# Patient Record
Sex: FEMALE | Race: WHITE | NOT HISPANIC OR LATINO | Employment: UNEMPLOYED | URBAN - METROPOLITAN AREA
[De-identification: names, ages, dates, MRNs, and addresses within clinical notes are randomized per-mention and may not be internally consistent; named-entity substitution may affect disease eponyms.]

---

## 2017-01-23 ENCOUNTER — THERAPY VISIT (OUTPATIENT)
Dept: CHIROPRACTIC MEDICINE | Facility: CLINIC | Age: 47
End: 2017-01-23
Payer: COMMERCIAL

## 2017-01-23 DIAGNOSIS — G89.29 CHRONIC LEFT-SIDED LOW BACK PAIN WITH LEFT-SIDED SCIATICA: ICD-10-CM

## 2017-01-23 DIAGNOSIS — M99.01 CERVICAL SEGMENT DYSFUNCTION: ICD-10-CM

## 2017-01-23 DIAGNOSIS — M54.2 CERVICALGIA: ICD-10-CM

## 2017-01-23 DIAGNOSIS — M25.552 HIP PAIN, LEFT: ICD-10-CM

## 2017-01-23 DIAGNOSIS — G89.29 CHRONIC BILATERAL THORACIC BACK PAIN: ICD-10-CM

## 2017-01-23 DIAGNOSIS — M99.03 SOMATIC DYSFUNCTION OF LUMBAR REGION: Primary | ICD-10-CM

## 2017-01-23 DIAGNOSIS — M54.6 CHRONIC BILATERAL THORACIC BACK PAIN: ICD-10-CM

## 2017-01-23 DIAGNOSIS — M54.42 CHRONIC LEFT-SIDED LOW BACK PAIN WITH LEFT-SIDED SCIATICA: ICD-10-CM

## 2017-01-23 PROCEDURE — 98941 CHIROPRACT MANJ 3-4 REGIONS: CPT | Mod: AT | Performed by: CHIROPRACTOR

## 2017-01-24 ENCOUNTER — OFFICE VISIT (OUTPATIENT)
Dept: FAMILY MEDICINE | Facility: CLINIC | Age: 47
End: 2017-01-24
Payer: COMMERCIAL

## 2017-01-24 VITALS
OXYGEN SATURATION: 99 % | BODY MASS INDEX: 32.07 KG/M2 | DIASTOLIC BLOOD PRESSURE: 80 MMHG | HEIGHT: 65 IN | TEMPERATURE: 98 F | HEART RATE: 101 BPM | SYSTOLIC BLOOD PRESSURE: 119 MMHG | WEIGHT: 192.5 LBS

## 2017-01-24 DIAGNOSIS — J20.9 ACUTE BRONCHITIS WITH SYMPTOMS > 10 DAYS: Primary | ICD-10-CM

## 2017-01-24 DIAGNOSIS — J45.21 MILD INTERMITTENT ASTHMA WITH ACUTE EXACERBATION: ICD-10-CM

## 2017-01-24 DIAGNOSIS — J30.9 ALLERGIC RHINITIS, UNSPECIFIED ALLERGIC RHINITIS TRIGGER, UNSPECIFIED RHINITIS SEASONALITY: ICD-10-CM

## 2017-01-24 PROCEDURE — 99214 OFFICE O/P EST MOD 30 MIN: CPT | Performed by: FAMILY MEDICINE

## 2017-01-24 RX ORDER — AZITHROMYCIN 250 MG/1
TABLET, FILM COATED ORAL
Qty: 6 TABLET | Refills: 0 | Status: SHIPPED | OUTPATIENT
Start: 2017-01-24 | End: 2017-01-30

## 2017-01-24 RX ORDER — PREDNISONE 20 MG/1
20 TABLET ORAL 2 TIMES DAILY
Qty: 10 TABLET | Refills: 0 | Status: SHIPPED | OUTPATIENT
Start: 2017-01-24 | End: 2017-09-28

## 2017-01-24 NOTE — NURSING NOTE
"Chief Complaint   Patient presents with     URI     Initial Pulse 101  Temp(Src) 98  F (36.7  C) (Oral)  Ht 5' 5.25\" (1.657 m)  Wt 192 lb 8 oz (87.317 kg)  BMI 31.80 kg/m2  SpO2 99% Estimated body mass index is 31.8 kg/(m^2) as calculated from the following:    Height as of this encounter: 5' 5.25\" (1.657 m).    Weight as of this encounter: 192 lb 8 oz (87.317 kg)..  BP completed using cuff size: leon Hernandez MA   "

## 2017-01-24 NOTE — PATIENT INSTRUCTIONS
Flonase 1 spray each nose daily 2 weeks  Zyrtec 10 mg daily 2 weeks  Mucinex 600 mg twice a day for 10 days   Supportive care as below  Schedule your albuterol every 6 hrs next three days will help with symptoms  zpak as directed   Chest xray if not better   Follow up with your Primary Dr Briggs   Your symptoms and exam today indicate that you have a viral upper respiratory illness.  This includes viral rhinosinusitis and viral bronchitis.  Antibiotics do not help viral illnesses; the best remedies treat the symptoms (see below).  The typical course of a viral illness is that you feel rather miserable for the first few days - with sore throat, runny nose/nasal congestion, cough, and sometimes fever and body aches.  You should start to feel better after about 5-7 days and much better by 10-14 days.  If you develop sudden worsening of symptoms or fever after the first 5-7 days, or if you have persistence of your symptoms beyond 14 days, let us know as you may have developed a secondary bacterial infection.      For symptom relief I suggest tryin. Steam.  Take a long, hot shower.  Or if you don't want to get in the shower just run it with the bathroom door shut for a few minutes and breathe the steam.  2. Drink hot liquids frequently such as tea or hot water with honey and lemon.  3. Acetaminophen (Tylenol) and ibuprofen (Motrin or Advil) as needed for headache, sore throat, body aches, or fever.  4. For loosening phlegm and sputum try guaifenesin (available in many combination products and alone as plain Robitussin or plain Mucinex) and for cough suppression you can try dextromethorphan (Delsym or combined in other products).  5. For nasal congestion try:    An oral decongestant.  The only decongestant I recommend is pseudoephedrine. Ask the pharmacist for the over the counter (but real) pseudoephedrine - not phenylephrine.  This can raise your blood pressure and heart rate so do not use this if you have  hypertension.       Afrin spray for 3 days.  (Never use afrin nasal spray for more than 3 days as there is a risk of developing tolerance and rebound/worsening nasal congestion if used longer than this.)    Nealmed sinus rinses.    Nasal steroid spray such as nasacort or flonase, which are over-the-counter.  6. And most importantly: plenty of rest and sleep  especially while you have a fever.     Stop smoking and avoid secondhand smoke    Drink lots of fluids such as water and clear soups. Fluids help loosen mucus. Fluids are also important because they help prevent dehydration.    Gargle with warm salt water a few times a day to relieve a sore throat. Throat sprays or lozenges may also help relieve the pain.    Avoid alcohol.    Use saline (salt water) nose drops to help loosen mucus and moisten the tender skin in your nose.  Encouraged mucinex, warm salt water gargles, cepacol spray, soothers/lozenges, sinus rinses (neilmed), flonase (2 sprays per nostril daily x 2 weeks), vitamin c, fluids and rest.  May alternate tylenol and NSAIDS (ibuprofen, advil, aleve type products) every 4-6 hours for the next few days as needed.
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
## 2017-01-24 NOTE — MR AVS SNAPSHOT
After Visit Summary   2017    Isabela Pnachal    MRN: [id number]           Patient Information     Date Of Birth          1970        Visit Information        Provider Department      2017 9:20 AM Blanca Espinosa MD [facility]        Today's Diagnoses     Acute bronchitis with symptoms > 10 days    -  1     Mild intermittent asthma with acute exacerbation         Allergic rhinitis, unspecified allergic rhinitis trigger, unspecified rhinitis seasonality           Care Instructions    Flonase 1 spray each nose daily 2 weeks  Zyrtec 10 mg daily 2 weeks  Mucinex 600 mg twice a day for 10 days   Supportive care as below  Schedule your albuterol every 6 hrs next three days will help with symptoms  zpak as directed   Chest xray if not better   Follow up with your Primary Dr Briggs   Your symptoms and exam today indicate that you have a viral upper respiratory illness.  This includes viral rhinosinusitis and viral bronchitis.  Antibiotics do not help viral illnesses; the best remedies treat the symptoms (see below).  The typical course of a viral illness is that you feel rather miserable for the first few days - with sore throat, runny nose/nasal congestion, cough, and sometimes fever and body aches.  You should start to feel better after about 5-7 days and much better by 10-14 days.  If you develop sudden worsening of symptoms or fever after the first 5-7 days, or if you have persistence of your symptoms beyond 14 days, let us know as you may have developed a secondary bacterial infection.      For symptom relief I suggest tryin. Steam.  Take a long, hot shower.  Or if you don't want to get in the shower just run it with the bathroom door shut for a few minutes and breathe the steam.  2. Drink hot liquids frequently such as tea or hot water with honey and lemon.  3. Acetaminophen (Tylenol) and ibuprofen (Motrin or Advil) as needed for headache, sore throat, body aches, or  fever.  4. For loosening phlegm and sputum try guaifenesin (available in many combination products and alone as plain Robitussin or plain Mucinex) and for cough suppression you can try dextromethorphan (Delsym or combined in other products).  5. For nasal congestion try:    An oral decongestant.  The only decongestant I recommend is pseudoephedrine. Ask the pharmacist for the over the counter (but real) pseudoephedrine - not phenylephrine.  This can raise your blood pressure and heart rate so do not use this if you have hypertension.       Afrin spray for 3 days.  (Never use afrin nasal spray for more than 3 days as there is a risk of developing tolerance and rebound/worsening nasal congestion if used longer than this.)    Nealmed sinus rinses.    Nasal steroid spray such as nasacort or flonase, which are over-the-counter.  6. And most importantly: plenty of rest and sleep  especially while you have a fever.     Stop smoking and avoid secondhand smoke    Drink lots of fluids such as water and clear soups. Fluids help loosen mucus. Fluids are also important because they help prevent dehydration.    Gargle with warm salt water a few times a day to relieve a sore throat. Throat sprays or lozenges may also help relieve the pain.    Avoid alcohol.    Use saline (salt water) nose drops to help loosen mucus and moisten the tender skin in your nose.  Encouraged mucinex, warm salt water gargles, cepacol spray, soothers/lozenges, sinus rinses (neilmed), flonase (2 sprays per nostril daily x 2 weeks), vitamin c, fluids and rest.  May alternate tylenol and NSAIDS (ibuprofen, advil, aleve type products) every 4-6 hours for the next few days as needed.             Follow-ups after your visit        Who to contact     If you have questions or need follow up information about today's clinic visit or your schedule please contact [facility] directly at [phone].  Normal or non-critical lab and imaging results will  "be communicated to you by MyChart, letter or phone within 4 business days after the clinic has received the results. If you do not hear from us within 7 days, please contact the clinic through Sequence Design or phone. If you have a critical or abnormal lab result, we will notify you by phone as soon as possible.  Submit refill requests through Sequence Design or call your pharmacy and they will forward the refill request to us. Please allow 3 business days for your refill to be completed.          Additional Information About Your Visit        Sequence Design Information     Sequence Design lets you send messages to your doctor, view your test results, renew your prescriptions, schedule appointments and more. To sign up, go to [email or web address] . Click on \"Log in\" on the left side of the screen, which will take you to the Welcome page. Then click on \"Sign up Now\" on the right side of the page.     You will be asked to enter the access code listed below, as well as some personal information. Please follow the directions to create your username and password.     Your access code is: [email or web address]  Expires: 2017  9:53 AM     Your access code will  in 90 days. If you need help or a new code, please call your [place] clinic or [phone].        Care EveryWhere ID     This is your Care EveryWhere ID. This could be used by other organizations to access your [place] medical records  [id number]        Your Vitals Were     Pulse Temperature Height BMI (Body Mass Index) Pulse Oximetry       101 98  F (36.7  C) (Oral) 5' 5.25\" (1.657 m) 31.80 kg/m2 99%        Blood Pressure from Last 3 Encounters:   17 119/80   16 123/84   16 122/73    Weight from Last 3 Encounters:   17 192 lb 8 oz (87.317 kg)   16 189 lb 12.8 oz (86.093 kg)   16 195 lb (88.451 kg)              Today, you had the following     No orders found for display         Today's Medication Changes          These changes are accurate as of: " 1/24/17  9:53 AM.  If you have any questions, ask your nurse or doctor.               Start taking these medicines.        Dose/Directions    azithromycin 250 MG tablet   Commonly known as:  ZITHROMAX   Used for:  Acute bronchitis with symptoms > 10 days   Started by:  Blanca Espinosa MD        Two tablets first day, then one tablet daily for four days.   Quantity:  6 tablet   Refills:  0       predniSONE 20 MG tablet   Commonly known as:  DELTASONE   Used for:  Acute bronchitis with symptoms > 10 days, Mild intermittent asthma with acute exacerbation   Started by:  Blanca Espinosa MD        Dose:  20 mg   Take 1 tablet (20 mg) by mouth 2 times daily   Quantity:  10 tablet   Refills:  0         Stop taking these medicines if you haven't already. Please contact your care team if you have questions.     nitrofurantoin (macrocrystal-monohydrate) 100 MG capsule   Commonly known as:  MACROBID   Stopped by:  Blanca Espinosa MD                Where to get your medicines      These medications were sent to [facility] [place] [address]  [address] [facility] [address]     Phone:  [phone]    - azithromycin 250 MG tablet  - predniSONE 20 MG tablet             Primary Care Provider Office Phone # Fax #    Felisha Briggs [phone]       [facility] [address]  [facility] [address]        Thank you!     Thank you for choosing [facility]  for your care. Our goal is always to provide you with excellent care. Hearing back from our patients is one way we can continue to improve our services. Please take a few minutes to complete the written survey that you may receive in the mail after your visit with us. Thank you!             Your Updated Medication List - Protect others around you: Learn how to safely use, store and throw away your medicines at www.disposemymeds.org.          This list is accurate as of: 1/24/17  9:53 AM.  Always use  your most recent med list.                   Brand Name Dispense Instructions for use    albuterol 108 (90 BASE) MCG/ACT Inhaler    PROAIR HFA/PROVENTIL HFA/VENTOLIN HFA    2 Inhaler    Inhale 2 puffs into the lungs every 6 hours as needed for shortness of breath / dyspnea or wheezing       azithromycin 250 MG tablet    ZITHROMAX    6 tablet    Two tablets first day, then one tablet daily for four days.       CALCIUM + D PO      Take  by mouth daily.       Capsaicin 0.1 % Crea     42.5 g    Externally apply 1 g topically 2 times daily as needed       fluticasone 50 MCG/ACT spray    FLONASE    16 g    Spray 2 sprays into both nostrils daily       gabapentin 300 MG capsule    NEURONTIN     Indications: Headache Take 300mg po BID x 7 days, then increase to 300mg po TID x 7 days, then increase to 600mg po TID.       lisinopril 10 MG tablet    PRINIVIL/ZESTRIL    90 tablet    Take 1 tablet (10 mg) by mouth daily       loratadine-pseudoePHEDrine  MG per 24 hr tablet    CLARITIN-D 24-hour    30 tablet    Take 1 tablet by mouth daily       MIRENA (52 MG) 20 MCG/24HR IUD   Generic drug:  levonorgestrel      USE FOR UP TO 5 YEARS THEN REMOVE       predniSONE 20 MG tablet    DELTASONE    10 tablet    Take 1 tablet (20 mg) by mouth 2 times daily       traZODone 100 MG tablet    DESYREL    90 tablet    Take 1 tablet (100 mg) by mouth At Bedtime Please profile.       venlafaxine 150 MG 24 hr capsule    EFFEXOR-XR    90 capsule    Take 1 capsule (150 mg) by mouth daily Please profile.

## 2017-01-24 NOTE — PROGRESS NOTES
Visit #:  1/2017  Authorized to 04-23/2017    Subjective:  Isabela Panchal is a 45 year old female who is seen in f/u up for:        Somatic dysfunction of lumbar region  Chronic bilateral thoracic back pain  Chronic left-sided low back pain with left-sided sciatica  Hip pain, left  Cervical segment dysfunction  Cervicalgia.     Since last visit on 5/23/2016,  Isabela Panchal reports: 5-6/10 on VAS     Area of chief complaint:  Cervical, Thoracic and Lumbar :  Symptoms are graded at 5/10 on VAS. The quality is described as stiff, stabbing, dull.  Motion has increased, but is still not normal. The pt returns with L sided neck pain and HA that started this past week. She graded the pain a 6-7/10 on VAS at the time. She relates the episode to possible stress.  Previously her HA sx had been controlled and she denied taking medication to resolve the issue. The pt also reports low back discomfort more on the L side. She states she was shoveling snow and may have irritated the low back area.     Since last visit the patient feels that they are 50 percent  improved from last visit.       Objective:  The following was observed:    P: pain elicited on palpation, C2, C3, T5, T6, L4, L5, SACRUM, L PSIS  A: static palpation demonstrates intersegmental asymmetry C2, C3, T6, L4, L5, SACRUM, L PSIS  R: motion palpation notes restricted motion  T: hypertonicity at: Gluteal, Levator scapulae, Lumbar erector spine and Quad lumb L>>R    Segmental spinal dysfunction/restrictions found at:  C2 ,C3, T5, T6, L4, L5, SACRUM, L PSIS.      Assessment:    Diagnoses:      1. Somatic dysfunction of lumbar region    2. Chronic bilateral thoracic back pain    3. Chronic left-sided low back pain with left-sided sciatica    4. Hip pain, left    5. Cervical segment dysfunction    6. Cervicalgia        Patient's condition:  Exacerbation/regression    Treatment effectiveness:  No increase in sx, pt stable       Procedures:  CMT:  46398  Chiropractic manipulative treatment 3-4 regions performed manual adjustment  Cervical: diversified  C2, C3 , C7 , Prone  Thoracic: diversified , T1, T5, T6, Prone  Lumbar: diversified  L4, L5, Prone  Pelvis: Drop Table, Sacrum , PSIS Left , Prone    Modalities:  none    Therapeutic procedures: none       Response to Treatment  Reduction of symptoms no increase in sx, pt stable     Prognosis: Guarded    Progress towards Goals: Patient is making progress towards the goal: pending    Recommendations:    Instructions:drink plenty of fluids    Follow-up:  Return to care: none PRN if sx persist

## 2017-02-13 ENCOUNTER — THERAPY VISIT (OUTPATIENT)
Dept: CHIROPRACTIC MEDICINE | Facility: CLINIC | Age: 47
End: 2017-02-13
Payer: COMMERCIAL

## 2017-02-13 DIAGNOSIS — M99.03 SOMATIC DYSFUNCTION OF LUMBAR REGION: Primary | ICD-10-CM

## 2017-02-13 DIAGNOSIS — M54.2 CERVICALGIA: ICD-10-CM

## 2017-02-13 DIAGNOSIS — M99.02 THORACIC SEGMENT DYSFUNCTION: ICD-10-CM

## 2017-02-13 DIAGNOSIS — M54.50 CHRONIC LEFT-SIDED LOW BACK PAIN WITHOUT SCIATICA: ICD-10-CM

## 2017-02-13 DIAGNOSIS — G89.29 CHRONIC LEFT-SIDED LOW BACK PAIN WITHOUT SCIATICA: ICD-10-CM

## 2017-02-13 DIAGNOSIS — R51.9 CEPHALGIA: ICD-10-CM

## 2017-02-13 DIAGNOSIS — M99.01 CERVICAL SEGMENT DYSFUNCTION: ICD-10-CM

## 2017-02-13 PROCEDURE — 98941 CHIROPRACT MANJ 3-4 REGIONS: CPT | Mod: AT | Performed by: CHIROPRACTOR

## 2017-02-13 NOTE — PROGRESS NOTES
Visit #:  2/2017  Authorized to 04-23/2017    Subjective:  Isabela Panchal is a 45 year old female who is seen in f/u up for:        Somatic dysfunction of lumbar region  Chronic left-sided low back pain without sciatica  Cervical segment dysfunction  Cervicalgia  Thoracic segment dysfunction  Cephalgia.     Since last visit on 5/23/2016,  Isabela Panchal reports: 5-6/10 on VAS     Area of chief complaint:  Cervical, Thoracic and Lumbar :  Symptoms are graded at 8/10 on VAS. The quality is described as stiff, stabbing, dull.  Motion has increased, but is still not normal. The pt reports a door slamming into her L hip area last week. She also reported moderate pain in the L shoulder area that she relates to the injury. The pt is not able to lift the arm or sleep on the L arm. She reports moderate mid back pain and pain radiating into the L medial 2 fingers coming from the L posterior shoulder. She continues to report HA however no migraine sx. The pt denies weakness or other unusual sx.     Since last visit the patient feels that they are 50 percent  improved from last visit-exacerbation/regression.       Objective:  The following was observed:    P: pain elicited on palpation, C2, C3, T5, T6, L4, L5, SACRUM, L PSIS  A: static palpation demonstrates intersegmental asymmetry C2, C3, T6, L4, L5, SACRUM, L PSIS  R: motion palpation notes restricted motion  T: hypertonicity at: Gluteal, Levator scapulae, Lumbar erector spine and Quad lumb L>>R    Segmental spinal dysfunction/restrictions found at:  C2 ,C3, T5, T6, L4, L5, SACRUM, L PSIS.      Assessment:    Diagnoses:      1. Somatic dysfunction of lumbar region    2. Chronic left-sided low back pain without sciatica    3. Cervical segment dysfunction    4. Cervicalgia    5. Thoracic segment dysfunction    6. Cephalgia        Patient's condition:  Exacerbation/regression    Treatment effectiveness:  No increase in sx, pt stable       Procedures:  CMT:  92446  Chiropractic manipulative treatment 3-4 regions performed manual adjustment  Cervical: diversified  C2, C3 , C7 , Prone  Thoracic: diversified , T1, T5, T6, Prone  Lumbar: diversified  L4, L5, Prone  Pelvis: Drop Table, Sacrum , PSIS Left , Prone    Modalities:  none    Therapeutic procedures: none       Response to Treatment  Reduction of symptoms no increase in sx, pt stable     Prognosis: Guarded    Progress towards Goals: Patient is making progress towards the goal: pending    Recommendations:    Instructions:drink plenty of fluids    Follow-up:  Return to care: 1 week to stabilize

## 2017-02-13 NOTE — MR AVS SNAPSHOT
"              After Visit Summary   2/13/2017    Isabela Panchal    MRN: [id number]           Patient Information     Date Of Birth          1970        Visit Information        Provider Department      2/13/2017 11:00 AM Eusebia Jenkins DC [facility]        Today's Diagnoses     Somatic dysfunction of lumbar region    -  1    Chronic left-sided low back pain without sciatica        Cervical segment dysfunction        Cervicalgia        Thoracic segment dysfunction        Cephalgia           Follow-ups after your visit        Your next 10 appointments already scheduled     Feb 27, 2017 11:45 AM CST   [facility]ractor with [facility] ([facility]  )    [address]  [place] [address] [phone]              Who to contact     If you have questions or need follow up information about today's clinic visit or your schedule please contact [facility] directly at [phone].  Normal or non-critical lab and imaging results will be communicated to you by GENWIhart, letter or phone within 4 business days after the clinic has received the results. If you do not hear from us within 7 days, please contact the clinic through GENWIhart or phone. If you have a critical or abnormal lab result, we will notify you by phone as soon as possible.  Submit refill requests through Mersive or call your pharmacy and they will forward the refill request to us. Please allow 3 business days for your refill to be completed.          Additional Information About Your Visit        Mersive Information     Mersive lets you send messages to your doctor, view your test results, renew your prescriptions, schedule appointments and more. To sign up, go to [email or web address] . Click on \"Log in\" on the left side of the screen, which will take you to the Welcome page. Then click on \"Sign up Now\" on the right side of the page.     You will be asked to enter the access code listed " below, as well as some personal information. Please follow the directions to create your username and password.     Your access code is: [email or web address]  Expires: 2017  9:53 AM     Your access code will  in 90 days. If you need help or a new code, please call your [place] clinic or [phone].        Care EveryWhere ID     This is your Care EveryWhere ID. This could be used by other organizations to access your [place] medical records  [id number]         Blood Pressure from Last 3 Encounters:   17 119/80   16 123/84   16 122/73    Weight from Last 3 Encounters:   17 87.3 kg (192 lb 8 oz)   16 86.1 kg (189 lb 12.8 oz)   16 88.5 kg (195 lb)              We Performed the Following     CHIROPRAC MANIP,SPINAL,3-4 REGIONS        Primary Care Provider Office Phone # Fax #    Felisha Briggs [phone]       [facility] [address]  [facility] [address]        Thank you!     Thank you for choosing [facility]  for your care. Our goal is always to provide you with excellent care. Hearing back from our patients is one way we can continue to improve our services. Please take a few minutes to complete the written survey that you may receive in the mail after your visit with us. Thank you!             Your Updated Medication List - Protect others around you: Learn how to safely use, store and throw away your medicines at www.disposemymeds.org.          This list is accurate as of: 17 12:05 PM.  Always use your most recent med list.                   Brand Name Dispense Instructions for use    albuterol 108 (90 BASE) MCG/ACT Inhaler    PROAIR HFA/PROVENTIL HFA/VENTOLIN HFA    2 Inhaler    Inhale 2 puffs into the lungs every 6 hours as needed for shortness of breath / dyspnea or wheezing       azithromycin 250 MG tablet    ZITHROMAX    6 tablet    Two tablets first day, then one tablet daily for four days.       CALCIUM + D PO       Take  by mouth daily.       Capsaicin 0.1 % Crea     42.5 g    Externally apply 1 g topically 2 times daily as needed       fluticasone 50 MCG/ACT spray    FLONASE    16 g    Spray 2 sprays into both nostrils daily       gabapentin 300 MG capsule    NEURONTIN     Indications: Headache Take 300mg po BID x 7 days, then increase to 300mg po TID x 7 days, then increase to 600mg po TID.       lisinopril 10 MG tablet    PRINIVIL/ZESTRIL    90 tablet    Take 1 tablet (10 mg) by mouth daily       loratadine-pseudoePHEDrine  MG per 24 hr tablet    CLARITIN-D 24-hour    30 tablet    Take 1 tablet by mouth daily       MIRENA (52 MG) 20 MCG/24HR IUD   Generic drug:  levonorgestrel      USE FOR UP TO 5 YEARS THEN REMOVE       predniSONE 20 MG tablet    DELTASONE    10 tablet    Take 1 tablet (20 mg) by mouth 2 times daily       traZODone 100 MG tablet    DESYREL    90 tablet    Take 1 tablet (100 mg) by mouth At Bedtime Please profile.       venlafaxine 150 MG 24 hr capsule    EFFEXOR-XR    90 capsule    Take 1 capsule (150 mg) by mouth daily Please profile.

## 2017-03-16 DIAGNOSIS — Z20.828 EXPOSURE TO THE FLU: Primary | ICD-10-CM

## 2017-03-16 RX ORDER — OSELTAMIVIR PHOSPHATE 75 MG/1
75 CAPSULE ORAL DAILY
Qty: 10 CAPSULE | Refills: 0 | Status: SHIPPED | OUTPATIENT
Start: 2017-03-16 | End: 2017-03-20

## 2017-03-16 NOTE — TELEPHONE ENCOUNTER
I agree with assessment and plan below, thanks!  Felisha Briggs  3/16/2017   ASSESSMENT / PLAN:  (Z20.828) Exposure to the flu  (primary encounter diagnosis)  Comment: refill signed.  Plan: oseltamivir (TAMIFLU) 75 MG capsule        Routed to MA, please let patient know she can  prescription at [facility], thanks. -Felisha

## 2017-03-16 NOTE — TELEPHONE ENCOUNTER
Routing to PCP.    Dr. Briggs-Please review and advise.  Med pended.    Thank you!  JOANIE Smith, BRETTN, RN

## 2017-03-28 ENCOUNTER — OFFICE VISIT (OUTPATIENT)
Dept: FAMILY MEDICINE | Facility: CLINIC | Age: 47
End: 2017-03-28
Payer: COMMERCIAL

## 2017-03-28 VITALS
HEART RATE: 92 BPM | OXYGEN SATURATION: 96 % | TEMPERATURE: 99.2 F | WEIGHT: 200 LBS | BODY MASS INDEX: 33.03 KG/M2 | SYSTOLIC BLOOD PRESSURE: 125 MMHG | DIASTOLIC BLOOD PRESSURE: 79 MMHG

## 2017-03-28 DIAGNOSIS — F33.1 MODERATE RECURRENT MAJOR DEPRESSION (H): Primary | ICD-10-CM

## 2017-03-28 DIAGNOSIS — F41.1 GENERALIZED ANXIETY DISORDER: ICD-10-CM

## 2017-03-28 DIAGNOSIS — L01.00 IMPETIGO: ICD-10-CM

## 2017-03-28 PROCEDURE — 99213 OFFICE O/P EST LOW 20 MIN: CPT | Performed by: FAMILY MEDICINE

## 2017-03-28 RX ORDER — MUPIROCIN 20 MG/G
OINTMENT TOPICAL 3 TIMES DAILY
Qty: 22 G | Refills: 1 | Status: SHIPPED | OUTPATIENT
Start: 2017-03-28 | End: 2017-04-02

## 2017-03-28 ASSESSMENT — ANXIETY QUESTIONNAIRES
7. FEELING AFRAID AS IF SOMETHING AWFUL MIGHT HAPPEN: NOT AT ALL
GAD7 TOTAL SCORE: 5
1. FEELING NERVOUS, ANXIOUS, OR ON EDGE: SEVERAL DAYS
5. BEING SO RESTLESS THAT IT IS HARD TO SIT STILL: NOT AT ALL
6. BECOMING EASILY ANNOYED OR IRRITABLE: SEVERAL DAYS
2. NOT BEING ABLE TO STOP OR CONTROL WORRYING: SEVERAL DAYS
3. WORRYING TOO MUCH ABOUT DIFFERENT THINGS: SEVERAL DAYS
IF YOU CHECKED OFF ANY PROBLEMS ON THIS QUESTIONNAIRE, HOW DIFFICULT HAVE THESE PROBLEMS MADE IT FOR YOU TO DO YOUR WORK, TAKE CARE OF THINGS AT HOME, OR GET ALONG WITH OTHER PEOPLE: SOMEWHAT DIFFICULT

## 2017-03-28 ASSESSMENT — PATIENT HEALTH QUESTIONNAIRE - PHQ9: 5. POOR APPETITE OR OVEREATING: SEVERAL DAYS

## 2017-03-28 NOTE — MR AVS SNAPSHOT
"              After Visit Summary   3/28/2017    Isabela Panchal    MRN: [id number]           Patient Information     Date Of Birth          1970        Visit Information        Provider Department      3/28/2017 1:40 PM Felisha Briggs MD [facility]        Today's Diagnoses     Moderate recurrent major depression (H)    -  1    Generalized anxiety disorder        Impetigo           Follow-ups after your visit        Who to contact     If you have questions or need follow up information about today's clinic visit or your schedule please contact [facility] directly at [phone].  Normal or non-critical lab and imaging results will be communicated to you by [employer], letter or phone within 4 business days after the clinic has received the results. If you do not hear from us within 7 days, please contact the clinic through [employer] or phone. If you have a critical or abnormal lab result, we will notify you by phone as soon as possible.  Submit refill requests through [employer] or call your pharmacy and they will forward the refill request to us. Please allow 3 business days for your refill to be completed.          Additional Information About Your Visit        MyChart Information     [employer] lets you send messages to your doctor, view your test results, renew your prescriptions, schedule appointments and more. To sign up, go to [email or web address] . Click on \"Log in\" on the left side of the screen, which will take you to the Welcome page. Then click on \"Sign up Now\" on the right side of the page.     You will be asked to enter the access code listed below, as well as some personal information. Please follow the directions to create your username and password.     Your access code is: [email or web address]  Expires: 2017 10:53 AM     Your access code will  in 90 days. If you need help or a new code, please call your [facility] or [phone].        Care " EveryWhere ID     This is your Care EveryWhere ID. This could be used by other organizations to access your [place] medical records  [id number]        Your Vitals Were     Pulse Temperature Pulse Oximetry BMI (Body Mass Index)          92 99.2  F (37.3  C) (Tympanic) 96% 33.03 kg/m2         Blood Pressure from Last 3 Encounters:   03/28/17 125/79   01/24/17 119/80   11/23/16 123/84    Weight from Last 3 Encounters:   03/28/17 200 lb (90.7 kg)   01/24/17 192 lb 8 oz (87.3 kg)   11/23/16 189 lb 12.8 oz (86.1 kg)              We Performed the Following     DEPRESSION ACTION PLAN (DAP)          Today's Medication Changes          These changes are accurate as of: 3/28/17  2:24 PM.  If you have any questions, ask your nurse or doctor.               Start taking these medicines.        Dose/Directions    mupirocin 2 % ointment   Commonly known as:  BACTROBAN   Used for:  Impetigo   Started by:  Felisha Briggs MD        Apply topically 3 times daily for 5 days   Quantity:  22 g   Refills:  1            Where to get your medicines      These medications were sent to [facility] [place] [address]  [address] [facility] [address]     Phone:  [phone]     mupirocin 2 % ointment                Primary Care Provider Office Phone # Fax #    Felisha Briggs [phone]       [facility] [address]  [facility] [address]        Thank you!     Thank you for choosing [facility]  for your care. Our goal is always to provide you with excellent care. Hearing back from our patients is one way we can continue to improve our services. Please take a few minutes to complete the written survey that you may receive in the mail after your visit with us. Thank you!             Your Updated Medication List - Protect others around you: Learn how to safely use, store and throw away your medicines at www.disposemymeds.org.          This  list is accurate as of: 3/28/17  2:24 PM.  Always use your most recent med list.                   Brand Name Dispense Instructions for use    albuterol 108 (90 BASE) MCG/ACT Inhaler    PROAIR HFA/PROVENTIL HFA/VENTOLIN HFA    2 Inhaler    Inhale 2 puffs into the lungs every 6 hours as needed for shortness of breath / dyspnea or wheezing       azithromycin 250 MG tablet    ZITHROMAX    6 tablet    Two tablets first day, then one tablet daily for four days.       CALCIUM + D PO      Take  by mouth daily.       Capsaicin 0.1 % Crea     42.5 g    Externally apply 1 g topically 2 times daily as needed       fluticasone 50 MCG/ACT spray    FLONASE    16 g    Spray 2 sprays into both nostrils daily       gabapentin 300 MG capsule    NEURONTIN     Indications: Headache Take 300mg po BID x 7 days, then increase to 300mg po TID x 7 days, then increase to 600mg po TID.       lisinopril 10 MG tablet    PRINIVIL/ZESTRIL    90 tablet    Take 1 tablet (10 mg) by mouth daily       loratadine-pseudoePHEDrine  MG per 24 hr tablet    CLARITIN-D 24-hour    30 tablet    Take 1 tablet by mouth daily       MIRENA (52 MG) 20 MCG/24HR IUD   Generic drug:  levonorgestrel      USE FOR UP TO 5 YEARS THEN REMOVE       mupirocin 2 % ointment    BACTROBAN    22 g    Apply topically 3 times daily for 5 days       oseltamivir 75 MG capsule    TAMIFLU    10 capsule    Take 1 capsule (75 mg) by mouth daily       predniSONE 20 MG tablet    DELTASONE    10 tablet    Take 1 tablet (20 mg) by mouth 2 times daily       traZODone 100 MG tablet    DESYREL    90 tablet    Take 1 tablet (100 mg) by mouth At Bedtime Please profile.       * venlafaxine 150 MG 24 hr capsule    EFFEXOR-XR    90 capsule    Take 1 capsule (150 mg) by mouth daily Please profile.       * venlafaxine 150 MG 24 hr capsule    EFFEXOR-XR    90 capsule    TAKE ONE CAPSULE BY MOUTH EVERY DAY (NEED TO BE SEEN IN CLINIC FOR FURTHER REFILLS)       * Notice:  This list has 2  medication(s) that are the same as other medications prescribed for you. Read the directions carefully, and ask your doctor or other care provider to review them with you.
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
## 2017-03-28 NOTE — LETTER
My Depression Action Plan  Name: Isabela Panchal   Date of Birth 1970  Date: 3/28/2017    My doctor: Felisha Briggs   My clinic: [address] [address] [phone]          GREEN    ZONE   Good Control    What it looks like:     Things are going generally well. You have normal up s and down s. You may even feel depressed from time to time, but bad moods usually last less than a day.   What you need to do:  1. Continue to care for yourself (see self care plan)  2. Check your depression survival kit and update it as needed  3. Follow your physician s recommendations including any medication.  4. Do not stop taking medication unless you consult with your physician first.           YELLOW         ZONE Getting Worse    What it looks like:     Depression is starting to interfere with your life.     It may be hard to get out of bed; you may be starting to isolate yourself from others.    Symptoms of depression are starting to last most all day and this has happened for several days.     You may have suicidal thoughts but they are not constant.   What you need to do:     1. Call your care team, your response to treatment will improve if you keep your care team informed of your progress. Yellow periods are signs an adjustment may need to be made.     2. Continue your self-care, even if you have to fake it!    3. Talk to someone in your support network    4. Open up your depression survival kit           RED    ZONE Medical Alert - Get Help    What it looks like:     Depression is seriously interfering with your life.     You may experience these or other symptoms: You can t get out of bed most days, can t work or engage in other necessary activities, you have trouble taking care of basic hygiene, or basic responsibilities, thoughts of suicide or death that will not go away, self-injurious behavior.     What you need to do:  1. Call your  care team and request a same-day appointment. If they are not available (weekends or after hours) call your local crisis line, emergency room or 911.      Electronically signed by: Ina Toussaint, March 28, 2017    Depression Self Care Plan / Survival Kit    Self-Care for Depression  Here s the deal. Your body and mind are really not as separate as most people think.  What you do and think affects how you feel and how you feel influences what you do and think. This means if you do things that people who feel good do, it will help you feel better.  Sometimes this is all it takes.  There is also a place for medication and therapy depending on how severe your depression is, so be sure to consult with your medical provider and/ or Behavioral Health Consultant if your symptoms are worsening or not improving.     In order to better manage my stress, I will:    Exercise  Get some form of exercise, every day. This will help reduce pain and release endorphins, the  feel good  chemicals in your brain. This is almost as good as taking antidepressants!  This is not the same as joining a gym and then never going! (they count on that by the way ) It can be as simple as just going for a walk or doing some gardening, anything that will get you moving.      Hygiene   Maintain good hygiene (Get out of bed in the morning, Make your bed, Brush your teeth, Take a shower, and Get dressed like you were going to work, even if you are unemployed).  If your clothes don't fit try to get ones that do.    Diet  I will strive to eat foods that are good for me, drink plenty of water, and avoid excessive sugar, caffeine, alcohol, and other mood-altering substances.  Some foods that are helpful in depression are: complex carbohydrates, B vitamins, flaxseed, fish or fish oil, fresh fruits and vegetables.    Psychotherapy  I agree to participate in Individual Therapy (if recommended).    Medication  If prescribed medications, I agree to take them.   Missing doses can result in serious side effects.  I understand that drinking alcohol, or other illicit drug use, may cause potential side effects.  I will not stop my medication abruptly without first discussing it with my provider.    Staying Connected With Others  I will stay in touch with my friends, family members, and my primary care provider/team.    Use your imagination  Be creative.  We all have a creative side; it doesn t matter if it s oil painting, sand castles, or mud pies! This will also kick up the endorphins.    Witness Beauty  (AKA stop and smell the roses) Take a look outside, even in mid-winter. Notice colors, textures. Watch the squirrels and birds.     Service to others  Be of service to others.  There is always someone else in need.  By helping others we can  get out of ourselves  and remember the really important things.  This also provides opportunities for practicing all the other parts of the program.    Humor  Laugh and be silly!  Adjust your TV habits for less news and crime-drama and more comedy.    Control your stress  Try breathing deep, massage therapy, biofeedback, and meditation. Find time to relax each day.     My support system    Clinic Contact:  Phone number:    Contact 1:  Phone number:    Contact 2:  Phone number:    Denominational/:  Phone number:    Therapist:  Phone number:    Local crisis center:    Phone number:    Other community support:  Phone number:

## 2017-03-29 ASSESSMENT — PATIENT HEALTH QUESTIONNAIRE - PHQ9: SUM OF ALL RESPONSES TO PHQ QUESTIONS 1-9: 9

## 2017-03-29 ASSESSMENT — ASTHMA QUESTIONNAIRES: ACT_TOTALSCORE: 22

## 2017-03-29 ASSESSMENT — ANXIETY QUESTIONNAIRES: GAD7 TOTAL SCORE: 5

## 2017-04-27 ENCOUNTER — OFFICE VISIT (OUTPATIENT)
Dept: FAMILY MEDICINE | Facility: CLINIC | Age: 47
End: 2017-04-27
Payer: COMMERCIAL

## 2017-04-27 VITALS
OXYGEN SATURATION: 96 % | BODY MASS INDEX: 32.86 KG/M2 | SYSTOLIC BLOOD PRESSURE: 126 MMHG | DIASTOLIC BLOOD PRESSURE: 85 MMHG | WEIGHT: 199 LBS | HEART RATE: 86 BPM | TEMPERATURE: 98.2 F

## 2017-04-27 DIAGNOSIS — M51.369 DDD (DEGENERATIVE DISC DISEASE), LUMBAR: ICD-10-CM

## 2017-04-27 DIAGNOSIS — M54.2 CERVICALGIA: ICD-10-CM

## 2017-04-27 DIAGNOSIS — M62.830 BACK MUSCLE SPASM: Primary | ICD-10-CM

## 2017-04-27 PROCEDURE — 99213 OFFICE O/P EST LOW 20 MIN: CPT | Performed by: FAMILY MEDICINE

## 2017-04-27 RX ORDER — CYCLOBENZAPRINE HCL 10 MG
5-10 TABLET ORAL 3 TIMES DAILY PRN
Qty: 30 TABLET | Refills: 1 | Status: SHIPPED | OUTPATIENT
Start: 2017-04-27 | End: 2017-09-28

## 2017-04-27 ASSESSMENT — ENCOUNTER SYMPTOMS
LOSS OF CONSCIOUSNESS: 0
PHOTOPHOBIA: 1
TREMORS: 0
FOCAL WEAKNESS: 0
GASTROINTESTINAL NEGATIVE: 1
SPEECH CHANGE: 0
CARDIOVASCULAR NEGATIVE: 1
SEIZURES: 0
CHILLS: 0
WEIGHT LOSS: 0
DIZZINESS: 1
BRUISES/BLEEDS EASILY: 0
NECK PAIN: 1
HEADACHES: 1
FALLS: 0
TINGLING: 0
FEVER: 0
RESPIRATORY NEGATIVE: 1
SENSORY CHANGE: 0
MYALGIAS: 1
BACK PAIN: 1

## 2017-04-27 NOTE — MR AVS SNAPSHOT
"              After Visit Summary   4/27/2017    Isabela Panchal    MRN: [id number]           Patient Information     Date Of Birth          1970        Visit Information        Provider Department      4/27/2017 9:20 AM Felisha Briggs MD [facility]        Today's Diagnoses     DDD (degenerative disc disease), lumbar    -  1    Cervicalgia        Back muscle spasm          Care Instructions    Aleve 375 twice per day for the next 5 days  Hot bath every night  Flexeril 3 times per day but will make you drowsy and dizzy  Gentle stretching: touching toes,  \"hug\" yourself, shoulder stretch  2-3 times per day        Follow-ups after your visit        Who to contact     If you have questions or need follow up information about today's clinic visit or your schedule please contact [facility] directly at [phone].  Normal or non-critical lab and imaging results will be communicated to you by MyChart, letter or phone within 4 business days after the clinic has received the results. If you do not hear from us within 7 days, please contact the clinic through [email or web address] or phone. If you have a critical or abnormal lab result, we will notify you by phone as soon as possible.  Submit refill requests through Dailyevent or call your pharmacy and they will forward the refill request to us. Please allow 3 business days for your refill to be completed.          Additional Information About Your Visit        MyChart Information     Dailyevent lets you send messages to your doctor, view your test results, renew your prescriptions, schedule appointments and more. To sign up, go to [email or web address] . Click on \"Log in\" on the left side of the screen, which will take you to the Welcome page. Then click on \"Sign up Now\" on the right side of the page.     You will be asked to enter the access code listed below, as well as some personal information. Please follow the directions to create your " username and password.     Your access code is: [email or web address]  Expires: 2017 10:11 AM     Your access code will  in 90 days. If you need help or a new code, please call your [facility] or [phone].        Care EveryWhere ID     This is your Care EveryWhere ID. This could be used by other organizations to access your [facility] medical records  [id number]        Your Vitals Were     Pulse Temperature Pulse Oximetry BMI (Body Mass Index)          86 98.2  F (36.8  C) (Tympanic) 96% 32.86 kg/m2         Blood Pressure from Last 3 Encounters:   17 126/85   17 125/79   17 119/80    Weight from Last 3 Encounters:   17 199 lb (90.3 kg)   17 200 lb (90.7 kg)   17 192 lb 8 oz (87.3 kg)              Today, you had the following     No orders found for display         Today's Medication Changes          These changes are accurate as of: 17 10:11 AM.  If you have any questions, ask your nurse or doctor.               Start taking these medicines.        Dose/Directions    cyclobenzaprine 10 MG tablet   Commonly known as:  FLEXERIL   Used for:  DDD (degenerative disc disease), lumbar, Cervicalgia   Started by:  Felisha Briggs MD        Dose:  5-10 mg   Take 0.5-1 tablets (5-10 mg) by mouth 3 times daily as needed for muscle spasms   Quantity:  30 tablet   Refills:  1            Where to get your medicines      These medications were sent to [facility] - [place], [place] - [address]  [address] [facility] [address]     Phone:  [phone]     cyclobenzaprine 10 MG tablet                Primary Care Provider Office Phone # Fax #    Felisha Briggs [phone]       [facility] [address]  [facility] [address]        Thank you!     Thank you for choosing [facility]  for your care. Our goal is always to provide you with excellent care. Hearing back from our patients is one way we can  continue to improve our services. Please take a few minutes to complete the written survey that you may receive in the mail after your visit with us. Thank you!             Your Updated Medication List - Protect others around you: Learn how to safely use, store and throw away your medicines at www.disposemymeds.org.          This list is accurate as of: 4/27/17 10:11 AM.  Always use your most recent med list.                   Brand Name Dispense Instructions for use    albuterol 108 (90 BASE) MCG/ACT Inhaler    PROAIR HFA/PROVENTIL HFA/VENTOLIN HFA    2 Inhaler    Inhale 2 puffs into the lungs every 6 hours as needed for shortness of breath / dyspnea or wheezing       CALCIUM + D PO      Take  by mouth daily.       Capsaicin 0.1 % cream     42.5 g    Externally apply 1 g topically 2 times daily as needed       cyclobenzaprine 10 MG tablet    FLEXERIL    30 tablet    Take 0.5-1 tablets (5-10 mg) by mouth 3 times daily as needed for muscle spasms       fluticasone 50 MCG/ACT spray    FLONASE    16 g    Spray 2 sprays into both nostrils daily       gabapentin 300 MG capsule    NEURONTIN     Indications: Headache Take 300mg po BID x 7 days, then increase to 300mg po TID x 7 days, then increase to 600mg po TID.       lisinopril 10 MG tablet    PRINIVIL/ZESTRIL    90 tablet    Take 1 tablet (10 mg) by mouth daily       loratadine-pseudoePHEDrine  MG per 24 hr tablet    CLARITIN-D 24-hour    30 tablet    Take 1 tablet by mouth daily       MIRENA (52 MG) 20 MCG/24HR IUD   Generic drug:  levonorgestrel      USE FOR UP TO 5 YEARS THEN REMOVE       predniSONE 20 MG tablet    DELTASONE    10 tablet    Take 1 tablet (20 mg) by mouth 2 times daily       traZODone 100 MG tablet    DESYREL    90 tablet    Take 1 tablet (100 mg) by mouth At Bedtime Please profile.       * venlafaxine 150 MG 24 hr capsule    EFFEXOR-XR    90 capsule    Take 1 capsule (150 mg) by mouth daily Please profile.       * venlafaxine 150 MG 24 hr  capsule    EFFEXOR-XR    90 capsule    TAKE ONE CAPSULE BY MOUTH EVERY DAY (NEED TO BE SEEN IN CLINIC FOR FURTHER REFILLS)       * Notice:  This list has 2 medication(s) that are the same as other medications prescribed for you. Read the directions carefully, and ask your doctor or other care provider to review them with you.
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
## 2017-04-27 NOTE — PATIENT INSTRUCTIONS
"Aleve 375 twice per day for the next 5 days  Hot bath every night  Flexeril 3 times per day but will make you drowsy and dizzy  Gentle stretching: touching toes,  \"hug\" yourself, shoulder stretch  2-3 times per day  "
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
## 2017-05-15 ENCOUNTER — THERAPY VISIT (OUTPATIENT)
Dept: PHYSICAL THERAPY | Facility: CLINIC | Age: 47
End: 2017-05-15
Payer: COMMERCIAL

## 2017-05-15 DIAGNOSIS — M54.2 CERVICALGIA: Primary | ICD-10-CM

## 2017-05-15 DIAGNOSIS — G89.29 CHRONIC BILATERAL LOW BACK PAIN WITHOUT SCIATICA: ICD-10-CM

## 2017-05-15 DIAGNOSIS — M54.50 CHRONIC BILATERAL LOW BACK PAIN WITHOUT SCIATICA: ICD-10-CM

## 2017-05-15 DIAGNOSIS — M54.6 PAIN IN THORACIC SPINE: ICD-10-CM

## 2017-05-15 PROCEDURE — 97161 PT EVAL LOW COMPLEX 20 MIN: CPT | Mod: GP | Performed by: PHYSICAL THERAPIST

## 2017-05-15 PROCEDURE — 97110 THERAPEUTIC EXERCISES: CPT | Mod: GP | Performed by: PHYSICAL THERAPIST
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
## 2017-05-15 NOTE — MR AVS SNAPSHOT
"              After Visit Summary   5/15/2017    Isabela Panchal    MRN: [id number]           Patient Information     Date Of Birth          1970        Visit Information        Provider Department      5/15/2017 11:00 AM Hermilo Vega, PT [facility]        Today's Diagnoses     Cervicalgia    -  1    Chronic bilateral low back pain without sciatica        Pain in thoracic spine           Follow-ups after your visit        Your next 10 appointments already scheduled     May 22, 2017 11:00 AM CDT   [facility] Spine with Hermilo Vega PT   [facility] ([facility]  )    [address] [place] [address] [phone]              Who to contact     If you have questions or need follow up information about today's clinic visit or your schedule please contact [facility]AP[facility] directly at [phone].  Normal or non-critical lab and imaging results will be communicated to you by [employer], letter or phone within 4 business days after the clinic has received the results. If you do not hear from us within 7 days, please contact the clinic through [employer] or phone. If you have a critical or abnormal lab result, we will notify you by phone as soon as possible.  Submit refill requests through [employer] or call your pharmacy and they will forward the refill request to us. Please allow 3 business days for your refill to be completed.          Additional Information About Your Visit        [employer] Information     [employer] lets you send messages to your doctor, view your test results, renew your prescriptions, schedule appointments and more. To sign up, go to [email or web address] . Click on \"Log in\" on the left side of the screen, which will take you to the Welcome page. Then click on \"Sign up Now\" on the right side of the page.     You will be asked to enter " the access code listed below, as well as some personal information. Please follow the directions to create your username and password.     Your access code is: [email or web address]  Expires: 2017 10:11 AM     Your access code will  in 90 days. If you need help or a new code, please call your [facility] clinic or [phone].        Care EveryWhere ID     This is your Care EveryWhere ID. This could be used by other organizations to access your [facility] medical records  [id number]         Blood Pressure from Last 3 Encounters:   17 126/85   17 125/79   17 119/80    Weight from Last 3 Encounters:   17 90.3 kg (199 lb)   17 90.7 kg (200 lb)   17 87.3 kg (192 lb 8 oz)              We Performed the Following     HC PT EVAL, LOW COMPLEXITY     LEISA INITIAL EVAL REPORT     THERAPEUTIC EXERCISES        Primary Care Provider Office Phone # Fax #    Felisha Briggs [phone]       [facility] [address]  [facility] [address]        Thank you!     Thank you for choosing [facility] PH[facility]ER[facility]  for your care. Our goal is always to provide you with excellent care. Hearing back from our patients is one way we can continue to improve our services. Please take a few minutes to complete the written survey that you may receive in the mail after your visit with us. Thank you!             Your Updated Medication List - Protect others around you: Learn how to safely use, store and throw away your medicines at www.disposemymeds.org.          This list is accurate as of: 5/15/17  1:37 PM.  Always use your most recent med list.                   Brand Name Dispense Instructions for use    albuterol 108 (90 BASE) MCG/ACT Inhaler    PROAIR HFA/PROVENTIL HFA/VENTOLIN HFA    2 Inhaler    Inhale 2 puffs into the lungs every 6 hours as needed for shortness of breath / dyspnea or wheezing       CALCIUM + D PO      Take  by  mouth daily.       Capsaicin 0.1 % cream     42.5 g    Externally apply 1 g topically 2 times daily as needed       cyclobenzaprine 10 MG tablet    FLEXERIL    30 tablet    Take 0.5-1 tablets (5-10 mg) by mouth 3 times daily as needed for muscle spasms       fluticasone 50 MCG/ACT spray    FLONASE    16 g    Spray 2 sprays into both nostrils daily       gabapentin 300 MG capsule    NEURONTIN     Indications: Headache Take 300mg po BID x 7 days, then increase to 300mg po TID x 7 days, then increase to 600mg po TID.       lisinopril 10 MG tablet    PRINIVIL/ZESTRIL    90 tablet    Take 1 tablet (10 mg) by mouth daily       loratadine-pseudoePHEDrine  MG per 24 hr tablet    CLARITIN-D 24-hour    30 tablet    Take 1 tablet by mouth daily       MIRENA (52 MG) 20 MCG/24HR IUD   Generic drug:  levonorgestrel      USE FOR UP TO 5 YEARS THEN REMOVE       predniSONE 20 MG tablet    DELTASONE    10 tablet    Take 1 tablet (20 mg) by mouth 2 times daily       traZODone 100 MG tablet    DESYREL    90 tablet    Take 1 tablet (100 mg) by mouth At Bedtime Please profile.       * venlafaxine 150 MG 24 hr capsule    EFFEXOR-XR    90 capsule    Take 1 capsule (150 mg) by mouth daily Please profile.       * venlafaxine 150 MG 24 hr capsule    EFFEXOR-XR    90 capsule    TAKE ONE CAPSULE BY MOUTH EVERY DAY (NEED TO BE SEEN IN CLINIC FOR FURTHER REFILLS)       * Notice:  This list has 2 medication(s) that are the same as other medications prescribed for you. Read the directions carefully, and ask your doctor or other care provider to review them with you.

## 2017-05-22 ENCOUNTER — THERAPY VISIT (OUTPATIENT)
Dept: PHYSICAL THERAPY | Facility: CLINIC | Age: 47
End: 2017-05-22
Payer: COMMERCIAL

## 2017-05-22 DIAGNOSIS — M54.6 PAIN IN THORACIC SPINE: ICD-10-CM

## 2017-05-22 DIAGNOSIS — M54.2 CERVICALGIA: ICD-10-CM

## 2017-05-22 DIAGNOSIS — G89.29 CHRONIC BILATERAL LOW BACK PAIN WITHOUT SCIATICA: Primary | ICD-10-CM

## 2017-05-22 DIAGNOSIS — M54.50 CHRONIC BILATERAL LOW BACK PAIN WITHOUT SCIATICA: Primary | ICD-10-CM

## 2017-05-22 PROCEDURE — 97140 MANUAL THERAPY 1/> REGIONS: CPT | Mod: GP | Performed by: PHYSICAL THERAPIST

## 2017-05-22 PROCEDURE — 97110 THERAPEUTIC EXERCISES: CPT | Mod: GP | Performed by: PHYSICAL THERAPIST

## 2017-06-01 ENCOUNTER — OFFICE VISIT (OUTPATIENT)
Dept: PODIATRY | Facility: CLINIC | Age: 47
End: 2017-06-01
Payer: COMMERCIAL

## 2017-06-01 VITALS — HEART RATE: 68 BPM | HEIGHT: 65 IN | WEIGHT: 199 LBS | BODY MASS INDEX: 33.15 KG/M2

## 2017-06-01 DIAGNOSIS — M72.2 PLANTAR FASCIITIS OF LEFT FOOT: ICD-10-CM

## 2017-06-01 DIAGNOSIS — M79.672 PAIN OF LEFT HEEL: Primary | ICD-10-CM

## 2017-06-01 PROCEDURE — 99213 OFFICE O/P EST LOW 20 MIN: CPT | Mod: 25 | Performed by: PODIATRIST

## 2017-06-01 PROCEDURE — 20551 NJX 1 TENDON ORIGIN/INSJ: CPT | Mod: LT | Performed by: PODIATRIST

## 2017-06-01 NOTE — PATIENT INSTRUCTIONS
DR. ZAMBRANO'S SCHEDULE:        MONDAY / FRIDAY AM - [place] WEDNESDAY - [place]   [address] [place] [address] [place] [address]   P: [phone] P: [phone]   F: [phone] F:[phone]       TUESDAY - SURGERY THURSDAY - [place]   [phone] [address] [place] [address]   TO SCHEDULE AN APPT CALL: P: [phone] F: [phone]     FYI: Our schedule at [place] on Wednesday is from 7 AM - 2 PM.    [facility] LOCATIONS  [facility]  [address]  [place] [address]  Phone: [phone]  Fax: [phone] [facility]  [address]  [place] [address]  Phone: [phone]   Fax: [phone]   [facility]  [address]  [place] [address]  Phone: [phone]  Fax: [phone] [facility]  [address]  [place] [address]  Phone: [phone]   Fax: [phone]   [facility]   [address]  [place] [address]  Phone: [phone]   Fax: [phone] * Please call any location listed to make an appointment for a casting/fitting. Your referral was sent to their central office and they will all have the order on file.       PLANTAR FASCIITIS    Plantar fasciitis is often referred to as heel spurs or heel pain. Plantar fasciitis is a very common problem that affects people of all foot shapes, age, weight and activity level. Pain may be in the arch or on the weight-bearing surface of the heel. The pain may come on without injury or identifiable cause. Pain is generally present when first getting out of bed in the morning or up from a seated break.     CAUSES  The plantar fascia is a dense fibrous band of tissue that stretches across the bottom surface of the foot. The fascia helps support the foot muscles and arch. Plantar fasciitis is thought to be caused by  mechanical strain or overload. Frequent walking without shoes or wearing unsupportive shoes is thought to cause structural overload and ultimately inflammation of the plantar fascia. Some people have heel spurs that can be seen on x-ray. The heel spur is actually a minor component of plantar fascitis and is largely ignored.       SELF TREATMENT   The easiest solution is to stop walking around your home without shoes. Plantar fasciitis is largely a shoe problem. Shoes are either not being worn often enough or your current shoes are inadequate for your weight, foot structure or activity level. The majority of shoes on the market today are not sufficient to resist development of plantar fasciitis or to promote healing. Assume that your current shoes are inadequate and will need to be replaced. Even high quality shoes wear out with 6 months to one year of frequent use. Weight loss is another option. Losing ten pounds in the next two months may be enough to resolve the problem. Ice applied to the area of pain two to three times per day for ten minutes each session can be very helpful. This should continue until the problem resolves. Achilles tendon stretching is essential. Stretch multiple times daily to promote healing and to prevent recurrence in the future.     MEDICAL TREATMENT  Medical treatments often include custom arch supports, cortisone injections, physical therapy, splints to be worn in bed, prescription medications and surgery. The home treatments listed above will be necessary regardless of these advanced medical treatments. Surgery is rarely needed but is very helpful in selected cases.     PROGNOSIS  Plantar fasciitis can last from one day to a lifetime. Some people get intermittent fascitis that is very short-lived. Others suffer daily for years. Excessive body weight, frequent bare foot walking, long hours on the feet, inadequate shoes, predisposing foot structures and excessive activity such as running  are all potential issues that lead to chronic and/or recurring plantar fascitis. Having plantar fasciitis means that you are forever prone to this problem and will require modification of some of the above factors. Most people seek treatment within one to four months. Healing usually requires a similar one to four month time frame. Healing time is relative to the amount of effort spent treating the problem.   Plantar fasciitis is highly recurrent. Risk factors often continue, including return to bare foot walking, inadequate shoes, excessive body weight, excessive activities, etc. Your life style and foot structure may predispose you to recurrent plantar fasciitis. A daily prevention regimen can be very helpful. Ongoing use of shoe inserts, careful attention to appropriate shoes, daily Achilles stretching, etc. may prevent recurrence. Prompt attention at the earliest warning signs of heel pain can resolve the problem in as short as a few days.     EXERCISES    Stair Exercise: Step on the stairs with the ball of your foot and hold your position for at least 15 seconds, then slowly step down with the heels of your foot. You can do this daily and as often as you want.   Picking the Towel: Sit comfortably and then pick the towel up with your toes. You can use any object other than a towel as long as the material can be soft and you can pick it up with your toes.  Rolling the Bottle: Use a small ball or frozen water bottle and then roll it around with your foot.   Flex the Toes: Sit comfortably and then flex your toes by pointing it towards the floor or towards your body. This will relax and flex your foot and exercise your plantar fascia, the calf, and the Achilles tendon. The inability of the foot to stretch often causes the bunching up of the plantar fascia area leading to the pain.  Calf/Achilles Stretching: Lay on you back and raise one foot, then point your toes towards the floor. See photo below:                Hold each stretch for 10 seconds. Stretch 10 times per set, three sets per day. Morning, afternoon and evening. If your heel pain is very severe in the morning, consider doing the first set of stretches before you get out of bed.    THERAPIES COVERED:  1.  Supportive Shoes: minimizing barefoot ambulation helps to provide cushion, padding and support to the ligament that is inflamed. Socks, flip flops, flats and some slippers are not typically sufficient to provide support. Shoes should be worn even indoors  2.  Insert/Orthotics: ones with an arch support built in to them provide further stress relief for the ligament. See the information below on recommended inserts.  3. Icing: using a frozen water bottle or orange, and rolling it along the bottom of the arch/heel can help to alleviate discomfort, and can act as a tissue massage to the painful, inflamed ligament.  There is evidence that shows icing at least three times daily can be beneficial  4.  Antiinflammatory (NSAID): Ibuprofen, Aleve, as well as Tylenol can be used to help decrease symptoms and improve pain levels. If you have high blood pressure, heart disease, stomach or kidney problems, use antiinflammatories sparingly. Tylenol should not be used if you have liver problems.   5. Activity Modifications: if there are certain things that you do, whether it's going barefoot or certain shoes/activities, you should try to minimize those activities as much as possible until your symptoms are sufficiently resolved. Certainly, some activities, such as running on the treadmill, are easier to take a break from versus others, such as work or chores at home. If there are certain activities that hurt your heel, and you keep doing those activities that hurt your heel, your heel will keep hurting.  **If these initial therapies are insufficient, we have our tier 2 therapies that can more aggressively work to improve your symptoms and get you back to the activities that you  enjoy!    OVER THE COUNTER INSERTS    SuperFeet   Sofsole Fit Spenco   Power Step   Walk-Fit Arch Cradles     Most of these can be found at your local [employer], sporting [employer] stores, or online.  **A good high quality over the counter insert should cost around $40-$50      [employer] LOCATIONS    [place]  [address] [facility] [address] [facility]  [phone]   [address]  [phone] [place]  [address]  [phone]   [place]  [address]  [phone]   [place]  [address]  [phone] [place]  [address] [place] [address] [place].  [phone]   [place]  [address]  [phone]   [place]  [address]  [phone] [place]  [address] [phone]           BODY MASS INDEX (BMI)  Many things can cause foot and ankle problems. Foot structure, activity level, foot mechanics and injuries are common causes of pain.    One very important issue that often goes unmentioned, is body weight.  Extra weight can cause increased stress on muscles, ligaments, bones and tendons. Sometimes just a few extra pounds is all it takes to put one over her/his threshold. Without reducing that stress, it can be difficult to alleviate pain.      Some people are uncomfortable addressing this issue, but we feel it is important for you to think about it. As Foot & Ankle specialists, our job is addressing the lower extremity problem and possible causes.     Regarding extra body weight, we encourage patients to discuss diet and weight management plans with their primary care doctors. It is this team approach that gives you the best opportunity for pain relief and getting you back on your feet.

## 2017-06-01 NOTE — PROGRESS NOTES
"ASSESSMENT/PLAN:    Encounter Diagnoses   Name Primary?     Pain of left heel Yes     Plantar fasciitis of left foot      Pt educated about the cause and nature of heel pain.  Treatment plan discussed includes icing, calf and plantar fascial stretching, avoidance of barefoot walking, wearing sturdy, supportive athletic-type shoes, and activity modification.  Educational handouts provided.    Pt is referred to the [facility] for prescription orthoses.      Short CAM walker/ Aircast - due to limping    She requested an injection.     Heel Injection Procedure:    Procedure was discussed and questions answered.  Verbal and written consent obtained.  Risks include, but not limited to, infection and skin discoloration. The site was marked and the \"time out\" called.    The point of maximal tenderness was located.  The skin on the medial aspect of the heel (thick-thin skin intersection) was prepped with an alcohol swab.      A mixture of 0.5 cc of 2% Lidocaine plain and 0.5 cc of Kenalog -10 was injected into the point of maximal tenderness, directed from the medial aspect of the left heel.  The patient tolerated this well.  After the needle was withdrawn, the site was wiped with an alcohol swab, allowed to air dry, and a band aid was applied.      Patient is instructed to pay close attention to his symptoms in the immediate hours following the injection and over the next two weeks.  Also instructed to call if increasing pain, redness, or drainage.        Body mass index is 32.86 kg/(m^2).      Weight management plan: Patient was referred to their PCP to discuss a diet and exercise plan.      Teddy Coker DPM, FACFAS, MS    [facility]      ____________________________________________________________________    HPI:      Chief Complaint: left heel pain  Onset of problem:  Years.  Recently acted up, \"blew up.\"  Pain/ discomfort is described as:  Sharp " burning  Ratin/10   Frequency:  daily    The pain is made worse with walking  Previous treatment: stretching    She is interested in a referral for custom orthoses. She did not use the previous prescription/ referral.   *  Past Medical History:   Diagnosis Date     Allergic rhinitis due to allergen      Anemia      Breathing difficulty      Chest pain      Generalised anxiety disorder 2012     Headache      Heart trouble      High blood triglycerides 2016     History of blood transfusion     unsure     Hoarseness      Hypertension      Hypoglycemia 3/10/2013     Impaired fasting glucose 2014     Insomnia 2012     Irritable bowel syndrome      Itchy eyes      MEDICAL HISTORY OF -     hx of right wrist dislocation     Migraine      Mild intermittent asthma     allergy or URI triggered      Moderate recurrent major depression (H) 2012     Nasal congestion      Numbness      Obesity, unspecified (OBESE) 2014     Paroxysmal supraventricular tachycardia (H)          PONV (postoperative nausea and vomiting)      Ringing in ears      Sleep difficulties      Sneezing      Sore throat      Vertigo      Weakness      Weight gain    *  *  Past Surgical History:   Procedure Laterality Date     C NONSPECIFIC PROCEDURE      Plastic repair of facial lac     C NONSPECIFIC PROCEDURE      Lipoma removed from arm close to the tail of te breast     C NONSPECIFIC PROCEDURE      Plastic repair of facial lac     C NONSPECIFIC PROCEDURE      Knee surgery     C NONSPECIFIC PROCEDURE      Miscarriage x 2     COLONOSCOPY  2013    Procedure: COLONOSCOPY;;  Surgeon: Jim Humphries MD;  Location: [facility]     ENT SURGERY      deviated septum     HEAD & NECK SURGERY       LAPAROSCOPIC SALPINGO-OOPHORECTOMY  2014    Procedure: LAPAROSCOPIC SALPINGO-OOPHORECTOMY;  Laparoscopic Left Salpingo Oophorectomy ;  Surgeon: Chani Del Rosario MD;  Location: [facility]     LUMPECTOMY BREAST      right      "ORTHOPEDIC SURGERY      knee     ORTHOPEDIC SURGERY      foot     SOFT TISSUE SURGERY      lymphoma face and armpit     SOFT TISSUE SURGERY     *  *  Current Outpatient Prescriptions   Medication Sig Dispense Refill     cyclobenzaprine (FLEXERIL) 10 MG tablet Take 0.5-1 tablets (5-10 mg) by mouth 3 times daily as needed for muscle spasms 30 tablet 1     venlafaxine (EFFEXOR-XR) 150 MG 24 hr capsule TAKE ONE CAPSULE BY MOUTH EVERY DAY (NEED TO BE SEEN IN CLINIC FOR FURTHER REFILLS) 90 capsule 1     predniSONE (DELTASONE) 20 MG tablet Take 1 tablet (20 mg) by mouth 2 times daily 10 tablet 0     gabapentin (NEURONTIN) 300 MG capsule Indications: Headache Take 300mg po BID x 7 days, then increase to 300mg po TID x 7 days, then increase to 600mg po TID.       fluticasone (FLONASE) 50 MCG/ACT nasal spray Spray 2 sprays into both nostrils daily 16 g 9     traZODone (DESYREL) 100 MG tablet Take 1 tablet (100 mg) by mouth At Bedtime Please profile. 90 tablet 3     venlafaxine (EFFEXOR-XR) 150 MG 24 hr capsule Take 1 capsule (150 mg) by mouth daily Please profile. 90 capsule 1     loratadine-pseudoePHEDrine (CLARITIN-D 24-HOUR)  MG per tablet Take 1 tablet by mouth daily 30 tablet 3     albuterol (PROAIR HFA, PROVENTIL HFA, VENTOLIN HFA) 108 (90 BASE) MCG/ACT inhaler Inhale 2 puffs into the lungs every 6 hours as needed for shortness of breath / dyspnea or wheezing 2 Inhaler 11     lisinopril (PRINIVIL,ZESTRIL) 10 MG tablet Take 1 tablet (10 mg) by mouth daily 90 tablet 3     Capsaicin 0.1 % CREA Externally apply 1 g topically 2 times daily as needed 42.5 g 2     Calcium Carbonate-Vitamin D (CALCIUM + D PO) Take  by mouth daily.       MIRENA 20 MCG/24HR IU IUD USE FOR UP TO 5 YEARS THEN REMOVE         EXAM:    Vitals: Pulse 68  Ht 5' 5.25\" (1.657 m)  Wt 199 lb (90.3 kg)  BMI 32.86 kg/m2  BMI: Body mass index is 32.86 kg/(m^2).  Height: 5' 5.25\"    Constitutional/ general:  Pt is in no apparent distress, appears " well-nourished.  Cooperative with history and physical exam.     Vascular:  Pedal pulses are palpable bilaterally for both the DP and PT arteries.  CFT < 3 sec.  No edema.  Pedal hair growth noted.     Neuro:  Alert and oriented x 3. Coordinated gait.  Light touch sensation is intact to the L4, L5, S1 distributions. No obvious deficits.  No evidence of neurological-based weakness, spasticity, or contracture in the lower extremities.     Derm: Normal texture and turgor.  No erythema, ecchymosis, or cyanosis.  No open lesions.     Musculoskeletal:    Lower extremity muscle strength is normal.  Patient is ambulatory without an assistive device or brace .  No gross deformities.  Pain on palpation: plantar medial and plantar central left heel . No pain with side-to-side compression of the heel.       Teddy Coker DPM, FACFAS, MS    [facility] & [facility]le Surgery

## 2017-06-01 NOTE — MR AVS SNAPSHOT
After Visit Summary   2017    Isabela Panchal    MRN: [id number]           Patient Information     Date Of Birth          1970        Visit Information        Provider Department      2017 11:30 AM Teddy Zambrano DPM [facility]        Care Instructions    DR. ZAMBRANO'S SCHEDULE:        MONDAY / FRIDAY AM - [place] WEDNESDAY - [place]   [address] [place] [address] [place] [address]   P: [phone] P: [phone]   F: [phone] F:[phone]       TUESDAY - SURGERY THURSDAY - [place]   [phone] [address] [place] [address] [place] [address]   TO SCHEDULE AN APPT CALL: P: [phone] F: [phone]     FYI: Our schedule at [place] on Wednesday is from 7 AM - 2 PM.    [place] [facility] LOCATIONS  [facility]  [address] [facility] [address]  [place] [address]  Phone: [phone]  Fax: [phone] [facility]  [address] [place], [place] [address]  [place] [address]  Phone: [phone]   Fax: [phone]   [facility]  [address]  [place] [address]  Phone: [phone]  Fax: [phone] [facility]  [address]  [place] [address]  Phone: [phone]   Fax: [phone]   [facility]   [address]  [place] [address]  Phone: [phone]   Fax: [phone] * Please call any location listed to make an appointment for a casting/fitting. Your referral was sent to their central office and they will all have the order on file.       PLANTAR FASCIITIS    Plantar fasciitis is often referred to as heel spurs or heel pain. Plantar fasciitis is a very common problem that affects people of all foot shapes, age, weight and activity level. Pain may be in the arch or on the weight-bearing surface of the heel. The pain may come on without injury or identifiable cause. Pain is  generally present when first getting out of bed in the morning or up from a seated break.     CAUSES  The plantar fascia is a dense fibrous band of tissue that stretches across the bottom surface of the foot. The fascia helps support the foot muscles and arch. Plantar fasciitis is thought to be caused by mechanical strain or overload. Frequent walking without shoes or wearing unsupportive shoes is thought to cause structural overload and ultimately inflammation of the plantar fascia. Some people have heel spurs that can be seen on x-ray. The heel spur is actually a minor component of plantar fascitis and is largely ignored.       SELF TREATMENT   The easiest solution is to stop walking around your home without shoes. Plantar fasciitis is largely a shoe problem. Shoes are either not being worn often enough or your current shoes are inadequate for your weight, foot structure or activity level. The majority of shoes on the market today are not sufficient to resist development of plantar fasciitis or to promote healing. Assume that your current shoes are inadequate and will need to be replaced. Even high quality shoes wear out with 6 months to one year of frequent use. Weight loss is another option. Losing ten pounds in the next two months may be enough to resolve the problem. Ice applied to the area of pain two to three times per day for ten minutes each session can be very helpful. This should continue until the problem resolves. Achilles tendon stretching is essential. Stretch multiple times daily to promote healing and to prevent recurrence in the future.     MEDICAL TREATMENT  Medical treatments often include custom arch supports, cortisone injections, physical therapy, splints to be worn in bed, prescription medications and surgery. The home treatments listed above will be necessary regardless of these advanced medical treatments. Surgery is rarely needed but is very helpful in selected cases.      PROGNOSIS  Plantar fasciitis can last from one day to a lifetime. Some people get intermittent fascitis that is very short-lived. Others suffer daily for years. Excessive body weight, frequent bare foot walking, long hours on the feet, inadequate shoes, predisposing foot structures and excessive activity such as running are all potential issues that lead to chronic and/or recurring plantar fascitis. Having plantar fasciitis means that you are forever prone to this problem and will require modification of some of the above factors. Most people seek treatment within one to four months. Healing usually requires a similar one to four month time frame. Healing time is relative to the amount of effort spent treating the problem.   Plantar fasciitis is highly recurrent. Risk factors often continue, including return to bare foot walking, inadequate shoes, excessive body weight, excessive activities, etc. Your life style and foot structure may predispose you to recurrent plantar fasciitis. A daily prevention regimen can be very helpful. Ongoing use of shoe inserts, careful attention to appropriate shoes, daily Achilles stretching, etc. may prevent recurrence. Prompt attention at the earliest warning signs of heel pain can resolve the problem in as short as a few days.     EXERCISES    Stair Exercise: Step on the stairs with the ball of your foot and hold your position for at least 15 seconds, then slowly step down with the heels of your foot. You can do this daily and as often as you want.   Picking the Towel: Sit comfortably and then pick the towel up with your toes. You can use any object other than a towel as long as the material can be soft and you can pick it up with your toes.  Rolling the Bottle: Use a small ball or frozen water bottle and then roll it around with your foot.   Flex the Toes: Sit comfortably and then flex your toes by pointing it towards the floor or towards your body. This will relax and flex your  foot and exercise your plantar fascia, the calf, and the Achilles tendon. The inability of the foot to stretch often causes the bunching up of the plantar fascia area leading to the pain.  Calf/Achilles Stretching: Lay on you back and raise one foot, then point your toes towards the floor. See photo below:               Hold each stretch for 10 seconds. Stretch 10 times per set, three sets per day. Morning, afternoon and evening. If your heel pain is very severe in the morning, consider doing the first set of stretches before you get out of bed.    THERAPIES COVERED:  1.  Supportive Shoes: minimizing barefoot ambulation helps to provide cushion, padding and support to the ligament that is inflamed. Socks, flip flops, flats and some slippers are not typically sufficient to provide support. Shoes should be worn even indoors  2.  Insert/Orthotics: ones with an arch support built in to them provide further stress relief for the ligament. See the information below on recommended inserts.  3. Icing: using a frozen water bottle or orange, and rolling it along the bottom of the arch/heel can help to alleviate discomfort, and can act as a tissue massage to the painful, inflamed ligament.  There is evidence that shows icing at least three times daily can be beneficial  4.  Antiinflammatory (NSAID): Ibuprofen, Aleve, as well as Tylenol can be used to help decrease symptoms and improve pain levels. If you have high blood pressure, heart disease, stomach or kidney problems, use antiinflammatories sparingly. Tylenol should not be used if you have liver problems.   5. Activity Modifications: if there are certain things that you do, whether it's going barefoot or certain shoes/activities, you should try to minimize those activities as much as possible until your symptoms are sufficiently resolved. Certainly, some activities, such as running on the treadmill, are easier to take a break from versus others, such as work or chores at  home. If there are certain activities that hurt your heel, and you keep doing those activities that hurt your heel, your heel will keep hurting.  **If these initial therapies are insufficient, we have our tier 2 therapies that can more aggressively work to improve your symptoms and get you back to the activities that you enjoy!    OVER THE COUNTER INSERTS    SuperFeet   Sofsole Fit Spenco   Power Step   Walk-Fit Arch Cradles     Most of these can be found at your local [employer], sporting [employer] stores, or online.  **A good high quality over the counter insert should cost around $40-$50      [facility] LOCATIONS    [place]  [address] [place] [address]  [phone]   [address]  [phone] [place]  [address] [facility]  [phone]   [place]  [address]  [phone]   [place]  [address] [facility]  [phone] [place]  [address] [place] [address] [place].  [phone]   [place]  [address]  [phone]   [place]  [address] [place] [address]  [phone] [place]  [address] [facility] [phone]           BODY MASS INDEX (BMI)  Many things can cause foot and ankle problems. Foot structure, activity level, foot mechanics and injuries are common causes of pain.    One very important issue that often goes unmentioned, is body weight.  Extra weight can cause increased stress on muscles, ligaments, bones and tendons. Sometimes just a few extra pounds is all it takes to put one over her/his threshold. Without reducing that stress, it can be difficult to alleviate pain.      Some people are uncomfortable addressing this issue, but we feel it is important for you to think about it. As Foot & Ankle specialists, our job is addressing the lower extremity problem and possible causes.     Regarding extra body weight, we encourage patients to discuss diet and weight management plans with their primary care doctors. It is this team approach that gives you the best opportunity  "for pain relief and getting you back on your feet.                Follow-ups after your visit        Your next 10 appointments already scheduled     Jun 05, 2017 11:00 AM CDT   LE[facility] Spine with Hermilo Vega, PT   [facility] ([facility]  )    [address] [place] [address]   [phone]            Jun 12, 2017 11:00 AM CDT   LEISA Spine with Hermilo Vega PT   [facility] ([facility]  )    [address] [place] [address]   [phone]            Jun 19, 2017 11:00 AM CDT   LEISA Spine with Hermilo Vega PT   [facility] ([address] [address]   [phone]              Who to contact     If you have questions or need follow up information about today's clinic visit or your schedule please contact [facility] directly at [phone].  Normal or non-critical lab and imaging results will be communicated to you by MyChart, letter or phone within 4 business days after the clinic has received the results. If you do not hear from us within 7 days, please contact the clinic through [employer] or phone. If you have a critical or abnormal lab result, we will notify you by phone as soon as possible.  Submit refill requests through [employer] or call your pharmacy and they will forward the refill request to us. Please allow 3 business days for your refill to be completed.          Additional Information About Your Visit        [employer] Information     [employer] lets you send messages to your doctor, view your test results, renew your prescriptions, schedule appointments and more. To sign up, go to [email or web address] . Click on \"Log in\" on the left side of the screen, which will take you to the Welcome page. Then click on \"Sign up Now\" on the right side of the page.     You will be asked " "to enter the access code listed below, as well as some personal information. Please follow the directions to create your username and password.     Your access code is: [email or web address]  Expires: 2017 10:11 AM     Your access code will  in 90 days. If you need help or a new code, please call your [facility] clinic or [phone].        Care EveryWhere ID     This is your Care EveryWhere ID. This could be used by other organizations to access your [facility] medical records  [id number]        Your Vitals Were     Pulse Height BMI (Body Mass Index)             68 5' 5.25\" (1.657 m) 32.86 kg/m2          Blood Pressure from Last 3 Encounters:   17 126/85   17 125/79   17 119/80    Weight from Last 3 Encounters:   17 199 lb (90.3 kg)   17 199 lb (90.3 kg)   17 200 lb (90.7 kg)              Today, you had the following     No orders found for display       Primary Care Provider Office Phone # Fax #    Felisha Briggs [phone]       [facility] [address] [facility] [address]        Thank you!     Thank you for choosing [facility]  for your care. Our goal is always to provide you with excellent care. Hearing back from our patients is one way we can continue to improve our services. Please take a few minutes to complete the written survey that you may receive in the mail after your visit with us. Thank you!             Your Updated Medication List - Protect others around you: Learn how to safely use, store and throw away your medicines at www.disposemymeds.org.          This list is accurate as of: 17 11:54 AM.  Always use your most recent med list.                   Brand Name Dispense Instructions for use    albuterol 108 (90 BASE) MCG/ACT Inhaler    PROAIR HFA/PROVENTIL HFA/VENTOLIN HFA    2 Inhaler    Inhale 2 puffs into the lungs every 6 hours as needed for shortness of breath / dyspnea or wheezing       CALCIUM + " D PO      Take  by mouth daily.       Capsaicin 0.1 % cream     42.5 g    Externally apply 1 g topically 2 times daily as needed       cyclobenzaprine 10 MG tablet    FLEXERIL    30 tablet    Take 0.5-1 tablets (5-10 mg) by mouth 3 times daily as needed for muscle spasms       fluticasone 50 MCG/ACT spray    FLONASE    16 g    Spray 2 sprays into both nostrils daily       gabapentin 300 MG capsule    NEURONTIN     Indications: Headache Take 300mg po BID x 7 days, then increase to 300mg po TID x 7 days, then increase to 600mg po TID.       lisinopril 10 MG tablet    PRINIVIL/ZESTRIL    90 tablet    Take 1 tablet (10 mg) by mouth daily       loratadine-pseudoePHEDrine  MG per 24 hr tablet    CLARITIN-D 24-hour    30 tablet    Take 1 tablet by mouth daily       MIRENA (52 MG) 20 MCG/24HR IUD   Generic drug:  levonorgestrel      USE FOR UP TO 5 YEARS THEN REMOVE       predniSONE 20 MG tablet    DELTASONE    10 tablet    Take 1 tablet (20 mg) by mouth 2 times daily       traZODone 100 MG tablet    DESYREL    90 tablet    Take 1 tablet (100 mg) by mouth At Bedtime Please profile.       * venlafaxine 150 MG 24 hr capsule    EFFEXOR-XR    90 capsule    Take 1 capsule (150 mg) by mouth daily Please profile.       * venlafaxine 150 MG 24 hr capsule    EFFEXOR-XR    90 capsule    TAKE ONE CAPSULE BY MOUTH EVERY DAY (NEED TO BE SEEN IN CLINIC FOR FURTHER REFILLS)       * Notice:  This list has 2 medication(s) that are the same as other medications prescribed for you. Read the directions carefully, and ask your doctor or other care provider to review them with you.

## 2017-06-01 NOTE — LETTER
"  6/1/2017       RE: Isabela Panchal  [address]  [facility] [address]           Dear Colleague,    Thank you for referring your patient, Isabela Panchal, to the [facility]. Please see a copy of my visit note below.    ASSESSMENT/PLAN:    Encounter Diagnoses   Name Primary?     Pain of left heel Yes     Plantar fasciitis of left foot      Pt educated about the cause and nature of heel pain.  Treatment plan discussed includes icing, calf and plantar fascial stretching, avoidance of barefoot walking, wearing sturdy, supportive athletic-type shoes, and activity modification.  Educational handouts provided.    Pt is referred to the [facility] for prescription orthoses.      Short CAM walker/ Aircast - due to limping    She requested an injection.     Heel Injection Procedure:    Procedure was discussed and questions answered.  Verbal and written consent obtained.  Risks include, but not limited to, infection and skin discoloration. The site was marked and the \"time out\" called.    The point of maximal tenderness was located.  The skin on the medial aspect of the heel (thick-thin skin intersection) was prepped with an alcohol swab.      A mixture of 0.5 cc of 2% Lidocaine plain and 0.5 cc of Kenalog -10 was injected into the point of maximal tenderness, directed from the medial aspect of the left heel.  The patient tolerated this well.  After the needle was withdrawn, the site was wiped with an alcohol swab, allowed to air dry, and a band aid was applied.      Patient is instructed to pay close attention to his symptoms in the immediate hours following the injection and over the next two weeks.  Also instructed to call if increasing pain, redness, or drainage.        Body mass index is 32.86 kg/(m^2).      Weight management plan: Patient was referred to their PCP to discuss a diet and exercise plan.      Teddy Coker DPM, FACFAS, MS    [facility] Department " "of Podiatry/Foot & Ankle Surgery      ____________________________________________________________________    HPI:      Chief Complaint: left heel pain  Onset of problem:  Years.  Recently acted up, \"blew up.\"  Pain/ discomfort is described as:  Sharp burning  Ratin/10   Frequency:  daily    The pain is made worse with walking  Previous treatment: stretching    She is interested in a referral for custom orthoses. She did not use the previous prescription/ referral.   *  Past Medical History:   Diagnosis Date     Allergic rhinitis due to allergen      Anemia      Breathing difficulty      Chest pain      Generalised anxiety disorder 2012     Headache      Heart trouble      High blood triglycerides 2016     History of blood transfusion     unsure     Hoarseness      Hypertension      Hypoglycemia 3/10/2013     Impaired fasting glucose 2014     Insomnia 2012     Irritable bowel syndrome      Itchy eyes      MEDICAL HISTORY OF -     hx of right wrist dislocation     Migraine      Mild intermittent asthma     allergy or URI triggered      Moderate recurrent major depression (H) 2012     Nasal congestion      Numbness      Obesity, unspecified (OBESE) 2014     Paroxysmal supraventricular tachycardia (H)     4/00     PONV (postoperative nausea and vomiting)      Ringing in ears      Sleep difficulties      Sneezing      Sore throat      Vertigo      Weakness      Weight gain    *  *  Past Surgical History:   Procedure Laterality Date     C NONSPECIFIC PROCEDURE      Plastic repair of facial lac     C NONSPECIFIC PROCEDURE      Lipoma removed from arm close to the tail of te breast     C NONSPECIFIC PROCEDURE      Plastic repair of facial lac     C NONSPECIFIC PROCEDURE      Knee surgery     C NONSPECIFIC PROCEDURE      Miscarriage x 2     COLONOSCOPY  2013    Procedure: COLONOSCOPY;;  Surgeon: Jim Humphries MD;  Location:  GI     ENT SURGERY      deviated septum     " HEAD & NECK SURGERY       LAPAROSCOPIC SALPINGO-OOPHORECTOMY  1/20/2014    Procedure: LAPAROSCOPIC SALPINGO-OOPHORECTOMY;  Laparoscopic Left Salpingo Oophorectomy ;  Surgeon: Chani Del Rosario MD;  Location: [facility] OR     LUMPECTOMY BREAST      right     ORTHOPEDIC SURGERY      knee     ORTHOPEDIC SURGERY      foot     SOFT TISSUE SURGERY      lymphoma face and armpit     SOFT TISSUE SURGERY     *  *  Current Outpatient Prescriptions   Medication Sig Dispense Refill     cyclobenzaprine (FLEXERIL) 10 MG tablet Take 0.5-1 tablets (5-10 mg) by mouth 3 times daily as needed for muscle spasms 30 tablet 1     venlafaxine (EFFEXOR-XR) 150 MG 24 hr capsule TAKE ONE CAPSULE BY MOUTH EVERY DAY (NEED TO BE SEEN IN CLINIC FOR FURTHER REFILLS) 90 capsule 1     predniSONE (DELTASONE) 20 MG tablet Take 1 tablet (20 mg) by mouth 2 times daily 10 tablet 0     gabapentin (NEURONTIN) 300 MG capsule Indications: Headache Take 300mg po BID x 7 days, then increase to 300mg po TID x 7 days, then increase to 600mg po TID.       fluticasone (FLONASE) 50 MCG/ACT nasal spray Spray 2 sprays into both nostrils daily 16 g 9     traZODone (DESYREL) 100 MG tablet Take 1 tablet (100 mg) by mouth At Bedtime Please profile. 90 tablet 3     venlafaxine (EFFEXOR-XR) 150 MG 24 hr capsule Take 1 capsule (150 mg) by mouth daily Please profile. 90 capsule 1     loratadine-pseudoePHEDrine (CLARITIN-D 24-HOUR)  MG per tablet Take 1 tablet by mouth daily 30 tablet 3     albuterol (PROAIR HFA, PROVENTIL HFA, VENTOLIN HFA) 108 (90 BASE) MCG/ACT inhaler Inhale 2 puffs into the lungs every 6 hours as needed for shortness of breath / dyspnea or wheezing 2 Inhaler 11     lisinopril (PRINIVIL,ZESTRIL) 10 MG tablet Take 1 tablet (10 mg) by mouth daily 90 tablet 3     Capsaicin 0.1 % CREA Externally apply 1 g topically 2 times daily as needed 42.5 g 2     Calcium Carbonate-Vitamin D (CALCIUM + D PO) Take  by mouth daily.       MIRENA 20 MCG/24HR IU IUD USE FOR UP TO  "5 YEARS THEN REMOVE         EXAM:    Vitals: Pulse 68  Ht 5' 5.25\" (1.657 m)  Wt 199 lb (90.3 kg)  BMI 32.86 kg/m2  BMI: Body mass index is 32.86 kg/(m^2).  Height: 5' 5.25\"    Constitutional/ general:  Pt is in no apparent distress, appears well-nourished.  Cooperative with history and physical exam.     Vascular:  Pedal pulses are palpable bilaterally for both the DP and PT arteries.  CFT < 3 sec.  No edema.  Pedal hair growth noted.     Neuro:  Alert and oriented x 3. Coordinated gait.  Light touch sensation is intact to the L4, L5, S1 distributions. No obvious deficits.  No evidence of neurological-based weakness, spasticity, or contracture in the lower extremities.     Derm: Normal texture and turgor.  No erythema, ecchymosis, or cyanosis.  No open lesions.     Musculoskeletal:    Lower extremity muscle strength is normal.  Patient is ambulatory without an assistive device or brace .  No gross deformities.  Pain on palpation: plantar medial and plantar central left heel . No pain with side-to-side compression of the heel.       Teddy Cokre DPM, FACFAS, MS    [place] Department of Podiatry/Foot & Ankle Surgery              Again, thank you for allowing me to participate in the care of your patient.        Sincerely,              Teddy Coker DPM    "

## 2017-06-09 ENCOUNTER — TELEPHONE (OUTPATIENT)
Dept: PODIATRY | Facility: CLINIC | Age: 47
End: 2017-06-09

## 2017-06-09 NOTE — TELEPHONE ENCOUNTER
Patient calls back.  She got some relief from the injection, but not much.  States that she has tried Aleve and Tylenol and they don't seem to help much.  She states she lost the AVS and asking to have it mailed to her-wants to see exercises to do-this was mailed to patient.  She will continue icing, resting and will try other antiinflammatories and call with an update next week, if needed.  She thinks she is improving and want to give it more time ( the pain is still there and patient states that she was under the impression the pain should have been better now.)    She will call if not better-AVS mailed to patient.    Dionne Monsalve RN  Message handled by Nurse Triage.

## 2017-06-09 NOTE — TELEPHONE ENCOUNTER
Reason for Call:  Other call back    Detailed comments: Pt called in to leave a message for Dr. Coker. She stated she saw Dr. Coker recently for a heel injection. She stated she is still in a lot of pain. She has tried icing, resting, anti-inflammatories with no relief. Pt lost her sheet from the visit on how to ease the pain if those don't work. Pt is leaving out of town at 12:30 but can be reached on her cell. Please follow up with pt as she is in pain, thank you.    Phone Number Patient can be reached at: Cell number on file:    Telephone Information:   Mobile [phone]       Best Time: anytime    Can we leave a detailed message on this number? YES    Call taken on 6/9/2017 at 11:59 AM by Dora Arce

## 2017-06-10 ENCOUNTER — HEALTH MAINTENANCE LETTER (OUTPATIENT)
Age: 47
End: 2017-06-10

## 2017-07-11 ENCOUNTER — THERAPY VISIT (OUTPATIENT)
Dept: PHYSICAL THERAPY | Facility: CLINIC | Age: 47
End: 2017-07-11
Payer: COMMERCIAL

## 2017-07-11 DIAGNOSIS — M54.2 CERVICALGIA: Primary | ICD-10-CM

## 2017-07-11 DIAGNOSIS — M54.50 LUMBAGO: ICD-10-CM

## 2017-07-11 DIAGNOSIS — M54.6 PAIN IN THORACIC SPINE: ICD-10-CM

## 2017-07-11 PROCEDURE — 97112 NEUROMUSCULAR REEDUCATION: CPT | Mod: GP | Performed by: PHYSICAL THERAPIST

## 2017-07-11 PROCEDURE — 97110 THERAPEUTIC EXERCISES: CPT | Mod: GP | Performed by: PHYSICAL THERAPIST

## 2017-07-17 ENCOUNTER — TELEPHONE (OUTPATIENT)
Dept: FAMILY MEDICINE | Facility: CLINIC | Age: 47
End: 2017-07-17

## 2017-07-18 ENCOUNTER — THERAPY VISIT (OUTPATIENT)
Dept: PHYSICAL THERAPY | Facility: CLINIC | Age: 47
End: 2017-07-18
Payer: COMMERCIAL

## 2017-07-18 DIAGNOSIS — M54.50 LUMBAGO: ICD-10-CM

## 2017-07-18 DIAGNOSIS — M54.2 CERVICALGIA: Primary | ICD-10-CM

## 2017-07-18 DIAGNOSIS — M54.6 PAIN IN THORACIC SPINE: ICD-10-CM

## 2017-07-18 PROCEDURE — 97112 NEUROMUSCULAR REEDUCATION: CPT | Mod: GP | Performed by: PHYSICAL THERAPIST

## 2017-07-18 PROCEDURE — 97110 THERAPEUTIC EXERCISES: CPT | Mod: GP | Performed by: PHYSICAL THERAPIST

## 2017-07-24 ENCOUNTER — OFFICE VISIT (OUTPATIENT)
Dept: FAMILY MEDICINE | Facility: CLINIC | Age: 47
End: 2017-07-24
Payer: COMMERCIAL

## 2017-07-24 VITALS
SYSTOLIC BLOOD PRESSURE: 128 MMHG | OXYGEN SATURATION: 97 % | TEMPERATURE: 96.9 F | HEART RATE: 96 BPM | DIASTOLIC BLOOD PRESSURE: 94 MMHG | BODY MASS INDEX: 32.7 KG/M2 | WEIGHT: 198 LBS

## 2017-07-24 DIAGNOSIS — H00.015 HORDEOLUM EXTERNUM OF LEFT LOWER EYELID: Primary | ICD-10-CM

## 2017-07-24 PROCEDURE — 99213 OFFICE O/P EST LOW 20 MIN: CPT | Performed by: FAMILY MEDICINE

## 2017-07-24 RX ORDER — POLYMYXIN B SULFATE AND TRIMETHOPRIM 1; 10000 MG/ML; [USP'U]/ML
1 SOLUTION OPHTHALMIC
Qty: 1 BOTTLE | Refills: 0 | Status: SHIPPED | OUTPATIENT
Start: 2017-07-24 | End: 2017-07-31

## 2017-07-24 NOTE — NURSING NOTE
"Chief Complaint   Patient presents with     Stye / Hordeolum Evaluation       Initial BP (!) 128/94 (BP Location: Left arm, Patient Position: Chair, Cuff Size: Adult Regular)  Pulse 96  Temp 96.9  F (36.1  C) (Tympanic)  Wt 198 lb (89.8 kg)  SpO2 97%  BMI 32.7 kg/m2 Estimated body mass index is 32.7 kg/(m^2) as calculated from the following:    Height as of 6/1/17: 5' 5.25\" (1.657 m).    Weight as of this encounter: 198 lb (89.8 kg).  Medication Reconciliation: complete Des Tee MA      "

## 2017-07-24 NOTE — PROGRESS NOTES
SUBJECTIVE:                                                    Isabela Panchal is a 46 year old female who presents to clinic today for the following health issues:      Eye(s) Problem/stye      Duration: 6 days    Description:  Location: left  Pain: YES  Redness: YES- slight pink  Discharge: YES- yellow discharge     Accompanying signs and symptoms: tender to touch and painful when pt is looking down    History (Trauma, foreign body exposure,): yes- but as painful- go away    Precipitating or alleviating factors (contact use): looking at a certain directions     Therapies tried and outcome: eyedrop for style        Additional history/life update:    Hordeolum externum of left lower eyelid :for one week has had before.  Using some drops, not sure name and may be .      Medical, social, surgical, and family histories reviewed.      REVIEW OF SYSTEMS FOR GENERAL, RESPIRATORY, CV, GI AND PSYCHIATRIC NEGATIVE EXCEPT AS NOTED IN HPI.         OBJECTIVE:  BP (!) 128/94 (BP Location: Left arm, Patient Position: Chair, Cuff Size: Adult Regular)  Pulse 96  Temp 96.9  F (36.1  C) (Tympanic)  Wt 198 lb (89.8 kg)  SpO2 97%  BMI 32.7 kg/m2    EXAM:  GENERAL APPEARANCE: healthy, alert and no distress  Left lower eye lid with a 3mm erythematous nodule on inner eye lid.  No spreading erythema.  No conjunctival irritation.       ASSESSMENT AND PLAN  Patient Instructions   ASSESSMENT AND PLAN  1. Hordeolum externum of left lower eyelid  Treat with warm packs 3-4 times daily and start polytrim (new bottle.) I recommend follow up with your eye doctor (has one) in one week if possible in case it needs to be drained.       - trimethoprim-polymyxin b (POLYTRIM) ophthalmic solution; Apply 1 drop to eye every 3 hours for 7 days  Dispense: 1 Bottle; Refill: 0        MYCHART SIGNUP FOR E-VISITS AND EASIER COMMUNICATION:  [email or web address]     Zipnosis:  [email or web address].  Sign up for e-visits for common  illnesses!     RADIOLOGY:   [facility]:  [phone] to schedule any radiology tests at [facility]: [phone]    Mammograms/colonoscopies:  [phone]    CONSUMER PRICE LINE for estimates of test costs:  [phone]               Joel Wegener,MD

## 2017-07-24 NOTE — PATIENT INSTRUCTIONS
ASSESSMENT AND PLAN  1. Hordeolum externum of left lower eyelid  Treat with warm packs 3-4 times daily and start polytrim (new bottle.) I recommend follow up with your eye doctor (has one) in one week if possible in case it needs to be drained.       - trimethoprim-polymyxin b (POLYTRIM) ophthalmic solution; Apply 1 drop to eye every 3 hours for 7 days  Dispense: 1 Bottle; Refill: 0        MYCHART SIGNUP FOR E-VISITS AND EASIER COMMUNICATION:  [email or web address]     Zipnosis:  [email or web address].  Sign up for e-visits for common illnesses!     RADIOLOGY:   [facility]:  [phone] to schedule any radiology tests at [facility] [place]: [phone]    Mammograms/colonoscopies:  [phone]    CONSUMER PRICE LINE for estimates of test costs:  [phone]

## 2017-07-24 NOTE — MR AVS SNAPSHOT
After Visit Summary   7/24/2017    Isabela Panchal    MRN: [id number]           Patient Information     Date Of Birth          1970        Visit Information        Provider Department      7/24/2017 9:40 AM Wegener, Joel Daniel Irwin, MD [facility]        Today's Diagnoses     Hordeolum externum of left lower eyelid    -  1      Care Instructions    ASSESSMENT AND PLAN  1. Hordeolum externum of left lower eyelid  Treat with warm packs 3-4 times daily and start polytrim (new bottle.) I recommend follow up with your eye doctor (has one) in one week if possible in case it needs to be drained.       - trimethoprim-polymyxin b (POLYTRIM) ophthalmic solution; Apply 1 drop to eye every 3 hours for 7 days  Dispense: 1 Bottle; Refill: 0        MYCHART SIGNUP FOR E-VISITS AND EASIER COMMUNICATION:  [email or web address]     Zipnosis:  [email or web address].  Sign up for e-visits for common illnesses!     RADIOLOGY:   [facility]:  [phone] to schedule any radiology tests at [facility] [place]: [phone]    Mammograms/colonoscopies:  [phone]    CONSUMER PRICE LINE for estimates of test costs:  [phone]               Follow-ups after your visit        Your next 10 appointments already scheduled     Jul 26, 2017 10:20 AM CDT   LEISA Spine with Hermilo Vega PT   [facility] ([facility]  )    [address] [place] [id number] [phone]              Who to contact     If you have questions or need follow up information about today's clinic visit or your schedule please contact [facility] directly at [phone].  Normal or non-critical lab and imaging results will be communicated to you by MyChart, letter or phone within 4 business days after the clinic has received the results. If you do not hear from us within 7 days, please contact the clinic through MyChart or  "phone. If you have a critical or abnormal lab result, we will notify you by phone as soon as possible.  Submit refill requests through [employer] or call your pharmacy and they will forward the refill request to us. Please allow 3 business days for your refill to be completed.          Additional Information About Your Visit        GetFeedbackhart Information     [employer] lets you send messages to your doctor, view your test results, renew your prescriptions, schedule appointments and more. To sign up, go to [email or web address] . Click on \"Log in\" on the left side of the screen, which will take you to the Welcome page. Then click on \"Sign up Now\" on the right side of the page.     You will be asked to enter the access code listed below, as well as some personal information. Please follow the directions to create your username and password.     Your access code is: [email or web address]  Expires: 2017 10:11 AM     Your access code will  in 90 days. If you need help or a new code, please call your [facility] clinic or [phone].        Care EveryWhere ID     This is your Care EveryWhere ID. This could be used by other organizations to access your [facility] medical records  [id number]        Your Vitals Were     Pulse Temperature Pulse Oximetry BMI (Body Mass Index)          96 96.9  F (36.1  C) (Tympanic) 97% 32.7 kg/m2         Blood Pressure from Last 3 Encounters:   17 (!) 128/94   17 126/85   17 125/79    Weight from Last 3 Encounters:   17 198 lb (89.8 kg)   17 199 lb (90.3 kg)   17 199 lb (90.3 kg)              Today, you had the following     No orders found for display         Today's Medication Changes          These changes are accurate as of: 17 10:04 AM.  If you have any questions, ask your nurse or doctor.               Start taking these medicines.        Dose/Directions    trimethoprim-polymyxin b ophthalmic solution   Commonly known as:  POLYTRIM   Used for:  " Hordeolum externum of left lower eyelid   Started by:  Wegener, Joel Daniel Irwin, MD        Dose:  1 drop   Apply 1 drop to eye every 3 hours for 7 days   Quantity:  1 Bottle   Refills:  0            Where to get your medicines      These medications were sent to [facility] [address]  [address], [facility] [address]     Phone:  [phone]     trimethoprim-polymyxin b ophthalmic solution                Primary Care Provider Office Phone # Fax #    Felisha Imelda Briggs [phone]       [facility] [address]  [facility] [address]        Equal Access to Services     OSIEL GILLESPIE : Hadii mihir hudson hadasho Soomaali, waaxda luqadaha, qaybta kaalmada adeegyada, tere springer . So [facility] [phone].    ATENCIÓN: Si habla español, tiene a snyder disposición servicios gratuitos de asistencia lingüística. Llame al [phone].    We comply with applicable federal civil rights laws and [place] laws. We do not discriminate on the basis of race, color, national origin, age, disability sex, sexual orientation or gender identity.            Thank you!     Thank you for choosing [facility]  for your care. Our goal is always to provide you with excellent care. Hearing back from our patients is one way we can continue to improve our services. Please take a few minutes to complete the written survey that you may receive in the mail after your visit with us. Thank you!             Your Updated Medication List - Protect others around you: Learn how to safely use, store and throw away your medicines at www.disposemymeds.org.          This list is accurate as of: 7/24/17 10:04 AM.  Always use your most recent med list.                   Brand Name Dispense Instructions for use Diagnosis    albuterol 108 (90 BASE) MCG/ACT Inhaler    PROAIR HFA/PROVENTIL HFA/VENTOLIN HFA    2 Inhaler    Inhale 2 puffs into the lungs  every 6 hours as needed for shortness of breath / dyspnea or wheezing    Intermittent asthma, uncomplicated       CALCIUM + D PO      Take  by mouth daily.        Capsaicin 0.1 % cream     42.5 g    Externally apply 1 g topically 2 times daily as needed    Right anterior knee pain       cyclobenzaprine 10 MG tablet    FLEXERIL    30 tablet    Take 0.5-1 tablets (5-10 mg) by mouth 3 times daily as needed for muscle spasms    DDD (degenerative disc disease), lumbar, Cervicalgia, Back muscle spasm       fluticasone 50 MCG/ACT spray    FLONASE    16 g    Spray 2 sprays into both nostrils daily    Other seasonal allergic rhinitis       gabapentin 300 MG capsule    NEURONTIN     Indications: Headache Take 300mg po BID x 7 days, then increase to 300mg po TID x 7 days, then increase to 600mg po TID.        lisinopril 10 MG tablet    PRINIVIL/ZESTRIL    90 tablet    Take 1 tablet (10 mg) by mouth daily    Essential hypertension with goal blood pressure less than 140/90       loratadine-pseudoePHEDrine  MG per 24 hr tablet    CLARITIN-D 24-hour    30 tablet    Take 1 tablet by mouth daily    Environmental allergies       MIRENA (52 MG) 20 MCG/24HR IUD   Generic drug:  levonorgestrel      USE FOR UP TO 5 YEARS THEN REMOVE        order for DME     1 Device    Short CAM walker/ Aircast    Plantar fasciitis of left foot, Pain of left heel       predniSONE 20 MG tablet    DELTASONE    10 tablet    Take 1 tablet (20 mg) by mouth 2 times daily    Acute bronchitis with symptoms > 10 days, Mild intermittent asthma with acute exacerbation       traZODone 100 MG tablet    DESYREL    90 tablet    Take 1 tablet (100 mg) by mouth At Bedtime Please profile.    Primary insomnia       trimethoprim-polymyxin b ophthalmic solution    POLYTRIM    1 Bottle    Apply 1 drop to eye every 3 hours for 7 days    Hordeolum externum of left lower eyelid       * venlafaxine 150 MG 24 hr capsule    EFFEXOR-XR    90 capsule    Take 1 capsule (150 mg)  by mouth daily Please profile.    Generalized anxiety disorder, Moderate recurrent major depression (H)       * venlafaxine 150 MG 24 hr capsule    EFFEXOR-XR    90 capsule    TAKE ONE CAPSULE BY MOUTH EVERY DAY (NEED TO BE SEEN IN CLINIC FOR FURTHER REFILLS)    Generalized anxiety disorder, Moderate recurrent major depression (H)       * Notice:  This list has 2 medication(s) that are the same as other medications prescribed for you. Read the directions carefully, and ask your doctor or other care provider to review them with you.

## 2017-08-31 DIAGNOSIS — I10 ESSENTIAL HYPERTENSION WITH GOAL BLOOD PRESSURE LESS THAN 140/90: ICD-10-CM

## 2017-08-31 NOTE — TELEPHONE ENCOUNTER
lisinopril (PRINIVIL,ZESTRIL) 10 MG tablet      Last Written Prescription Date: 08/25/16  Last Fill Quantity: 90, # refills: 3  Last Office Visit with [facility], [facility] or [facility] prescribing provider: 07/24/17  Next 5 appointments (look out 90 days)     Sep 08, 2017 11:00 AM CDT   Nurse Only with  MEDICAL ASSISTANT   [facility] ([facility])    [address] [place] [address] [phone]                   Potassium   Date Value Ref Range Status   08/25/2016 3.8 3.4 - 5.3 mmol/L Final     Creatinine   Date Value Ref Range Status   08/25/2016 0.64 0.52 - 1.04 mg/dL Final     BP Readings from Last 3 Encounters:   07/24/17 (!) 128/94   04/27/17 126/85   03/28/17 125/79

## 2017-09-05 RX ORDER — LISINOPRIL 10 MG/1
TABLET ORAL
Qty: 90 TABLET | Refills: 0 | Status: SHIPPED | OUTPATIENT
Start: 2017-09-05 | End: 2017-09-28

## 2017-09-05 NOTE — TELEPHONE ENCOUNTER
Due in for labs  Please let pt know that a bmp order was placed for her to have drawn the same time as her ma appointment   and if ok, please make lab only appointment as well.   Thanks!     Regina Murcia RN

## 2017-09-08 ENCOUNTER — ALLIED HEALTH/NURSE VISIT (OUTPATIENT)
Dept: NURSING | Facility: CLINIC | Age: 47
End: 2017-09-08
Payer: COMMERCIAL

## 2017-09-08 VITALS — HEART RATE: 101 BPM | SYSTOLIC BLOOD PRESSURE: 122 MMHG | DIASTOLIC BLOOD PRESSURE: 80 MMHG

## 2017-09-08 DIAGNOSIS — R03.0 ELEVATED BLOOD PRESSURE READING WITHOUT DIAGNOSIS OF HYPERTENSION: Primary | ICD-10-CM

## 2017-09-08 DIAGNOSIS — I10 ESSENTIAL HYPERTENSION WITH GOAL BLOOD PRESSURE LESS THAN 140/90: ICD-10-CM

## 2017-09-08 PROCEDURE — 99207 ZZC NO CHARGE NURSE ONLY: CPT

## 2017-09-08 PROCEDURE — 36415 COLL VENOUS BLD VENIPUNCTURE: CPT | Performed by: FAMILY MEDICINE

## 2017-09-08 PROCEDURE — 80048 BASIC METABOLIC PNL TOTAL CA: CPT | Performed by: FAMILY MEDICINE

## 2017-09-08 NOTE — MR AVS SNAPSHOT
"              After Visit Summary   2017    Isabela Panchal    MRN: [id number]           Patient Information     Date Of Birth          1970        Visit Information        Provider Department      2017 3:00 PM  MEDICAL ASSISTANT [facility]        Today's Diagnoses     Elevated blood pressure reading without diagnosis of hypertension    -  1       Follow-ups after your visit        Who to contact     If you have questions or need follow up information about today's clinic visit or your schedule please contact [facility] directly at [phone].  Normal or non-critical lab and imaging results will be communicated to you by Fastmobilehart, letter or phone within 4 business days after the clinic has received the results. If you do not hear from us within 7 days, please contact the clinic through [employer] or phone. If you have a critical or abnormal lab result, we will notify you by phone as soon as possible.  Submit refill requests through [employer] or call your pharmacy and they will forward the refill request to us. Please allow 3 business days for your refill to be completed.          Additional Information About Your Visit        MyChart Information     [employer] lets you send messages to your doctor, view your test results, renew your prescriptions, schedule appointments and more. To sign up, go to [email or web address] . Click on \"Log in\" on the left side of the screen, which will take you to the Welcome page. Then click on \"Sign up Now\" on the right side of the page.     You will be asked to enter the access code listed below, as well as some personal information. Please follow the directions to create your username and password.     Your access code is: [email or web address]  Expires: 2017  3:26 PM     Your access code will  in 90 days. If you need help or a new code, please call your [facility] or [phone].        Care EveryWhere ID     This is your Care " EveryWhere ID. This could be used by other organizations to access your [facility] medical records  [id number]        Your Vitals Were     Pulse                   101            Blood Pressure from Last 3 Encounters:   09/08/17 122/80   07/24/17 (!) 128/94   04/27/17 126/85    Weight from Last 3 Encounters:   07/24/17 198 lb (89.8 kg)   06/01/17 199 lb (90.3 kg)   04/27/17 199 lb (90.3 kg)              Today, you had the following     No orders found for display       Primary Care Provider Office Phone # Fax #    Felisha Briggs [phone]       [address]  [facility] [address]        Equal Access to Services     OSIEL GILLESPIE : Hadii mihir hortao Sotarynali, waaxda luqadaha, qaybta kaalmada adeegyada, tere springer . So [facility] [phone].    ATENCIÓN: Si habla español, tiene a snyder disposición servicios gratuitos de asistencia lingüística. LlUC [facility] [phone].    We comply with applicable federal civil rights laws and [place] laws. We do not discriminate on the basis of race, color, national origin, age, disability sex, sexual orientation or gender identity.            Thank you!     Thank you for choosing [facility]  for your care. Our goal is always to provide you with excellent care. Hearing back from our patients is one way we can continue to improve our services. Please take a few minutes to complete the written survey that you may receive in the mail after your visit with us. Thank you!             Your Updated Medication List - Protect others around you: Learn how to safely use, store and throw away your medicines at www.disposemymeds.org.          This list is accurate as of: 9/8/17  3:26 PM.  Always use your most recent med list.                   Brand Name Dispense Instructions for use Diagnosis    albuterol 108 (90 BASE) MCG/ACT Inhaler    PROAIR HFA/PROVENTIL HFA/VENTOLIN HFA    2 Inhaler    Inhale 2 puffs into the lungs every 6  hours as needed for shortness of breath / dyspnea or wheezing    Intermittent asthma, uncomplicated       CALCIUM + D PO      Take  by mouth daily.        Capsaicin 0.1 % cream     42.5 g    Externally apply 1 g topically 2 times daily as needed    Right anterior knee pain       cyclobenzaprine 10 MG tablet    FLEXERIL    30 tablet    Take 0.5-1 tablets (5-10 mg) by mouth 3 times daily as needed for muscle spasms    DDD (degenerative disc disease), lumbar, Cervicalgia, Back muscle spasm       fluticasone 50 MCG/ACT spray    FLONASE    16 g    Spray 2 sprays into both nostrils daily    Other seasonal allergic rhinitis       gabapentin 300 MG capsule    NEURONTIN     Indications: Headache Take 300mg po BID x 7 days, then increase to 300mg po TID x 7 days, then increase to 600mg po TID.        lisinopril 10 MG tablet    PRINIVIL/ZESTRIL    90 tablet    TAKE ONE TABLET BY MOUTH EVERY DAY    Essential hypertension with goal blood pressure less than 140/90       loratadine-pseudoePHEDrine  MG per 24 hr tablet    CLARITIN-D 24-hour    30 tablet    Take 1 tablet by mouth daily    Environmental allergies       MIRENA (52 MG) 20 MCG/24HR IUD   Generic drug:  levonorgestrel      USE FOR UP TO 5 YEARS THEN REMOVE        order for DME     1 Device    Short CAM walker/ Aircast    Plantar fasciitis of left foot, Pain of left heel       predniSONE 20 MG tablet    DELTASONE    10 tablet    Take 1 tablet (20 mg) by mouth 2 times daily    Acute bronchitis with symptoms > 10 days, Mild intermittent asthma with acute exacerbation       traZODone 100 MG tablet    DESYREL    90 tablet    Take 1 tablet (100 mg) by mouth At Bedtime Please profile.    Primary insomnia       * venlafaxine 150 MG 24 hr capsule    EFFEXOR-XR    90 capsule    Take 1 capsule (150 mg) by mouth daily Please profile.    Generalized anxiety disorder, Moderate recurrent major depression (H)       * venlafaxine 150 MG 24 hr capsule    EFFEXOR-XR    90 capsule     TAKE ONE CAPSULE BY MOUTH EVERY DAY (NEED TO BE SEEN IN CLINIC FOR FURTHER REFILLS)    Generalized anxiety disorder, Moderate recurrent major depression (H)       * Notice:  This list has 2 medication(s) that are the same as other medications prescribed for you. Read the directions carefully, and ask your doctor or other care provider to review them with you.

## 2017-09-08 NOTE — NURSING NOTE
"Chief Complaint   Patient presents with     Hypertension       Initial /80  Pulse 101 Estimated body mass index is 32.7 kg/(m^2) as calculated from the following:    Height as of 6/1/17: 5' 5.25\" (1.657 m).    Weight as of 7/24/17: 198 lb (89.8 kg).  Medication Reconciliation: complete     Keturah Pettit CMA      "

## 2017-09-09 LAB
ANION GAP SERPL CALCULATED.3IONS-SCNC: 6 MMOL/L (ref 3–14)
BUN SERPL-MCNC: 10 MG/DL (ref 7–30)
CALCIUM SERPL-MCNC: 9 MG/DL (ref 8.5–10.1)
CHLORIDE SERPL-SCNC: 104 MMOL/L (ref 94–109)
CO2 SERPL-SCNC: 28 MMOL/L (ref 20–32)
CREAT SERPL-MCNC: 0.72 MG/DL (ref 0.52–1.04)
GFR SERPL CREATININE-BSD FRML MDRD: 87 ML/MIN/1.7M2
GLUCOSE SERPL-MCNC: 140 MG/DL (ref 70–99)
POTASSIUM SERPL-SCNC: 3.9 MMOL/L (ref 3.4–5.3)
SODIUM SERPL-SCNC: 138 MMOL/L (ref 133–144)

## 2017-09-24 ENCOUNTER — OFFICE VISIT (OUTPATIENT)
Dept: URGENT CARE | Facility: URGENT CARE | Age: 47
End: 2017-09-24
Payer: COMMERCIAL

## 2017-09-24 VITALS
TEMPERATURE: 98.9 F | BODY MASS INDEX: 31.87 KG/M2 | HEART RATE: 78 BPM | WEIGHT: 193 LBS | OXYGEN SATURATION: 97 % | DIASTOLIC BLOOD PRESSURE: 87 MMHG | SYSTOLIC BLOOD PRESSURE: 122 MMHG

## 2017-09-24 DIAGNOSIS — M77.12 LATERAL EPICONDYLITIS OF LEFT ELBOW: ICD-10-CM

## 2017-09-24 DIAGNOSIS — J20.9 ACUTE BRONCHITIS WITH SYMPTOMS > 10 DAYS: Primary | ICD-10-CM

## 2017-09-24 PROCEDURE — 99213 OFFICE O/P EST LOW 20 MIN: CPT | Performed by: PHYSICIAN ASSISTANT

## 2017-09-24 RX ORDER — AZITHROMYCIN 250 MG/1
TABLET, FILM COATED ORAL
Qty: 6 TABLET | Refills: 0 | Status: SHIPPED | OUTPATIENT
Start: 2017-09-24 | End: 2018-06-22

## 2017-09-24 NOTE — MR AVS SNAPSHOT
"              After Visit Summary   2017    Isabela Panchal    MRN: [id number]           Patient Information     Date Of Birth          1970        Visit Information        Provider Department      2017 5:25 PM Nohelia Reddy PA-C [facility]        Today's Diagnoses     Acute bronchitis with symptoms > 10 days    -  1    Lateral epicondylitis of left elbow           Follow-ups after your visit        Who to contact     If you have questions or need follow up information about today's clinic visit or your schedule please contact [facility] directly at [phone].  Normal or non-critical lab and imaging results will be communicated to you by [employer], letter or phone within 4 business days after the clinic has received the results. If you do not hear from us within 7 days, please contact the clinic through [employer] or phone. If you have a critical or abnormal lab result, we will notify you by phone as soon as possible.  Submit refill requests through [employer] or call your pharmacy and they will forward the refill request to us. Please allow 3 business days for your refill to be completed.          Additional Information About Your Visit        MyChart Information     [employer] lets you send messages to your doctor, view your test results, renew your prescriptions, schedule appointments and more. To sign up, go to [email or web address] . Click on \"Log in\" on the left side of the screen, which will take you to the Welcome page. Then click on \"Sign up Now\" on the right side of the page.     You will be asked to enter the access code listed below, as well as some personal information. Please follow the directions to create your username and password.     Your access code is: [email or web address]  Expires: 2017  3:26 PM     Your access code will  in 90 days. If you need help or a new code, please call your [facility] clinic or [phone].      "   Care EveryWhere ID     This is your Care EveryWhere ID. This could be used by other organizations to access your [facility] medical records  [id number]        Your Vitals Were     Pulse Temperature Pulse Oximetry BMI (Body Mass Index)          78 98.9  F (37.2  C) (Oral) 97% 31.87 kg/m2         Blood Pressure from Last 3 Encounters:   09/24/17 122/87   09/08/17 122/80   07/24/17 (!) 128/94    Weight from Last 3 Encounters:   09/24/17 193 lb (87.5 kg)   07/24/17 198 lb (89.8 kg)   06/01/17 199 lb (90.3 kg)              Today, you had the following     No orders found for display         Today's Medication Changes          These changes are accurate as of: 9/24/17  6:44 PM.  If you have any questions, ask your nurse or doctor.               Start taking these medicines.        Dose/Directions    azithromycin 250 MG tablet   Commonly known as:  ZITHROMAX   Used for:  Acute bronchitis with symptoms > 10 days   Started by:  Nohelia Reddy PA-C        Two tablets first day, then one tablet daily for four days.   Quantity:  6 tablet   Refills:  0         These medicines have changed or have updated prescriptions.        Dose/Directions    * order for DME   This may have changed:  Another medication with the same name was added. Make sure you understand how and when to take each.   Used for:  Plantar fasciitis of left foot, Pain of left heel   Changed by:  Teddy Coker DPM        Short CAM walker/ Aircast   Quantity:  1 Device   Refills:  0       * order for DME   This may have changed:  You were already taking a medication with the same name, and this prescription was added. Make sure you understand how and when to take each.   Used for:  Lateral epicondylitis of left elbow   Changed by:  Nohelia Reddy PA-C        Counter force forearm brace   Quantity:  1 Device   Refills:  0       * Notice:  This list has 2 medication(s) that are the same as other medications prescribed for you. Read the  directions carefully, and ask your doctor or other care provider to review them with you.         Where to get your medicines      These medications were sent to [facility] [address] [address] AT [address] [address]    Hours:  24-hours Phone:  [phone]     azithromycin 250 MG tablet         Some of these will need a paper prescription and others can be bought over the counter.  Ask your nurse if you have questions.     Bring a paper prescription for each of these medications     order for DME                Primary Care Provider Office Phone # Fax #    Felisha Briggs [phone] [address] [facility] [address]        Equal Access to Services     OSIEL GILLESPIE : Thu hortao Soaysha, waaxda luqadaha, qaybta kaalmada adeegyada, tere springer . So [facility] [phone].    ATENCIÓN: Si habla español, tiene a snyder disposición servicios gratuitos de asistencia lingüística. [facility] [phone].    We comply with applicable federal civil rights laws and [place] laws. We do not discriminate on the basis of race, color, national origin, age, disability sex, sexual orientation or gender identity.            Thank you!     Thank you for choosing [facility]  for your care. Our goal is always to provide you with excellent care. Hearing back from our patients is one way we can continue to improve our services. Please take a few minutes to complete the written survey that you may receive in the mail after your visit with us. Thank you!             Your Updated Medication List - Protect others around you: Learn how to safely use, store and throw away your medicines at www.disposemymeds.org.          This list is accurate as of: 9/24/17  6:44 PM.  Always use your most recent med list.                   Brand Name Dispense Instructions for use  Diagnosis    albuterol 108 (90 BASE) MCG/ACT Inhaler    PROAIR HFA/PROVENTIL HFA/VENTOLIN HFA    2 Inhaler    Inhale 2 puffs into the lungs every 6 hours as needed for shortness of breath / dyspnea or wheezing    Intermittent asthma, uncomplicated       azithromycin 250 MG tablet    ZITHROMAX    6 tablet    Two tablets first day, then one tablet daily for four days.    Acute bronchitis with symptoms > 10 days       CALCIUM + D PO      Take  by mouth daily.        Capsaicin 0.1 % cream     42.5 g    Externally apply 1 g topically 2 times daily as needed    Right anterior knee pain       cyclobenzaprine 10 MG tablet    FLEXERIL    30 tablet    Take 0.5-1 tablets (5-10 mg) by mouth 3 times daily as needed for muscle spasms    DDD (degenerative disc disease), lumbar, Cervicalgia, Back muscle spasm       fluticasone 50 MCG/ACT spray    FLONASE    16 g    Spray 2 sprays into both nostrils daily    Other seasonal allergic rhinitis       gabapentin 300 MG capsule    NEURONTIN     Indications: Headache Take 300mg po BID x 7 days, then increase to 300mg po TID x 7 days, then increase to 600mg po TID.        lisinopril 10 MG tablet    PRINIVIL/ZESTRIL    90 tablet    TAKE ONE TABLET BY MOUTH EVERY DAY    Essential hypertension with goal blood pressure less than 140/90       loratadine-pseudoePHEDrine  MG per 24 hr tablet    CLARITIN-D 24-hour    30 tablet    Take 1 tablet by mouth daily    Environmental allergies       MIRENA (52 MG) 20 MCG/24HR IUD   Generic drug:  levonorgestrel      USE FOR UP TO 5 YEARS THEN REMOVE        * order for DME     1 Device    Short CAM walker/ Aircast    Plantar fasciitis of left foot, Pain of left heel       * order for DME     1 Device    Counter force forearm brace    Lateral epicondylitis of left elbow       predniSONE 20 MG tablet    DELTASONE    10 tablet    Take 1 tablet (20 mg) by mouth 2 times daily    Acute bronchitis with symptoms > 10 days, Mild intermittent asthma with acute  exacerbation       traZODone 100 MG tablet    DESYREL    90 tablet    Take 1 tablet (100 mg) by mouth At Bedtime Please profile.    Primary insomnia       * venlafaxine 150 MG 24 hr capsule    EFFEXOR-XR    90 capsule    Take 1 capsule (150 mg) by mouth daily Please profile.    Generalized anxiety disorder, Moderate recurrent major depression (H)       * venlafaxine 150 MG 24 hr capsule    EFFEXOR-XR    90 capsule    TAKE ONE CAPSULE BY MOUTH EVERY DAY (NEED TO BE SEEN IN CLINIC FOR FURTHER REFILLS)    Generalized anxiety disorder, Moderate recurrent major depression (H)       * Notice:  This list has 4 medication(s) that are the same as other medications prescribed for you. Read the directions carefully, and ask your doctor or other care provider to review them with you.

## 2017-09-24 NOTE — PROGRESS NOTES
SUBJECTIVE:  Isabela Panchal is a 47 year old female who presents to the clinic today with a chief complaint of cough  for 6 week(s).  Her cough is described as persistent and productive of yellow sputum.    The patient's symptoms are moderate and stable.  Associated symptoms include nasal congestion and post nasal drainage with sore throat. The patient's symptoms are exacerbated by no particular triggers  Patient has been using albuterol nebs  to improve symptoms.    Elbow pain -- Lateral left elbow pain for the past 5-7 days. She has a history of tendonitis. She has not done extensive workouts or anything out of the ordinary. She has not had numbness or tingling. She is right handed.     Past Medical History:   Diagnosis Date     Allergic rhinitis due to allergen      Anemia      Breathing difficulty      Chest pain      Generalised anxiety disorder 9/6/2012     Headache      Heart trouble      High blood triglycerides 2/26/2016     History of blood transfusion     unsure     Hoarseness      Hypertension      Hypoglycemia 3/10/2013     Impaired fasting glucose 8/16/2014     Insomnia 9/6/2012     Irritable bowel syndrome      Itchy eyes      MEDICAL HISTORY OF -     hx of right wrist dislocation     Migraine      Mild intermittent asthma     allergy or URI triggered      Moderate recurrent major depression (H) 9/6/2012     Nasal congestion      Numbness      Obesity, unspecified (OBESE) 8/21/2014     Paroxysmal supraventricular tachycardia (H)     4/00     PONV (postoperative nausea and vomiting)      Ringing in ears      Sleep difficulties      Sneezing      Sore throat      Vertigo      Weakness      Weight gain        Current Outpatient Prescriptions   Medication Sig Dispense Refill     lisinopril (PRINIVIL/ZESTRIL) 10 MG tablet TAKE ONE TABLET BY MOUTH EVERY DAY 90 tablet 0     order for DME Short CAM walker/ Aircast 1 Device 0     cyclobenzaprine (FLEXERIL) 10 MG tablet Take 0.5-1 tablets (5-10 mg) by  mouth 3 times daily as needed for muscle spasms 30 tablet 1     venlafaxine (EFFEXOR-XR) 150 MG 24 hr capsule TAKE ONE CAPSULE BY MOUTH EVERY DAY (NEED TO BE SEEN IN CLINIC FOR FURTHER REFILLS) 90 capsule 1     predniSONE (DELTASONE) 20 MG tablet Take 1 tablet (20 mg) by mouth 2 times daily 10 tablet 0     gabapentin (NEURONTIN) 300 MG capsule Indications: Headache Take 300mg po BID x 7 days, then increase to 300mg po TID x 7 days, then increase to 600mg po TID.       fluticasone (FLONASE) 50 MCG/ACT nasal spray Spray 2 sprays into both nostrils daily 16 g 9     traZODone (DESYREL) 100 MG tablet Take 1 tablet (100 mg) by mouth At Bedtime Please profile. 90 tablet 3     venlafaxine (EFFEXOR-XR) 150 MG 24 hr capsule Take 1 capsule (150 mg) by mouth daily Please profile. 90 capsule 1     loratadine-pseudoePHEDrine (CLARITIN-D 24-HOUR)  MG per tablet Take 1 tablet by mouth daily 30 tablet 3     albuterol (PROAIR HFA, PROVENTIL HFA, VENTOLIN HFA) 108 (90 BASE) MCG/ACT inhaler Inhale 2 puffs into the lungs every 6 hours as needed for shortness of breath / dyspnea or wheezing 2 Inhaler 11     Capsaicin 0.1 % CREA Externally apply 1 g topically 2 times daily as needed 42.5 g 2     Calcium Carbonate-Vitamin D (CALCIUM + D PO) Take  by mouth daily.       MIRENA 20 MCG/24HR IU IUD USE FOR UP TO 5 YEARS THEN REMOVE         Social History   Substance Use Topics     Smoking status: Never Smoker     Smokeless tobacco: Never Used     Alcohol use Yes      Comment: occ-1-2x/month, 2 drinks a time       ROS  Review of systems negative except as stated above.    OBJECTIVE:  There were no vitals taken for this visit.  GENERAL APPEARANCE: healthy, alert and no distress  EYES: EOMI,  PERRL, conjunctiva clear  HENT: ear canals and TM's normal.  Nose and mouth without ulcers, erythema or lesions  NECK: supple, nontender, no lymphadenopathy  RESP: lungs clear to auscultation - no rales, rhonchi or wheezes  CV: regular rates and rhythm,  normal S1 S2, no murmur noted  ABDOMEN:  soft, nontender, no HSM or masses and bowel sounds normal  Extremities: TTP at lateral epicondyle. No swelling or erythema.   NEURO: Normal strength and tone, sensory exam grossly normal,  normal speech and mentation  SKIN: no suspicious lesions or rashes    ASSESSMENT/PLAN:  1. Acute bronchitis with symptoms > 10 days        Push fluids and rest. Honey for cough, humidified air at night.   Red flag symptoms discussed.   - azithromycin (ZITHROMAX) 250 MG tablet; Two tablets first day, then one tablet daily for four days.  Dispense: 6 tablet; Refill: 0    2. Lateral epicondylitis of left elbow  Elbow traction.   Tylenol for pain   ICE and rest. Avoid exacerbating activities.   - order for DME; Counter force forearm brace  Dispense: 1 Device; Refill: 0    Nohelia Reddy PA-C

## 2017-09-24 NOTE — NURSING NOTE
"Chief Complaint   Patient presents with     Urgent Care     Pt in clinic to have eval for left elbow pain and sinus drainage..     Sinus Problem     Elbow Pain       Initial /87  Pulse 78  Temp 98.9  F (37.2  C) (Oral)  Wt 193 lb (87.5 kg)  SpO2 97%  BMI 31.87 kg/m2 Estimated body mass index is 31.87 kg/(m^2) as calculated from the following:    Height as of 6/1/17: 5' 5.25\" (1.657 m).    Weight as of this encounter: 193 lb (87.5 kg).  Medication Reconciliation: complete   Nicole Rubio/ MA    "

## 2017-09-28 ENCOUNTER — OFFICE VISIT (OUTPATIENT)
Dept: FAMILY MEDICINE | Facility: CLINIC | Age: 47
End: 2017-09-28
Payer: COMMERCIAL

## 2017-09-28 VITALS
TEMPERATURE: 97.6 F | WEIGHT: 196 LBS | SYSTOLIC BLOOD PRESSURE: 125 MMHG | OXYGEN SATURATION: 98 % | RESPIRATION RATE: 16 BRPM | BODY MASS INDEX: 32.37 KG/M2 | HEART RATE: 101 BPM | DIASTOLIC BLOOD PRESSURE: 80 MMHG

## 2017-09-28 DIAGNOSIS — E55.9 VITAMIN D DEFICIENCY DISEASE: ICD-10-CM

## 2017-09-28 DIAGNOSIS — F41.1 GENERALIZED ANXIETY DISORDER: ICD-10-CM

## 2017-09-28 DIAGNOSIS — F33.1 MODERATE RECURRENT MAJOR DEPRESSION (H): ICD-10-CM

## 2017-09-28 DIAGNOSIS — M51.369 DDD (DEGENERATIVE DISC DISEASE), LUMBAR: ICD-10-CM

## 2017-09-28 DIAGNOSIS — Z23 NEED FOR PROPHYLACTIC VACCINATION AND INOCULATION AGAINST INFLUENZA: ICD-10-CM

## 2017-09-28 DIAGNOSIS — M62.830 BACK MUSCLE SPASM: ICD-10-CM

## 2017-09-28 DIAGNOSIS — M54.2 CERVICALGIA: ICD-10-CM

## 2017-09-28 DIAGNOSIS — F51.01 PRIMARY INSOMNIA: ICD-10-CM

## 2017-09-28 DIAGNOSIS — E78.1 HIGH BLOOD TRIGLYCERIDES: ICD-10-CM

## 2017-09-28 DIAGNOSIS — Z13.6 CARDIOVASCULAR SCREENING; LDL GOAL LESS THAN 160: ICD-10-CM

## 2017-09-28 DIAGNOSIS — F07.81 POSTCONCUSSION SYNDROME: ICD-10-CM

## 2017-09-28 DIAGNOSIS — I10 ESSENTIAL HYPERTENSION WITH GOAL BLOOD PRESSURE LESS THAN 140/90: ICD-10-CM

## 2017-09-28 DIAGNOSIS — J45.20 INTERMITTENT ASTHMA, UNCOMPLICATED: ICD-10-CM

## 2017-09-28 DIAGNOSIS — J30.2 OTHER SEASONAL ALLERGIC RHINITIS: ICD-10-CM

## 2017-09-28 DIAGNOSIS — M54.12 CERVICAL RADICULOPATHY: Primary | ICD-10-CM

## 2017-09-28 DIAGNOSIS — R73.01 IMPAIRED FASTING GLUCOSE: ICD-10-CM

## 2017-09-28 LAB — HBA1C MFR BLD: 5.3 % (ref 4.3–6)

## 2017-09-28 PROCEDURE — 83036 HEMOGLOBIN GLYCOSYLATED A1C: CPT | Performed by: FAMILY MEDICINE

## 2017-09-28 PROCEDURE — 90471 IMMUNIZATION ADMIN: CPT | Performed by: FAMILY MEDICINE

## 2017-09-28 PROCEDURE — 90686 IIV4 VACC NO PRSV 0.5 ML IM: CPT | Performed by: FAMILY MEDICINE

## 2017-09-28 PROCEDURE — 36415 COLL VENOUS BLD VENIPUNCTURE: CPT | Performed by: FAMILY MEDICINE

## 2017-09-28 PROCEDURE — 83721 ASSAY OF BLOOD LIPOPROTEIN: CPT | Mod: 59 | Performed by: FAMILY MEDICINE

## 2017-09-28 PROCEDURE — 99214 OFFICE O/P EST MOD 30 MIN: CPT | Mod: 25 | Performed by: FAMILY MEDICINE

## 2017-09-28 PROCEDURE — 82043 UR ALBUMIN QUANTITATIVE: CPT | Performed by: FAMILY MEDICINE

## 2017-09-28 PROCEDURE — 80061 LIPID PANEL: CPT | Performed by: FAMILY MEDICINE

## 2017-09-28 PROCEDURE — 82306 VITAMIN D 25 HYDROXY: CPT | Performed by: FAMILY MEDICINE

## 2017-09-28 RX ORDER — CYCLOBENZAPRINE HCL 10 MG
5-10 TABLET ORAL 3 TIMES DAILY PRN
Qty: 30 TABLET | Refills: 1 | Status: SHIPPED | OUTPATIENT
Start: 2017-09-28 | End: 2018-02-13

## 2017-09-28 RX ORDER — LISINOPRIL 10 MG/1
10 TABLET ORAL DAILY
Qty: 90 TABLET | Refills: 3 | Status: SHIPPED | OUTPATIENT
Start: 2017-09-28 | End: 2018-02-13

## 2017-09-28 RX ORDER — TRAZODONE HYDROCHLORIDE 100 MG/1
100 TABLET ORAL AT BEDTIME
Qty: 90 TABLET | Refills: 3 | Status: SHIPPED | OUTPATIENT
Start: 2017-09-28 | End: 2018-02-13

## 2017-09-28 RX ORDER — FLUTICASONE PROPIONATE 50 MCG
2 SPRAY, SUSPENSION (ML) NASAL DAILY
Qty: 16 G | Refills: 9 | Status: SHIPPED | OUTPATIENT
Start: 2017-09-28 | End: 2019-03-26

## 2017-09-28 RX ORDER — ALBUTEROL SULFATE 90 UG/1
2 AEROSOL, METERED RESPIRATORY (INHALATION) EVERY 6 HOURS PRN
Qty: 2 INHALER | Refills: 11 | Status: SHIPPED | OUTPATIENT
Start: 2017-09-28 | End: 2018-02-13

## 2017-09-28 RX ORDER — VENLAFAXINE HYDROCHLORIDE 150 MG/1
150 CAPSULE, EXTENDED RELEASE ORAL DAILY
Qty: 90 CAPSULE | Refills: 1 | Status: SHIPPED | OUTPATIENT
Start: 2017-09-28 | End: 2018-04-11

## 2017-09-28 ASSESSMENT — PATIENT HEALTH QUESTIONNAIRE - PHQ9
5. POOR APPETITE OR OVEREATING: MORE THAN HALF THE DAYS
SUM OF ALL RESPONSES TO PHQ QUESTIONS 1-9: 17

## 2017-09-28 ASSESSMENT — ANXIETY QUESTIONNAIRES
7. FEELING AFRAID AS IF SOMETHING AWFUL MIGHT HAPPEN: NEARLY EVERY DAY
6. BECOMING EASILY ANNOYED OR IRRITABLE: MORE THAN HALF THE DAYS
GAD7 TOTAL SCORE: 13
5. BEING SO RESTLESS THAT IT IS HARD TO SIT STILL: NOT AT ALL
2. NOT BEING ABLE TO STOP OR CONTROL WORRYING: MORE THAN HALF THE DAYS
1. FEELING NERVOUS, ANXIOUS, OR ON EDGE: SEVERAL DAYS
3. WORRYING TOO MUCH ABOUT DIFFERENT THINGS: NEARLY EVERY DAY
IF YOU CHECKED OFF ANY PROBLEMS ON THIS QUESTIONNAIRE, HOW DIFFICULT HAVE THESE PROBLEMS MADE IT FOR YOU TO DO YOUR WORK, TAKE CARE OF THINGS AT HOME, OR GET ALONG WITH OTHER PEOPLE: EXTREMELY DIFFICULT

## 2017-09-28 NOTE — LETTER
October 6, 2017      Isabela Panchal  [address]  [facility] [address]        Dear ,    We are writing to inform you of your test results.    Hello!  It was a pleasure to see you in clinic!  Thank you for getting labs done.  Your labs look pretty good!    Your microalbumen is normal, which is great news. This means you do not have protein in the urine, and that your kidneys are currently functioning well. Keeping blood pressure and blood fats low is the best way to preserve your kidney function over your lifetime.    Your vitamin D is normal but on the low end of normal. Please take 2000 units of Vitamin D daily.     Your cholesterol is pretty high, and your triglycerides are very, very high.    Thank you for getting your cholesterol checked!  Desired or goal levels are:  CHOLESTEROL: Desirable is less than 200.  HDL (Good Cholesterol): Desirable is greater than 40 in men and greater than 50 in women.  LDL (Bad Cholesterol): Desirable is less than 160  TRIGLYCERIDES: Desirable is less than 150.    Please follow the recommendations below and schedule a lab only appointment ([phone]) in 2-3 months for a repeat fasting test. Please don't have anything to eat or drink (except water) for 8 hours prior to the test.    I recommend that you take Omega-3 fatty acids (available in capsules) to improve your triglycerides. You need 2000 - 4000 mg daily of EPA + DHA. This usually means 4 - 8 capsules a day, depending on the strength you buy.  To keep triglycerides in check, reduce alcohol, sugar, juice, excess fruit, bread, pasta, rice and cereal in your diet. Increase exercise  and Omega 3 (fish oil supplements) with meals.  Your triglycerides are high enough that I recommend starting medication called fenofibrate to lower your triglycerides, along with the dietary changes (reducing sugar in your diet).    Also consider starting or increasing your aerobic activity; this is the best way to improve HDL  (good) cholesterol. Exercise for at least 30 min 5 times per week, and lift weights 2-3 times per week.    As you may know, abnormal cholesterol is one factor that increases your risk for heart disease and stroke. You can improve your cholesterol by controlling the amount and type of fat you eat and by increasing your daily activity level.    Here are some ways to improve your nutrition (adapted from the American Academy of [employer] handout):  Eat less fat (especially butter, Crisco and other saturated fats)  Buy lean cuts of meat; reduce your portions of red meat or substitute poultry or fish, or avoid meat altogether.  Use skim milk and low-fat dairy products  Eat no more than 4 egg yolks per week  Avoid fried or fast foods that are high in fat  Eat more fruits and vegetables, trying to make your plate of food at least half non-starchy vegetables.     If you have any questions, please contact the clinic or schedule an appointment with me, thank you!    Resulted Orders   Lipid panel reflex to direct LDL   Result Value Ref Range    Cholesterol 207 (H) <200 mg/dL      Comment:      Desirable:       <200 mg/dl    Triglycerides 618 (H) <150 mg/dL      Comment:      Borderline high:  150-199 mg/dl  High:             200-499 mg/dl  Very high:       >499 mg/dl  Non Fasting      HDL Cholesterol 30 (L) >49 mg/dL    LDL Cholesterol Calculated  <100 mg/dL     Cannot estimate LDL when triglyceride exceeds 400 mg/dL    Non HDL Cholesterol 177 (H) <130 mg/dL      Comment:      Above Desirable:  130-159 mg/dl  Borderline high:  160-189 mg/dl  High:             190-219 mg/dl  Very high:       >219 mg/dl     Albumin Random Urine Quantitative with Creat Ratio   Result Value Ref Range    Creatinine Urine 169 mg/dL    Albumin Urine mg/L 11 mg/L    Albumin Urine mg/g Cr 6.63 0 - 25 mg/g Cr   Hemoglobin A1c   Result Value Ref Range    Hemoglobin A1C 5.3 4.3 - 6.0 %   Vitamin D Deficiency   Result Value Ref Range    Vitamin D  Deficiency screening 24 20 - 75 ug/L      Comment:      Season, race, dietary intake, and treatment affect the concentration of   25-hydroxy-Vitamin D. Values may decrease during winter months and increase   during summer months. Values 20-29 ug/L may indicate Vitamin D insufficiency   and values <20 ug/L may indicate Vitamin D deficiency.  Vitamin D determination is routinely performed by an immunoassay specific for   25 hydroxyvitamin D3.  If an individual is on vitamin D2 (ergocalciferol)   supplementation, please specify 25 OH vitamin D2 and D3 level determination by   LCMSMS test VITD23.     LDL cholesterol direct   Result Value Ref Range    LDL Cholesterol Direct 115 (H) <100 mg/dL      Comment:      Above desirable:  100-129 mg/dl  Borderline High:  130-159 mg/dL  High:             160-189 mg/dL  Very high:       >189 mg/dl         If you have any questions or concerns, please call the clinic at the number listed above.       Sincerely,        Felisha Briggs MD/nr
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
## 2017-09-28 NOTE — NURSING NOTE
"Chief Complaint   Patient presents with     Referral       Initial /80  Pulse 101  Temp 97.6  F (36.4  C) (Tympanic)  Resp 16  Wt 196 lb (88.9 kg)  SpO2 98%  BMI 32.37 kg/m2 Estimated body mass index is 32.37 kg/(m^2) as calculated from the following:    Height as of 6/1/17: 5' 5.25\" (1.657 m).    Weight as of this encounter: 196 lb (88.9 kg).  Medication Reconciliation: complete       Ina Toussaint MA    "

## 2017-09-28 NOTE — MR AVS SNAPSHOT
After Visit Summary   9/28/2017    Isabela Panchal    MRN: [id number]           Patient Information     Date Of Birth          1970        Visit Information        Provider Department      9/28/2017 2:20 PM Felisha Briggs MD [facility]        Today's Diagnoses     Cervical radiculopathy    -  1    Postconcussion syndrome        Essential hypertension with goal blood pressure less than 140/90        Intermittent asthma, uncomplicated        Other seasonal allergic rhinitis        Generalized anxiety disorder        Moderate recurrent major depression        Primary insomnia        DDD (degenerative disc disease), lumbar        Cervicalgia        Back muscle spasm        Vitamin D deficiency disease        Impaired fasting glucose        CARDIOVASCULAR SCREENING; LDL GOAL LESS THAN 160        Need for prophylactic vaccination and inoculation against influenza          Care Instructions    Please schedule a physical with pap when you get a chance, thank you!          Follow-ups after your visit        Additional Services     [facility] PT, HAND, AND CHIROPRACTIC REFERRAL       **This order will print in the [facility] Scheduling Office**    Physical Therapy, Hand Therapy and Chiropractic Care are available through:    *[facility]  *[facility]  *[facility]    Call one number to schedule at any of the above locations: [phone].    Your provider has referred you to: Physical Therapy at [facility] or [facility]    Indication/Reason for Referral: neck and back pain  Onset of Illness: one year  Therapy Orders: Evaluate and Treat  Special Programs:   Special Request:     Venancio Craft      Additional Comments for the Therapist or Chiropractor:     Please be aware that coverage of these services is subject to the terms and limitations of your health insurance plan.  Call member services at your health plan with any benefit or coverage  "questions.      Please bring the following to your appointment:    *Your personal calendar for scheduling future appointments  *Comfortable clothing            PHYSICAL THERAPY REFERRAL       *This therapy referral will be filtered to a centralized scheduling office at [facility] and the patient will receive a call to schedule an appointment at a [facility] [place] location most convenient for them. *     [facility] provides Physical Therapy evaluation and treatment and many specialty services across the [place] system.  If requesting a specialty program, please choose from the list below.    If you have not heard from the scheduling office within 2 business days, please call [phone] for all locations, with the exception of [place], please call [phone].  Treatment: Evaluation & Treatment  Special Instructions/Modalities:   Special Programs: Balance/Vestibular    Please be aware that coverage of these services is subject to the terms and limitations of your health insurance plan.  Call member services at your health plan with any benefit or coverage questions.      **Note to Provider:  If you are referring outside of [facility] [place] for the therapy appointment, please list the name of the location in the \"special instructions\" above, print the referral and give to the patient to schedule the appointment.                  Who to contact     If you have questions or need follow up information about today's clinic visit or your schedule please contact [facility] directly at [phone].  Normal or non-critical lab and imaging results will be communicated to you by MyChart, letter or phone within 4 business days after the clinic has received the results. If you do not hear from us within 7 days, please contact the clinic through MyChart or phone. If you have a critical or abnormal lab result, we will notify you by phone as soon as possible.  Submit refill requests " "through [employer] or call your pharmacy and they will forward the refill request to us. Please allow 3 business days for your refill to be completed.          Additional Information About Your Visit        [employer] Information     [employer] lets you send messages to your doctor, view your test results, renew your prescriptions, schedule appointments and more. To sign up, go to [email or web address] . Click on \"Log in\" on the left side of the screen, which will take you to the Welcome page. Then click on \"Sign up Now\" on the right side of the page.     You will be asked to enter the access code listed below, as well as some personal information. Please follow the directions to create your username and password.     Your access code is: [email or web address]  Expires: 2017  3:26 PM     Your access code will  in 90 days. If you need help or a new code, please call your [place] clinic or [phone].        Care EveryWhere ID     This is your Care EveryWhere ID. This could be used by other organizations to access your [facility] medical records  [id number]        Your Vitals Were     Pulse Temperature Respirations Pulse Oximetry BMI (Body Mass Index)       101 97.6  F (36.4  C) (Tympanic) 16 98% 32.37 kg/m2        Blood Pressure from Last 3 Encounters:   17 125/80   17 122/87   17 122/80    Weight from Last 3 Encounters:   17 196 lb (88.9 kg)   17 193 lb (87.5 kg)   17 198 lb (89.8 kg)              We Performed the Following     Albumin Random Urine Quantitative with Creat Ratio     FLU VAC, SPLIT VIRUS IM > 3 YO (QUADRIVALENT) [59725]     Hemoglobin A1c     [facility] PT, HAND, AND CHIROPRACTIC REFERRAL     Lipid panel reflex to direct LDL     PHYSICAL THERAPY REFERRAL     Vaccine Administration, Initial [06708]     Vitamin D Deficiency          Today's Medication Changes          These changes are accurate as of: 17  3:22 PM.  If you have any questions, ask your nurse or doctor.    "            These medicines have changed or have updated prescriptions.        Dose/Directions    lisinopril 10 MG tablet   Commonly known as:  PRINIVIL/ZESTRIL   This may have changed:  See the new instructions.   Used for:  Essential hypertension with goal blood pressure less than 140/90   Changed by:  Felisha Briggs MD        Dose:  10 mg   Take 1 tablet (10 mg) by mouth daily   Quantity:  90 tablet   Refills:  3            Where to get your medicines      These medications were sent to [facility] [place], [place] - [address]  [address], [facility] [address]     Phone:  [phone]     albuterol 108 (90 BASE) MCG/ACT Inhaler    cyclobenzaprine 10 MG tablet    fluticasone 50 MCG/ACT spray    lisinopril 10 MG tablet    traZODone 100 MG tablet    venlafaxine 150 MG 24 hr capsule                Primary Care Provider Office Phone # Fax #    Felisha Briggs [phone]       [address]  [facility] [address]        Equal Access to Services     [facility]: Hadii aad ku hadasho Soomaali, waaxda luqadaha, qaybta kaalmada adeegyada, waxay idiin hayaan kristina springer . So [facility] [place] [phone].    ATENCIÓN: Si habla español, tiene a snyder disposición servicios gratuitos de asistencia lingüística. Ll[facility] [phone].    We comply with applicable federal civil rights laws and [place] laws. We do not discriminate on the basis of race, color, national origin, age, disability sex, sexual orientation or gender identity.            Thank you!     Thank you for choosing [facility]  for your care. Our goal is always to provide you with excellent care. Hearing back from our patients is one way we can continue to improve our services. Please take a few minutes to complete the written survey that you may receive in the mail after your visit with us. Thank you!             Your Updated Medication List - Protect others around you: Learn how to safely  use, store and throw away your medicines at www.disposemymeds.org.          This list is accurate as of: 9/28/17  3:22 PM.  Always use your most recent med list.                   Brand Name Dispense Instructions for use Diagnosis    albuterol 108 (90 BASE) MCG/ACT Inhaler    PROAIR HFA/PROVENTIL HFA/VENTOLIN HFA    2 Inhaler    Inhale 2 puffs into the lungs every 6 hours as needed for shortness of breath / dyspnea or wheezing    Intermittent asthma, uncomplicated       azithromycin 250 MG tablet    ZITHROMAX    6 tablet    Two tablets first day, then one tablet daily for four days.    Acute bronchitis with symptoms > 10 days       CALCIUM + D PO      Take  by mouth daily.        Capsaicin 0.1 % cream     42.5 g    Externally apply 1 g topically 2 times daily as needed    Right anterior knee pain       cyclobenzaprine 10 MG tablet    FLEXERIL    30 tablet    Take 0.5-1 tablets (5-10 mg) by mouth 3 times daily as needed for muscle spasms    DDD (degenerative disc disease), lumbar, Cervicalgia, Back muscle spasm       fluticasone 50 MCG/ACT spray    FLONASE    16 g    Spray 2 sprays into both nostrils daily    Other seasonal allergic rhinitis       gabapentin 300 MG capsule    NEURONTIN     Indications: Headache Take 300mg po BID x 7 days, then increase to 300mg po TID x 7 days, then increase to 600mg po TID.        lisinopril 10 MG tablet    PRINIVIL/ZESTRIL    90 tablet    Take 1 tablet (10 mg) by mouth daily    Essential hypertension with goal blood pressure less than 140/90       loratadine-pseudoePHEDrine  MG per 24 hr tablet    CLARITIN-D 24-hour    30 tablet    Take 1 tablet by mouth daily    Environmental allergies       MIRENA (52 MG) 20 MCG/24HR IUD   Generic drug:  levonorgestrel      USE FOR UP TO 5 YEARS THEN REMOVE        order for DME     1 Device    Counter force forearm brace    Lateral epicondylitis of left elbow       traZODone 100 MG tablet    DESYREL    90 tablet    Take 1 tablet (100 mg) by  mouth At Bedtime Please profile.    Primary insomnia       * venlafaxine 150 MG 24 hr capsule    EFFEXOR-XR    90 capsule    TAKE ONE CAPSULE BY MOUTH EVERY DAY (NEED TO BE SEEN IN CLINIC FOR FURTHER REFILLS)    Generalized anxiety disorder, Moderate recurrent major depression (H)       * venlafaxine 150 MG 24 hr capsule    EFFEXOR-XR    90 capsule    Take 1 capsule (150 mg) by mouth daily Please profile.    Generalized anxiety disorder, Moderate recurrent major depression (H)       * Notice:  This list has 2 medication(s) that are the same as other medications prescribed for you. Read the directions carefully, and ask your doctor or other care provider to review them with you.

## 2017-09-28 NOTE — PATIENT INSTRUCTIONS
Please schedule a physical with pap when you get a chance, thank you!    [facility]  [address]  [address], [place], [place] [address] [phone]    Lab Results   Component Value Date    PAP NIL 03/05/2014    PAP ASC-US 12/16/2010    PAP NIL 06/05/2009

## 2017-09-28 NOTE — PROGRESS NOTES
Patient was seen today in clinic.  I discussed results in clinic, please see clinic progress note.    Felisha Briggs 9/28/2017

## 2017-09-29 LAB
CHOLEST SERPL-MCNC: 207 MG/DL
CREAT UR-MCNC: 169 MG/DL
DEPRECATED CALCIDIOL+CALCIFEROL SERPL-MC: 24 UG/L (ref 20–75)
HDLC SERPL-MCNC: 30 MG/DL
LDLC SERPL CALC-MCNC: ABNORMAL MG/DL
LDLC SERPL DIRECT ASSAY-MCNC: 115 MG/DL
MICROALBUMIN UR-MCNC: 11 MG/L
MICROALBUMIN/CREAT UR: 6.63 MG/G CR (ref 0–25)
NONHDLC SERPL-MCNC: 177 MG/DL
TRIGL SERPL-MCNC: 618 MG/DL

## 2017-09-29 ASSESSMENT — ANXIETY QUESTIONNAIRES: GAD7 TOTAL SCORE: 13

## 2017-09-29 ASSESSMENT — ASTHMA QUESTIONNAIRES: ACT_TOTALSCORE: 25

## 2017-10-05 RX ORDER — FENOFIBRATE 48 MG/1
48 TABLET, COATED ORAL DAILY
Qty: 90 TABLET | Refills: 1 | Status: SHIPPED | OUTPATIENT
Start: 2017-10-05 | End: 2018-02-13

## 2017-10-05 NOTE — PROGRESS NOTES
Hello!  It was a pleasure to see you in clinic!  Thank you for getting labs done.  Your labs look pretty good!    Your microalbumen is normal, which is great news. This means you do not have protein in the urine, and that your kidneys are currently functioning well. Keeping blood pressure and blood fats low is the best way to preserve your kidney function over your lifetime.    Your vitamin D is normal but on the low end of normal. Please take 2000 units of Vitamin D daily.     Your cholesterol is pretty high, and your triglycerides are very, very high.    Thank you for getting your cholesterol checked!  Desired or goal levels are:  CHOLESTEROL: Desirable is less than 200.  HDL (Good Cholesterol): Desirable is greater than 40 in men and greater than 50 in women.  LDL (Bad Cholesterol): Desirable is less than 160  TRIGLYCERIDES: Desirable is less than 150.    Please follow the recommendations below and schedule a lab only appointment ([phone]) in 2-3 months for a repeat fasting test. Please don't have anything to eat or drink (except water) for 8 hours prior to the test.    I recommend that you take Omega-3 fatty acids (available in capsules) to improve your triglycerides. You need 2000 - 4000 mg daily of EPA + DHA. This usually means 4 - 8 capsules a day, depending on the strength you buy.  To keep triglycerides in check, reduce alcohol, sugar, juice, excess fruit, bread, pasta, rice and cereal in your diet. Increase exercise  and Omega 3 (fish oil supplements) with meals.  Your triglycerides are high enough that I recommend starting medication called fenofibrate to lower your triglycerides, along with the dietary changes (reducing sugar in your diet).    Also consider starting or increasing your aerobic activity; this is the best way to improve HDL (good) cholesterol. Exercise for at least 30 min 5 times per week, and lift weights 2-3 times per week.    As you may know, abnormal cholesterol is one factor that  increases your risk for heart disease and stroke. You can improve your cholesterol by controlling the amount and type of fat you eat and by increasing your daily activity level.    Here are some ways to improve your nutrition (adapted from the American Academy of Family Practice handout):  Eat less fat (especially butter, Crisco and other saturated fats)  Buy lean cuts of meat; reduce your portions of red meat or substitute poultry or fish, or avoid meat altogether.  Use skim milk and low-fat dairy products  Eat no more than 4 egg yolks per week  Avoid fried or fast foods that are high in fat  Eat more fruits and vegetables, trying to make your plate of food at least half non-starchy vegetables.    If you have any questions, please contact the clinic or schedule an appointment with me, thank you!    Sincerely,  Dr. Felisha Briggs MD  10/5/2017

## 2017-10-13 ENCOUNTER — HOSPITAL ENCOUNTER (OUTPATIENT)
Dept: PHYSICAL THERAPY | Facility: CLINIC | Age: 47
Setting detail: THERAPIES SERIES
End: 2017-10-13
Attending: FAMILY MEDICINE
Payer: COMMERCIAL

## 2017-10-13 PROCEDURE — 40000767 ZZHC STATISTIC PT CONCUSSION VISIT: Performed by: PHYSICAL THERAPIST

## 2017-10-13 PROCEDURE — 97161 PT EVAL LOW COMPLEX 20 MIN: CPT | Mod: GP | Performed by: PHYSICAL THERAPIST

## 2017-10-13 NOTE — IP AVS SNAPSHOT
"                  MRN:[id number]                      After Visit Summary   10/13/2017    Isabela Panchal    MRN: [id number]           Visit Information        Provider Department      10/13/2017  1:45 PM Tiesha Bell, PT [facility], [place], Physical Therapy - Outpatient        Your next 10 appointments already scheduled     Oct 16, 2017 12:00 PM CDT   (Arrive by 11:45 AM)   [facility] Chiropractor with [facility] ([facility] Demarco  )    [address] [place] [address]. [address]  [place] [address] [phone]                Further instructions from your care team       10/13/17     Ask at Monday jodi't at chiro - about neck PT.  There is a Dora PT out at [facility] [place] that is a great PT for your neck.    Scheduling number: [phone]     Try your walking pole on outdoor surfaces/ grass/ apple orchard.      Reminder to call [facility].    When get bifocals, take them off to do up or downstairs.    Ask around about a stationary bike/ your parents?  It would really help you get some home exercise.       Tiesha  PT   [phone]    Remember shoes/ better for closed toe regarding balance.             MyChart Information     [employer] lets you send messages to your doctor, view your test results, renew your prescriptions, schedule appointments and more. To sign up, go to [email or web address] . Click on \"Log in\" on the left side of the screen, which will take you to the Welcome page. Then click on \"Sign up Now\" on the right side of the page.     You will be asked to enter the access code listed below, as well as some personal information. Please follow the directions to create your username and password.     Your access code is: [email or web address]  Expires: 2017  3:26 PM     Your access code will  in 90 days. If you need help or a new code, please call your [place] clinic or [phone].        Care EveryWhere ID     This is your Care EveryWhere ID. This could be used by other organizations to access " your [facility] medical records  [id number]        Equal Access to Services     OSIEL GILLESPIE : Thu Le, roger york, kiya henriquez, tere guerrier. So [facility] [phone].    ATENCIÓN: Si habla español, tiene a snyder disposición servicios gratuitos de asistencia lingüística. Llame al [phone].    We comply with applicable federal civil rights laws and [place] laws. We do not discriminate on the basis of race, color, national origin, age, disability, sex, sexual orientation, or gender identity.

## 2017-10-13 NOTE — DISCHARGE INSTRUCTIONS
10/13/17     Ask at Monday jodi't at [facility] - about neck PT.  There is a Dora PT out at [facility] that is a great PT for your neck.    Scheduling number: [phone]     Try your walking pole on outdoor surfaces/ grass/ apple orchard.      Reminder to call [facility].    When get bifocals, take them off to do up or downstairs.    Ask around about a stationary bike/ your parents?  It would really help you get some home exercise.       Tiesha  PT   [phone]    Remember shoes/ better for closed toe regarding balance.

## 2017-10-16 ENCOUNTER — THERAPY VISIT (OUTPATIENT)
Dept: CHIROPRACTIC MEDICINE | Facility: CLINIC | Age: 47
End: 2017-10-16
Payer: COMMERCIAL

## 2017-10-16 DIAGNOSIS — M25.561 RIGHT KNEE PAIN, UNSPECIFIED CHRONICITY: ICD-10-CM

## 2017-10-16 DIAGNOSIS — M99.03 SOMATIC DYSFUNCTION OF LUMBAR REGION: ICD-10-CM

## 2017-10-16 DIAGNOSIS — F07.81 POST CONCUSSION SYNDROME: ICD-10-CM

## 2017-10-16 DIAGNOSIS — M99.01 CERVICAL SEGMENT DYSFUNCTION: Primary | ICD-10-CM

## 2017-10-16 DIAGNOSIS — M54.2 CERVICALGIA: ICD-10-CM

## 2017-10-16 DIAGNOSIS — G89.29 CHRONIC LOW BACK PAIN WITHOUT SCIATICA, UNSPECIFIED BACK PAIN LATERALITY: ICD-10-CM

## 2017-10-16 DIAGNOSIS — M99.04 SOMATIC DYSFUNCTION OF SACRAL REGION: ICD-10-CM

## 2017-10-16 DIAGNOSIS — M54.50 CHRONIC LOW BACK PAIN WITHOUT SCIATICA, UNSPECIFIED BACK PAIN LATERALITY: ICD-10-CM

## 2017-10-16 PROBLEM — M99.02 THORACIC SEGMENT DYSFUNCTION: Status: RESOLVED | Noted: 2017-02-13 | Resolved: 2017-10-16

## 2017-10-16 PROBLEM — R51.9 CEPHALGIA: Status: RESOLVED | Noted: 2017-02-13 | Resolved: 2017-10-16

## 2017-10-16 PROCEDURE — 98941 CHIROPRACT MANJ 3-4 REGIONS: CPT | Mod: AT | Performed by: CHIROPRACTOR

## 2017-10-16 PROCEDURE — 99213 OFFICE O/P EST LOW 20 MIN: CPT | Mod: GA | Performed by: CHIROPRACTOR

## 2017-10-16 NOTE — ADDENDUM NOTE
Encounter addended by: Tiesha Bell, PT on: 10/16/2017  8:00 AM<BR>     Actions taken: Episode edited, Flowsheet accepted, Charge Capture section accepted

## 2017-10-16 NOTE — PROGRESS NOTES
10/13/17 1300   General Information   Start of Care Date 10/13/17   Referring Physician Felisha Briggs   Orders Evaluate and Treat as Indicated   Additional Orders also has separate orders for chiro and PT for neck/back   Order Date 09/28/17   Medical Diagnosis post concussive syndrome   Onset of illness/injury or Date of Surgery 02/26/15  (feb of 2015)   Surgical/Medical history reviewed Yes   Pertinent history of current problem (include personal factors and/or comorbidities that impact the POC) Isabela here alone/ had past PT here  i have dizziness when i walk. not w/ bending/ hits me when i am walking and it throws me off balance. used 1-2 poles in past but not lately not in past yr.   i feel like i had a little relapse.  my head wants and body wants to go to the left.  am trying to cook again for the kids; forget to set timers.  not working/ applied for SSDI.     Pertinent Visual History  glasses   Prior level of functional mobility Ambulation   Ambulation not using pole(s) at this time.    Improvement after PT Mild   Improvement after OT Mild   Current Community Support Family/friend caregiver  (mom Janie)   Patient role/Employment history Disabled;Other/comments  (applied for ssdi.)   Living environment House/[facility]   Home/Community Accessibility Comments steps present.    Assistive Devices Comments has a pair of walking pole   Patient/Family Goals Statement feel more stable again, less wide gait, better on stairs.  less dizzy   General Information Comments Isabela is here alone; drove here.  kids 9 gr 4, a 18 yo bandar, and 19 yo senior   Fall Risk Screen   Fall screen completed by PT   Per patient - Fall 2 or more times in past year? Yes   Per patient - Fall with injury in past year? Yes   Is patient a fall risk? Yes;Department fall risk interventions implemented   Fall screen comments hard to remember; got up  and fell, bruises.   Pain   Patient currently in pain Yes   Pain location neck stiffness    Pain comments will be seeing chiro on monday 10/16/17   Cognitive Status Examination   Orientation orientation to person, place and time   Level of Consciousness alert   Follows Commands and Answers Questions 100% of the time   Personal Safety and Judgment intact   Posture   Posture Forward head position   Range of Motion (ROM)   ROM Comment grossly intact except.  neck rotation L 50 / R 60   Strength   Strength Comments gr 4-4+/5.    Bed Mobility   Bed Mobility Comments indep   Transfer Skills   Transfer Comments slow but indep   Locomotion   Wheel Chair Mobility Comments n/a   Gait Special Tests   Gait Special Tests FUNCTIONAL GAIT ASSESSMENT   Gait Special Tests 25 Foot Timed Walk   Seconds 13.4   Comments 18 steps w/ sl wide CLARISA, can talk and walk but slows w/ this test above.   Gait Special Tests Functional Gait Assessment Score out of 30   Score out of 30 18   Balance Special Tests Single Leg Stance Right,   Right, seconds 2 Seconds   Balance Special Tests Single Leg Stance Left   Left, seconds 2 Seconds   Balance Special Tests Modified CTSIB Conditions   Condition 1, seconds 30 Seconds   Condition 2, seconds 5 Seconds   Coordination   Coordination Comments not tested, did not noted issues   Muscle Tone   Muscle Tone no deficits were identified   Oculomotor Exam   Convergence Testing Comments 35cm then 40 cm   Convergence Testing Abnormal   Planned Therapy Interventions   Planned Therapy Interventions balance training;gait training;neuromuscular re-education;ROM;strengthening;stretching   Planned Therapy Interventions Comment HEP   Clinical Impression   Criteria for Skilled Therapeutic Interventions Met yes, treatment indicated   PT Diagnosis post concussive syndrome w/ lingering sxs   Influenced by the following impairments pain, imbalance, cognitive issues   Functional limitations due to impairments gait difficulty   Clinical Presentation Stable/Uncomplicated   Clinical Presentation Rationale long standing  sxs, non changing    Clinical Decision Making (Complexity) Low complexity   Therapy Frequency other (see comments)   Predicted Duration of Therapy Intervention (days/wks) up to 10 x in 3 months   Risk & Benefits of therapy have been explained Yes   Patient, Family & other staff in agreement with plan of care Yes   Clinical Impression Comments 46 yo female known to this clinic from PT/OT- pt feels has had backslide w/ sxs, wants PT again - MAY benefit from OT again but will await chiro jodi'ts and neck PT jodi'ts as well.  Skilled PT needed at this time for improving balance and gait and overall HEP/ wellness   Goal 1   Goal Identifier CSA   Goal Description Pt to demo decreased concussion sx to <30/90 to allow for increased participation in community activities as result of improved concussion sx management strategies.   Target Date 01/13/18   Goal 2   Goal Identifier balance   Goal Description pt to be able to perf SLS x 10 sec to perf safe curbs and stairs x 8 w/out railing    Target Date 01/13/18   Goal 3   Goal Identifier gait speed   Goal Description 25ft walk to be perf in 9 sec or less for safe crossing of streets/ parking lots.   Target Date 01/13/18   Goal 4   Goal Identifier gait   Goal Description pt to score FGA test at least 22/30 for indep community mobility   Target Date 01/13/18   Total Evaluation Time   Total Evaluation Time (Minutes) 45

## 2017-10-16 NOTE — MR AVS SNAPSHOT
After Visit Summary   10/16/2017    Isabela Panchal    MRN: [id number]           Patient Information     Date Of Birth          1970        Visit Information        Provider Department      10/16/2017 12:00 PM Eusebia Jenkins DC [facility]        Today's Diagnoses     Cervical segment dysfunction    -  1    Right knee pain, unspecified chronicity        Cervicalgia        Somatic dysfunction of lumbar region        Chronic low back pain without sciatica, unspecified back pain laterality        Somatic dysfunction of sacral region        Post concussion syndrome           Follow-ups after your visit        Additional Services     ORTHO  REFERRAL       [facility] referring you to the Orthopedic  Services at [facility].       The  Representative will assist you in the coordination of your Orthopedic and Musculoskeletal Care as prescribed by your physician.    The  Representative will call you within 1 business day to help schedule your appointment, or you may contact the  Representative at:    All areas ~ [phone]     Type of Referral : Non Surgical with Dr. Bartlett-may need a PCP referral      Timeframe requested: Routine    Coverage of these services is subject to the terms and limitations of your health insurance plan.  Please call member services at your health plan with any benefit or coverage questions.      If X-rays, CT or MRI's have been performed, please contact the facility where they were done to arrange for , prior to your scheduled appointment.  Please bring this referral request to your appointment and present it to your specialist.                  Your next 10 appointments already scheduled     Oct 23, 2017 11:15 AM CDT   [facility] with CONNOR Cantor ([facility]  )    [address]  [place] [id number] [phone]             Oct 31, 2017  3:15 PM CDT   Concussion Treatment with Tiesha Bell, PT   [facility] [place] - Outpatient ([facility])    [address], [address]  [place] [address]   [phone]            Nov 14, 2017 11:45 AM CST   Concussion Treatment with Gabrielle Stringer, PT   [facility] [place] - Outpatient ([facility])    [address], [address]  [place] [address]   [phone]            Nov 28, 2017 11:45 AM CST   Concussion Treatment with Tiesha Bell, PT   [facility] [place] - Outpatient ([facility])    [address]  [place] [address]   [phone]            Dec 12, 2017 11:45 AM CST   Concussion Treatment with Gabrielle Stringer, PT   [facility] [place] - Outpatient ([facility])    [address]  [place] [address]   [phone]              Who to contact     If you have questions or need follow up information about today's clinic visit or your schedule please contact [facility] directly at [phone].  Normal or non-critical lab and imaging results will be communicated to you by [employer], letter or phone within 4 business days after the clinic has received the results. If you do not hear from us within 7 days, please contact the clinic through [employer] or phone. If you have a critical or abnormal lab result, we will notify you by phone as soon as possible.  Submit refill requests through [employer] or call your pharmacy and they will forward the refill request to us. Please allow 3 business days for your refill to be completed.          Additional Information About Your Visit        MyFit Information     My[employer] lets you send messages to your doctor, view your test results, renew your prescriptions, schedule  "appointments and more. To sign up, go to [email or web address] . Click on \"Log in\" on the left side of the screen, which will take you to the Welcome page. Then click on \"Sign up Now\" on the right side of the page.     You will be asked to enter the access code listed below, as well as some personal information. Please follow the directions to create your username and password.     Your access code is: [email or web address]  Expires: 2017  3:26 PM     Your access code will  in 90 days. If you need help or a new code, please call your [facility] clinic or [phone].        Care EveryWhere ID     This is your Care EveryWhere ID. This could be used by other organizations to access your [facility] medical records  [id number]         Blood Pressure from Last 3 Encounters:   17 125/80   17 122/87   17 122/80    Weight from Last 3 Encounters:   17 88.9 kg (196 lb)   17 87.5 kg (193 lb)   17 89.8 kg (198 lb)              We Performed the Following     CHIROPRAC MANIP,SPINAL,3-4 REGIONS     OFFICE/OUTPT VISIT,EST,LEVL III     ORTHO  REFERRAL        Primary Care Provider Office Phone # Fax #    Felisha Imelda Briggs [phone]       [address]  [facility] [address]        Equal Access to Services     OSIEL GILLESPIE : Hadii mihir ku hadasho Soomaali, waaxda luqadaha, qaybta kaalmada tere henriquez. So [facility] [phone].    ATENCIÓN: Si milola andrea, tiene a snyder disposición servicios gratuitos de asistencia lingüística. Adelina al [phone].    We comply with applicable federal civil rights laws and [place] laws. We do not discriminate on the basis of race, color, national origin, age, disability, sex, sexual orientation, or gender identity.            Thank you!     Thank you for choosing [facility]  for your care. Our goal is always to provide you with excellent care. Hearing back from our patients is one way we " can continue to improve our services. Please take a few minutes to complete the written survey that you may receive in the mail after your visit with us. Thank you!             Your Updated Medication List - Protect others around you: Learn how to safely use, store and throw away your medicines at www.disposemymeds.org.          This list is accurate as of: 10/16/17  1:11 PM.  Always use your most recent med list.                   Brand Name Dispense Instructions for use Diagnosis    albuterol 108 (90 BASE) MCG/ACT Inhaler    PROAIR HFA/PROVENTIL HFA/VENTOLIN HFA    2 Inhaler    Inhale 2 puffs into the lungs every 6 hours as needed for shortness of breath / dyspnea or wheezing    Intermittent asthma, uncomplicated       azithromycin 250 MG tablet    ZITHROMAX    6 tablet    Two tablets first day, then one tablet daily for four days.    Acute bronchitis with symptoms > 10 days       CALCIUM + D PO      Take  by mouth daily.        Capsaicin 0.1 % cream     42.5 g    Externally apply 1 g topically 2 times daily as needed    Right anterior knee pain       cyclobenzaprine 10 MG tablet    FLEXERIL    30 tablet    Take 0.5-1 tablets (5-10 mg) by mouth 3 times daily as needed for muscle spasms    DDD (degenerative disc disease), lumbar, Cervicalgia, Back muscle spasm       fenofibrate 48 MG tablet     90 tablet    Take 1 tablet (48 mg) by mouth daily    High blood triglycerides       fluticasone 50 MCG/ACT spray    FLONASE    16 g    Spray 2 sprays into both nostrils daily    Other seasonal allergic rhinitis       gabapentin 300 MG capsule    NEURONTIN     Indications: Headache Take 300mg po BID x 7 days, then increase to 300mg po TID x 7 days, then increase to 600mg po TID.        lisinopril 10 MG tablet    PRINIVIL/ZESTRIL    90 tablet    Take 1 tablet (10 mg) by mouth daily    Essential hypertension with goal blood pressure less than 140/90       loratadine-pseudoePHEDrine  MG per 24 hr tablet    CLARITIN-D  24-hour    30 tablet    Take 1 tablet by mouth daily    Environmental allergies       MIRENA (52 MG) 20 MCG/24HR IUD   Generic drug:  levonorgestrel      USE FOR UP TO 5 YEARS THEN REMOVE        order for DME     1 Device    Counter force forearm brace    Lateral epicondylitis of left elbow       traZODone 100 MG tablet    DESYREL    90 tablet    Take 1 tablet (100 mg) by mouth At Bedtime Please profile.    Primary insomnia       * venlafaxine 150 MG 24 hr capsule    EFFEXOR-XR    90 capsule    TAKE ONE CAPSULE BY MOUTH EVERY DAY (NEED TO BE SEEN IN CLINIC FOR FURTHER REFILLS)    Generalized anxiety disorder, Moderate recurrent major depression (H)       * venlafaxine 150 MG 24 hr capsule    EFFEXOR-XR    90 capsule    Take 1 capsule (150 mg) by mouth daily Please profile.    Generalized anxiety disorder, Moderate recurrent major depression (H)       * Notice:  This list has 2 medication(s) that are the same as other medications prescribed for you. Read the directions carefully, and ask your doctor or other care provider to review them with you.
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
## 2017-10-16 NOTE — ADDENDUM NOTE
Encounter addended by: Tiesha Bell, PT on: 10/16/2017  9:17 AM<BR>     Actions taken: Flowsheet data copied forward, Sign clinical note, Flowsheet accepted

## 2017-10-17 NOTE — PROGRESS NOTES
Subjective:    Patient is a 47 year old female presenting with rehab left ankle/foot hpi.                                      Pertinent medical history includes:  Overweight, high blood pressure, depression, asthma, anemia and migraines.  Medical allergies: yes (adhesive ).  Other surgeries include:  Orthopedic surgery and other.  Current medications:  Anti-inflammatory, anti-depressants and high blood pressure medication.  Current occupation is none.        Barriers include:  None as reported by patient.    Red flags:  Significant weakness and severe dizziness.      Oswestry Score: 46 %                 Objective:    System    Physical Exam    General     ROS    Assessment/Plan:

## 2017-10-30 ENCOUNTER — THERAPY VISIT (OUTPATIENT)
Dept: CHIROPRACTIC MEDICINE | Facility: CLINIC | Age: 47
End: 2017-10-30
Payer: COMMERCIAL

## 2017-10-30 DIAGNOSIS — M99.02 THORACIC SEGMENT DYSFUNCTION: ICD-10-CM

## 2017-10-30 DIAGNOSIS — F07.81 POSTCONCUSSION SYNDROME: ICD-10-CM

## 2017-10-30 DIAGNOSIS — M99.03 SOMATIC DYSFUNCTION OF LUMBAR REGION: ICD-10-CM

## 2017-10-30 DIAGNOSIS — G89.29 CHRONIC LOW BACK PAIN WITHOUT SCIATICA, UNSPECIFIED BACK PAIN LATERALITY: ICD-10-CM

## 2017-10-30 DIAGNOSIS — M25.561 RIGHT KNEE PAIN, UNSPECIFIED CHRONICITY: ICD-10-CM

## 2017-10-30 DIAGNOSIS — M54.2 CERVICALGIA: ICD-10-CM

## 2017-10-30 DIAGNOSIS — M54.50 CHRONIC LOW BACK PAIN WITHOUT SCIATICA, UNSPECIFIED BACK PAIN LATERALITY: ICD-10-CM

## 2017-10-30 DIAGNOSIS — M99.01 CERVICAL SEGMENT DYSFUNCTION: Primary | ICD-10-CM

## 2017-10-30 PROCEDURE — 98941 CHIROPRACT MANJ 3-4 REGIONS: CPT | Mod: AT | Performed by: CHIROPRACTOR

## 2017-11-02 ENCOUNTER — OFFICE VISIT (OUTPATIENT)
Dept: BEHAVIORAL HEALTH | Facility: CLINIC | Age: 47
End: 2017-11-02
Payer: COMMERCIAL

## 2017-11-02 DIAGNOSIS — R69 DIAGNOSIS DEFERRED: Primary | ICD-10-CM

## 2017-11-02 PROBLEM — M25.561 RIGHT KNEE PAIN, UNSPECIFIED CHRONICITY: Status: ACTIVE | Noted: 2017-11-02

## 2017-11-02 PROBLEM — M54.50 CHRONIC LOW BACK PAIN WITHOUT SCIATICA, UNSPECIFIED BACK PAIN LATERALITY: Status: ACTIVE | Noted: 2017-11-02

## 2017-11-02 PROBLEM — M99.03 SOMATIC DYSFUNCTION OF LUMBAR REGION: Status: ACTIVE | Noted: 2017-11-02

## 2017-11-02 PROBLEM — M99.01 CERVICAL SEGMENT DYSFUNCTION: Status: ACTIVE | Noted: 2017-11-02

## 2017-11-02 PROBLEM — G89.29 CHRONIC LOW BACK PAIN WITHOUT SCIATICA, UNSPECIFIED BACK PAIN LATERALITY: Status: ACTIVE | Noted: 2017-11-02

## 2017-11-02 PROBLEM — M99.02 THORACIC SEGMENT DYSFUNCTION: Status: ACTIVE | Noted: 2017-11-02

## 2017-11-02 PROCEDURE — 99207 ZZC NO CHARGE LOS: CPT

## 2017-11-02 NOTE — PROGRESS NOTES
Behavioral Health Home Services  [facility]: [place]      Social Work Care Navigator Note      Patient: Isabela Panchal  Date: November 2, 2017  Preferred Name: Isabela    Previous PHQ-9:   PHQ-9 SCORE 9/20/2016 3/28/2017 9/28/2017   Total Score - - -   Total Score MyChart - 9 (Mild depression) -   Total Score 5 9 17     Previous JOSEFINA-7:   JOSEFINA-7 SCORE 1/5/2016 3/28/2017 9/28/2017   Total Score - - -   Total Score 15 5 13     MYKEL LEVEL:  MYKEL Score (Last Two) 11/5/2010   MYKEL Raw Score 51   Activation Score 91.6   MYKEL Level 4       Preferred Contact:  Need for : No    Type of Contact Today: Face to Face in Clinic      Data: (subjective / Objective):    [facility] Introduction:  Hi my name is Shayy Mora from your (name) primary care clinic.     I work closely with your primary care provider, Felisha Briggs.     If it's ok I'd like to talk about some new services available to you, at no out of pocket cost to you.      Before we get started can you verify your insurance for me? ucare/ma    What social work or case management services do you receive? (If so, are you receiving ACT or TCM?).  none    Getting to Know You - Whole Person Care:  This new service is called Behavioral Health Home services, which is designed to support you as a whole person beyond just your medical needs.      SW met with pt while she was in clinic with her daughter. SW explained [facility] and both pt and her daughter were enrolled.  Pt disclosed that she continues to suffer from a TBI. She had been going to the [facility] program but was discharged to do lack of attendance. Pt suffered from depression over the summer and stayed home more than usual she stated. When she was ready to get help again, she called but they informed her that she was no longer a pt in their program.    Pt believes she has a f/u neurology appt with [facility] but cannot remember. Pt was completing [facility] forms for herself and her daughter and  repeatedly had to be prompted of the date and her daughter's birthday. Pt stated her signature has been altered since the TBI as well.    Pt does not have a therapist and showed resistance to having ongoing therapy but was open to having an intake completed by [facility]. Pt stated that she feels overwhelmed caring for her two children and taking them to the ED consistentlly.     SW reassured pt that she would be able to assist her with her stressors and the girls stressors and health.   Pt returning tomorrow for her daughter's intake into [facility]. And will need to reschedule for her own.      Patient response to [facility] Service offering:   Interested in enrolling in [facility] services and scheduled appt / will drop-in to complete the consent form and Brief Needs Assessment    Shayy Mora, Social Work Care Coordinator   ________________________________________________________________

## 2017-11-02 NOTE — PROGRESS NOTES
Visit #:  2/2017      Subjective:  Isabela Panchal is a 45 year old female who is seen in f/u up for:        Cervical segment dysfunction  Cervicalgia  Thoracic segment dysfunction  Chronic low back pain without sciatica, unspecified back pain laterality  Right knee pain, unspecified chronicity  Postconcussion syndrome  Somatic dysfunction of lumbar region.     Since last visit,  Isabela Panchal reports    Area of chief complaint:  Cervical, Thoracic and Lumbar :  Symptoms are graded at 5/10 on VAS. The quality is described as stiff, stabbing, dull.  Motion has increased, but is still not normal. The pt returns with pain levels of 7/10 on VAS. She notes an increase in L sided cervical pain and L hip pain. The notes R knee pain that seemed to have increased. She reports  HA today. The pt denies weakness or other unusual sx.     Since last visit the patient feels that they are 0-10 percent  improved from last visit.       Objective:  The following was observed:    P: pain elicited on palpation, C2, C3, T5, T6, L4, L5, SACRUM, L PSIS  A: static palpation demonstrates intersegmental asymmetry C2, C3, T6, L4, L5, SACRUM, L PSIS  R: motion palpation notes restricted motion  T: hypertonicity at: Gluteal, Levator scapulae, Lumbar erector spine and Quad lumb L>>R    Segmental spinal dysfunction/restrictions found at:  C2 ,C3, T5, T6, L4, L5, SACRUM, L PSIS.      Assessment:    Diagnoses:      1. Cervical segment dysfunction    2. Cervicalgia    3. Thoracic segment dysfunction    4. Chronic low back pain without sciatica, unspecified back pain laterality    5. Right knee pain, unspecified chronicity    6. Postconcussion syndrome    7. Somatic dysfunction of lumbar region        Patient's condition:  Good response to initial therapy     Treatment effectiveness:  No increase in sx, pt stable       Procedures:  CMT:  12568 Chiropractic manipulative treatment 3-4 regions performed manual adjustment  Cervical:  diversified  C2, C3 , C7 , Prone  Thoracic: diversified , T1, T5, T6, Prone  Lumbar: diversified  L4, L5, Prone  Pelvis: Drop Table, Sacrum , PSIS Left , Prone    Modalities:  none    Therapeutic procedures: none       Response to Treatment  Reduction of symptoms no increase in sx, pt stable     Prognosis: Guarded    Progress towards Goals: Patient is making progress towards the goal: pending    Recommendations: Acupuncture     Instructions:drink plenty of fluids    Follow-up:  Return to care 1 week with ACP

## 2017-11-03 DIAGNOSIS — M25.561 CHRONIC PAIN OF RIGHT KNEE: Primary | ICD-10-CM

## 2017-11-03 DIAGNOSIS — G89.29 CHRONIC PAIN OF RIGHT KNEE: Primary | ICD-10-CM

## 2017-11-07 ENCOUNTER — OFFICE VISIT (OUTPATIENT)
Dept: ORTHOPEDICS | Facility: CLINIC | Age: 47
End: 2017-11-07

## 2017-11-07 VITALS — BODY MASS INDEX: 32.37 KG/M2 | SYSTOLIC BLOOD PRESSURE: 125 MMHG | DIASTOLIC BLOOD PRESSURE: 80 MMHG | WEIGHT: 196 LBS

## 2017-11-07 DIAGNOSIS — M22.2X2 PATELLOFEMORAL PAIN SYNDROME OF LEFT KNEE: Primary | ICD-10-CM

## 2017-11-07 RX ORDER — MELOXICAM 15 MG/1
15 TABLET ORAL DAILY
Qty: 30 TABLET | Refills: 0 | Status: SHIPPED | OUTPATIENT
Start: 2017-11-07 | End: 2017-12-15
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
## 2017-11-07 NOTE — MR AVS SNAPSHOT
After Visit Summary   11/7/2017    Isabela Panchal    MRN: [id number]           Patient Information     Date Of Birth          1970        Visit Information        Provider Department      11/7/2017 11:00 AM Man Zuniga DO [facility]        Today's Diagnoses     Patellofemoral pain syndrome of left knee    -  1      Care Instructions    Chondromalacia / Patellofemoral Pain    CHONDROMALACIA PATELLAE:  -Pain in the front of your knee due to increased pressure from the knee cap (patella)  -There are many causes including trauma, history of dislocation or subluxation of knee cap but most often it is due to an imbalance of the thigh muscles or weak core muscles which cause irregular tracking of your kneecap on your thigh bone.  -People whose feet pronate (roll in) increase the outward pulling on the kneecap as well    SIGNS AND SYMPTOMS:  -Diffuse knee pain that worsens with stairs, squatting, prolonged sitting, jumping, and similar movements.  -Pain may be achy or sharp  -Stiffness with prolonged sitting  -Occasionally, giving way of the knee, grinding or a catching sensation    TREATMENT:  -regular exercise (biking, swimming, or elliptical are good for cardio)  -weight lifting/plyometrics are helpful but remember to keep your knees behind your toes (don't bend knee more than 90degrees)  -regular core exercises (yoga and pilates)  -arch supports may help during exercise until hips stronger to prevent ankle pronation  *over the counter semi-rigid brands include power step arch supports may be purchased at specialty shoe stores, [employer] or internet  *custom made orthotics may be ordered by your physician if needed for prolonged treatment  -Stretching and strengthening therapy  -Ice 10-15 minutes after activity. (Ice cups for massage 5-7min)  -Ice and NSAIDs help decrease inflammation. A good anti-inflammatory NSAID medication is Alleve (220mg). Take 1-2 tabs twice  daily with food until pain improves or minimum of 14 days, then as needed for pain. (1-2 tabs twice daily dosing is for patients over 12 years old. Lito than 13 yo, check dose with doctor.)  -often component of hip weakness that leads to lower extremity (foot, ankle, shin, and knee) problems so a lot of focus will be on core strength and balance  - recommend yoga for core strengthening and stretching  -Perform exercises as instructed through handout or formal therapy if doing. Until then start with the following:  -Ankle strengthening  1) balance on one foot 1-2 min daily  2) once able to balance for 1 minute, start hip strengthening with 4 way motion with straight leg. Tie theraband around ankle and balance on other foot while doing15 reps each direction of straight leg  motion  3) arch raises- tighten bottom of foot and hold x 3-5 sec, repeat 5 times  4) ankle exercises (4 way with theraband)- 3 sets of 10-15 (fatigue) daily  -usually takes several weeks before pain free but longer before return to full activity without pain  --Once released to increase activity, BE PATIENT!    Return to activity guidelines include:    If it hurts, please avoid activity    Start gradually and build up, wait 24 hours before increase intensity and time    Runnin min on treadmill (or somewhere you can get home easily from if you have to stop), start walk 4 min/run 1 min and Repeat 3 times. If pain free for 48 hours, increase to walk 3 min/run 2 min. Continue to increase as such as pain allows. If you develop pain, back off to previous pain-free level and wait 1 week before increasing again.    Follow-up in 6 weeks if not improved or sooner if further concern.    If problem flares again after resolved, restart icing, and exercises.            Follow-ups after your visit        Additional Services     ORTHOTICS REFERRAL       Patellofemoral brace            PHYSICAL THERAPY REFERRAL (Internal)       Physical Therapy Referral                   Your next 10 appointments already scheduled     Nov 14, 2017 11:45 AM CST   Concussion Treatment with Gabrielle Stringer, PT   [facility] [place], Physical Therapy - Outpatient ([facility])    [address], [address]  [place] [address]   [phone]            Nov 20, 2017 11:15 AM CST   [facility] Chiropractor with CONNOR Cantor Chiro ([facility]  )    [address]  [place] [address] [phone]            Nov 28, 2017 11:45 AM CST   Concussion Treatment with Tiesha Bell, PT   [facility] [place], Physical Therapy - Outpatient ([facility])    [address], [address]  [place] [address]   [phone]            Dec 12, 2017 11:45 AM CST   Concussion Treatment with Gabrielle Stringer, PT   [facility] [place], Physical Therapy - Outpatient ([facility])    [address], [address]  [place] [address]   [phone]              Who to contact     Please call your clinic at [phone] to:    Ask questions about your health    Make or cancel appointments    Discuss your medicines    Learn about your test results    Speak to your doctor   If you have compliments or concerns about an experience at your clinic, or if you wish to file a complaint, please contact [facility] Patient Relations at [phone] or email us at [email or web address]         Additional Information About Your Visit        [employer] Information     [employer] is an electronic gateway that provides easy, online access to your medical records. With [employer], you can request a clinic appointment, read your test results, renew a prescription or communicate with your care team.     To sign up for [employer] visit the website at [email or web address]   You will be asked to enter the access code listed below, as well as some personal  information. Please follow the directions to create your username and password.     Your access code is: [email or web address]  Expires: 2017  2:26 PM     Your access code will  in 90 days. If you need help or a new code, please contact your [facility] or call [phone] for assistance.        Care EveryWhere ID     This is your Care EveryWhere ID. This could be used by other organizations to access your [facility] medical records  [id number]        Your Vitals Were     BMI (Body Mass Index)                   32.37 kg/m2            Blood Pressure from Last 3 Encounters:   17 125/80   17 125/80   17 122/87    Weight from Last 3 Encounters:   17 88.9 kg (196 lb)   17 88.9 kg (196 lb)   17 87.5 kg (193 lb)              We Performed the Following     ORTHOTICS REFERRAL     PHYSICAL THERAPY REFERRAL (Internal)          Today's Medication Changes          These changes are accurate as of: 17  2:27 PM.  If you have any questions, ask your nurse or doctor.               Start taking these medicines.        Dose/Directions    meloxicam 15 MG tablet   Commonly known as:  MOBIC   Used for:  Patellofemoral pain syndrome of left knee   Started by:  Man Zuniga DO        Dose:  15 mg   Take 1 tablet (15 mg) by mouth daily   Quantity:  30 tablet   Refills:  0            Where to get your medicines      These medications were sent to [facility] [address]  [address], [facility] [address]     Phone:  [phone]     meloxicam 15 MG tablet                Primary Care Provider Office Phone # Fax #    Felisha Briggs [phone]       [address]  [facility] [address]        Equal Access to Services     OSIEL GILLESPIE : Thu Le, walilo [place]ork, qaybta katere us. So [facility] [phone].    ATENCIÓN: Valeria thompson  español, tiene a snyder disposición servicios gratuitos de asistencia lingüística. Adelina villagomez [phone].    We comply with applicable federal civil rights laws and [place] laws. We do not discriminate on the basis of race, color, national origin, age, disability, sex, sexual orientation, or gender identity.            Thank you!     Thank you for choosing [facility]  for your care. Our goal is always to provide you with excellent care. Hearing back from our patients is one way we can continue to improve our services. Please take a few minutes to complete the written survey that you may receive in the mail after your visit with us. Thank you!             Your Updated Medication List - Protect others around you: Learn how to safely use, store and throw away your medicines at www.disposemymeds.org.          This list is accurate as of: 11/7/17  2:27 PM.  Always use your most recent med list.                   Brand Name Dispense Instructions for use Diagnosis    albuterol 108 (90 BASE) MCG/ACT Inhaler    PROAIR HFA/PROVENTIL HFA/VENTOLIN HFA    2 Inhaler    Inhale 2 puffs into the lungs every 6 hours as needed for shortness of breath / dyspnea or wheezing    Intermittent asthma, uncomplicated       azithromycin 250 MG tablet    ZITHROMAX    6 tablet    Two tablets first day, then one tablet daily for four days.    Acute bronchitis with symptoms > 10 days       CALCIUM + D PO      Take  by mouth daily.        Capsaicin 0.1 % cream     42.5 g    Externally apply 1 g topically 2 times daily as needed    Right anterior knee pain       cyclobenzaprine 10 MG tablet    FLEXERIL    30 tablet    Take 0.5-1 tablets (5-10 mg) by mouth 3 times daily as needed for muscle spasms    DDD (degenerative disc disease), lumbar, Cervicalgia, Back muscle spasm       fenofibrate 48 MG tablet     90 tablet    Take 1 tablet (48 mg) by mouth daily    High blood triglycerides       fluticasone 50 MCG/ACT spray    FLONASE    16 g     Spray 2 sprays into both nostrils daily    Other seasonal allergic rhinitis       gabapentin 300 MG capsule    NEURONTIN     Indications: Headache Take 300mg po BID x 7 days, then increase to 300mg po TID x 7 days, then increase to 600mg po TID.        lisinopril 10 MG tablet    PRINIVIL/ZESTRIL    90 tablet    Take 1 tablet (10 mg) by mouth daily    Essential hypertension with goal blood pressure less than 140/90       loratadine-pseudoePHEDrine  MG per 24 hr tablet    CLARITIN-D 24-hour    30 tablet    Take 1 tablet by mouth daily    Environmental allergies       meloxicam 15 MG tablet    MOBIC    30 tablet    Take 1 tablet (15 mg) by mouth daily    Patellofemoral pain syndrome of left knee       MIRENA (52 MG) 20 MCG/24HR IUD   Generic drug:  levonorgestrel      USE FOR UP TO 5 YEARS THEN REMOVE        order for DME     1 Device    Counter force forearm brace    Lateral epicondylitis of left elbow       traZODone 100 MG tablet    DESYREL    90 tablet    Take 1 tablet (100 mg) by mouth At Bedtime Please profile.    Primary insomnia       * venlafaxine 150 MG 24 hr capsule    EFFEXOR-XR    90 capsule    TAKE ONE CAPSULE BY MOUTH EVERY DAY (NEED TO BE SEEN IN CLINIC FOR FURTHER REFILLS)    Generalized anxiety disorder, Moderate recurrent major depression (H)       * venlafaxine 150 MG 24 hr capsule    EFFEXOR-XR    90 capsule    Take 1 capsule (150 mg) by mouth daily Please profile.    Generalized anxiety disorder, Moderate recurrent major depression (H)       * Notice:  This list has 2 medication(s) that are the same as other medications prescribed for you. Read the directions carefully, and ask your doctor or other care provider to review them with you.
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
## 2017-11-07 NOTE — PATIENT INSTRUCTIONS
Chondromalacia / Patellofemoral Pain    CHONDROMALACIA PATELLAE:  -Pain in the front of your knee due to increased pressure from the knee cap (patella)  -There are many causes including trauma, history of dislocation or subluxation of knee cap but most often it is due to an imbalance of the thigh muscles or weak core muscles which cause irregular tracking of your kneecap on your thigh bone.  -People whose feet pronate (roll in) increase the outward pulling on the kneecap as well    SIGNS AND SYMPTOMS:  -Diffuse knee pain that worsens with stairs, squatting, prolonged sitting, jumping, and similar movements.  -Pain may be achy or sharp  -Stiffness with prolonged sitting  -Occasionally, giving way of the knee, grinding or a catching sensation    TREATMENT:  -regular exercise (biking, swimming, or elliptical are good for cardio)  -weight lifting/plyometrics are helpful but remember to keep your knees behind your toes (don't bend knee more than 90degrees)  -regular core exercises (yoga and pilates)  -arch supports may help during exercise until hips stronger to prevent ankle pronation  *over the counter semi-rigid brands include power step arch supports may be purchased at specialty shoe stores, [employer] or internet  *custom made orthotics may be ordered by your physician if needed for prolonged treatment  -Stretching and strengthening therapy  -Ice 10-15 minutes after activity. (Ice cups for massage 5-7min)  -Ice and NSAIDs help decrease inflammation. A good anti-inflammatory NSAID medication is Alleve (220mg). Take 1-2 tabs twice daily with food until pain improves or minimum of 14 days, then as needed for pain. (1-2 tabs twice daily dosing is for patients over 12 years old. Lito than 13 yo, check dose with doctor.)  -often component of hip weakness that leads to lower extremity (foot, ankle, shin, and knee) problems so a lot of focus will be on core strength and balance  - recommend yoga for core  strengthening and stretching  -Perform exercises as instructed through handout or formal therapy if doing. Until then start with the following:  -Ankle strengthening  1) balance on one foot 1-2 min daily  2) once able to balance for 1 minute, start hip strengthening with 4 way motion with straight leg. Tie theraband around ankle and balance on other foot while doing15 reps each direction of straight leg  motion  3) arch raises- tighten bottom of foot and hold x 3-5 sec, repeat 5 times  4) ankle exercises (4 way with theraband)- 3 sets of 10-15 (fatigue) daily  -usually takes several weeks before pain free but longer before return to full activity without pain  --Once released to increase activity, BE PATIENT!    Return to activity guidelines include:    If it hurts, please avoid activity    Start gradually and build up, wait 24 hours before increase intensity and time    Runnin min on treadmill (or somewhere you can get home easily from if you have to stop), start walk 4 min/run 1 min and Repeat 3 times. If pain free for 48 hours, increase to walk 3 min/run 2 min. Continue to increase as such as pain allows. If you develop pain, back off to previous pain-free level and wait 1 week before increasing again.    Follow-up in 6 weeks if not improved or sooner if further concern.    If problem flares again after resolved, restart icing, and exercises.

## 2017-11-14 ENCOUNTER — THERAPY VISIT (OUTPATIENT)
Dept: PHYSICAL THERAPY | Facility: CLINIC | Age: 47
End: 2017-11-14
Payer: COMMERCIAL

## 2017-11-14 DIAGNOSIS — G89.29 CHRONIC PAIN OF RIGHT KNEE: Primary | ICD-10-CM

## 2017-11-14 DIAGNOSIS — M25.561 CHRONIC PAIN OF RIGHT KNEE: Primary | ICD-10-CM

## 2017-11-14 PROCEDURE — G8979 MOBILITY GOAL STATUS: HCPCS | Mod: GP | Performed by: PHYSICAL THERAPIST

## 2017-11-14 PROCEDURE — 97110 THERAPEUTIC EXERCISES: CPT | Mod: GP | Performed by: PHYSICAL THERAPIST

## 2017-11-14 PROCEDURE — G8978 MOBILITY CURRENT STATUS: HCPCS | Mod: GP | Performed by: PHYSICAL THERAPIST

## 2017-11-14 PROCEDURE — 97161 PT EVAL LOW COMPLEX 20 MIN: CPT | Mod: GP | Performed by: PHYSICAL THERAPIST

## 2017-11-14 ASSESSMENT — ACTIVITIES OF DAILY LIVING (ADL)
GO DOWN STAIRS: ACTIVITY IS SOMEWHAT DIFFICULT
RISE FROM A CHAIR: ACTIVITY IS MINIMALLY DIFFICULT
GIVING WAY, BUCKLING OR SHIFTING OF KNEE: THE SYMPTOM AFFECTS MY ACTIVITY SEVERELY
RAW_SCORE: 35
STAND: ACTIVITY IS FAIRLY DIFFICULT
WEAKNESS: THE SYMPTOM AFFECTS MY ACTIVITY MODERATELY
SQUAT: ACTIVITY IS SOMEWHAT DIFFICULT
STIFFNESS: I DO NOT HAVE THE SYMPTOM
KNEE_ACTIVITY_OF_DAILY_LIVING_SCORE: 50
LIMPING: THE SYMPTOM AFFECTS MY ACTIVITY SEVERELY
SWELLING: I DO NOT HAVE THE SYMPTOM
GO UP STAIRS: ACTIVITY IS FAIRLY DIFFICULT
HOW_WOULD_YOU_RATE_THE_CURRENT_FUNCTION_OF_YOUR_KNEE_DURING_YOUR_USUAL_DAILY_ACTIVITIES_ON_A_SCALE_FROM_0_TO_100_WITH_100_BEING_YOUR_LEVEL_OF_KNEE_FUNCTION_PRIOR_TO_YOUR_INJURY_AND_0_BEING_THE_INABILITY_TO_PERFORM_ANY_OF_YOUR_USUAL_DAILY_ACTIVITIES?: 40
PAIN: THE SYMPTOM AFFECTS MY ACTIVITY MODERATELY
SIT WITH YOUR KNEE BENT: ACTIVITY IS FAIRLY DIFFICULT
AS_A_RESULT_OF_YOUR_KNEE_INJURY,_HOW_WOULD_YOU_RATE_YOUR_CURRENT_LEVEL_OF_DAILY_ACTIVITY?: ABNORMAL
KNEEL ON THE FRONT OF YOUR KNEE: ACTIVITY IS VERY DIFFICULT
WALK: ACTIVITY IS FAIRLY DIFFICULT
KNEE_ACTIVITY_OF_DAILY_LIVING_SUM: 35
HOW_WOULD_YOU_RATE_THE_OVERALL_FUNCTION_OF_YOUR_KNEE_DURING_YOUR_USUAL_DAILY_ACTIVITIES?: ABNORMAL
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
## 2017-11-14 NOTE — MR AVS SNAPSHOT
After Visit Summary   11/14/2017    Isabela Panchal    MRN: [id number]           Patient Information     Date Of Birth          1970        Visit Information        Provider Department      11/14/2017 2:40 PM Serena May, PT [facility] Physical Therapy        Today's Diagnoses     Chronic pain of right knee    -  1       Follow-ups after your visit        Your next 10 appointments already scheduled     Nov 20, 2017 11:15 AM CST   LEISA Chiropractor with Eusebia Jenkins DC   [facility] [place] [facility] [place]  )    [address]  [place] [address]   [phone]            Nov 21, 2017  2:40 PM CST   LEISA Extremity with Serena May PT   [facility] Physical Therapy ([facility]  )    [address] [place] [address]   [phone]            Nov 28, 2017 10:10 AM CST   LEISA Extremity with Serena May PT   [facility] Physical Therapy ([facility]  )    [address] [place] [address]   [phone]            Nov 28, 2017 11:45 AM CST   Concussion Treatment with Tiesha Bell, PT   [facility] [place], Physical Therapy - Outpatient ([facility])    [address]  [place] [address]   [phone]            Dec 05, 2017  2:00 PM CST   LEISA Extremity with Serena May PT   [facility] Physical Therapy ([facility]  )    [address] [place] [address]   [phone]            Dec 12, 2017 11:45 AM CST   Concussion Treatment with Gabrielle Stringer, PT   [facility] [place], Physical Therapy - Outpatient ([facility])    [address] [facility] [facility] [address]  [place] [address]   [phone]            Dec 12, 2017  2:00 PM CST   LEISA Extremity with Serena May PT   [facility]  "[place] Physical Therapy ([facility]  )    [address] [facility] [address]   [phone]            Dec 19, 2017  2:00 PM CST   LIESA Extremity with Serena May PT   [facility] Physical Therapy ([facility]  )    [address] [place] [address]   [phone]            Dec 26, 2017  2:00 PM CST   LEISA Extremity with Serena May PT   [facility] Physical Therapy ([facility]  )    [address] [place] [address]   [phone]              Who to contact     If you have questions or need follow up information about today's clinic visit or your schedule please contact [facility] directly at [phone].  Normal or non-critical lab and imaging results will be communicated to you by MyChart, letter or phone within 4 business days after the clinic has received the results. If you do not hear from us within 7 days, please contact the clinic through [facility] [place] or phone. If you have a critical or abnormal lab result, we will notify you by phone as soon as possible.  Submit refill requests through [employer] or call your pharmacy and they will forward the refill request to us. Please allow 3 business days for your refill to be completed.          Additional Information About Your Visit        [facility] [place] Information     [employer] lets you send messages to your doctor, view your test results, renew your prescriptions, schedule appointments and more. To sign up, go to [email or web address] . Click on \"Log in\" on the left side of the screen, which will take you to the Welcome page. Then click on \"Sign up Now\" on the right side of the page.     You will be asked to enter the access code listed below, as well as some personal information. Please follow the directions to create your username and password.     Your access code is: [email or web address]  Expires: 12/7/2017  2:26 " PM     Your access code will  in 90 days. If you need help or a new code, please call your [facility] clinic or [phone].        Care EveryWhere ID     This is your Care EveryWhere ID. This could be used by other organizations to access your [facility] medical records  [id number]         Blood Pressure from Last 3 Encounters:   17 125/80   17 125/80   17 122/87    Weight from Last 3 Encounters:   17 88.9 kg (196 lb)   17 88.9 kg (196 lb)   17 87.5 kg (193 lb)              We Performed the Following     HC PT EVAL, LOW COMPLEXITY     LEISA INITIAL EVAL REPORT     THERAPEUTIC EXERCISES        Primary Care Provider Office Phone # Fax #    Felisha Briggs [phone] [address]  [facility] [address]        Equal Access to Services     OSIEL GILLESPIE : Hadii aad ku hadasho Soomaali, waaxda luqadaha, qaybta kaalmada adeegyada, waxay idiin hayhailen kristina springer . So [facility] [phone].    ATENCIÓN: Si habla español, tiene a snyder disposición servicios gratuitos de asistencia lingüística. Adelina al [phone].    We comply with applicable federal civil rights laws and [place] laws. We do not discriminate on the basis of race, color, national origin, age, disability, sex, sexual orientation, or gender identity.            Thank you!     Thank you for choosing [facility] PHYSICAL THERAPY  for your care. Our goal is always to provide you with excellent care. Hearing back from our patients is one way we can continue to improve our services. Please take a few minutes to complete the written survey that you may receive in the mail after your visit with us. Thank you!             Your Updated Medication List - Protect others around you: Learn how to safely use, store and throw away your medicines at www.disposemymeds.org.          This list is accurate as of: 17  3:47 PM.  Always use your most recent med  list.                   Brand Name Dispense Instructions for use Diagnosis    albuterol 108 (90 BASE) MCG/ACT Inhaler    PROAIR HFA/PROVENTIL HFA/VENTOLIN HFA    2 Inhaler    Inhale 2 puffs into the lungs every 6 hours as needed for shortness of breath / dyspnea or wheezing    Intermittent asthma, uncomplicated       azithromycin 250 MG tablet    ZITHROMAX    6 tablet    Two tablets first day, then one tablet daily for four days.    Acute bronchitis with symptoms > 10 days       CALCIUM + D PO      Take  by mouth daily.        Capsaicin 0.1 % cream     42.5 g    Externally apply 1 g topically 2 times daily as needed    Right anterior knee pain       cyclobenzaprine 10 MG tablet    FLEXERIL    30 tablet    Take 0.5-1 tablets (5-10 mg) by mouth 3 times daily as needed for muscle spasms    DDD (degenerative disc disease), lumbar, Cervicalgia, Back muscle spasm       fenofibrate 48 MG tablet     90 tablet    Take 1 tablet (48 mg) by mouth daily    High blood triglycerides       fluticasone 50 MCG/ACT spray    FLONASE    16 g    Spray 2 sprays into both nostrils daily    Other seasonal allergic rhinitis       gabapentin 300 MG capsule    NEURONTIN     Indications: Headache Take 300mg po BID x 7 days, then increase to 300mg po TID x 7 days, then increase to 600mg po TID.        lisinopril 10 MG tablet    PRINIVIL/ZESTRIL    90 tablet    Take 1 tablet (10 mg) by mouth daily    Essential hypertension with goal blood pressure less than 140/90       loratadine-pseudoePHEDrine  MG per 24 hr tablet    CLARITIN-D 24-hour    30 tablet    Take 1 tablet by mouth daily    Environmental allergies       meloxicam 15 MG tablet    MOBIC    30 tablet    Take 1 tablet (15 mg) by mouth daily    Patellofemoral pain syndrome of left knee       MIRENA (52 MG) 20 MCG/24HR IUD   Generic drug:  levonorgestrel      USE FOR UP TO 5 YEARS THEN REMOVE        order for DME     1 Device    Counter force forearm brace    Lateral epicondylitis of  left elbow       traZODone 100 MG tablet    DESYREL    90 tablet    Take 1 tablet (100 mg) by mouth At Bedtime Please profile.    Primary insomnia       * venlafaxine 150 MG 24 hr capsule    EFFEXOR-XR    90 capsule    TAKE ONE CAPSULE BY MOUTH EVERY DAY (NEED TO BE SEEN IN CLINIC FOR FURTHER REFILLS)    Generalized anxiety disorder, Moderate recurrent major depression (H)       * venlafaxine 150 MG 24 hr capsule    EFFEXOR-XR    90 capsule    Take 1 capsule (150 mg) by mouth daily Please profile.    Generalized anxiety disorder, Moderate recurrent major depression (H)       * Notice:  This list has 2 medication(s) that are the same as other medications prescribed for you. Read the directions carefully, and ask your doctor or other care provider to review them with you.

## 2017-12-05 ENCOUNTER — OFFICE VISIT (OUTPATIENT)
Dept: URGENT CARE | Facility: URGENT CARE | Age: 47
End: 2017-12-05
Payer: COMMERCIAL

## 2017-12-05 VITALS
HEIGHT: 65 IN | HEART RATE: 91 BPM | DIASTOLIC BLOOD PRESSURE: 86 MMHG | TEMPERATURE: 99.1 F | BODY MASS INDEX: 32.65 KG/M2 | OXYGEN SATURATION: 96 % | SYSTOLIC BLOOD PRESSURE: 143 MMHG | WEIGHT: 196 LBS

## 2017-12-05 DIAGNOSIS — R30.0 DYSURIA: ICD-10-CM

## 2017-12-05 DIAGNOSIS — N89.8 VAGINAL ITCHING: Primary | ICD-10-CM

## 2017-12-05 LAB
ALBUMIN UR-MCNC: NEGATIVE MG/DL
APPEARANCE UR: CLEAR
BILIRUB UR QL STRIP: NEGATIVE
COLOR UR AUTO: YELLOW
GLUCOSE UR STRIP-MCNC: NEGATIVE MG/DL
HGB UR QL STRIP: NEGATIVE
KETONES UR STRIP-MCNC: NEGATIVE MG/DL
LEUKOCYTE ESTERASE UR QL STRIP: NEGATIVE
NITRATE UR QL: NEGATIVE
PH UR STRIP: 5.5 PH (ref 5–7)
SOURCE: NORMAL
SP GR UR STRIP: >1.03 (ref 1–1.03)
SPECIMEN SOURCE: NORMAL
UROBILINOGEN UR STRIP-ACNC: 0.2 EU/DL (ref 0.2–1)
WET PREP SPEC: NORMAL

## 2017-12-05 PROCEDURE — 99213 OFFICE O/P EST LOW 20 MIN: CPT | Performed by: PHYSICIAN ASSISTANT

## 2017-12-05 PROCEDURE — 87210 SMEAR WET MOUNT SALINE/INK: CPT | Performed by: FAMILY MEDICINE

## 2017-12-05 PROCEDURE — 81003 URINALYSIS AUTO W/O SCOPE: CPT | Performed by: PHYSICIAN ASSISTANT

## 2017-12-05 RX ORDER — FLUCONAZOLE 150 MG/1
150 TABLET ORAL ONCE
Qty: 1 TABLET | Refills: 0 | Status: SHIPPED | OUTPATIENT
Start: 2017-12-05 | End: 2017-12-05

## 2017-12-05 NOTE — MR AVS SNAPSHOT
After Visit Summary   12/5/2017    Isabela Panchal    MRN: [id number]           Patient Information     Date Of Birth          1970        Visit Information        Provider Department      12/5/2017 7:15 PM Violet Argueta PA-C [facility]        Today's Diagnoses     Vaginal itching    -  1    Dysuria           Follow-ups after your visit        Your next 10 appointments already scheduled     Dec 12, 2017 11:45 AM CST   Concussion Treatment with Gabrielle Stringer, PT   [facility] [place], Physical Therapy - Outpatient ([facility])    [address]  [place] [address]   [phone]            Dec 12, 2017  2:00 PM CST   LEISA Extremity with Serena May, PT   [facility] Therapy ([facility]  )    [address] [place] [id number] [phone]            Dec 19, 2017  2:00 PM CST   LEISA Extremity with Serena May PT   [facility] Therapy ([facility]  )    [address] [place] [phone]            Dec 26, 2017  2:00 PM CST   LEISA Extremity with Serena May PT   [facility] Therapy ([facility]  )    [address] [place] [phone]              Who to contact     If you have questions or need follow up information about today's clinic visit or your schedule please contact [facility] URGENT CARE directly at [phone].  Normal or non-critical lab and imaging results will be communicated to you by MyChart, letter or phone within 4 business days after the clinic has received the results. If you do not hear from us within 7 days, please contact the clinic through MyChart or phone. If you have a critical or abnormal lab result, we will notify you by phone as soon as possible.  Submit refill  "requests through [employer] or call your pharmacy and they will forward the refill request to us. Please allow 3 business days for your refill to be completed.          Additional Information About Your Visit        [facility] Information     [employer] lets you send messages to your doctor, view your test results, renew your prescriptions, schedule appointments and more. To sign up, go to [email or web address] . Click on \"Log in\" on the left side of the screen, which will take you to the Welcome page. Then click on \"Sign up Now\" on the right side of the page.     You will be asked to enter the access code listed below, as well as some personal information. Please follow the directions to create your username and password.     Your access code is: [email or web address]  Expires: 2017  2:26 PM     Your access code will  in 90 days. If you need help or a new code, please call your [facility] clinic or [phone].        Care EveryWhere ID     This is your Care EveryWhere ID. This could be used by other organizations to access your [facility] medical records  [id number]        Your Vitals Were     Pulse Temperature Height Pulse Oximetry BMI (Body Mass Index)       91 99.1  F (37.3  C) (Tympanic) 5' 5\" (1.651 m) 96% 32.62 kg/m2        Blood Pressure from Last 3 Encounters:   17 143/86   17 125/80   17 125/80    Weight from Last 3 Encounters:   17 196 lb (88.9 kg)   17 196 lb (88.9 kg)   17 196 lb (88.9 kg)              We Performed the Following     *UA reflex to Microscopic and Culture ([facility] (except [place] and [place])     Wet prep          Today's Medication Changes          These changes are accurate as of: 17  9:36 PM.  If you have any questions, ask your nurse or doctor.               Start taking these medicines.        Dose/Directions    fluconazole 150 MG tablet   Commonly known as:  DIFLUCAN   Used for:  Dysuria   Started by:  Violet Argueta, " PA-C        Dose:  150 mg   Take 1 tablet (150 mg) by mouth once for 1 dose   Quantity:  1 tablet   Refills:  0            Where to get your medicines      These medications were sent to [facility] [id number] - [address] AT [facility] & [address] [place] [address]     Phone:  [phone]     fluconazole 150 MG tablet                Primary Care Provider Office Phone # Fax #    Felisha Imelda Briggs [phone] [address] [facility] [address]        Equal Access to Services     ZARI [facility] Center[facility]ANKLYN : Hadii aad ku hadasho Soomaali, waaxda luqadaha, qaybta kaalmada adeegyada, waxradha albrightn kristina springer . So [facility] [phone].    ATENCIÓN: Si habla español, tiene a snyder disposición servicios gratuitos de asistencia lingüística. Llame al [phone].    We comply with applicable federal civil rights laws and [place] laws. We do not discriminate on the basis of race, color, national origin, age, disability, sex, sexual orientation, or gender identity.            Thank you!     Thank you for choosing [facility] URGENT CARE  for your care. Our goal is always to provide you with excellent care. Hearing back from our patients is one way we can continue to improve our services. Please take a few minutes to complete the written survey that you may receive in the mail after your visit with us. Thank you!             Your Updated Medication List - Protect others around you: Learn how to safely use, store and throw away your medicines at www.disposemymeds.org.          This list is accurate as of: 12/5/17  9:36 PM.  Always use your most recent med list.                   Brand Name Dispense Instructions for use Diagnosis    albuterol 108 (90 BASE) MCG/ACT Inhaler    PROAIR HFA/PROVENTIL HFA/VENTOLIN HFA    2 Inhaler    Inhale 2 puffs into the lungs every 6 hours as needed for shortness of breath / dyspnea or wheezing     Intermittent asthma, uncomplicated       azithromycin 250 MG tablet    ZITHROMAX    6 tablet    Two tablets first day, then one tablet daily for four days.    Acute bronchitis with symptoms > 10 days       CALCIUM + D PO      Take  by mouth daily.        Capsaicin 0.1 % cream     42.5 g    Externally apply 1 g topically 2 times daily as needed    Right anterior knee pain       cyclobenzaprine 10 MG tablet    FLEXERIL    30 tablet    Take 0.5-1 tablets (5-10 mg) by mouth 3 times daily as needed for muscle spasms    DDD (degenerative disc disease), lumbar, Cervicalgia, Back muscle spasm       fenofibrate 48 MG tablet     90 tablet    Take 1 tablet (48 mg) by mouth daily    High blood triglycerides       fluconazole 150 MG tablet    DIFLUCAN    1 tablet    Take 1 tablet (150 mg) by mouth once for 1 dose    Dysuria       fluticasone 50 MCG/ACT spray    FLONASE    16 g    Spray 2 sprays into both nostrils daily    Other seasonal allergic rhinitis       gabapentin 300 MG capsule    NEURONTIN     Indications: Headache Take 300mg po BID x 7 days, then increase to 300mg po TID x 7 days, then increase to 600mg po TID.        lisinopril 10 MG tablet    PRINIVIL/ZESTRIL    90 tablet    Take 1 tablet (10 mg) by mouth daily    Essential hypertension with goal blood pressure less than 140/90       loratadine-pseudoePHEDrine  MG per 24 hr tablet    CLARITIN-D 24-hour    30 tablet    Take 1 tablet by mouth daily    Environmental allergies       meloxicam 15 MG tablet    MOBIC    30 tablet    Take 1 tablet (15 mg) by mouth daily    Patellofemoral pain syndrome of left knee       MIRENA (52 MG) 20 MCG/24HR IUD   Generic drug:  levonorgestrel      USE FOR UP TO 5 YEARS THEN REMOVE        order for DME     1 Device    Counter force forearm brace    Lateral epicondylitis of left elbow       traZODone 100 MG tablet    DESYREL    90 tablet    Take 1 tablet (100 mg) by mouth At Bedtime Please profile.    Primary insomnia       *  venlafaxine 150 MG 24 hr capsule    EFFEXOR-XR    90 capsule    TAKE ONE CAPSULE BY MOUTH EVERY DAY (NEED TO BE SEEN IN CLINIC FOR FURTHER REFILLS)    Generalized anxiety disorder, Moderate recurrent major depression (H)       * venlafaxine 150 MG 24 hr capsule    EFFEXOR-XR    90 capsule    Take 1 capsule (150 mg) by mouth daily Please profile.    Generalized anxiety disorder, Moderate recurrent major depression (H)       * Notice:  This list has 2 medication(s) that are the same as other medications prescribed for you. Read the directions carefully, and ask your doctor or other care provider to review them with you.

## 2017-12-06 NOTE — PROGRESS NOTES
"HPI:  Isabela is a 46 yo female who presents for dysuria and vaginal itching.  Dysuria onset last week but symptoms have mostly resolved.  However, vaginal itching and \"milky discharge\" over last few days.  States monistat irritates.  Denies abdominal pain, blood in urine, flank pain, vaginal lesions, or fever.  Uses IUD for birthcontrol.        ROS:  See HPI    PE:  Vitals & nursing notes reviewed. B/P: 143/86, T: 99.1, P: 91, R: Data Unavailable  Constitutional:  Alert, well nourished, well-developed, NAD  Lungs:  CTA, no wheezes, rhonchi, or rales  CV:  RRR,  no murmur appreciated  Abdomen:  Soft, mild mid suprapubic TTP, No HSM, No CVA tenderness, (+) bowel sounds,      ASSESSMENT:  1. Vaginal itching  (primary encounter diagnosis)  2. Dysuria  - mostly resolved  Comment: wet prep & UA negative.    Despite no yeast seen on wet prep, Will tx with 1 dose of diflucan a patient is symptomatic to see if any improvement.  Plan: Diflucan 150mg x 1   F/U with GYN if sx persist or worsen.        "

## 2017-12-06 NOTE — NURSING NOTE
"Chief Complaint   Patient presents with     Urgent Care     Pt in clinic to have eval for vaginal itching and dysuria     Vaginal Itching     Dysuria       Initial /86  Pulse 91  Temp 99.1  F (37.3  C) (Tympanic)  Ht 5' 5\" (1.651 m)  Wt 196 lb (88.9 kg)  SpO2 96%  BMI 32.62 kg/m2 Estimated body mass index is 32.62 kg/(m^2) as calculated from the following:    Height as of this encounter: 5' 5\" (1.651 m).    Weight as of this encounter: 196 lb (88.9 kg).  Medication Reconciliation: complete   Nicole Rubio/ MA    "

## 2017-12-12 NOTE — ADDENDUM NOTE
Encounter addended by: Tiesha Bell, PT on: 12/12/2017 12:57 PM<BR>     Actions taken: Flowsheet data copied forward, Flowsheet accepted, Episode resolved

## 2018-02-13 ENCOUNTER — OFFICE VISIT (OUTPATIENT)
Dept: FAMILY MEDICINE | Facility: CLINIC | Age: 48
End: 2018-02-13
Payer: COMMERCIAL

## 2018-02-13 ENCOUNTER — RESULT FOLLOW UP (OUTPATIENT)
Dept: FAMILY MEDICINE | Facility: CLINIC | Age: 48
End: 2018-02-13

## 2018-02-13 VITALS
HEIGHT: 65 IN | SYSTOLIC BLOOD PRESSURE: 138 MMHG | RESPIRATION RATE: 12 BRPM | HEART RATE: 90 BPM | TEMPERATURE: 98.4 F | WEIGHT: 185 LBS | BODY MASS INDEX: 30.82 KG/M2 | OXYGEN SATURATION: 98 % | DIASTOLIC BLOOD PRESSURE: 84 MMHG

## 2018-02-13 DIAGNOSIS — F41.1 GENERALIZED ANXIETY DISORDER: ICD-10-CM

## 2018-02-13 DIAGNOSIS — Z01.419 WELL WOMAN EXAM WITH ROUTINE GYNECOLOGICAL EXAM: Primary | ICD-10-CM

## 2018-02-13 DIAGNOSIS — F51.01 PRIMARY INSOMNIA: ICD-10-CM

## 2018-02-13 DIAGNOSIS — G89.29 CHRONIC PAIN OF RIGHT KNEE: ICD-10-CM

## 2018-02-13 DIAGNOSIS — M25.561 CHRONIC PAIN OF RIGHT KNEE: ICD-10-CM

## 2018-02-13 DIAGNOSIS — E78.1 HIGH BLOOD TRIGLYCERIDES: ICD-10-CM

## 2018-02-13 DIAGNOSIS — Z11.3 SCREENING FOR STD (SEXUALLY TRANSMITTED DISEASE): ICD-10-CM

## 2018-02-13 DIAGNOSIS — I10 ESSENTIAL HYPERTENSION WITH GOAL BLOOD PRESSURE LESS THAN 140/90: ICD-10-CM

## 2018-02-13 DIAGNOSIS — E55.9 VITAMIN D DEFICIENCY DISEASE: ICD-10-CM

## 2018-02-13 DIAGNOSIS — F07.81 POSTCONCUSSION SYNDROME: ICD-10-CM

## 2018-02-13 DIAGNOSIS — J45.20 MILD INTERMITTENT ASTHMA WITHOUT COMPLICATION: ICD-10-CM

## 2018-02-13 DIAGNOSIS — R87.810 CERVICAL HIGH RISK HPV (HUMAN PAPILLOMAVIRUS) TEST POSITIVE: ICD-10-CM

## 2018-02-13 DIAGNOSIS — F33.1 MODERATE RECURRENT MAJOR DEPRESSION (H): ICD-10-CM

## 2018-02-13 DIAGNOSIS — M51.369 DDD (DEGENERATIVE DISC DISEASE), LUMBAR: ICD-10-CM

## 2018-02-13 PROCEDURE — G0145 SCR C/V CYTO,THINLAYER,RESCR: HCPCS | Performed by: FAMILY MEDICINE

## 2018-02-13 PROCEDURE — 99214 OFFICE O/P EST MOD 30 MIN: CPT | Mod: 25 | Performed by: FAMILY MEDICINE

## 2018-02-13 PROCEDURE — 87491 CHLMYD TRACH DNA AMP PROBE: CPT | Performed by: FAMILY MEDICINE

## 2018-02-13 PROCEDURE — 87591 N.GONORRHOEAE DNA AMP PROB: CPT | Performed by: FAMILY MEDICINE

## 2018-02-13 PROCEDURE — 99396 PREV VISIT EST AGE 40-64: CPT | Performed by: FAMILY MEDICINE

## 2018-02-13 PROCEDURE — G0476 HPV COMBO ASSAY CA SCREEN: HCPCS | Performed by: FAMILY MEDICINE

## 2018-02-13 RX ORDER — CYCLOBENZAPRINE HCL 10 MG
5-10 TABLET ORAL 3 TIMES DAILY PRN
Qty: 30 TABLET | Refills: 1 | Status: SHIPPED | OUTPATIENT
Start: 2018-02-13 | End: 2018-10-30

## 2018-02-13 RX ORDER — ALBUTEROL SULFATE 90 UG/1
2 AEROSOL, METERED RESPIRATORY (INHALATION) EVERY 6 HOURS PRN
Qty: 2 INHALER | Refills: 11 | Status: SHIPPED | OUTPATIENT
Start: 2018-02-13 | End: 2019-06-04

## 2018-02-13 RX ORDER — FENOFIBRATE 48 MG/1
48 TABLET, COATED ORAL DAILY
Qty: 90 TABLET | Refills: 3 | Status: SHIPPED | OUTPATIENT
Start: 2018-02-13 | End: 2019-02-26

## 2018-02-13 RX ORDER — LISINOPRIL 10 MG/1
10 TABLET ORAL DAILY
Qty: 90 TABLET | Refills: 3 | Status: SHIPPED | OUTPATIENT
Start: 2018-02-13 | End: 2019-02-26

## 2018-02-13 RX ORDER — TRAZODONE HYDROCHLORIDE 100 MG/1
100 TABLET ORAL AT BEDTIME
Qty: 90 TABLET | Refills: 3 | Status: SHIPPED | OUTPATIENT
Start: 2018-02-13 | End: 2019-03-26

## 2018-02-13 ASSESSMENT — ANXIETY QUESTIONNAIRES
3. WORRYING TOO MUCH ABOUT DIFFERENT THINGS: MORE THAN HALF THE DAYS
2. NOT BEING ABLE TO STOP OR CONTROL WORRYING: SEVERAL DAYS
GAD7 TOTAL SCORE: 10
GAD7 TOTAL SCORE: 10
7. FEELING AFRAID AS IF SOMETHING AWFUL MIGHT HAPPEN: MORE THAN HALF THE DAYS
5. BEING SO RESTLESS THAT IT IS HARD TO SIT STILL: NOT AT ALL
1. FEELING NERVOUS, ANXIOUS, OR ON EDGE: MORE THAN HALF THE DAYS
7. FEELING AFRAID AS IF SOMETHING AWFUL MIGHT HAPPEN: MORE THAN HALF THE DAYS
4. TROUBLE RELAXING: SEVERAL DAYS
GAD7 TOTAL SCORE: 10
6. BECOMING EASILY ANNOYED OR IRRITABLE: MORE THAN HALF THE DAYS

## 2018-02-13 ASSESSMENT — PATIENT HEALTH QUESTIONNAIRE - PHQ9
10. IF YOU CHECKED OFF ANY PROBLEMS, HOW DIFFICULT HAVE THESE PROBLEMS MADE IT FOR YOU TO DO YOUR WORK, TAKE CARE OF THINGS AT HOME, OR GET ALONG WITH OTHER PEOPLE: SOMEWHAT DIFFICULT
SUM OF ALL RESPONSES TO PHQ QUESTIONS 1-9: 13
SUM OF ALL RESPONSES TO PHQ QUESTIONS 1-9: 13

## 2018-02-13 NOTE — LETTER
[facility]  [address] [place] [address] [phone]      April 4, 2018    Isabela Panchal    [address] [facility] [address]      Dear MsNicoleAnsley,    We are contacting you by certified letter because we are unable to verify that you have recieved this information in regards to the pap smear and HPV (Human Papillomavirus) test you had done recently.     Your PAP test result is normal, but your HPV (Human Papillomavirus) test was positive for a high risk type of the HPV. HPV is a common virus found in sexually active men and women. Some high risk strains of the HPV virus can be risk factors for later development of cervical cancer.    About 80 percent of women have been exposed to HPV virus throughout their lifetime. There is no medication for the treatment of HPV. Typically your own immune system gets rid of the virus before it does harm. HPV is spread by direct skin-to-skin contact, including sexual intercourse, oral sex, anal sex, or any other contact involving the genital area (example: hand to genital contact). It is not possible to become infected with HPV by touching an object, such as a toilet seat. Most people who are infected with HPV have no signs or symptoms.    Your doctor has recommended that you have specific test called colposcopy. This test allows the provider to closely examine your cervix. If biopsies are taken, the results will be complete within two weeks and will be used to determine the plan for future follow-up.      Please call [facility] at [phone] to schedule this procedure with Dr. Naranjo. Schedule this for a time when you are not due to have a period (if having regular menstrual bleeding). You can take an over the counter pain reliever 1 hour before your colposcopy. Nothing in the vagina for 24 hours before your colposcopy (no sex, douches, vaginal medications or lubricants).     If you have questions regarding this result  please contact [facility] at [phone].    Sincerely,    Felisha Briggs MD/  aKtty Raymond RN-Pap Tracking
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
## 2018-02-13 NOTE — PROGRESS NOTES
"  SUBJECTIVE:   Isabela Panchal is a 47 year old female who presents to clinic today for the following health issues:    Behavioral Health referral    Depression and Anxiety Follow-Up    Status since last visit: { :775571::\"No change\"}    Other associated symptoms:{ :232537::\"None\"}    Complicating factors:     Significant life event: { :848750::\"No\"}     Current substance abuse: { :575359::\"None\"}    PHQ-9 3/28/2017 3/28/2017 9/28/2017   Total Score - 9 17   Q9: Suicide Ideation Not at all Not at all Not at all     JOSEFINA-7 SCORE 1/5/2016 3/28/2017 9/28/2017   Total Score - - -   Total Score 15 5 13     {PROVIDER ONLY Complete follow-up questions for patients who report suicide ideation  (Optional):902352}  PHQ-9  English  PHQ-9   Any Language  JOSEFINA-7  Suicide Assessment Five-step Evaluation and Treatment (SAFE-T)    Amount of exercise or physical activity: {Exercise frequency days per week:768332}    Problems taking medications regularly: {Med Problems:102635::\"No\"}    Medication side effects: {CHRONIC MED SIDE EFFECTS:453027::\"none\"}    Diet: { :529880}      {ACUTE Problem  - brief histories:050525}    {additional problems for provider to add:778462}    Problem list and histories reviewed & adjusted, as indicated.  Additional history: {NONE - AS DOCUMENTED:587164::\"as documented\"}    {HIST REVIEW/ LINKS 2:528038}    Reviewed and updated as needed this visit by clinical staff       Reviewed and updated as needed this visit by Provider         {PROVIDER CHARTING PREFERENCE:623503}    "

## 2018-02-13 NOTE — LETTER
August 2, 2018      Isabela Panchal  [address]  [place] [facility] [address]    Dear ,      At [facility], your health and wellness is our primary concern. That is why we are following up on a positive high risk HPV test from 02/13/18, which was reported as HPV 16. Your provider had recommended that you have a Colposcopy completed by 05/13/18. Our records do not show that this has been done or scheduled.      It is important to complete the follow up that your provider has suggested for you to ensure that there are no worsening changes which may, over time, develop into cancer.      If you have chosen not to do the recommended colposcopy, please contact our office at [phone] to schedule an appointment for a repeat PAP smear and HPV test at your earliest convenience.    If you have completed the tests outside of [facility], please have the results forwarded to our office. We will update the chart for your primary Physician to review before your next annual physical.     Thank you for choosing [facility]!    Sincerely,      Felisha Briggs MD/[facility]

## 2018-02-13 NOTE — MR AVS SNAPSHOT
After Visit Summary   2/13/2018    Isabela Panchal    MRN: [id number]           Patient Information     Date Of Birth          1970        Visit Information        Provider Department      2/13/2018 1:40 PM Felisha Briggs MD [facility]        Today's Diagnoses     Well woman exam with routine gynecological exam    -  1    Mild intermittent asthma without complication        Moderate recurrent major depression (H)        Generalized anxiety disorder        DDD (degenerative disc disease), lumbar        High blood triglycerides        Intermittent asthma, uncomplicated        Essential hypertension with goal blood pressure less than 140/90        Primary insomnia        Vitamin D deficiency disease        Postconcussion syndrome        Screening for STD (sexually transmitted disease)          Care Instructions      Preventive Health Recommendations  Female Ages 40 to 49    Yearly exam:     See your health care provider every year in order to  1. Review health changes.   2. Discuss preventive care.    3. Review your medicines if your doctor prescribed any.      Get a Pap test every three years (unless you have an abnormal result and your provider advises testing more often).      If you get Pap tests with HPV test, you only need to test every 5 years, unless you have an abnormal result. You do not need a Pap test if your uterus was removed (hysterectomy) and you have not had cancer.      You should be tested each year for STDs (sexually transmitted diseases), if you're at risk.       Ask your doctor if you should have a mammogram.      Have a colonoscopy (test for colon cancer) if someone in your family has had colon cancer or polyps before age 50.       Have a cholesterol test every 5 years.       Have a diabetes test (fasting glucose) after age 45. If you are at risk for diabetes, you should have this test every 3 years.    Shots: Get a flu shot each year. Get a tetanus  shot every 10 years.     Nutrition:     Eat at least 5 servings of fruits and vegetables each day.    Eat whole-grain bread, whole-wheat pasta and brown rice instead of white grains and rice.    Talk to your provider about Calcium and Vitamin D.     Lifestyle    Exercise at least 150 minutes a week (an average of 30 minutes a day, 5 days a week). This will help you control your weight and prevent disease.    Limit alcohol to one drink per day.    No smoking.     Wear sunscreen to prevent skin cancer.    See your dentist every six months for an exam and cleaning.          Follow-ups after your visit        Additional Services     MENTAL HEALTH REFERRAL  - Adult; Outpatient Treatment; Individual/Couples/Family/Group Therapy/Health Psychology; [facility]: [facility] [phone]; We will contact you to schedule the appointment or please call with any questions       All scheduling is subject to the client's specific insurance plan & benefits, provider/location availability, and provider clinical specialities.  Please arrive 15 minutes early for your first appointment and bring your completed paperwork.    Please be aware that coverage of these services is subject to the terms and limitations of your health insurance plan.  Call member services at your health plan with any benefit or coverage questions.                            Future tests that were ordered for you today     Open Future Orders        Priority Expected Expires Ordered    Mammo Screening digital (bilat) Routine  2/13/2019 2/13/2018            Who to contact     If you have questions or need follow up information about today's clinic visit or your schedule please contact [facility] directly at [phone].  Normal or non-critical lab and imaging results will be communicated to you by MyChart, letter or phone within 4 business days after the clinic has received the results. If you do not hear from us within 7 days, please  "contact the clinic through [employer] or phone. If you have a critical or abnormal lab result, we will notify you by phone as soon as possible.  Submit refill requests through [employer] or call your pharmacy and they will forward the refill request to us. Please allow 3 business days for your refill to be completed.          Additional Information About Your Visit        [employer] Information     [employer] lets you send messages to your doctor, view your test results, renew your prescriptions, schedule appointments and more. To sign up, go to [email or web address] . Click on \"Log in\" on the left side of the screen, which will take you to the Welcome page. Then click on \"Sign up Now\" on the right side of the page.     You will be asked to enter the access code listed below, as well as some personal information. Please follow the directions to create your username and password.     Your access code is: [email or web address]  Expires: 2018  2:20 PM     Your access code will  in 90 days. If you need help or a new code, please call your [facility] clinic or [phone].        Care EveryWhere ID     This is your Care EveryWhere ID. This could be used by other organizations to access your [facility] medical records  [id number]        Your Vitals Were     Pulse Temperature Respirations Height Pulse Oximetry BMI (Body Mass Index)    90 98.4  F (36.9  C) (Oral) 12 5' 5\" (1.651 m) 98% 30.79 kg/m2       Blood Pressure from Last 3 Encounters:   18 138/84   17 143/86   17 125/80    Weight from Last 3 Encounters:   18 185 lb (83.9 kg)   17 196 lb (88.9 kg)   17 196 lb (88.9 kg)              We Performed the Following     CHLAMYDIA TRACHOMATIS PCR     HPV High Risk Types DNA Cervical     MENTAL HEALTH REFERRAL  - Adult; Outpatient Treatment; Individual/Couples/Family/Group Therapy/Health Psychology; [facility]: [facility] [phone]; We will contact you to schedule the appointment or " please call with any questions     NEISSERIA GONORRHOEA PCR     Pap imaged thin layer screen with HPV - recommended age 30 - 65          Where to get your medicines      These medications were sent to [facility] [place], [place] - [address]  [address], [facility] [address]     Phone:  [phone]     albuterol 108 (90 BASE) MCG/ACT Inhaler    cyclobenzaprine 10 MG tablet    fenofibrate 48 MG tablet    lisinopril 10 MG tablet    traZODone 100 MG tablet          Primary Care Provider Office Phone # Fax #    Felisha Briggs [phone]       [address]  [facility] [address]        Equal Access to Services     OSIEL GILLESPIE : Hadwillie Le, waaxda jaylen, qaybta kaalmada florence, tere guerrier. So [facility] [phone].    ATENCIÓN: Si habla español, tiene a snyder disposición servicios gratuitos de asistencia lingüística. Llame al [phone].    We comply with applicable federal civil rights laws and [place] laws. We do not discriminate on the basis of race, color, national origin, age, disability, sex, sexual orientation, or gender identity.            Thank you!     Thank you for choosing [facility]  for your care. Our goal is always to provide you with excellent care. Hearing back from our patients is one way we can continue to improve our services. Please take a few minutes to complete the written survey that you may receive in the mail after your visit with us. Thank you!             Your Updated Medication List - Protect others around you: Learn how to safely use, store and throw away your medicines at www.disposemymeds.org.          This list is accurate as of 2/13/18  2:20 PM.  Always use your most recent med list.                   Brand Name Dispense Instructions for use Diagnosis    albuterol 108 (90 BASE) MCG/ACT Inhaler    PROAIR HFA/PROVENTIL HFA/VENTOLIN HFA    2 Inhaler    Inhale 2 puffs  into the lungs every 6 hours as needed for shortness of breath / dyspnea or wheezing    Intermittent asthma, uncomplicated       azithromycin 250 MG tablet    ZITHROMAX    6 tablet    Two tablets first day, then one tablet daily for four days.    Acute bronchitis with symptoms > 10 days       CALCIUM + D PO      Take  by mouth daily.        Capsaicin 0.1 % cream     42.5 g    Externally apply 1 g topically 2 times daily as needed    Right anterior knee pain       cyclobenzaprine 10 MG tablet    FLEXERIL    30 tablet    Take 0.5-1 tablets (5-10 mg) by mouth 3 times daily as needed for muscle spasms    DDD (degenerative disc disease), lumbar       DEPAKOTE PO           fenofibrate 48 MG tablet     90 tablet    Take 1 tablet (48 mg) by mouth daily    High blood triglycerides       fluticasone 50 MCG/ACT spray    FLONASE    16 g    Spray 2 sprays into both nostrils daily    Other seasonal allergic rhinitis       gabapentin 300 MG capsule    NEURONTIN     Indications: Headache Take 300mg po BID x 7 days, then increase to 300mg po TID x 7 days, then increase to 600mg po TID.        lisinopril 10 MG tablet    PRINIVIL/ZESTRIL    90 tablet    Take 1 tablet (10 mg) by mouth daily    Essential hypertension with goal blood pressure less than 140/90       loratadine-pseudoePHEDrine  MG per 24 hr tablet    CLARITIN-D 24-hour    30 tablet    Take 1 tablet by mouth daily    Environmental allergies       meloxicam 15 MG tablet    MOBIC    30 tablet    Take 1 tablet (15 mg) by mouth daily    Patellofemoral pain syndrome of left knee       MIRENA (52 MG) 20 MCG/24HR IUD   Generic drug:  levonorgestrel      USE FOR UP TO 5 YEARS THEN REMOVE        order for DME     1 Device    Counter force forearm brace    Lateral epicondylitis of left elbow       traZODone 100 MG tablet    DESYREL    90 tablet    Take 1 tablet (100 mg) by mouth At Bedtime Please profile.    Primary insomnia       * venlafaxine 150 MG 24 hr capsule     EFFEXOR-XR    90 capsule    TAKE ONE CAPSULE BY MOUTH EVERY DAY (NEED TO BE SEEN IN CLINIC FOR FURTHER REFILLS)    Generalized anxiety disorder, Moderate recurrent major depression (H)       * venlafaxine 150 MG 24 hr capsule    EFFEXOR-XR    90 capsule    Take 1 capsule (150 mg) by mouth daily Please profile.    Generalized anxiety disorder, Moderate recurrent major depression (H)       * Notice:  This list has 2 medication(s) that are the same as other medications prescribed for you. Read the directions carefully, and ask your doctor or other care provider to review them with you.

## 2018-02-13 NOTE — LETTER
March 5, 2018      Isabela Panchal  [address]  [facility] [address]    Dear ,      We are contacting you in writing because we have been unable to reach you by phone.    This letter is in regards to the pap smear and HPV (Human Papillomavirus) test you had done recently. Your PAP test result is normal, but your HPV (Human Papillomavirus) test was positive for a high risk type of the HPV. HPV is a common virus found in sexually active men and women. Some high risk strains of the HPV virus can be risk factors for later development of cervical cancer.    About 80 percent of women have been exposed to HPV virus throughout their lifetime. There is no medication for the treatment of HPV. Typically your own immune system gets rid of the virus before it does harm. HPV is spread by direct skin-to-skin contact, including sexual intercourse, oral sex, anal sex, or any other contact involving the genital area (example: hand to genital contact). It is not possible to become infected with HPV by touching an object, such as a toilet seat. Most people who are infected with HPV have no signs or symptoms.    Your doctor has recommended that you have specific test called colposcopy. This test allows the provider to closely examine your cervix. If biopsies are taken, the results will be complete within two weeks and will be used to determine the plan for future follow-up.      Please call [facility] at [phone] to schedule this procedure with Dr. Naranjo. Schedule this for a time when you are not due to have a period (if having regular menstrual bleeding). You can take an over the counter pain reliever 1 hour before your colposcopy. Nothing in the vagina for 24 hours before your colposcopy (no sex, douches, vaginal medications or lubricants).     If you have questions regarding this result please contact Katty at [phone].    Sincerely,      Felisha Briggs MD./  Katty Raymond RN-Pap  Tracking

## 2018-02-13 NOTE — LETTER
My Asthma Action Plan  Name: Isabela Panchal   YOB: 1970  Date: 2/13/2018   My doctor: Felisha Briggs MD   My clinic: [facility]        My Control Medicine: none  My Rescue Medicine: Albuterol (Proair/Ventolin/Proventil) inhaler prn   My Asthma Severity: intermittent  Avoid your asthma triggers  bronchitis            GREEN ZONE   Good Control    I feel good    No cough or wheeze    Can work, sleep and play without asthma symptoms       Take your asthma control medicine every day.     1. If exercise triggers your asthma, take your rescue medication    15 minutes before exercise or sports, and    During exercise if you have asthma symptoms  2. Spacer to use with inhaler: If you have a spacer, make sure to use it with your inhaler             YELLOW ZONE Getting Worse  I have ANY of these:    I do not feel good    Cough or wheeze    Chest feels tight    Wake up at night   1. Keep taking your Green Zone medications  2. Start taking your rescue medicine:    every 20 minutes for up to 1 hour. Then every 4 hours for 24-48 hours.  3. If you stay in the Yellow Zone for more than 12-24 hours, contact your doctor.  4. If you do not return to the Green Zone in 12-24 hours or you get worse, start taking your oral steroid medicine if prescribed by your provider.           RED ZONE Medical Alert - Get Help  I have ANY of these:    I feel awful    Medicine is not helping    Breathing getting harder    Trouble walking or talking    Nose opens wide to breathe       1. Take your rescue medicine NOW  2. If your provider has prescribed an oral steroid medicine, start taking it NOW  3. Call your doctor NOW  4. If you are still in the Red Zone after 20 minutes and you have not reached your doctor:    Take your rescue medicine again and    Call 911 or go to the emergency room right away    See your regular doctor within 2 weeks of an Emergency Room or Urgent Care visit for follow-up treatment.         Electronically signed by: Felisha Briggs, February 13, 2018    Annual Reminders:  Meet with Asthma Educator,  Flu Shot in the Fall, consider Pneumonia Vaccination for patients with asthma (aged 19 and older).    Pharmacy:    [facility] [place], [place] - [address]  [facility] [address] [place], [place] - [address] AT [facility] [address]  [facility] [place], [place] - [address]  [facility] [address]  WRITTEN PRESCRIPTION REQUESTED  [facility] - [place], [place] - [address] AT [facility] [address]                    Asthma Triggers  How To Control Things That Make Your Asthma Worse    Triggers are things that make your asthma worse.  Look at the list below to help you find your triggers and what you can do about them.  You can help prevent asthma flare-ups by staying away from your triggers.      Trigger                                                          What you can do   Cigarette Smoke  Tobacco smoke can make asthma worse. Do not allow smoking in your home, car or around you.  Be sure no one smokes at a child s day care or school.  If you smoke, ask your health care provider for ways to help you quit.  Ask family members to quit too.  Ask your health care provider for a referral to Quit Plan to help you quit smoking, or call 5-413-505-PLAN.     Colds, Flu, Bronchitis  These are common triggers of asthma. Wash your hands often.  Don t touch your eyes, nose or mouth.  Get a flu shot every year.     Dust Mites  These are tiny bugs that live in cloth or carpet. They are too small to see. Wash sheets and blankets in hot water every week.   Encase pillows and mattress in dust mite proof covers.  Avoid having carpet if you can. If you have carpet, vacuum weekly.   Use a dust mask and HEPA vacuum.   Pollen and Outdoor Mold  Some people are allergic to trees, grass, or weed pollen, or molds. Try to keep  your windows closed.  Limit time out doors when pollen count is high.   Ask you health care provider about taking medicine during allergy season.     Animal Dander  Some people are allergic to skin flakes, urine or saliva from pets with fur or feathers. Keep pets with fur or feathers out of your home.    If you can t keep the pet outdoors, then keep the pet out of your bedroom.  Keep the bedroom door closed.  Keep pets off cloth furniture and away from stuffed toys.     Mice, Rats, and Cockroaches  Some people are allergic to the waste from these pests.   Cover food and garbage.  Clean up spills and food crumbs.  Store grease in the refrigerator.   Keep food out of the bedroom.   Indoor Mold  This can be a trigger if your home has high moisture. Fix leaking faucets, pipes, or other sources of water.   Clean moldy surfaces.  Dehumidify basement if it is damp and smelly.   Smoke, Strong Odors, and Sprays  These can reduce air quality. Stay away from strong odors and sprays, such as perfume, powder, hair spray, paints, smoke incense, paint, cleaning products, candles and new carpet.   Exercise or Sports  Some people with asthma have this trigger. Be active!  Ask your doctor about taking medicine before sports or exercise to prevent symptoms.    Warm up for 5-10 minutes before and after sports or exercise.     Other Triggers of Asthma  Cold air:  Cover your nose and mouth with a scarf.  Sometimes laughing or crying can be a trigger.  Some medicines and food can trigger asthma.

## 2018-02-13 NOTE — LETTER
February 16, 2018      Isabela Panchal  [address]  [facility] [address]        Dear Isabela,      Good news!  Your STI screen was completely negative for gonorrhea and chlamydia.  Thank you for getting these tests done, and please remember that only condoms can prevent pregnancy AND infections, if used correctly and 100% of the time.     If you have any questions, please contact the clinic or schedule an appointment with me, thank you!        Sincerely,        Felisha Briggs MD/martita

## 2018-02-13 NOTE — LETTER
November 21, 2019      Isabela Panchal  [address]  [facility] [address]    Dear ,      At [facility], your health and wellness is our primary concern. That is why we are following up on an abnormal pap from 07/05/19, which was reported as ASCUS and positive for high-risk HPV 16. Your provider had recommended that you have a Pap smear and HPV test completed by 12/05/19. Our records do not show that this has been scheduled.    It is important to complete the follow up that your provider has suggested for you to ensure that there are no worsening changes which may, over time, develop into cancer.      Please contact our office at  [phone] to schedule an appointment for a Pap smear and HPV test at your earliest convenience. If you have questions or concerns, please call the clinic and we will be happy to assist you.    If you have completed the tests outside of [facility], please have the results forwarded to our office. We will update the chart for your primary Physician to review before your next annual physical.     Thank you for choosing [facility]!    Sincerely,      Your [facility] Care Team//[facility]

## 2018-02-14 LAB
C TRACH DNA SPEC QL NAA+PROBE: NEGATIVE
N GONORRHOEA DNA SPEC QL NAA+PROBE: NEGATIVE
SPECIMEN SOURCE: NORMAL
SPECIMEN SOURCE: NORMAL

## 2018-02-14 ASSESSMENT — PATIENT HEALTH QUESTIONNAIRE - PHQ9: SUM OF ALL RESPONSES TO PHQ QUESTIONS 1-9: 13

## 2018-02-14 ASSESSMENT — ANXIETY QUESTIONNAIRES: GAD7 TOTAL SCORE: 10

## 2018-02-15 LAB
COPATH REPORT: NORMAL
PAP: NORMAL

## 2018-02-15 NOTE — PROGRESS NOTES
Good news!  Your STI screen was completely negative for gonorrhea and chlamydia.  Thank you for getting these tests done, and please remember that only condoms can prevent pregnancy AND infections, if used correctly and 100% of the time.     If you have any questions, please contact the clinic or schedule an appointment with me, thank you!    Felisha Briggs  2/15/2018

## 2018-02-19 LAB
FINAL DIAGNOSIS: ABNORMAL
HPV HR 12 DNA CVX QL NAA+PROBE: NEGATIVE
HPV16 DNA SPEC QL NAA+PROBE: POSITIVE
HPV18 DNA SPEC QL NAA+PROBE: NEGATIVE
SPECIMEN DESCRIPTION: ABNORMAL
SPECIMEN SOURCE CVX/VAG CYTO: ABNORMAL

## 2018-02-19 NOTE — PROGRESS NOTES
12/16/10 ASCUS pap, neg HR HPV.  3/5/14 NIL pap, neg HR HPV.  2/13/18 NIL pap, + HR HPV type 16. Plan colp due by 5/13/18.  02/21/18: Msg left to call back. (sk)  02/27/18:Msg left to call back. (sk)  03/05/18: Pt hasn't returned the call. Result letter mailed to the pt. (sk)  03/06/18 Result letter sent at request of RN. ([facility])   04/05/18 Result letter sent certified at request of RN: [phone] ([facility])   05/09/18 Certified letter returned as unclaimed. Sent to [facility] for scanning into pt's chart. ([facility])  05/09/18: [place] not done. Tracking updated for 6 mo colp/pap.   08/02/18 [place]/Pap reminder letter sent. ([facility])   12/05/18: [place] Bx A small amount of squamous epithelium with atypia of undetermined significance, favor reactive change,and chronic inflammation. Plan cotest in 1 year.   12/13/18: I called and left a detailed voice mail message on the pt's phone with the [place] results and recommendations. (ok per demographics) (sk)  7/5/19 ASCUS Pap, + HR HPV 16 (Neg 18 & other). Plan colp  7/11/19 Pt given result and recommendation by phone. She will call to schedule colp. ([facility])  08/14/19 [place] appt--after discussion, we will defer today's colposcopy, instead planning a repeat Pap and HPV test in December 2019.  If colposcopy is indicated at that time we will proceed. ([facility])  11/21/19 [place] reminder letter sent. ([facility])  12/18/19 [facility] clinic and schedule. ([facility])  01/15/20: [place] Bx benign, chronic inflammation present. ECC benign,chronic inflammation present, small amount of atypical squamous epithelium. NIL Pap, Neg HR HPV result. Plan cotest in 1 year. (sk)  01/23/20: Pt was advised. (sk)  01/24/20 Result letter sent at request of RN. ([facility])

## 2018-04-03 ENCOUNTER — HOSPITAL ENCOUNTER (EMERGENCY)
Facility: CLINIC | Age: 48
Discharge: HOME | End: 2018-04-03
Attending: EMERGENCY MEDICINE | Admitting: EMERGENCY MEDICINE
Payer: COMMERCIAL

## 2018-04-03 ENCOUNTER — TELEPHONE (OUTPATIENT)
Dept: FAMILY MEDICINE | Facility: CLINIC | Age: 48
End: 2018-04-03

## 2018-04-03 VITALS
HEART RATE: 93 BPM | SYSTOLIC BLOOD PRESSURE: 132 MMHG | WEIGHT: 180 LBS | DIASTOLIC BLOOD PRESSURE: 81 MMHG | OXYGEN SATURATION: 98 % | BODY MASS INDEX: 29.95 KG/M2 | TEMPERATURE: 96.5 F | RESPIRATION RATE: 16 BRPM

## 2018-04-03 DIAGNOSIS — R51.9 ACUTE NONINTRACTABLE HEADACHE, UNSPECIFIED HEADACHE TYPE: ICD-10-CM

## 2018-04-03 PROCEDURE — 96375 TX/PRO/DX INJ NEW DRUG ADDON: CPT | Performed by: EMERGENCY MEDICINE

## 2018-04-03 PROCEDURE — 99284 EMERGENCY DEPT VISIT MOD MDM: CPT | Mod: Z6 | Performed by: EMERGENCY MEDICINE

## 2018-04-03 PROCEDURE — 99284 EMERGENCY DEPT VISIT MOD MDM: CPT | Mod: 25 | Performed by: EMERGENCY MEDICINE

## 2018-04-03 PROCEDURE — 96361 HYDRATE IV INFUSION ADD-ON: CPT | Performed by: EMERGENCY MEDICINE

## 2018-04-03 PROCEDURE — 25000128 H RX IP 250 OP 636: Performed by: EMERGENCY MEDICINE

## 2018-04-03 PROCEDURE — 96374 THER/PROPH/DIAG INJ IV PUSH: CPT | Performed by: EMERGENCY MEDICINE

## 2018-04-03 RX ORDER — KETOROLAC TROMETHAMINE 30 MG/ML
30 INJECTION, SOLUTION INTRAMUSCULAR; INTRAVENOUS ONCE
Status: COMPLETED | OUTPATIENT
Start: 2018-04-03 | End: 2018-04-03

## 2018-04-03 RX ORDER — SODIUM CHLORIDE 9 MG/ML
1000 INJECTION, SOLUTION INTRAVENOUS CONTINUOUS
Status: DISCONTINUED | OUTPATIENT
Start: 2018-04-03 | End: 2018-04-03 | Stop reason: HOSPADM

## 2018-04-03 RX ADMIN — KETOROLAC TROMETHAMINE 30 MG: 30 INJECTION, SOLUTION INTRAMUSCULAR at 16:30

## 2018-04-03 RX ADMIN — PROCHLORPERAZINE EDISYLATE 10 MG: 5 INJECTION INTRAMUSCULAR; INTRAVENOUS at 16:32

## 2018-04-03 RX ADMIN — SODIUM CHLORIDE 1000 ML: 9 INJECTION, SOLUTION INTRAVENOUS at 16:30

## 2018-04-03 ASSESSMENT — ENCOUNTER SYMPTOMS
HEADACHES: 1
PHOTOPHOBIA: 1
FEVER: 0

## 2018-04-03 NOTE — ED AVS SNAPSHOT
[facility], Emergency Department    [address] [address]    Phone:  [phone]                                       Isabela Panchal   MRN: [id number]    Department:  [facility], Emergency Department   Date of Visit:  4/3/2018           Patient Information     Date Of Birth          1970        Your diagnoses for this visit were:     Acute nonintractable headache, unspecified headache type        You were seen by Mack Scanlon MD.        Discharge Instructions       Return if any problems or concerns    24 Hour Appointment Hotline       To make an appointment at any [facility] clinic, call [phone] ([phone]). If you don't have a family doctor or clinic, we will help you find one. [facility] clinics are conveniently located to serve the needs of you and your family.             Review of your medicines      Our records show that you are taking the medicines listed below. If these are incorrect, please call your family doctor or clinic.        Dose / Directions Last dose taken    albuterol 108 (90 BASE) MCG/ACT Inhaler   Commonly known as:  PROAIR HFA/PROVENTIL HFA/VENTOLIN HFA   Dose:  2 puff   Quantity:  2 Inhaler        Inhale 2 puffs into the lungs every 6 hours as needed for shortness of breath / dyspnea or wheezing   Refills:  11        azithromycin 250 MG tablet   Commonly known as:  ZITHROMAX   Quantity:  6 tablet        Two tablets first day, then one tablet daily for four days.   Refills:  0        CALCIUM + D PO        Take  by mouth daily.   Refills:  0        Capsaicin 0.1 % cream   Dose:  1 g   Quantity:  42.5 g        Externally apply 1 g topically 2 times daily as needed   Refills:  2        cyclobenzaprine 10 MG tablet   Commonly known as:  FLEXERIL   Dose:  5-10 mg   Quantity:  30 tablet        Take 0.5-1 tablets (5-10 mg) by mouth 3 times daily as needed for muscle spasms   Refills:  1        DEPAKOTE PO        Refills:  0        fenofibrate  48 MG tablet   Dose:  48 mg   Quantity:  90 tablet        Take 1 tablet (48 mg) by mouth daily   Refills:  3        fluticasone 50 MCG/ACT spray   Commonly known as:  FLONASE   Dose:  2 spray   Quantity:  16 g        Spray 2 sprays into both nostrils daily   Refills:  9        gabapentin 300 MG capsule   Commonly known as:  NEURONTIN        Indications: Headache Take 300mg po BID x 7 days, then increase to 300mg po TID x 7 days, then increase to 600mg po TID.   Refills:  0        lisinopril 10 MG tablet   Commonly known as:  PRINIVIL/ZESTRIL   Dose:  10 mg   Quantity:  90 tablet        Take 1 tablet (10 mg) by mouth daily   Refills:  3        loratadine-pseudoePHEDrine  MG per 24 hr tablet   Commonly known as:  CLARITIN-D 24-hour   Dose:  1 tablet   Quantity:  30 tablet        Take 1 tablet by mouth daily   Refills:  3        meloxicam 15 MG tablet   Commonly known as:  MOBIC   Dose:  15 mg   Quantity:  30 tablet        Take 1 tablet (15 mg) by mouth daily   Refills:  0        MIRENA (52 MG) 20 MCG/24HR IUD   Generic drug:  levonorgestrel        USE FOR UP TO 5 YEARS THEN REMOVE   Refills:  0        order for DME   Quantity:  1 Device        Counter force forearm brace   Refills:  0        traZODone 100 MG tablet   Commonly known as:  DESYREL   Dose:  100 mg   Quantity:  90 tablet        Take 1 tablet (100 mg) by mouth At Bedtime Please profile.   Refills:  3        * venlafaxine 150 MG 24 hr capsule   Commonly known as:  EFFEXOR-XR   Quantity:  90 capsule        TAKE ONE CAPSULE BY MOUTH EVERY DAY (NEED TO BE SEEN IN CLINIC FOR FURTHER REFILLS)   Refills:  1        * venlafaxine 150 MG 24 hr capsule   Commonly known as:  EFFEXOR-XR   Dose:  150 mg   Quantity:  90 capsule        Take 1 capsule (150 mg) by mouth daily Please profile.   Refills:  1        * Notice:  This list has 2 medication(s) that are the same as other medications prescribed for you. Read the directions carefully, and ask your doctor or other  "care provider to review them with you.            Orders Needing Specimen Collection     None      Pending Results     No orders found from 2018 to 2018.            Pending Culture Results     No orders found from 2018 to 2018.            Pending Results Instructions     If you had any lab results that were not finalized at the time of your Discharge, you can call the ED Lab Result RN at [phone]. You will be contacted by this team for any positive Lab results or changes in treatment. The nurses are available 7 days a week from 10A to 6:30P.  You can leave a message 24 hours per day and they will return your call.        Thank you for choosing [facility]       Thank you for choosing [facility] for your care. Our goal is always to provide you with excellent care. Hearing back from our patients is one way we can continue to improve our services. Please take a few minutes to complete the written survey that you may receive in the mail after you visit with us. Thank you!        [employer] Information     [employer] lets you send messages to your doctor, view your test results, renew your prescriptions, schedule appointments and more. To sign up, go to [email or web address] . Click on \"Log in\" on the left side of the screen, which will take you to the Welcome page. Then click on \"Sign up Now\" on the right side of the page.     You will be asked to enter the access code listed below, as well as some personal information. Please follow the directions to create your username and password.     Your access code is: [email or web address]  Expires: 2018  3:20 PM     Your access code will  in 90 days. If you need help or a new code, please call your [facility] clinic or [phone].        Care EveryWhere ID     This is your Care EveryWhere ID. This could be used by other organizations to access your [facility] medical records  [id number]        Equal Access to Services     OSIEL GILLESPIE AH: Thu cruz " roger Le, kiya powellmatere walker. So [facility] [phone].    ATENCIÓN: Si habla español, tiene a snyder disposición servicios gratuitos de asistencia lingüística. Llame al [phone].    We comply with applicable federal civil rights laws and [place] laws. We do not discriminate on the basis of race, color, national origin, age, disability, sex, sexual orientation, or gender identity.            After Visit Summary       This is your record. Keep this with you and show to your community pharmacist(s) and doctor(s) at your next visit.

## 2018-04-03 NOTE — ED NOTES
Bed: [facility]  Expected date:   Expected time:   Means of arrival:   Comments:  H412, yellow  Head pain after fall

## 2018-04-03 NOTE — ED NOTES
Pt presents via EMS from home. Pt states yesterday around 5pm Pt walked into wall and hit head. Pt slapped head against wall, Pt hade LOC for short periord of time per report to EMS from daughter. Pt is presently A and O x4. Has hx chronic headaches daily r/t TBI. Pt states has headache presently that is worse, nausea, dizziness and photosensitivity. Pt given 4 mg zofran Iv in route with some relief of nausea.

## 2018-04-03 NOTE — ED PROVIDER NOTES
[facility] EMERGENCY DEPARTMENT ([facility])  4/03/18 [facility]  History     Chief Complaint   Patient presents with     Head Injury     The history is provided by the patient and medical records.     Isabela Panchal is a 47 year old female who presents with worsening headache after striking her head on the floor yesterday.  She has a history of traumatic brain injury in 2016 as subsequent chronic headaches.  Follows with neurology for these headaches.  Patient states that she struck her head against the wall yesterday.  She states that she did black out and lose consciousness with this, found herself on the floor.  Had a headache initially which improved but worsened today.  She did have episode of receptive aphasia where she could not understand words being spoken to her, but this improved overnight and she has no aphasia at this time.  Although she has chronic headaches, she states that her headache currently is worse than her baseline headaches.  She has photophobia with this.     I have reviewed the Medications, Allergies, Past Medical and Surgical History, and Social History in the [employer] system.  Past Medical History:   Diagnosis Date     Allergic rhinitis due to allergen      Anemia      Breathing difficulty      Cervical high risk HPV (human papillomavirus) test positive 02/13/2018    type 16     Chest pain      Generalised anxiety disorder 9/6/2012     Headache      Heart trouble      High blood triglycerides 2/26/2016     History of blood transfusion     unsure     Hoarseness      Hypertension      Hypoglycemia 3/10/2013     Impaired fasting glucose 8/16/2014     Insomnia 9/6/2012     Irritable bowel syndrome      Itchy eyes      MEDICAL HISTORY OF -     hx of right wrist dislocation     Migraine      Mild intermittent asthma     allergy or URI triggered      Moderate recurrent major depression (H) 9/6/2012     Nasal congestion      Numbness      Obesity, unspecified (OBESE) 8/21/2014      Paroxysmal supraventricular tachycardia (H)     4/00     PONV (postoperative nausea and vomiting)      Ringing in ears      Sleep difficulties      Sneezing      Sore throat      Vertigo      Weakness      Weight gain        Past Surgical History:   Procedure Laterality Date     C NONSPECIFIC PROCEDURE      Plastic repair of facial lac     C NONSPECIFIC PROCEDURE      Lipoma removed from arm close to the tail of te breast     C NONSPECIFIC PROCEDURE      Plastic repair of facial lac     C NONSPECIFIC PROCEDURE      Knee surgery     C NONSPECIFIC PROCEDURE      Miscarriage x 2     COLONOSCOPY  4/26/2013    Procedure: COLONOSCOPY;;  Surgeon: Jim Humphries MD;  Location:  GI     ENT SURGERY      deviated septum     HEAD & NECK SURGERY       LAPAROSCOPIC SALPINGO-OOPHORECTOMY  1/20/2014    Procedure: LAPAROSCOPIC SALPINGO-OOPHORECTOMY;  Laparoscopic Left Salpingo Oophorectomy ;  Surgeon: Chani Del Rosario MD;  Location: [facility] OR     LUMPECTOMY BREAST      right     ORTHOPEDIC SURGERY      knee     ORTHOPEDIC SURGERY      foot     SOFT TISSUE SURGERY      lymphoma face and armpit     SOFT TISSUE SURGERY         Social History   Substance Use Topics     Smoking status: Never Smoker     Smokeless tobacco: Never Used     Alcohol use Yes      Comment: occ-1-2x/month, 2 drinks a time      Review of Systems   Constitutional: Negative for fever.   Eyes: Positive for photophobia.   Neurological: Positive for headaches.   All other systems reviewed and are negative.      Physical Exam   BP: 148/84  Pulse: 81  Temp: 96.4  F (35.8  C)  Resp: 16  Weight: 81.6 kg (180 lb)  SpO2: 98 %      Physical Exam   Constitutional: She is oriented to person, place, and time. She appears well-developed and well-nourished. She appears distressed.   Eyes: EOM are normal. Pupils are equal, round, and reactive to light. Right eye exhibits no nystagmus. Left eye exhibits no nystagmus.   Cardiovascular: Regular rhythm and normal heart  sounds.    Pulmonary/Chest: Breath sounds normal.   Neurological: She is alert and oriented to person, place, and time. No cranial nerve deficit.   Psychiatric: She has a normal mood and affect. Her behavior is normal.   Nursing note and vitals reviewed.      ED Course     ED Course     Procedures        Medications   0.9% sodium chloride BOLUS (1,000 mLs Intravenous New Bag 4/3/18 1630)     Followed by   sodium chloride 0.9% infusion (not administered)   prochlorperazine (COMPAZINE) injection 10 mg (10 mg Intravenous Given 4/3/18 1632)   ketorolac (TORADOL) injection 30 mg (30 mg Intravenous Given 4/3/18 1630)     6:04 PM Feels better       No results found for this or any previous visit (from the past 24 hour(s)).  Medications   0.9% sodium chloride BOLUS (1,000 mLs Intravenous New Bag 4/3/18 1630)     Followed by   sodium chloride 0.9% infusion (not administered)   prochlorperazine (COMPAZINE) injection 10 mg (10 mg Intravenous Given 4/3/18 1632)   ketorolac (TORADOL) injection 30 mg (30 mg Intravenous Given 4/3/18 1630)         Assessments & Plan (with Medical Decision Making)   47-year-old female with history of chronic headaches who had a minor head injury yesterday resulting in worsening headache.  She also has photophobia, no fevers, no rash.  Here, her neurologic exam was normal.  She has no nystagmus.  IV was started, she was given Compazine and Reglan and she feels much better and will be discharged home.  I do not think that this represents a pathologic headache.  I do not feel that she needs head imaging given the very minor trauma and the fact that she is not anticoagulated.    I have reviewed the nursing notes.    I have reviewed the findings, diagnosis, plan and need for follow up with the patient.  This part of the document was transcribed by Oswald Fletcher, Medical Scribe.     New Prescriptions    No medications on file       Final diagnoses:   Acute nonintractable headache, unspecified  headache type   I, Corina Morris, am serving as a trained medical scribe to document services personally performed by Mack Scanlon MD based on the provider's statements to me on April 3, 2018.  This document has been checked and approved by the attending provider.    I, Mack Scanlon MD, was physically present and have reviewed and verified the accuracy of this note documented by Corina Morris, medical scribe.       4/3/2018   [facility], [place], EMERGENCY DEPARTMENT     Mack Scanlon MD  04/03/18 1849

## 2018-04-03 NOTE — ED AVS SNAPSHOT
[facility], [place], Emergency Department    [address] [place] [address]    Phone:  [phone]                                       Isabela Panchal   MRN: [id number]    Department:  [facility], Emergency Department   Date of Visit:  4/3/2018           After Visit Summary Signature Page     I have received my discharge instructions, and my questions have been answered. I have discussed any challenges I see with this plan with the nurse or doctor.    ..........................................................................................................................................  Patient/Patient Representative Signature      ..........................................................................................................................................  Patient Representative Print Name and Relationship to Patient    ..................................................               ................................................  Date                                            Time    ..........................................................................................................................................  Reviewed by Signature/Title    ...................................................              ..............................................  Date                                                            Time

## 2018-04-03 NOTE — TELEPHONE ENCOUNTER
PT is a TBI pt.  Hit head on a wall last night.  Huge bump on head.  Bump is on Forehead and hairline.  Pt felt she lost consciousness. Went blank.  Had dry heave feeling. Doesn't feel well.    Has hx of light sensitivities.  Difficulty driving today.    Rec pt be taken to ER for assessment.  Don't drive until assessment.  Pt felt she had someone to drive her to ER.  KETTY Wallis

## 2018-04-11 DIAGNOSIS — F41.1 GENERALIZED ANXIETY DISORDER: ICD-10-CM

## 2018-04-11 DIAGNOSIS — F33.1 MODERATE RECURRENT MAJOR DEPRESSION (H): ICD-10-CM

## 2018-04-11 RX ORDER — VENLAFAXINE HYDROCHLORIDE 150 MG/1
CAPSULE, EXTENDED RELEASE ORAL
Qty: 90 CAPSULE | Refills: 0 | Status: SHIPPED | OUTPATIENT
Start: 2018-04-11 | End: 2018-07-17

## 2018-04-11 NOTE — TELEPHONE ENCOUNTER
Routing refill request to provider for review/approval because:  --Last PHQ-9 2/13/18 was > 5.      Last office visit : 2/13/18     PHQ-9 SCORE 3/28/2017 9/28/2017 2/13/2018   Total Score - - -   Total Score MyChart 9 (Mild depression) - 13 (Moderate depression)   Total Score 9 17 13       BP Readings from Last 1 Encounters:   04/03/18 132/81       Creatinine   Date Value Ref Range Status   09/08/2017 0.72 0.52 - 1.04 mg/dL Final   ]

## 2018-04-11 NOTE — TELEPHONE ENCOUNTER
Pt is calling on stating she only has one pill left and is hoping to get this filled ASAP, if appt is needed she will schedule.  Saira Aguilar

## 2018-06-22 ENCOUNTER — OFFICE VISIT (OUTPATIENT)
Dept: FAMILY MEDICINE | Facility: CLINIC | Age: 48
End: 2018-06-22
Payer: COMMERCIAL

## 2018-06-22 ENCOUNTER — RADIANT APPOINTMENT (OUTPATIENT)
Dept: GENERAL RADIOLOGY | Facility: CLINIC | Age: 48
End: 2018-06-22
Attending: FAMILY MEDICINE
Payer: COMMERCIAL

## 2018-06-22 VITALS
TEMPERATURE: 97.7 F | HEART RATE: 98 BPM | HEIGHT: 65 IN | OXYGEN SATURATION: 97 % | DIASTOLIC BLOOD PRESSURE: 76 MMHG | WEIGHT: 194 LBS | SYSTOLIC BLOOD PRESSURE: 116 MMHG | BODY MASS INDEX: 32.32 KG/M2

## 2018-06-22 DIAGNOSIS — M79.644 PAIN OF FINGER OF RIGHT HAND: ICD-10-CM

## 2018-06-22 DIAGNOSIS — M54.6 CHRONIC MIDLINE THORACIC BACK PAIN: Primary | ICD-10-CM

## 2018-06-22 DIAGNOSIS — M54.6 CHRONIC MIDLINE THORACIC BACK PAIN: ICD-10-CM

## 2018-06-22 DIAGNOSIS — G89.29 CHRONIC MIDLINE THORACIC BACK PAIN: ICD-10-CM

## 2018-06-22 DIAGNOSIS — G89.29 CHRONIC MIDLINE THORACIC BACK PAIN: Primary | ICD-10-CM
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
## 2018-06-22 PROCEDURE — 36415 COLL VENOUS BLD VENIPUNCTURE: CPT | Performed by: FAMILY MEDICINE

## 2018-06-22 PROCEDURE — 99214 OFFICE O/P EST MOD 30 MIN: CPT | Performed by: FAMILY MEDICINE

## 2018-06-22 PROCEDURE — 86431 RHEUMATOID FACTOR QUANT: CPT | Performed by: FAMILY MEDICINE

## 2018-06-22 PROCEDURE — 72070 X-RAY EXAM THORAC SPINE 2VWS: CPT

## 2018-06-22 PROCEDURE — 73130 X-RAY EXAM OF HAND: CPT | Mod: RT

## 2018-06-22 RX ORDER — CALCITONIN SALMON 200 [IU]/.09ML
1 SPRAY, METERED NASAL DAILY
Qty: 1 BOTTLE | Refills: 1 | Status: SHIPPED | OUTPATIENT
Start: 2018-06-22 | End: 2019-03-26

## 2018-06-22 NOTE — LETTER
June 27, 2018      Isabela Panchal  [address]  [facility] [address]        Dear ,    We are writing to inform you of your test results.    Good news, your test for autoimmune rheumatoid arthritis is normal (negative for RA).    Resulted Orders   Rheumatoid factor   Result Value Ref Range    Rheumatoid Factor <20 <20 IU/mL       If you have any questions or concerns, please call the clinic at the number listed above.       Sincerely,        Felisha Briggs MD/nr
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
## 2018-06-22 NOTE — PATIENT INSTRUCTIONS
Rheumatoid Arthritis  You have rheumatoid arthritis (RA). This is a chronic disease that mainly affects the joints. Sometimes, it also affects other parts of the body. RA is an autoimmune disease. This means that the body s immune system, which normally protects the body, causes harm instead. With RA, the immune system attacks the joints. The reason for this is unknown.  In most cases, RA affects pairs of joints on both sides of the body. These can include joints in both elbows, wrists, hands, knees, feet, or ankles. The disease often starts slowly. Early symptoms include stiffness, muscle aches, weakness, and fatigue. Over time, the joints may begin to hurt. They may also become warm, swollen, or tender. Symptoms may feel worse in the morning after a night s rest and may get better with activity.  With RA, you may have periods of active disease (when symptoms worsen) followed by periods of remission (when symptoms improve or go away). There is no known cure for RA. But medical treatment can slow or stop the progress of the disease. It can also help relieve symptoms. For advanced disease, surgery, such as joint replacement, may be the best option.  Home care    If you were prescribed a medicine, take it as directed.    To help control swelling and pain, acetaminophen, ibuprofen, or another NSAID (non-steroidal anti-inflammatory drug) may be recommended. Note: If you have chronic liver or kidney disease or ever had a stomach ulcer or gastrointestinal bleeding, tell your healthcare provider before taking any of these medicines.    Some persons find relief with heat (hot shower, hot bath, or heating pad). Others prefer cold (ice in a plastic bag, wrapped in a towel). Try both. Then use the method you like best. Use heat or cold for about 20 minutes, a few times a day.    Exercise is a key part of treatment for RA. It helps reduce pain. It may also improve flexibility. Do your best to be active daily. Move your joints  through their full range of motion each morning. Avoid staying in any one position for long periods of time. Take breaks throughout the day and move around. Also, ask your healthcare provider or physical therapist what exercises are best for you.    If you are overweight, lose weight. Extra weight puts stress on your joints.    If you smoke, quit. Smoking raises the risk of other problems linked to RA.    No herbal product or nutritional supplement has been proven to help RA. But treatments such as acupuncture and massage may help relieve pain.    Talk to your healthcare provider or occupational therapist about easier ways to perform daily tasks. This may include the use of assistive devices. These are special tools that can help with things like dressing, bathing, cooking, driving, and moving or getting around.  Follow-up care  Follow up with your healthcare provider, or as advised.   When to seek medical advice  Call your healthcare provider right away if any of these occur:    Increasing weakness, pale color of the skin, fainting    Chest pain or shortness of breath    Blood in vomit or stool (black or red color)    Changes in vision    Skin ulcers    Fever of 100.4 F (38 C) or higher, or as directed by your healthcare provider    New joint pain    New rash  Resources  To learn more about RA, contact:    Arthritis Foundation, 106.613.7492, www.arthritis.org    National [place] of Arthritis and Musculoskeletal and Skin Diseases (NIAMS), 188.507.6372, www.niams.nih.gov     Date Last Reviewed: 3/1/2017    8089-7084 [employer]. [address] [place] [address], [place] [id number]. All rights reserved. This information is not intended as a substitute for professional medical care. Always follow your healthcare professional's instructions.
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
## 2018-06-24 LAB — RHEUMATOID FACT SER NEPH-ACNC: <20 IU/ML (ref 0–20)

## 2018-06-26 NOTE — PROGRESS NOTES
Good news, your test for autoimmune rheumatoid arthritis is normal (negative for RA).    Sincerely,  Dr. Felisha Briggs MD  6/26/2018

## 2018-06-26 NOTE — PROGRESS NOTES
Your xray results are back and look good. You do not have arthritis of your hand and your spine is pretty normal, with only a little bit of arthritis in the neck.    Sincerely,  Dr. Felisha Briggs MD  6/26/2018

## 2018-06-28 ENCOUNTER — OFFICE VISIT (OUTPATIENT)
Dept: ORTHOPEDICS | Facility: CLINIC | Age: 48
End: 2018-06-28
Payer: COMMERCIAL

## 2018-06-28 VITALS — HEART RATE: 101 BPM | DIASTOLIC BLOOD PRESSURE: 79 MMHG | SYSTOLIC BLOOD PRESSURE: 112 MMHG | HEIGHT: 65 IN

## 2018-06-28 DIAGNOSIS — M51.379 DEGENERATION OF LUMBAR OR LUMBOSACRAL INTERVERTEBRAL DISC: ICD-10-CM

## 2018-06-28 DIAGNOSIS — M51.34 THORACIC DEGENERATIVE DISC DISEASE: Primary | ICD-10-CM

## 2018-06-28 DIAGNOSIS — F40.240 CLAUSTROPHOBIA: ICD-10-CM

## 2018-06-28 RX ORDER — METHOCARBAMOL 500 MG/1
1000 TABLET, FILM COATED ORAL 3 TIMES DAILY PRN
Qty: 30 TABLET | Refills: 1 | Status: SHIPPED | OUTPATIENT
Start: 2018-06-28 | End: 2018-06-29

## 2018-06-28 RX ORDER — DICLOFENAC SODIUM 75 MG/1
75 TABLET, DELAYED RELEASE ORAL 2 TIMES DAILY PRN
Qty: 30 TABLET | Refills: 1 | Status: SHIPPED | OUTPATIENT
Start: 2018-06-28 | End: 2018-10-30

## 2018-06-28 RX ORDER — LORAZEPAM 1 MG/1
2 TABLET ORAL
Qty: 2 TABLET | Refills: 0 | Status: SHIPPED | OUTPATIENT
Start: 2018-06-28 | End: 2019-07-05
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
## 2018-06-28 NOTE — MR AVS SNAPSHOT
After Visit Summary   6/28/2018    Isabela Panchal    MRN: [id number]           Patient Information     Date Of Birth          1970        Visit Information        Provider Department      6/28/2018 3:40 PM Man Gilbert MD [facility] Sports and Orthopaedic Walk In Clinic        Today's Diagnoses     Thoracic degenerative disc disease    -  1    Degeneration of lumbar or lumbosacral intervertebral disc        Claustrophobia           Follow-ups after your visit        Your next 10 appointments already scheduled     Jun 30, 2018  2:30 PM CDT   MR LUMBAR SPINE W/O CONTRAST with [id number]   [facility] [place] [facility] ([facility])    [address] [address] [phone]           Take your medicines as usual, unless your doctor tells you not to. Bring a list of your current medicines to your exam (including vitamins, minerals and over-the-counter drugs). Also bring the results of similar scans you may have had.  Please remove any body piercings and hair extensions before you arrive.  Follow your doctor s orders. If you do not, we may have to postpone your exam.  You may or may not receive IV contrast for this exam pending the discretion of the Radiologist.  You do not need to do anything special to prepare.  The MRI machine uses a strong magnet. Please wear clothes without metal (snaps, zippers). A sweatsuit works well, or we may give you a hospital gown.   **IMPORTANT** THE INSTRUCTIONS BELOW ARE ONLY FOR THOSE PATIENTS WHO HAVE BEEN PRESCRIBED SEDATION OR GENERAL ANESTHESIA DURING THEIR MRI PROCEDURE:  IF YOUR DOCTOR PRESCRIBED ORAL SEDATION (take medicine to help you relax during your exam):   You must get the medicine from your doctor (oral medication) before you arrive. Bring the medicine to the exam. Do not take it at home. You ll be told when to take it upon arriving for your exam.   Arrive one hour early. Bring someone  who can take you home after the test. Your medicine will make you sleepy. After the exam, you may not drive, take a bus or take a taxi by yourself.  IF YOUR DOCTOR PRESCRIBED IV SEDATION:   Arrive one hour early. Bring someone who can take you home after the test. Your medicine will make you sleepy. After the exam, you may not drive, take a bus or take a taxi by yourself.   No eating 6 hours before your exam. You may have clear liquids up until 4 hours before your exam. (Clear liquids include water, clear tea, black coffee and fruit juice without pulp.)  IF YOUR DOCTOR PRESCRIBED ANESTHESIA (be asleep for your exam):   Arrive 1 1/2 hours early. Bring someone who can take you home after the test. You may not drive, take a bus or take a taxi by yourself.   No eating 8 hours before your exam. You may have clear liquids up until 4 hours before your exam. (Clear liquids include water, clear tea, black coffee and fruit juice without pulp.)   You will spend four to five hours in the recovery room.  Please call the Imaging Department at your exam site with any questions.            Jun 30, 2018  3:15 PM CDT   MR THORACIC SPINE W/O CONTRAST with [id number]   [facility] ([facility])    [address] [address] [phone]           Take your medicines as usual, unless your doctor tells you not to. Bring a list of your current medicines to your exam (including vitamins, minerals and over-the-counter drugs). Also bring the results of similar scans you may have had.  Please remove any body piercings and hair extensions before you arrive.  Follow your doctor s orders. If you do not, we may have to postpone your exam.  You may or may not receive IV contrast for this exam pending the discretion of the Radiologist.  You do not need to do anything special to prepare.  The MRI machine uses a strong magnet. Please wear clothes without metal (snaps, zippers). A  [facility] works well, or we may give you a hospital gown.   **IMPORTANT** THE INSTRUCTIONS BELOW ARE ONLY FOR THOSE PATIENTS WHO HAVE BEEN PRESCRIBED SEDATION OR GENERAL ANESTHESIA DURING THEIR MRI PROCEDURE:  IF YOUR DOCTOR PRESCRIBED ORAL SEDATION (take medicine to help you relax during your exam):   You must get the medicine from your doctor (oral medication) before you arrive. Bring the medicine to the exam. Do not take it at home. You ll be told when to take it upon arriving for your exam.   Arrive one hour early. Bring someone who can take you home after the test. Your medicine will make you sleepy. After the exam, you may not drive, take a bus or take a taxi by yourself.  IF YOUR DOCTOR PRESCRIBED IV SEDATION:   Arrive one hour early. Bring someone who can take you home after the test. Your medicine will make you sleepy. After the exam, you may not drive, take a bus or take a taxi by yourself.   No eating 6 hours before your exam. You may have clear liquids up until 4 hours before your exam. (Clear liquids include water, clear tea, black coffee and fruit juice without pulp.)  IF YOUR DOCTOR PRESCRIBED ANESTHESIA (be asleep for your exam):   Arrive 1 1/2 hours early. Bring someone who can take you home after the test. You may not drive, take a bus or take a taxi by yourself.   No eating 8 hours before your exam. You may have clear liquids up until 4 hours before your exam. (Clear liquids include water, clear tea, black coffee and fruit juice without pulp.)   You will spend four to five hours in the recovery room.  Please call the Imaging Department at your exam site with any questions.            Aug 03, 2018  1:20 PM CDT   (Arrive by 1:05 PM)   Return Walk In Ortho with Man Gilbert MD   [facility] ([facility] [place])    [address] [address] [phone]              Future tests that were ordered for you today     Open Future  "Orders        Priority Expected Expires Ordered    MRI Thoracic spine w/o contrast Routine  6/28/2019 6/28/2018    MRI Lumbar spine w/o contrast Routine  6/28/2019 6/28/2018            Who to contact     Please call your clinic at [phone] to:    Ask questions about your health    Make or cancel appointments    Discuss your medicines    Learn about your test results    Speak to your doctor            Additional Information About Your Visit        Care EveryWhere ID     This is your Care EveryWhere ID. This could be used by other organizations to access your [facility] medical records  [id number]        Your Vitals Were     Pulse Height                101 1.651 m (5' 5\")           Blood Pressure from Last 3 Encounters:   06/28/18 112/79   06/22/18 116/76   04/03/18 132/81    Weight from Last 3 Encounters:   06/22/18 88 kg (194 lb)   04/03/18 81.6 kg (180 lb)   02/13/18 83.9 kg (185 lb)                 Today's Medication Changes          These changes are accurate as of 6/28/18  4:53 PM.  If you have any questions, ask your nurse or doctor.               Start taking these medicines.        Dose/Directions    diclofenac 75 MG EC tablet   Commonly known as:  VOLTAREN   Used for:  Thoracic degenerative disc disease   Started by:  Man Gilbert MD        Dose:  75 mg   Take 1 tablet (75 mg) by mouth 2 times daily as needed for moderate pain   Quantity:  30 tablet   Refills:  1       LORazepam 1 MG tablet   Commonly known as:  ATIVAN   Used for:  Claustrophobia   Started by:  Man Gilbert MD        Dose:  2 mg   Take 2 tablets (2 mg) by mouth once as needed for anxiety (take prior to MRI for claustrophobia)   Quantity:  2 tablet   Refills:  0       methocarbamol 500 MG tablet   Commonly known as:  ROBAXIN   Used for:  Thoracic degenerative disc disease   Started by:  Man Gilbert MD        Dose:  1000 mg   Take 2 tablets (1,000 mg) by mouth 3 times daily as needed for muscle spasms "   Quantity:  30 tablet   Refills:  1            Where to get your medicines      These medications were sent to [facility] [place], [place] - [address]  [address], [facility] [address]     Phone:  [phone]     diclofenac 75 MG EC tablet    methocarbamol 500 MG tablet         Some of these will need a paper prescription and others can be bought over the counter.  Ask your nurse if you have questions.     Bring a paper prescription for each of these medications     LORazepam 1 MG tablet                Primary Care Provider Office Phone # Fax #    Felisha Briggs [phone]       [address]  [facility] [address]        Equal Access to Services     OSIEL GILLESPIE : Thu Le, walilo [place], qacheriseta kaalmada [place], tere guerrier. So [facility] [phone].    ATENCIÓN: Si habla español, tiene a snyder disposición servicios gratuitos de asistencia lingüística. Llame al [phone].    We comply with applicable federal civil rights laws and [place] laws. We do not discriminate on the basis of race, color, national origin, age, disability, sex, sexual orientation, or gender identity.            Thank you!     Thank you for choosing [facility] SPORTS AND ORTHOPAEDIC WALK IN CLINIC  for your care. Our goal is always to provide you with excellent care. Hearing back from our patients is one way we can continue to improve our services. Please take a few minutes to complete the written survey that you may receive in the mail after your visit with us. Thank you!             Your Updated Medication List - Protect others around you: Learn how to safely use, store and throw away your medicines at www.disposemymeds.org.          This list is accurate as of 6/28/18  4:53 PM.  Always use your most recent med list.                   Brand Name Dispense Instructions for use Diagnosis    albuterol 108 (90 Base) MCG/ACT Inhaler     PROAIR HFA/PROVENTIL HFA/VENTOLIN HFA    2 Inhaler    Inhale 2 puffs into the lungs every 6 hours as needed for shortness of breath / dyspnea or wheezing    Mild intermittent asthma without complication       calcitonin (salmon) 200 UNIT/ACT nasal spray    MIACALCIN    1 Bottle    Spray 1 spray into one nostril alternating nostrils daily Alternate nostril each day.    Chronic midline thoracic back pain       CALCIUM + D PO      Take  by mouth daily.        Capsaicin 0.1 % cream     42.5 g    Externally apply 1 g topically 2 times daily as needed    Right anterior knee pain       cyclobenzaprine 10 MG tablet    FLEXERIL    30 tablet    Take 0.5-1 tablets (5-10 mg) by mouth 3 times daily as needed for muscle spasms    DDD (degenerative disc disease), lumbar       DEPAKOTE PO           diclofenac 75 MG EC tablet    VOLTAREN    30 tablet    Take 1 tablet (75 mg) by mouth 2 times daily as needed for moderate pain    Thoracic degenerative disc disease       fenofibrate 48 MG tablet     90 tablet    Take 1 tablet (48 mg) by mouth daily    High blood triglycerides       fluticasone 50 MCG/ACT spray    FLONASE    16 g    Spray 2 sprays into both nostrils daily    Other seasonal allergic rhinitis       gabapentin 300 MG capsule    NEURONTIN     Indications: Headache Take 300mg po BID x 7 days, then increase to 300mg po TID x 7 days, then increase to 600mg po TID.        lisinopril 10 MG tablet    PRINIVIL/ZESTRIL    90 tablet    Take 1 tablet (10 mg) by mouth daily    Essential hypertension with goal blood pressure less than 140/90       loratadine-pseudoePHEDrine  MG per 24 hr tablet    CLARITIN-D 24-hour    30 tablet    Take 1 tablet by mouth daily    Environmental allergies       LORazepam 1 MG tablet    ATIVAN    2 tablet    Take 2 tablets (2 mg) by mouth once as needed for anxiety (take prior to MRI for claustrophobia)    Claustrophobia       meloxicam 15 MG tablet    MOBIC    30 tablet    Take 1 tablet (15 mg) by  mouth daily    Patellofemoral pain syndrome of left knee       methocarbamol 500 MG tablet    ROBAXIN    30 tablet    Take 2 tablets (1,000 mg) by mouth 3 times daily as needed for muscle spasms    Thoracic degenerative disc disease       MIRENA (52 MG) 20 MCG/24HR IUD   Generic drug:  levonorgestrel      USE FOR UP TO 5 YEARS THEN REMOVE        order for DME     1 Device    Counter force forearm brace    Lateral epicondylitis of left elbow       traZODone 100 MG tablet    DESYREL    90 tablet    Take 1 tablet (100 mg) by mouth At Bedtime Please profile.    Primary insomnia       * venlafaxine 150 MG 24 hr capsule    EFFEXOR-XR    90 capsule    TAKE ONE CAPSULE BY MOUTH EVERY DAY (NEED TO BE SEEN IN CLINIC FOR FURTHER REFILLS)    Generalized anxiety disorder, Moderate recurrent major depression (H)       * venlafaxine 150 MG 24 hr capsule    EFFEXOR-XR    90 capsule    TAKE ONE CAPSULE BY MOUTH EVERY DAY    Generalized anxiety disorder, Moderate recurrent major depression (H)       * Notice:  This list has 2 medication(s) that are the same as other medications prescribed for you. Read the directions carefully, and ask your doctor or other care provider to review them with you.
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
## 2018-06-29 ENCOUNTER — TELEPHONE (OUTPATIENT)
Dept: ORTHOPEDICS | Facility: CLINIC | Age: 48
End: 2018-06-29

## 2018-06-29 RX ORDER — METHOCARBAMOL 500 MG/1
1000 TABLET, FILM COATED ORAL 3 TIMES DAILY PRN
Qty: 30 TABLET | Refills: 1 | Status: SHIPPED | OUTPATIENT
Start: 2018-06-29 | End: 2019-03-26

## 2018-06-29 NOTE — TELEPHONE ENCOUNTER
[facility] called this morning stating that the prescription Robaxin was not available at that pharmacy. After consulting Dr. Gilbert, I confirmed with the pharmacy in the [facility] that we have that medication here (generic). Dr. Gilbert ordered Tizanidine as well. Patient can either getting Robaxin at this or another pharmacy, or filling Tizanidine at [facility]. She would also be able to pick it up and get it filled elsewhere.      was left asking her to call us back about a her prescriptions. No further details were left.

## 2018-06-30 ENCOUNTER — RADIANT APPOINTMENT (OUTPATIENT)
Dept: MRI IMAGING | Facility: CLINIC | Age: 48
End: 2018-06-30
Attending: PREVENTIVE MEDICINE
Payer: COMMERCIAL

## 2018-06-30 DIAGNOSIS — M51.379 DEGENERATION OF LUMBAR OR LUMBOSACRAL INTERVERTEBRAL DISC: ICD-10-CM

## 2018-06-30 DIAGNOSIS — M51.34 THORACIC DEGENERATIVE DISC DISEASE: ICD-10-CM

## 2018-07-03 ENCOUNTER — TELEPHONE (OUTPATIENT)
Dept: ORTHOPEDICS | Facility: CLINIC | Age: 48
End: 2018-07-03

## 2018-07-03 NOTE — TELEPHONE ENCOUNTER
The pharmacy called requesting information on the patient's Robaxin prescription.  The patient was then called discuss this.  From the last note she was attempted to be contacted regarding this issue at the end of last week.  The plan is for the patient to come to the [facility] and  her prescription in the lock box and then fill the prescription at the pharmacy.  The patient was agreeable with this plan.    The [place] pharmacy was called back regarding the medications.  They will cancel the Robaxin and they are putting a note in that we will have further discussion with the patient and Dr. Gilbert about the Voltaren gel.  Roxy was the pharmacist that we were in contact with and the number that was called was [phone].

## 2018-07-13 ENCOUNTER — TELEPHONE (OUTPATIENT)
Dept: ORTHOPEDICS | Facility: CLINIC | Age: 48
End: 2018-07-13

## 2018-07-13 DIAGNOSIS — M62.830 LUMBAR PARASPINAL MUSCLE SPASM: Primary | ICD-10-CM

## 2018-07-17 DIAGNOSIS — F41.1 GENERALIZED ANXIETY DISORDER: ICD-10-CM

## 2018-07-17 DIAGNOSIS — F33.1 MODERATE RECURRENT MAJOR DEPRESSION (H): ICD-10-CM

## 2018-07-18 NOTE — TELEPHONE ENCOUNTER
"Requested Prescriptions   Pending Prescriptions Disp Refills     venlafaxine (EFFEXOR-XR) 150 MG 24 hr capsule [Pharmacy Med Name: VENLAFAXINE HCL ER 150MG CP24]  Last Written Prescription Date:  4/11/18  Last Fill Quantity: 90 capsule,  # refills: 0   Last Office Visit: 6/22/2018   Future Office Visit:      90 capsule 0     Sig: TAKE ONE CAPSULE BY MOUTH EVERY DAY    Serotonin-Norepinephrine Reuptake Inhibitors  Failed    7/17/2018  1:12 PM       Failed - PHQ-9 score of less than 5 in past 6 months    Please review last PHQ-9 score.   PHQ-9 SCORE 3/28/2017 9/28/2017 2/13/2018   Total Score - - -   Total Score MyChart 9 (Mild depression) - 13 (Moderate depression)   Total Score 9 17 13     JOSEFINA-7 SCORE 3/28/2017 9/28/2017 2/13/2018   Total Score - - -   Total Score - - 10 (moderate anxiety)   Total Score 5 13 10              Passed - Blood pressure under 140/90 in past 12 months    BP Readings from Last 3 Encounters:   06/28/18 112/79   06/22/18 116/76   04/03/18 132/81          Passed - Patient is age 18 or older       Passed - No active pregnancy on record       Passed - Normal serum creatinine on file in past 12 months    Recent Labs   Lab Test  09/08/17   1528   CR  0.72            Passed - No positive pregnancy test in past 12 months       Passed - Recent (6 mo) or future (30 days) visit within the authorizing provider's specialty    Patient had office visit in the last 6 months or has a visit in the next 30 days with authorizing provider or within the authorizing provider's specialty.  See \"Patient Info\" tab in inbasket, or \"Choose Columns\" in Meds & Orders section of the refill encounter.              "

## 2018-07-20 NOTE — TELEPHONE ENCOUNTER
Routing refill request to provider for review/approval because:  Labs out of range:  phq9 >4

## 2018-07-22 RX ORDER — VENLAFAXINE HYDROCHLORIDE 150 MG/1
CAPSULE, EXTENDED RELEASE ORAL
Qty: 90 CAPSULE | Refills: 0 | Status: SHIPPED | OUTPATIENT
Start: 2018-07-22 | End: 2018-10-24

## 2018-09-04 ENCOUNTER — OFFICE VISIT (OUTPATIENT)
Dept: ORTHOPEDICS | Facility: CLINIC | Age: 48
End: 2018-09-04
Payer: COMMERCIAL

## 2018-09-04 ENCOUNTER — RADIANT APPOINTMENT (OUTPATIENT)
Dept: GENERAL RADIOLOGY | Facility: CLINIC | Age: 48
End: 2018-09-04
Attending: FAMILY MEDICINE
Payer: COMMERCIAL

## 2018-09-04 VITALS — HEART RATE: 108 BPM | HEIGHT: 65 IN | SYSTOLIC BLOOD PRESSURE: 144 MMHG | DIASTOLIC BLOOD PRESSURE: 85 MMHG

## 2018-09-04 DIAGNOSIS — M79.671 RIGHT FOOT PAIN: ICD-10-CM

## 2018-09-04 DIAGNOSIS — M79.671 RIGHT FOOT PAIN: Primary | ICD-10-CM

## 2018-09-04 NOTE — PROGRESS NOTES
[facility]  Orthopedics  Man Zuniga, DO  2018     Name: Isabela Panchal  MRN: [id number]  Age: 48 year old  : 1970  Referring provider: Referred Self     Chief Complaint: Pain of the Right Foot    History of Present Illness:   Isabela Panchal is a 48 year old female, 11-12 years status-post unspecified right lateral foot surgery secondary to what sounds like an accessory ossicle, who presents today for evaluation of right foot pain beginning approximately two months ago without precipitating injury or trauma. Pain is localized laterally and is sharp in nature. She endorses associated swelling in the area of pain. Standing and walking seem to exacerbate her symptoms while rest offers improvement. She has been using ice and supportive footwear for pain management with little relief of her discomfort.      Review of Systems:   A 10-point review of systems was obtained and is negative except for as noted in the HPI.     Medications:      albuterol (PROAIR HFA/PROVENTIL HFA/VENTOLIN HFA) 108 (90 BASE) MCG/ACT Inhaler, Inhale 2 puffs into the lungs every 6 hours as needed for shortness of breath / dyspnea or wheezing, Disp: 2 Inhaler, Rfl: 11     calcitonin, salmon, (MIACALCIN) 200 UNIT/ACT nasal spray, Spray 1 spray into one nostril alternating nostrils daily Alternate nostril each day., Disp: 1 Bottle, Rfl: 1     Calcium Carbonate-Vitamin D (CALCIUM + D PO), Take  by mouth daily., Disp: , Rfl:      Capsaicin 0.1 % CREA, Externally apply 1 g topically 2 times daily as needed, Disp: 42.5 g, Rfl: 2     cyclobenzaprine (FLEXERIL) 10 MG tablet, Take 0.5-1 tablets (5-10 mg) by mouth 3 times daily as needed for muscle spasms, Disp: 30 tablet, Rfl: 1     diclofenac (VOLTAREN) 75 MG EC tablet, Take 1 tablet (75 mg) by mouth 2 times daily as needed for moderate pain, Disp: 30 tablet, Rfl: 1     Divalproex Sodium (DEPAKOTE PO), , Disp: , Rfl:      fenofibrate 48 MG tablet, Take 1 tablet (48 mg) by mouth  daily, Disp: 90 tablet, Rfl: 3     fluticasone (FLONASE) 50 MCG/ACT spray, Spray 2 sprays into both nostrils daily, Disp: 16 g, Rfl: 9     gabapentin (NEURONTIN) 300 MG capsule, Indications: Headache Take 300mg po BID x 7 days, then increase to 300mg po TID x 7 days, then increase to 600mg po TID., Disp: , Rfl:      lisinopril (PRINIVIL/ZESTRIL) 10 MG tablet, Take 1 tablet (10 mg) by mouth daily, Disp: 90 tablet, Rfl: 3     loratadine-pseudoePHEDrine (CLARITIN-D 24-HOUR)  MG per tablet, Take 1 tablet by mouth daily, Disp: 30 tablet, Rfl: 3     LORazepam (ATIVAN) 1 MG tablet, Take 2 tablets (2 mg) by mouth once as needed for anxiety (take prior to MRI for claustrophobia), Disp: 2 tablet, Rfl: 0     meloxicam (MOBIC) 15 MG tablet, Take 1 tablet (15 mg) by mouth daily, Disp: 30 tablet, Rfl: 0     methocarbamol (ROBAXIN) 500 MG tablet, Take 2 tablets (1,000 mg) by mouth 3 times daily as needed for muscle spasms, Disp: 30 tablet, Rfl: 1     MIRENA 20 MCG/24HR IU IUD, USE FOR UP TO 5 YEARS THEN REMOVE, Disp: , Rfl:      order for DME, Counter force forearm brace, Disp: 1 Device, Rfl: 0     tiZANidine (ZANAFLEX) 4 MG tablet, Take 1-2 tablets (4-8 mg) by mouth 3 times daily as needed, Disp: 60 tablet, Rfl: 1     tiZANidine (ZANAFLEX) 4 MG tablet, Take 1-2 tablets (4-8 mg) by mouth 3 times daily as needed, Disp: 60 tablet, Rfl: 1     traZODone (DESYREL) 100 MG tablet, Take 1 tablet (100 mg) by mouth At Bedtime Please profile., Disp: 90 tablet, Rfl: 3     venlafaxine (EFFEXOR-XR) 150 MG 24 hr capsule, TAKE ONE CAPSULE BY MOUTH EVERY DAY, Disp: 90 capsule, Rfl: 0     venlafaxine (EFFEXOR-XR) 150 MG 24 hr capsule, TAKE ONE CAPSULE BY MOUTH EVERY DAY (NEED TO BE SEEN IN CLINIC FOR FURTHER REFILLS), Disp: 90 capsule, Rfl: 1    Allergies:  Benzoin  Adhesive tape.   Allegra.   Cucumber extract.   Fexofenadine.   Ibuprofen.   Lexapro.     Additional medical/Social/Surgical histories reviewed in EPIC and updated as  "appropriate.      Physical Examination:  Blood pressure 144/85, pulse 108, height 1.651 m (5' 5\"), not currently breastfeeding.  General  - normal appearance, in no obvious distress  CV  - normal pulses at posterior tib and dorsalis pedis  Pulm  - normal respiratory pattern, non-labored  Musculoskeletal - Right foot  - stance: normal stance without excessive pronation, arches well maintained overall  - inspection: mild swelling at lateral foot near proximal metatarsals, swelling overlying the peroneal tendons at lateral ankle, normal bone and joint alignment  - palpation: tenderness at ATFL region, overlying peroneal tendons  - ROM: normal active and passive ROM of great and lesser toes, no pain with MT translation  - strength: 5/5 in all planes  Neuro  - no sensory or motor deficit, grossly normal coordination, normal muscle tone  Skin  - no ecchymosis, erythema, warmth, or induration, no obvious rash  Psych  - interactive, appropriate, normal mood and affect    Imaging:   Radiographs of the right foot - 3 views (09/04/2018)  Osteophyte at the base of the 5th metatarsal. Otherwise no significant osteoarthritis. Per my read.     I have independently reviewed the above imaging studies; the results were discussed with the patient.     Assessment:   48 year old, right handed female with right foot pain with insidious onset of lateral foot pain. Clinical exam consistent with peroneal tendonitis of right foot.  XR unremarkable.    Plan:   - One week of short CAM walking boot, then attempt wean if pain free  - Over-the-counter antiinflammatory for one week; naprosyn  - Activity modification.   - May begin light ankle exercises in one week.   - Follow up in two weeks for repeat evaluation.   - Consider MRI if pain persists despite immobilization    IMan DO, have reviewed the above note and agree with the scribe's notation as written.    Scribe Disclosure:   I, Anuj Szymanski, am serving as a scribe to document " services personally performed by Man Zuniga DO at this visit, based upon the provider's statements to me. All documentation has been reviewed by the aforementioned provider prior to being entered into the official medical record.     Portions of this medical record were completed by a scribe. UPON MY REVIEW AND AUTHENTICATION BY ELECTRONIC SIGNATURE, this confirms (a) I performed the applicable clinical services, and (b) the record is accurate.

## 2018-09-04 NOTE — PATIENT INSTRUCTIONS
PERONEAL TENDONITIS  What is a peroneal tendon injury?   A peroneal tendon injury is a problem with the tendons and muscles on the outer side of your lower leg and foot. Tendons are strong bands of tissue that attach muscle to bone. The peroneal tendons help keep your foot and ankle stable when you walk.   Tendons can be injured suddenly or they may be slowly damaged over time. You can have tiny or partial tears in your tendon. If you have a complete tear of your tendon, it is called a rupture. Other tendon injuries may be called a strain, tendinosis, or tendonitis.  What is the cause?   Peroneal injuries can be caused by:  Overuse of the tendon from a sport or work activity that causes your foot and ankle to roll inward, like when you run on sloped surfaces or run in shoes that are getting worn out on the outside of the heel.   A sudden activity that forces your foot upward toward your shin, like landing on your feet after a fall, or rolling your ankle on a rock while running.   What are the symptoms?   You may hear a pop or a snap when the injury happens. You may have pain and swelling on the outer side of your lower leg or ankle.   How is it diagnosed?   Your healthcare provider will examine you and ask about your symptoms, activities, and medical history. You may have X-rays or other scans.  How is it treated?   While you are recovering from your injury, you will need to change your sport or activity to one that will not make your condition worse. For example, you may need to swim instead of run.   Your healthcare provider may recommend stretching and strengthening exercises to help you heal.  Use an elastic bandage or an ankle brace as directed by your provider. You may need to use crutches until you can walk without pain.   The pain often gets better within a few weeks with self-care, but some injuries may take several months or longer to heal. It s important to follow all of your healthcare provider s  instructions.  How can I take care of myself?   To help relieve swelling and pain:  Put an ice pack, gel pack, or package of frozen vegetables wrapped in a cloth, on the area every 3 to 4 hours for up to 20 minutes at a time.   Do ice massage. To do this, first freeze water in a Styrofoam cup, then peel the top of the cup away to expose the ice. Hold the bottom of the cup and rub the ice over your tendon for 5 to 10 minutes. Do this several times a day while you have pain.   Keep your ankle up on a pillow when you sit or lie down.   Take pain medicine, such as acetaminophen, ibuprofen, or other medicine as directed by your provider. Nonsteroidal anti-inflammatory medicines (NSAIDs), such as ibuprofen, may cause stomach bleeding and other problems. These risks increase with age. Read the label and take as directed. Unless recommended by your healthcare provider, do not take for more than 10 days.  Moist heat may help relax your muscles and make it easier to move your leg. Put moist heat on the injured area for 10 to 15 minutes at a time before you do warm-up and stretching exercises. Moist heat includes heat patches or moist heating pads that you can purchase at most [employer], a wet washcloth or towel that has been heated in the dryer, or a hot shower. Don t use heat if you have swelling.   Follow your healthcare provider's instructions, including any exercises recommended by your provider. Ask your provider:  How and when you will hear your test results   How long it will take to recover   What activities you should avoid, including how much you can lift, and when you can return to your normal activities   How to take care of yourself at home   What symptoms or problems you should watch for and what to do if you have them  Make sure you know when you should come back for a checkup.  How can I help prevent a peroneal tendon injury?   Warm-up exercises and stretching before activities can help prevent injuries. For  example, do exercises that keep your ankles and leg muscles strong. If your leg or ankle hurts after exercise, putting ice on it may help keep it from getting injured.   Follow safety rules and use any protective equipment recommended for your work or sport. For example, wear high-top athletic shoes or a supportive ankle brace. When you run, choose level surfaces and avoid rocks or holes.  Developed by [email or web address].  Published by [email or web address].  Copyright  2014 [employer] and/or one of its subsidiaries. All rights reserved.                Peroneal Tendon Exercises  You may start these exercises when you can stand comfortably on your injured leg with your heel resting on the floor and your full weight evenly distributed on both legs.  Towel stretch: Sit on a hard surface with your injured leg stretched out in front of you. Loop a towel around your toes and the ball of your foot and pull the towel toward your body keeping your leg straight. Hold this position for 15 to 30 seconds and then relax. Repeat 3 times.  When you don't feel much of a stretch using the towel, you can start the following exercises.  Standing calf stretch: Stand facing a wall with your hands on the wall at about eye level. Keep your injured leg back with your heel on the floor. Keep the other leg forward with the knee bent. Turn your back foot slightly inward (as if you were pigeon-toed). Slowly lean into the wall until you feel a stretch in the back of your calf. Hold the stretch for 15 to 30 seconds. Return to the starting position. Repeat 3 times. Do this exercise several times each day.   Standing soleus stretch: Stand facing a wall with your hands on the wall at about chest height. Keep your injured leg back with your heel on the floor. Keep the other leg forward with the knee bent. Turn your back foot slightly inward (as if you were pigeon-toed). Bend your back knee slightly and gently lean into the wall until you feel a stretch in  the lower calf of your injured leg. Hold the stretch for 15 to 30 seconds. Return to the starting position. Repeat 3 times.   Achilles stretch: Stand with the ball of one foot on a stair. Reach for the step below with your heel until you feel a stretch in the arch of your foot. Hold this position for 15 to 30 seconds and then relax. Repeat 3 times.   Heel raise: Stand behind a chair or counter with both feet flat on the floor. Using the chair or counter as a support, rise up onto your toes and hold for 5 seconds. Then slowly lower yourself down without holding onto the support. (It's OK to keep holding onto the support if you need to.) When this exercise becomes less painful, try doing this exercise while you are standing on the injured leg only. Repeat 15 times. Do 2 sets of 15. Rest 30 seconds between sets.   Step-up: Stand with the foot of your injured leg on a support 3 to 5 inches (8 to 13 centimeters) high --like a small step or block of wood. Keep your other foot flat on the floor. Shift your weight onto the injured leg on the support. Straighten your injured leg as the other leg comes off the floor. Return to the starting position by bending your injured leg and slowly lowering your uninjured leg back to the floor. Do 2 sets of 15.   Resisted ankle eversion: Sit with both legs stretched out in front of you, with your feet about a shoulder's width apart. Tie a loop in one end of elastic tubing. Put the foot of your injured leg through the loop so that the tubing goes around the arch of that foot and wraps around the outside of the other foot. Hold onto the other end of the tubing with your hand to provide tension. Turn the foot of your injured leg up and out. Make sure you keep your other foot still so that it will allow the tubing to stretch as you move the foot of your injured leg. Return to the starting position. Do 2 sets of 15.   Balance and reach exercises: Stand next to a chair with your injured leg  farther from the chair. The chair will provide support if you need it. Stand on the foot of your injured leg and bend your knee slightly. Try to raise the arch of this foot while keeping your big toe on the floor. Keep your foot in this position.   With the hand that is farther away from the chair, reach forward in front of you by bending at the waist. Avoid bending your knee any more as you do this. Repeat this 15 times. To make the exercise more challenging, reach farther in front of you. Do 2 sets of 15.   While keeping your arch raised, reach the hand that is farther away from the chair across your body toward the chair. The farther you reach, the more challenging the exercise. Do 2 sets of 15.  If you have access to a wobble board, do the following exercises:  Wobble board exercises   Stand on a wobble board with your feet shoulder-width apart.   Rock the board forwards and backwards 30 times, then side to side 30 times. Hold on to a chair if you need support.   Rotate the wobble board around so that the edge of the board is in contact with the floor at all times. Do this 30 times in a clockwise and then a counterclockwise direction.   Balance on the wobble board for as long as you can without letting the edges touch the floor. Try to do this for 2 minutes without touching the floor.   Rotate the wobble board in clockwise and counterclockwise circles, but do not let the edge of the board touch the floor.   When you have mastered the wobble exercises standing on both legs, try repeating them while standing on just your injured leg. After you are able to do these exercises on one leg, try to do them with your eyes closed. Make sure you have something nearby to support you in case you lose your balance.  Developed by [employer].  Published by [employer].  Copyright  2014 [employer] and/or one of its subsidiaries. All rights reserved.

## 2018-09-04 NOTE — MR AVS SNAPSHOT
After Visit Summary   9/4/2018    Isabela Panchal    MRN: [id number]           Patient Information     Date Of Birth          1970        Visit Information        Provider Department      9/4/2018 2:20 PM Man Zuniga [facility]        Today's Diagnoses     Right foot pain    -  1      Care Instructions    PERONEAL TENDONITIS  What is a peroneal tendon injury?   A peroneal tendon injury is a problem with the tendons and muscles on the outer side of your lower leg and foot. Tendons are strong bands of tissue that attach muscle to bone. The peroneal tendons help keep your foot and ankle stable when you walk.   Tendons can be injured suddenly or they may be slowly damaged over time. You can have tiny or partial tears in your tendon. If you have a complete tear of your tendon, it is called a rupture. Other tendon injuries may be called a strain, tendinosis, or tendonitis.  What is the cause?   Peroneal injuries can be caused by:  Overuse of the tendon from a sport or work activity that causes your foot and ankle to roll inward, like when you run on sloped surfaces or run in shoes that are getting worn out on the outside of the heel.   A sudden activity that forces your foot upward toward your shin, like landing on your feet after a fall, or rolling your ankle on a rock while running.   What are the symptoms?   You may hear a pop or a snap when the injury happens. You may have pain and swelling on the outer side of your lower leg or ankle.   How is it diagnosed?   Your healthcare provider will examine you and ask about your symptoms, activities, and medical history. You may have X-rays or other scans.  How is it treated?   While you are recovering from your injury, you will need to change your sport or activity to one that will not make your condition worse. For example, you may need to swim instead of run.   Your healthcare provider may recommend  stretching and strengthening exercises to help you heal.  Use an elastic bandage or an ankle brace as directed by your provider. You may need to use crutches until you can walk without pain.   The pain often gets better within a few weeks with self-care, but some injuries may take several months or longer to heal. It s important to follow all of your healthcare provider s instructions.  How can I take care of myself?   To help relieve swelling and pain:  Put an ice pack, gel pack, or package of frozen vegetables wrapped in a cloth, on the area every 3 to 4 hours for up to 20 minutes at a time.   Do ice massage. To do this, first freeze water in a Styrofoam cup, then peel the top of the cup away to expose the ice. Hold the bottom of the cup and rub the ice over your tendon for 5 to 10 minutes. Do this several times a day while you have pain.   Keep your ankle up on a pillow when you sit or lie down.   Take pain medicine, such as acetaminophen, ibuprofen, or other medicine as directed by your provider. Nonsteroidal anti-inflammatory medicines (NSAIDs), such as ibuprofen, may cause stomach bleeding and other problems. These risks increase with age. Read the label and take as directed. Unless recommended by your healthcare provider, do not take for more than 10 days.  Moist heat may help relax your muscles and make it easier to move your leg. Put moist heat on the injured area for 10 to 15 minutes at a time before you do warm-up and stretching exercises. Moist heat includes heat patches or moist heating pads that you can purchase at most drugstores, a wet washcloth or towel that has been heated in the dryer, or a hot shower. Don t use heat if you have swelling.   Follow your healthcare provider's instructions, including any exercises recommended by your provider. Ask your provider:  How and when you will hear your test results   How long it will take to recover   What activities you should avoid, including how much you  can lift, and when you can return to your normal activities   How to take care of yourself at home   What symptoms or problems you should watch for and what to do if you have them  Make sure you know when you should come back for a checkup.  How can I help prevent a peroneal tendon injury?   Warm-up exercises and stretching before activities can help prevent injuries. For example, do exercises that keep your ankles and leg muscles strong. If your leg or ankle hurts after exercise, putting ice on it may help keep it from getting injured.   Follow safety rules and use any protective equipment recommended for your work or sport. For example, wear high-top athletic shoes or a supportive ankle brace. When you run, choose level surfaces and avoid rocks or holes.  Developed by [employer].  Published by [employer].  Copyright  2014 [employer] and/or one of its subsidiaries. All rights reserved.                Peroneal Tendon Exercises  You may start these exercises when you can stand comfortably on your injured leg with your heel resting on the floor and your full weight evenly distributed on both legs.  Towel stretch: Sit on a hard surface with your injured leg stretched out in front of you. Loop a towel around your toes and the ball of your foot and pull the towel toward your body keeping your leg straight. Hold this position for 15 to 30 seconds and then relax. Repeat 3 times.  When you don't feel much of a stretch using the towel, you can start the following exercises.  Standing calf stretch: Stand facing a wall with your hands on the wall at about eye level. Keep your injured leg back with your heel on the floor. Keep the other leg forward with the knee bent. Turn your back foot slightly inward (as if you were pigeon-toed). Slowly lean into the wall until you feel a stretch in the back of your calf. Hold the stretch for 15 to 30 seconds. Return to the starting position. Repeat 3 times. Do this exercise  several times each day.   Standing soleus stretch: Stand facing a wall with your hands on the wall at about chest height. Keep your injured leg back with your heel on the floor. Keep the other leg forward with the knee bent. Turn your back foot slightly inward (as if you were pigeon-toed). Bend your back knee slightly and gently lean into the wall until you feel a stretch in the lower calf of your injured leg. Hold the stretch for 15 to 30 seconds. Return to the starting position. Repeat 3 times.   Achilles stretch: Stand with the ball of one foot on a stair. Reach for the step below with your heel until you feel a stretch in the arch of your foot. Hold this position for 15 to 30 seconds and then relax. Repeat 3 times.   Heel raise: Stand behind a chair or counter with both feet flat on the floor. Using the chair or counter as a support, rise up onto your toes and hold for 5 seconds. Then slowly lower yourself down without holding onto the support. (It's OK to keep holding onto the support if you need to.) When this exercise becomes less painful, try doing this exercise while you are standing on the injured leg only. Repeat 15 times. Do 2 sets of 15. Rest 30 seconds between sets.   Step-up: Stand with the foot of your injured leg on a support 3 to 5 inches (8 to 13 centimeters) high --like a small step or block of wood. Keep your other foot flat on the floor. Shift your weight onto the injured leg on the support. Straighten your injured leg as the other leg comes off the floor. Return to the starting position by bending your injured leg and slowly lowering your uninjured leg back to the floor. Do 2 sets of 15.   Resisted ankle eversion: Sit with both legs stretched out in front of you, with your feet about a shoulder's width apart. Tie a loop in one end of elastic tubing. Put the foot of your injured leg through the loop so that the tubing goes around the arch of that foot and wraps around the outside of the other  foot. Hold onto the other end of the tubing with your hand to provide tension. Turn the foot of your injured leg up and out. Make sure you keep your other foot still so that it will allow the tubing to stretch as you move the foot of your injured leg. Return to the starting position. Do 2 sets of 15.   Balance and reach exercises: Stand next to a chair with your injured leg farther from the chair. The chair will provide support if you need it. Stand on the foot of your injured leg and bend your knee slightly. Try to raise the arch of this foot while keeping your big toe on the floor. Keep your foot in this position.   With the hand that is farther away from the chair, reach forward in front of you by bending at the waist. Avoid bending your knee any more as you do this. Repeat this 15 times. To make the exercise more challenging, reach farther in front of you. Do 2 sets of 15.   While keeping your arch raised, reach the hand that is farther away from the chair across your body toward the chair. The farther you reach, the more challenging the exercise. Do 2 sets of 15.  If you have access to a wobble board, do the following exercises:  Wobble board exercises   Stand on a wobble board with your feet shoulder-width apart.   Rock the board forwards and backwards 30 times, then side to side 30 times. Hold on to a chair if you need support.   Rotate the wobble board around so that the edge of the board is in contact with the floor at all times. Do this 30 times in a clockwise and then a counterclockwise direction.   Balance on the wobble board for as long as you can without letting the edges touch the floor. Try to do this for 2 minutes without touching the floor.   Rotate the wobble board in clockwise and counterclockwise circles, but do not let the edge of the board touch the floor.   When you have mastered the wobble exercises standing on both legs, try repeating them while standing on just your injured leg. After you  "are able to do these exercises on one leg, try to do them with your eyes closed. Make sure you have something nearby to support you in case you lose your balance.  Developed by [employer].  Published by [employer].  Copyright  2014 [employer] and/or one of its subsidiaries. All rights reserved.                    Follow-ups after your visit        Who to contact     Please call your clinic at [phone] to:    Ask questions about your health    Make or cancel appointments    Discuss your medicines    Learn about your test results    Speak to your doctor            Additional Information About Your Visit        Care EveryWhere ID     This is your Care EveryWhere ID. This could be used by other organizations to access your [place] medical records  [id number]        Your Vitals Were     Pulse Height                108 1.651 m (5' 5\")           Blood Pressure from Last 3 Encounters:   09/04/18 144/85   06/28/18 112/79   06/22/18 116/76    Weight from Last 3 Encounters:   06/22/18 88 kg (194 lb)   04/03/18 81.6 kg (180 lb)   02/13/18 83.9 kg (185 lb)               Primary Care Provider Office Phone # Fax #    Felisha Briggs [phone] [address]  [facility] [address]        Equal Access to Services     OSIEL GILLESPIE AH: Hadii mihir ku hadasho Soomaali, waaxda luqadaha, qaybta kaalmada adeegyada, waxay terrence albrightn kristina guerrier. So [facility] [phone].    ATENCIÓN: Si habla español, tiene a snyder disposición servicios gratuitos de asistencia lingüística. Llame al [phone].    We comply with applicable federal civil rights laws and [place] laws. We do not discriminate on the basis of race, color, national origin, age, disability, sex, sexual orientation, or gender identity.            Thank you!     Thank you for choosing [facility]  for your care. Our goal is always to provide you with excellent care. Hearing back from our " patients is one way we can continue to improve our services. Please take a few minutes to complete the written survey that you may receive in the mail after your visit with us. Thank you!             Your Updated Medication List - Protect others around you: Learn how to safely use, store and throw away your medicines at www.disposemymeds.org.          This list is accurate as of 9/4/18  3:24 PM.  Always use your most recent med list.                   Brand Name Dispense Instructions for use Diagnosis    albuterol 108 (90 Base) MCG/ACT inhaler    PROAIR HFA/PROVENTIL HFA/VENTOLIN HFA    2 Inhaler    Inhale 2 puffs into the lungs every 6 hours as needed for shortness of breath / dyspnea or wheezing    Mild intermittent asthma without complication       calcitonin (salmon) 200 UNIT/ACT nasal spray    MIACALCIN    1 Bottle    Spray 1 spray into one nostril alternating nostrils daily Alternate nostril each day.    Chronic midline thoracic back pain       CALCIUM + D PO      Take  by mouth daily.        Capsaicin 0.1 % cream     42.5 g    Externally apply 1 g topically 2 times daily as needed    Right anterior knee pain       cyclobenzaprine 10 MG tablet    FLEXERIL    30 tablet    Take 0.5-1 tablets (5-10 mg) by mouth 3 times daily as needed for muscle spasms    DDD (degenerative disc disease), lumbar       DEPAKOTE PO           diclofenac 75 MG EC tablet    VOLTAREN    30 tablet    Take 1 tablet (75 mg) by mouth 2 times daily as needed for moderate pain    Thoracic degenerative disc disease       fenofibrate 48 MG tablet     90 tablet    Take 1 tablet (48 mg) by mouth daily    High blood triglycerides       fluticasone 50 MCG/ACT spray    FLONASE    16 g    Spray 2 sprays into both nostrils daily    Other seasonal allergic rhinitis       gabapentin 300 MG capsule    NEURONTIN     Indications: Headache Take 300mg po BID x 7 days, then increase to 300mg po TID x 7 days, then increase to 600mg po TID.        lisinopril 10  MG tablet    PRINIVIL/ZESTRIL    90 tablet    Take 1 tablet (10 mg) by mouth daily    Essential hypertension with goal blood pressure less than 140/90       loratadine-pseudoePHEDrine  MG per 24 hr tablet    CLARITIN-D 24-hour    30 tablet    Take 1 tablet by mouth daily    Environmental allergies       LORazepam 1 MG tablet    ATIVAN    2 tablet    Take 2 tablets (2 mg) by mouth once as needed for anxiety (take prior to MRI for claustrophobia)    Claustrophobia       meloxicam 15 MG tablet    MOBIC    30 tablet    Take 1 tablet (15 mg) by mouth daily    Patellofemoral pain syndrome of left knee       methocarbamol 500 MG tablet    ROBAXIN    30 tablet    Take 2 tablets (1,000 mg) by mouth 3 times daily as needed for muscle spasms    Thoracic degenerative disc disease       MIRENA (52 MG) 20 MCG/24HR IUD   Generic drug:  levonorgestrel      USE FOR UP TO 5 YEARS THEN REMOVE        order for DME     1 Device    Counter force forearm brace    Lateral epicondylitis of left elbow       * tiZANidine 4 MG tablet    ZANAFLEX    60 tablet    Take 1-2 tablets (4-8 mg) by mouth 3 times daily as needed    Degeneration of lumbar or lumbosacral intervertebral disc, Thoracic degenerative disc disease       * tiZANidine 4 MG tablet    ZANAFLEX    60 tablet    Take 1-2 tablets (4-8 mg) by mouth 3 times daily as needed    Lumbar paraspinal muscle spasm       traZODone 100 MG tablet    DESYREL    90 tablet    Take 1 tablet (100 mg) by mouth At Bedtime Please profile.    Primary insomnia       * venlafaxine 150 MG 24 hr capsule    EFFEXOR-XR    90 capsule    TAKE ONE CAPSULE BY MOUTH EVERY DAY (NEED TO BE SEEN IN CLINIC FOR FURTHER REFILLS)    Generalized anxiety disorder, Moderate recurrent major depression (H)       * venlafaxine 150 MG 24 hr capsule    EFFEXOR-XR    90 capsule    TAKE ONE CAPSULE BY MOUTH EVERY DAY    Generalized anxiety disorder, Moderate recurrent major depression (H)       * Notice:  This list has 4  medication(s) that are the same as other medications prescribed for you. Read the directions carefully, and ask your doctor or other care provider to review them with you.

## 2018-09-04 NOTE — LETTER
2018     RE: Isabela Panchal  [address]  [facility] [address]     Dear Colleague,    Thank you for referring your patient, Isabela Panchal, to the [facility] at [facility]. Please see a copy of my visit note below.          SPORTS & ORTHOPEDIC WALK-IN VISIT 2018    Primary Care Physician: Dr. Briggs    11-12 years ago she had a repair to lateral part of foot. Spliced tendons together. Now hurts to walk. Used to be intermittent and now it's constant.     Reason for visit:     What part of your body is injured / painful?  right foot    What caused the injury /pain? Unsure    How long ago did your injury occur or pain begin? problem is longstanding, this episode began 2month(s)ago    What are your most bothersome symptoms? Pain, swelling    How would you characterize your symptom?  pulling    What makes your symptoms better? Ice and Other: footwear    What makes your symptoms worse? Standing and Walking    Have you been previously seen for this problem? No    Medical History:    Any recent changes to your medical history? No    Any new medication prescribed since last visit? No    Have you had surgery on this body part before? Yes 11-12 years ago.     Social History:    Occupation:     Handedness: Right    Exercise: 3-4 days/week    Review of Systems:    Do you have fever, chills, weight loss? No    Do you have any vision problems? No    Do you have any chest pain or edema? No    Do you have any shortness of breath or wheezing?  No    Do you have stomach problems? No    Do you have any numbness or focal weakness? Yes, hand, seen by PCP    Do you have diabetes? No    Do you have problems with bleeding or clotting? No    Do you have an rashes or other skin lesions? No           [facility]  Orthopedics  Man Zuniga,   2018     Name: Isabela Panchal  MRN: [id number]  Age: 48 year old  :  1970  Referring provider: Referred Self     Chief Complaint: Pain of the Right Foot    History of Present Illness:   Isabela Panchal is a 48 year old female, 11-12 years status-post unspecified right lateral foot surgery secondary to what sounds like an accessory ossicle, who presents today for evaluation of right foot pain beginning approximately two months ago without precipitating injury or trauma. Pain is localized laterally and is sharp in nature. She endorses associated swelling in the area of pain. Standing and walking seem to exacerbate her symptoms while rest offers improvement. She has been using ice and supportive footwear for pain management with little relief of her discomfort.      Review of Systems:   A 10-point review of systems was obtained and is negative except for as noted in the HPI.     Medications:      albuterol (PROAIR HFA/PROVENTIL HFA/VENTOLIN HFA) 108 (90 BASE) MCG/ACT Inhaler, Inhale 2 puffs into the lungs every 6 hours as needed for shortness of breath / dyspnea or wheezing, Disp: 2 Inhaler, Rfl: 11     calcitonin, salmon, (MIACALCIN) 200 UNIT/ACT nasal spray, Spray 1 spray into one nostril alternating nostrils daily Alternate nostril each day., Disp: 1 Bottle, Rfl: 1     Calcium Carbonate-Vitamin D (CALCIUM + D PO), Take  by mouth daily., Disp: , Rfl:      Capsaicin 0.1 % CREA, Externally apply 1 g topically 2 times daily as needed, Disp: 42.5 g, Rfl: 2     cyclobenzaprine (FLEXERIL) 10 MG tablet, Take 0.5-1 tablets (5-10 mg) by mouth 3 times daily as needed for muscle spasms, Disp: 30 tablet, Rfl: 1     diclofenac (VOLTAREN) 75 MG EC tablet, Take 1 tablet (75 mg) by mouth 2 times daily as needed for moderate pain, Disp: 30 tablet, Rfl: 1     Divalproex Sodium (DEPAKOTE PO), , Disp: , Rfl:      fenofibrate 48 MG tablet, Take 1 tablet (48 mg) by mouth daily, Disp: 90 tablet, Rfl: 3     fluticasone (FLONASE) 50 MCG/ACT spray, Spray 2 sprays into both nostrils daily, Disp: 16 g,  "Rfl: 9     gabapentin (NEURONTIN) 300 MG capsule, Indications: Headache Take 300mg po BID x 7 days, then increase to 300mg po TID x 7 days, then increase to 600mg po TID., Disp: , Rfl:      lisinopril (PRINIVIL/ZESTRIL) 10 MG tablet, Take 1 tablet (10 mg) by mouth daily, Disp: 90 tablet, Rfl: 3     loratadine-pseudoePHEDrine (CLARITIN-D 24-HOUR)  MG per tablet, Take 1 tablet by mouth daily, Disp: 30 tablet, Rfl: 3     LORazepam (ATIVAN) 1 MG tablet, Take 2 tablets (2 mg) by mouth once as needed for anxiety (take prior to MRI for claustrophobia), Disp: 2 tablet, Rfl: 0     meloxicam (MOBIC) 15 MG tablet, Take 1 tablet (15 mg) by mouth daily, Disp: 30 tablet, Rfl: 0     methocarbamol (ROBAXIN) 500 MG tablet, Take 2 tablets (1,000 mg) by mouth 3 times daily as needed for muscle spasms, Disp: 30 tablet, Rfl: 1     MIRENA 20 MCG/24HR IU IUD, USE FOR UP TO 5 YEARS THEN REMOVE, Disp: , Rfl:      order for DME, Counter force forearm brace, Disp: 1 Device, Rfl: 0     tiZANidine (ZANAFLEX) 4 MG tablet, Take 1-2 tablets (4-8 mg) by mouth 3 times daily as needed, Disp: 60 tablet, Rfl: 1     tiZANidine (ZANAFLEX) 4 MG tablet, Take 1-2 tablets (4-8 mg) by mouth 3 times daily as needed, Disp: 60 tablet, Rfl: 1     traZODone (DESYREL) 100 MG tablet, Take 1 tablet (100 mg) by mouth At Bedtime Please profile., Disp: 90 tablet, Rfl: 3     venlafaxine (EFFEXOR-XR) 150 MG 24 hr capsule, TAKE ONE CAPSULE BY MOUTH EVERY DAY, Disp: 90 capsule, Rfl: 0     venlafaxine (EFFEXOR-XR) 150 MG 24 hr capsule, TAKE ONE CAPSULE BY MOUTH EVERY DAY (NEED TO BE SEEN IN CLINIC FOR FURTHER REFILLS), Disp: 90 capsule, Rfl: 1    Allergies:  Benzoin  Adhesive tape.   Allegra.   Cucumber extract.   Fexofenadine.   Ibuprofen.   Lexapro.     Additional medical/Social/Surgical histories reviewed in EPIC and updated as appropriate.      Physical Examination:  Blood pressure 144/85, pulse 108, height 1.651 m (5' 5\"), not currently breastfeeding.  General  - " normal appearance, in no obvious distress  CV  - normal pulses at posterior tib and dorsalis pedis  Pulm  - normal respiratory pattern, non-labored  Musculoskeletal - Right foot  - stance: normal stance without excessive pronation, arches well maintained overall  - inspection: mild swelling at lateral foot near proximal metatarsals, swelling overlying the peroneal tendons at lateral ankle, normal bone and joint alignment  - palpation: tenderness at ATFL region, overlying peroneal tendons  - ROM: normal active and passive ROM of great and lesser toes, no pain with MT translation  - strength: 5/5 in all planes  Neuro  - no sensory or motor deficit, grossly normal coordination, normal muscle tone  Skin  - no ecchymosis, erythema, warmth, or induration, no obvious rash  Psych  - interactive, appropriate, normal mood and affect    Imaging:   Radiographs of the right foot - 3 views (09/04/2018)  Osteophyte at the base of the 5th metatarsal. Otherwise no significant osteoarthritis. Per my read.     I have independently reviewed the above imaging studies; the results were discussed with the patient.     Assessment:   48 year old, right handed female with right foot pain with insidious onset of lateral foot pain. Clinical exam consistent with peroneal tendonitis of right foot.  XR unremarkable.    Plan:   - One week of short CAM walking boot, then attempt wean if pain free  - Over-the-counter antiinflammatory for one week; naprosyn  - Activity modification.   - May begin light ankle exercises in one week.   - Follow up in two weeks for repeat evaluation.   - Consider MRI if pain persists despite immobilization    Man WARREN DO, have reviewed the above note and agree with the scribe's notation as written.    Scribe Disclosure:   IAnuj, am serving as a scribe to document services personally performed by Man Zuniga DO at this visit, based upon the provider's statements to me. All documentation has been  reviewed by the aforementioned provider prior to being entered into the official medical record.     Portions of this medical record were completed by a scribe. UPON MY REVIEW AND AUTHENTICATION BY ELECTRONIC SIGNATURE, this confirms (a) I performed the applicable clinical services, and (b) the record is accurate.    Again, thank you for allowing me to participate in the care of your patient.      Sincerely,    Man Zuniga, DO

## 2018-09-07 ENCOUNTER — OFFICE VISIT (OUTPATIENT)
Dept: ORTHOPEDICS | Facility: CLINIC | Age: 48
End: 2018-09-07
Payer: COMMERCIAL

## 2018-09-07 VITALS — WEIGHT: 194 LBS | HEIGHT: 65 IN | BODY MASS INDEX: 32.32 KG/M2

## 2018-09-07 DIAGNOSIS — M51.26 LUMBAR HERNIATED DISC: Primary | ICD-10-CM

## 2018-09-07 ASSESSMENT — ENCOUNTER SYMPTOMS
LOSS OF CONSCIOUSNESS: 0
STIFFNESS: 0
DISTURBANCES IN COORDINATION: 0
FATIGUE: 1
POOR WOUND HEALING: 0
ARTHRALGIAS: 0
WEAKNESS: 1
BACK PAIN: 1
DIFFICULTY URINATING: 0
FLANK PAIN: 0
HALLUCINATIONS: 0
PALPITATIONS: 0
DEPRESSION: 1
SEIZURES: 0
SLEEP DISTURBANCES DUE TO BREATHING: 0
JOINT SWELLING: 0
MEMORY LOSS: 1
PARALYSIS: 0
CHILLS: 0
HYPOTENSION: 0
HYPERTENSION: 1
LIGHT-HEADEDNESS: 1
TREMORS: 0
HEMATURIA: 0
EYE IRRITATION: 0
DECREASED APPETITE: 0
EYE REDNESS: 0
MUSCLE WEAKNESS: 0
POLYDIPSIA: 0
POLYPHAGIA: 0
DOUBLE VISION: 0
SYNCOPE: 1
ORTHOPNEA: 0
INCREASED ENERGY: 1
EXERCISE INTOLERANCE: 0
NERVOUS/ANXIOUS: 0
DECREASED CONCENTRATION: 1
SKIN CHANGES: 0
EYE PAIN: 0
LEG PAIN: 0
NUMBNESS: 1
EYE WATERING: 0
NECK PAIN: 1
INSOMNIA: 0
NAIL CHANGES: 0
MUSCLE CRAMPS: 0
TINGLING: 1
HEADACHES: 1
DYSURIA: 0
PANIC: 0
WEIGHT LOSS: 0
WEIGHT GAIN: 0
NIGHT SWEATS: 0
FEVER: 0
SPEECH CHANGE: 1
DIZZINESS: 1
ALTERED TEMPERATURE REGULATION: 0
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
## 2018-09-07 NOTE — MR AVS SNAPSHOT
After Visit Summary   9/7/2018    Isabela Panchal    MRN: [id number]           Patient Information     Date Of Birth          1970        Visit Information        Provider Department      9/7/2018 2:00 PM Man Glibert MD [facility]        Today's Diagnoses     Lumbar herniated disc    -  1       Follow-ups after your visit        Your next 10 appointments already scheduled     Sep 18, 2018 11:00 AM CDT   Return Walk In Ortho with Man Zuniga DO   [facility] ([facility] [place])    [address]  4th Floor  [facility] [address] [phone]            Oct 05, 2018 11:00 AM CDT   XR LUMBAR EPIDURAL INJECTION with UCXR3,  IMAGING NURSE, [facility] NEURO RAD   [facility] [place] [facility] ([facility])    [address] [place] [address]  1st Floor  [facility] [id number] [phone]           How do I prepare for my exam? (Food and drink instructions) No Food and Drink Restrictions.  How do I prepare for my exam? (Other instructions) * If you take Coumadin (or any other blood thinners) you may need to stop taking them up to 5 days prior to the exam. Talk to your doctor before stopping. * If you take Plavix, Ticlid, Pletal or Persantine, please ask your doctor if you should stop these medicines. You may need extra tests on the morning of your scan. * If you take Xarelto (Rivaroxaban), you may need to stop taking it 24 hours before treatment. Talk to your doctor before stopping this medicine.  What should I wear: Wear comfortable clothes.  How long does the exam take: Injections take about 30 to 45 minutes. Most people spend up to 2 hours in the clinic or hospital.  What should I bring: Please bring any scans or X-rays taken at other hospitals, if similar tests were done. Also bring a list of your medicines, including vitamins, minerals and over-the-counter drugs. It is safest to leave  "personal items at home. Do I need a :  Plan to have someone drive you home afterward.  What do I need to tell my doctor: Tell your doctor in advance: * If you are allergic to X-ray dye (contrast fluid). * If you may be pregnant.  What should I do after the exam: You may have slight cramping during this exam. The cramps should go away afterward. You may have some spotting after the exam.  What is this test: A spinal shot is done in or near the spine. You may receive steroid medicine (to reduce swelling) or contrast fluid (dye that makes the area show up more clearly on X-rays). A nerve root shot is a shot into the nerve near the spine. It may reduce inflammation near the nerve root or spinal cord and reduce pain in the arm or leg.  Who should I call with questions: If you have any questions, please call the Imaging Department where you will have your exam. Directions, parking instructions, and other information is available on our website, [email or web address] [facility].            Oct 16, 2018  3:00 PM CDT   (Arrive by 2:45 PM)   Return Visit with Man Gilbert MD   [facility] ([facility] [place])    [address] [place] [address] [phone]              Future tests that were ordered for you today     Open Future Orders        Priority Expected Expires Ordered    X-ray Lumbar epidural injection Routine 9/7/2018 9/7/2019 9/7/2018            Who to contact     Please call your clinic at [phone] to:    Ask questions about your health    Make or cancel appointments    Discuss your medicines    Learn about your test results    Speak to your doctor            Additional Information About Your Visit        Care EveryWhere ID     This is your Care EveryWhere ID. This could be used by other organizations to access your [facility] medical records  [id number]        Your Vitals Were     Height BMI (Body Mass Index)                1.651 m (5' 5\") 32.28 " kg/m2           Blood Pressure from Last 3 Encounters:   09/04/18 144/85   06/28/18 112/79   06/22/18 116/76    Weight from Last 3 Encounters:   09/07/18 88 kg (194 lb)   06/22/18 88 kg (194 lb)   04/03/18 81.6 kg (180 lb)               Primary Care Provider Office Phone # Fax #    Felisha Imelda Briggs -[phone] [address]  [facility] [address]        Equal Access to Services     OSIEL GILLESPIE : Hadii aad ku hadasho Soomaali, waaxda luqadaha, qaybta kaalmada adeegyada, waxay idiin hayaan adevalerie springer . So [facility] [phone].    ATENCIÓN: Si habla español, tiene a snyder disposición servicios gratuitos de asistencia lingüística. [facility] [phone].    We comply with applicable federal civil rights laws and [place] laws. We do not discriminate on the basis of race, color, national origin, age, disability, sex, sexual orientation, or gender identity.            Thank you!     Thank you for choosing [facility]  for your care. Our goal is always to provide you with excellent care. Hearing back from our patients is one way we can continue to improve our services. Please take a few minutes to complete the written survey that you may receive in the mail after your visit with us. Thank you!             Your Updated Medication List - Protect others around you: Learn how to safely use, store and throw away your medicines at www.disposemymeds.org.          This list is accurate as of 9/7/18  3:36 PM.  Always use your most recent med list.                   Brand Name Dispense Instructions for use Diagnosis    albuterol 108 (90 Base) MCG/ACT inhaler    PROAIR HFA/PROVENTIL HFA/VENTOLIN HFA    2 Inhaler    Inhale 2 puffs into the lungs every 6 hours as needed for shortness of breath / dyspnea or wheezing    Mild intermittent asthma without complication       calcitonin (salmon) 200 UNIT/ACT nasal spray    MIACALCIN    1 Bottle    Spray 1 spray into one nostril alternating  nostrils daily Alternate nostril each day.    Chronic midline thoracic back pain       CALCIUM + D PO      Take  by mouth daily.        Capsaicin 0.1 % cream     42.5 g    Externally apply 1 g topically 2 times daily as needed    Right anterior knee pain       cyclobenzaprine 10 MG tablet    FLEXERIL    30 tablet    Take 0.5-1 tablets (5-10 mg) by mouth 3 times daily as needed for muscle spasms    DDD (degenerative disc disease), lumbar       DEPAKOTE PO           diclofenac 75 MG EC tablet    VOLTAREN    30 tablet    Take 1 tablet (75 mg) by mouth 2 times daily as needed for moderate pain    Thoracic degenerative disc disease       fenofibrate 48 MG tablet     90 tablet    Take 1 tablet (48 mg) by mouth daily    High blood triglycerides       fluticasone 50 MCG/ACT spray    FLONASE    16 g    Spray 2 sprays into both nostrils daily    Other seasonal allergic rhinitis       gabapentin 300 MG capsule    NEURONTIN     Indications: Headache Take 300mg po BID x 7 days, then increase to 300mg po TID x 7 days, then increase to 600mg po TID.        lisinopril 10 MG tablet    PRINIVIL/ZESTRIL    90 tablet    Take 1 tablet (10 mg) by mouth daily    Essential hypertension with goal blood pressure less than 140/90       loratadine-pseudoePHEDrine  MG per 24 hr tablet    CLARITIN-D 24-hour    30 tablet    Take 1 tablet by mouth daily    Environmental allergies       LORazepam 1 MG tablet    ATIVAN    2 tablet    Take 2 tablets (2 mg) by mouth once as needed for anxiety (take prior to MRI for claustrophobia)    Claustrophobia       meloxicam 15 MG tablet    MOBIC    30 tablet    Take 1 tablet (15 mg) by mouth daily    Patellofemoral pain syndrome of left knee       methocarbamol 500 MG tablet    ROBAXIN    30 tablet    Take 2 tablets (1,000 mg) by mouth 3 times daily as needed for muscle spasms    Thoracic degenerative disc disease       MIRENA (52 MG) 20 MCG/24HR IUD   Generic drug:  levonorgestrel      USE FOR UP TO 5  YEARS THEN REMOVE        order for DME     1 Device    Counter force forearm brace    Lateral epicondylitis of left elbow       * tiZANidine 4 MG tablet    ZANAFLEX    60 tablet    Take 1-2 tablets (4-8 mg) by mouth 3 times daily as needed    Degeneration of lumbar or lumbosacral intervertebral disc, Thoracic degenerative disc disease       * tiZANidine 4 MG tablet    ZANAFLEX    60 tablet    Take 1-2 tablets (4-8 mg) by mouth 3 times daily as needed    Lumbar paraspinal muscle spasm       traZODone 100 MG tablet    DESYREL    90 tablet    Take 1 tablet (100 mg) by mouth At Bedtime Please profile.    Primary insomnia       * venlafaxine 150 MG 24 hr capsule    EFFEXOR-XR    90 capsule    TAKE ONE CAPSULE BY MOUTH EVERY DAY (NEED TO BE SEEN IN CLINIC FOR FURTHER REFILLS)    Generalized anxiety disorder, Moderate recurrent major depression (H)       * venlafaxine 150 MG 24 hr capsule    EFFEXOR-XR    90 capsule    TAKE ONE CAPSULE BY MOUTH EVERY DAY    Generalized anxiety disorder, Moderate recurrent major depression (H)       * Notice:  This list has 4 medication(s) that are the same as other medications prescribed for you. Read the directions carefully, and ask your doctor or other care provider to review them with you.

## 2018-09-21 ENCOUNTER — RADIANT APPOINTMENT (OUTPATIENT)
Dept: GENERAL RADIOLOGY | Facility: CLINIC | Age: 48
End: 2018-09-21
Attending: PREVENTIVE MEDICINE
Payer: COMMERCIAL

## 2018-09-21 VITALS
HEART RATE: 97 BPM | DIASTOLIC BLOOD PRESSURE: 84 MMHG | OXYGEN SATURATION: 98 % | RESPIRATION RATE: 20 BRPM | SYSTOLIC BLOOD PRESSURE: 137 MMHG

## 2018-09-21 DIAGNOSIS — M51.26 LUMBAR HERNIATED DISC: ICD-10-CM

## 2018-09-21 RX ORDER — LIDOCAINE HYDROCHLORIDE 10 MG/ML
30 INJECTION, SOLUTION EPIDURAL; INFILTRATION; INTRACAUDAL; PERINEURAL ONCE
Status: COMPLETED | OUTPATIENT
Start: 2018-09-21 | End: 2018-09-21

## 2018-09-21 RX ORDER — BUPIVACAINE HYDROCHLORIDE 5 MG/ML
10 INJECTION, SOLUTION EPIDURAL; INTRACAUDAL ONCE
Status: COMPLETED | OUTPATIENT
Start: 2018-09-21 | End: 2018-09-21

## 2018-09-21 RX ORDER — IOPAMIDOL 408 MG/ML
20 INJECTION, SOLUTION INTRATHECAL ONCE
Status: COMPLETED | OUTPATIENT
Start: 2018-09-21 | End: 2018-09-21

## 2018-09-21 RX ORDER — METHYLPREDNISOLONE ACETATE 80 MG/ML
80 INJECTION, SUSPENSION INTRA-ARTICULAR; INTRALESIONAL; INTRAMUSCULAR; SOFT TISSUE ONCE
Status: COMPLETED | OUTPATIENT
Start: 2018-09-21 | End: 2018-09-21

## 2018-09-21 RX ADMIN — IOPAMIDOL 2 ML: 408 INJECTION, SOLUTION INTRATHECAL at 08:12

## 2018-09-21 RX ADMIN — LIDOCAINE HYDROCHLORIDE 5 ML: 10 INJECTION, SOLUTION EPIDURAL; INFILTRATION; INTRACAUDAL; PERINEURAL at 08:12

## 2018-09-21 RX ADMIN — BUPIVACAINE HYDROCHLORIDE 2 ML: 5 INJECTION, SOLUTION EPIDURAL; INTRACAUDAL at 08:12

## 2018-09-21 RX ADMIN — METHYLPREDNISOLONE ACETATE 80 MG: 80 INJECTION, SUSPENSION INTRA-ARTICULAR; INTRALESIONAL; INTRAMUSCULAR; SOFT TISSUE at 08:12

## 2018-09-21 NOTE — MR AVS SNAPSHOT
MRN:[id number]                      After Visit Summary   9/21/2018    Isabela Panchal    MRN: [id number]           Visit Information        Provider Department      9/21/2018 8:00 AM  NEURO RAD;  IMAGING NURSE; XR3 [facility] Xray           Review of your medicines          These changes are accurate as of 9/21/18  8:08 AM.  If you have any questions, ask your nurse or doctor.               CONTINUE these medicines which have NOT CHANGED        Dose / Directions    albuterol 108 (90 Base) MCG/ACT inhaler   Commonly known as:  PROAIR HFA/PROVENTIL HFA/VENTOLIN HFA   Used for:  Mild intermittent asthma without complication        Dose:  2 puff   Inhale 2 puffs into the lungs every 6 hours as needed for shortness of breath / dyspnea or wheezing   Quantity:  2 Inhaler   Refills:  11       calcitonin (salmon) 200 UNIT/ACT nasal spray   Commonly known as:  MIACALCIN   Used for:  Chronic midline thoracic back pain        Dose:  1 spray   Spray 1 spray into one nostril alternating nostrils daily Alternate nostril each day.   Quantity:  1 Bottle   Refills:  1       CALCIUM + D PO        Take  by mouth daily.   Refills:  0       Capsaicin 0.1 % cream   Used for:  Right anterior knee pain        Dose:  1 g   Externally apply 1 g topically 2 times daily as needed   Quantity:  42.5 g   Refills:  2       cyclobenzaprine 10 MG tablet   Commonly known as:  FLEXERIL   Used for:  DDD (degenerative disc disease), lumbar        Dose:  5-10 mg   Take 0.5-1 tablets (5-10 mg) by mouth 3 times daily as needed for muscle spasms   Quantity:  30 tablet   Refills:  1       DEPAKOTE PO        Refills:  0       diclofenac 75 MG EC tablet   Commonly known as:  VOLTAREN   Used for:  Thoracic degenerative disc disease        Dose:  75 mg   Take 1 tablet (75 mg) by mouth 2 times daily as needed for moderate pain   Quantity:  30 tablet   Refills:  1       fenofibrate 48 MG tablet   Used for:  High blood  triglycerides        Dose:  48 mg   Take 1 tablet (48 mg) by mouth daily   Quantity:  90 tablet   Refills:  3       fluticasone 50 MCG/ACT spray   Commonly known as:  FLONASE   Used for:  Other seasonal allergic rhinitis        Dose:  2 spray   Spray 2 sprays into both nostrils daily   Quantity:  16 g   Refills:  9       gabapentin 300 MG capsule   Commonly known as:  NEURONTIN        Indications: Headache Take 300mg po BID x 7 days, then increase to 300mg po TID x 7 days, then increase to 600mg po TID.   Refills:  0       lisinopril 10 MG tablet   Commonly known as:  PRINIVIL/ZESTRIL   Used for:  Essential hypertension with goal blood pressure less than 140/90        Dose:  10 mg   Take 1 tablet (10 mg) by mouth daily   Quantity:  90 tablet   Refills:  3       loratadine-pseudoePHEDrine  MG per 24 hr tablet   Commonly known as:  CLARITIN-D 24-hour   Used for:  Environmental allergies        Dose:  1 tablet   Take 1 tablet by mouth daily   Quantity:  30 tablet   Refills:  3       LORazepam 1 MG tablet   Commonly known as:  ATIVAN   Used for:  Claustrophobia        Dose:  2 mg   Take 2 tablets (2 mg) by mouth once as needed for anxiety (take prior to MRI for claustrophobia)   Quantity:  2 tablet   Refills:  0       meloxicam 15 MG tablet   Commonly known as:  MOBIC   Used for:  Patellofemoral pain syndrome of left knee        Dose:  15 mg   Take 1 tablet (15 mg) by mouth daily   Quantity:  30 tablet   Refills:  0       methocarbamol 500 MG tablet   Commonly known as:  ROBAXIN   Used for:  Thoracic degenerative disc disease        Dose:  1000 mg   Take 2 tablets (1,000 mg) by mouth 3 times daily as needed for muscle spasms   Quantity:  30 tablet   Refills:  1       MIRENA (52 MG) 20 MCG/24HR IUD   Generic drug:  levonorgestrel        USE FOR UP TO 5 YEARS THEN REMOVE   Refills:  0       order for DME   Used for:  Lateral epicondylitis of left elbow        Counter force forearm brace   Quantity:  1 Device    Refills:  0       * tiZANidine 4 MG tablet   Commonly known as:  ZANAFLEX   Used for:  Degeneration of lumbar or lumbosacral intervertebral disc, Thoracic degenerative disc disease        Dose:  4-8 mg   Take 1-2 tablets (4-8 mg) by mouth 3 times daily as needed   Quantity:  60 tablet   Refills:  1       * tiZANidine 4 MG tablet   Commonly known as:  ZANAFLEX   Used for:  Lumbar paraspinal muscle spasm        Dose:  4-8 mg   Take 1-2 tablets (4-8 mg) by mouth 3 times daily as needed   Quantity:  60 tablet   Refills:  1       traZODone 100 MG tablet   Commonly known as:  DESYREL   Used for:  Primary insomnia        Dose:  100 mg   Take 1 tablet (100 mg) by mouth At Bedtime Please profile.   Quantity:  90 tablet   Refills:  3       * venlafaxine 150 MG 24 hr capsule   Commonly known as:  EFFEXOR-XR   Used for:  Generalized anxiety disorder, Moderate recurrent major depression (H)        TAKE ONE CAPSULE BY MOUTH EVERY DAY (NEED TO BE SEEN IN CLINIC FOR FURTHER REFILLS)   Quantity:  90 capsule   Refills:  1       * venlafaxine 150 MG 24 hr capsule   Commonly known as:  EFFEXOR-XR   Used for:  Generalized anxiety disorder, Moderate recurrent major depression (H)        TAKE ONE CAPSULE BY MOUTH EVERY DAY   Quantity:  90 capsule   Refills:  0       * Notice:  This list has 4 medication(s) that are the same as other medications prescribed for you. Read the directions carefully, and ask your doctor or other care provider to review them with you.             Protect others around you: Learn how to safely use, store and throw away your medicines at www.disposemymeds.org.         Follow-ups after your visit        Your next 10 appointments already scheduled     Oct 16, 2018  3:00 PM CDT   (Arrive by 2:45 PM)   Return Visit with Man Gilbert MD   [facility] ([facility])    [address]  [address] [facility] [address] [phone]               [facility]  Instructions        Further instructions from your care team       Discharge Instructions for Epidural Steroid Injection/Nerve Block    Care of injection site:    If you have new numbness down your leg after the injection, this may last up to 4-6 hours, but should go away. You may need help with walking until your leg feels normal.    Over the next 24 to 48 hours, pain at the injection site may increase before it gets better.    For the next 48 hours, use ice packs for 15 minutes,3-4 times a day for pain relief.    If you have a bandage, you may remove it the next morning     Do not submerge injection site in water for 24 hours, (no baths. pools, hot tubs). Showers are OK.            Activity:    You should relax today and then you may return to your regular activity tomorrow.    You may eat a normal diet.    Medicines:    Restart all your medicines tomorrow at your regular dose, including Coumadin (Warfarin).    If you are restarting Coumadin, talk to your doctor about having your INR checked.    If you received steroids: The steroid should help reduce swelling and pain. This may take from 3-14 days. Pain from the shot will be mild and go away in 2-3 days.     Common side effects may include:    Insomnia    Heartburn    Flushed face    Water retention    Increased appetite    Increased blood sugar      If you have diabetes, watch your blood sugar closely, If needed, call your doctor to help control your blood sugar.    Some patients will get lasting relief from a single injection. Others may require additional injections to get results.     Call your doctor or go to the Emergency Room if you have new severe pain or fever.     Additional Information About Your Visit        Liftopia Information     Liftopia is an electronic gateway that provides easy, online access to your medical records. With Liftopia, you can request a clinic appointment, read your test results, renew a prescription or communicate with your care  team.     To sign up for [employer] visit the website at [email or web address]   You will be asked to enter the access code listed below, as well as some personal information. Please follow the directions to create your username and password.     Your access code is: [email or web address]  Expires: 2018  8:07 AM     Your access code will  in 90 days. If you need help or a new code, please contact your [facility] or call [phone] for assistance.        Care EveryWhere ID     This is your Care EveryWhere ID. This could be used by other organizations to access your [facility] medical records  [id number]        Your Vitals Were     Blood Pressure Pulse Respirations Pulse Oximetry          124/80 97 20 98%         Primary Care Provider Office Phone # Fax #    Felisha Briggs [phone]      Equal Access to Services     [facility] CenterFRANKLYN : Hadii aad ku hadasho Soomaali, waaxda luqadaha, qaybta kaalmada adeegyada, tere ocampo hayfredy springer . So [facility] [phone].    ATENCIÓN: Si habla español, tiene a snyder disposición servicios gratuitos de asistencia lingüística. Adelina al [phone].    We comply with applicable federal civil rights laws and [place] laws. We do not discriminate on the basis of race, color, national origin, age, disability, sex, sexual orientation, or gender identity.            Thank you!     Thank you for choosing [facility] for your care. Our goal is always to provide you with excellent care. Hearing back from our patients is one way we can continue to improve our services. Please take a few minutes to complete the written survey that you may receive in the mail after you visit with us. Thank you!             Medication List: This is a list of all your medications and when to take them. Check marks below indicate your daily home schedule. Keep this list as a reference.          This list is accurate as of 18  8:08 AM.   Always use your most recent med list.               Medications           Morning Afternoon Evening Bedtime As Needed    albuterol 108 (90 Base) MCG/ACT inhaler   Commonly known as:  PROAIR HFA/PROVENTIL HFA/VENTOLIN HFA   Inhale 2 puffs into the lungs every 6 hours as needed for shortness of breath / dyspnea or wheezing                                calcitonin (salmon) 200 UNIT/ACT nasal spray   Commonly known as:  MIACALCIN   Spray 1 spray into one nostril alternating nostrils daily Alternate nostril each day.                                CALCIUM + D PO   Take  by mouth daily.                                Capsaicin 0.1 % cream   Externally apply 1 g topically 2 times daily as needed                                cyclobenzaprine 10 MG tablet   Commonly known as:  FLEXERIL   Take 0.5-1 tablets (5-10 mg) by mouth 3 times daily as needed for muscle spasms                                DEPAKOTE PO                                diclofenac 75 MG EC tablet   Commonly known as:  VOLTAREN   Take 1 tablet (75 mg) by mouth 2 times daily as needed for moderate pain                                fenofibrate 48 MG tablet   Take 1 tablet (48 mg) by mouth daily                                fluticasone 50 MCG/ACT spray   Commonly known as:  FLONASE   Spray 2 sprays into both nostrils daily                                gabapentin 300 MG capsule   Commonly known as:  NEURONTIN   Indications: Headache Take 300mg po BID x 7 days, then increase to 300mg po TID x 7 days, then increase to 600mg po TID.                                lisinopril 10 MG tablet   Commonly known as:  PRINIVIL/ZESTRIL   Take 1 tablet (10 mg) by mouth daily                                loratadine-pseudoePHEDrine  MG per 24 hr tablet   Commonly known as:  CLARITIN-D 24-hour   Take 1 tablet by mouth daily                                LORazepam 1 MG tablet   Commonly known as:  ATIVAN   Take 2 tablets (2 mg) by mouth once as needed for anxiety  (take prior to MRI for claustrophobia)                                meloxicam 15 MG tablet   Commonly known as:  MOBIC   Take 1 tablet (15 mg) by mouth daily                                methocarbamol 500 MG tablet   Commonly known as:  ROBAXIN   Take 2 tablets (1,000 mg) by mouth 3 times daily as needed for muscle spasms                                MIRENA (52 MG) 20 MCG/24HR IUD   USE FOR UP TO 5 YEARS THEN REMOVE   Generic drug:  levonorgestrel                                order for DME   Counter force forearm brace                                * tiZANidine 4 MG tablet   Commonly known as:  ZANAFLEX   Take 1-2 tablets (4-8 mg) by mouth 3 times daily as needed                                * tiZANidine 4 MG tablet   Commonly known as:  ZANAFLEX   Take 1-2 tablets (4-8 mg) by mouth 3 times daily as needed                                traZODone 100 MG tablet   Commonly known as:  DESYREL   Take 1 tablet (100 mg) by mouth At Bedtime Please profile.                                * venlafaxine 150 MG 24 hr capsule   Commonly known as:  EFFEXOR-XR   TAKE ONE CAPSULE BY MOUTH EVERY DAY (NEED TO BE SEEN IN CLINIC FOR FURTHER REFILLS)                                * venlafaxine 150 MG 24 hr capsule   Commonly known as:  EFFEXOR-XR   TAKE ONE CAPSULE BY MOUTH EVERY DAY                                * Notice:  This list has 4 medication(s) that are the same as other medications prescribed for you. Read the directions carefully, and ask your doctor or other care provider to review them with you.

## 2018-09-24 ENCOUNTER — ALLIED HEALTH/NURSE VISIT (OUTPATIENT)
Dept: NURSING | Facility: CLINIC | Age: 48
End: 2018-09-24
Payer: COMMERCIAL

## 2018-09-24 DIAGNOSIS — Z23 NEED FOR PROPHYLACTIC VACCINATION AND INOCULATION AGAINST INFLUENZA: Primary | ICD-10-CM

## 2018-09-24 PROCEDURE — 90686 IIV4 VACC NO PRSV 0.5 ML IM: CPT

## 2018-09-24 PROCEDURE — 99207 ZZC NO CHARGE NURSE ONLY: CPT

## 2018-09-24 PROCEDURE — 90471 IMMUNIZATION ADMIN: CPT

## 2018-09-24 NOTE — MR AVS SNAPSHOT
"              After Visit Summary   9/24/2018    Isabela Panchal    MRN: [id number]           Patient Information     Date Of Birth          1970        Visit Information        Provider Department      9/24/2018 4:00 PM HW MEDICAL ASSISTANT [facility]        Today's Diagnoses     Need for prophylactic vaccination and inoculation against influenza    -  1       Follow-ups after your visit        Your next 10 appointments already scheduled     Oct 16, 2018  3:00 PM CDT   (Arrive by 2:45 PM)   Return Visit with Man Gilbert MD   [facility] ([facility])    [address] [place] [address]  4th [facility] [address] [phone]              Who to contact     If you have questions or need follow up information about today's clinic visit or your schedule please contact [facility] directly at [phone].  Normal or non-critical lab and imaging results will be communicated to you by MyChart, letter or phone within 4 business days after the clinic has received the results. If you do not hear from us within 7 days, please contact the clinic through MyChart or phone. If you have a critical or abnormal lab result, we will notify you by phone as soon as possible.  Submit refill requests through AquaBlok or call your pharmacy and they will forward the refill request to us. Please allow 3 business days for your refill to be completed.          Additional Information About Your Visit        MyChart Information     AquaBlok lets you send messages to your doctor, view your test results, renew your prescriptions, schedule appointments and more. To sign up, go to [email or web address] . Click on \"Log in\" on the left side of the screen, which will take you to the Welcome page. Then click on \"Sign up Now\" on the right side of the page.     You will be asked to enter the access code listed below, as well as some personal information. Please " follow the directions to create your username and password.     Your access code is: [email or web address]  Expires: 2018  8:07 AM     Your access code will  in 90 days. If you need help or a new code, please call your [facility] clinic or [phone].        Care EveryWhere ID     This is your Care EveryWhere ID. This could be used by other organizations to access your [facility] medical records  [id number]         Blood Pressure from Last 3 Encounters:   18 137/84   18 144/85   18 112/79    Weight from Last 3 Encounters:   18 194 lb (88 kg)   18 194 lb (88 kg)   18 180 lb (81.6 kg)              We Performed the Following     FLU VACCINE, SPLIT VIRUS, IM (QUADRIVALENT) [24663]- >3 YRS     Vaccine Administration, Initial [80447]        Primary Care Provider Office Phone # Fax #    Felisha Briggs [phone] [address] [facility] [address]        Equal Access to Services     ZARI GILLESPIE : Hadii aad ku hadasho Soaysha, waaxda luqadaha, qaybta kaalmada florence, tere springer . So [facility] [phone].    ATENCIÓN: Si habla español, tiene a snyder disposición servicios gratuitos de asistencia lingüística. [facility] [phone].    We comply with applicable federal civil rights laws and [place] laws. We do not discriminate on the basis of race, color, national origin, age, disability, sex, sexual orientation, or gender identity.            Thank you!     Thank you for choosing [facility]  for your care. Our goal is always to provide you with excellent care. Hearing back from our patients is one way we can continue to improve our services. Please take a few minutes to complete the written survey that you may receive in the mail after your visit with us. Thank you!             Your Updated Medication List - Protect others around you: Learn how to safely use, store and throw away your medicines at  www.disposemymeds.org.          This list is accurate as of 9/24/18  6:56 PM.  Always use your most recent med list.                   Brand Name Dispense Instructions for use Diagnosis    albuterol 108 (90 Base) MCG/ACT inhaler    PROAIR HFA/PROVENTIL HFA/VENTOLIN HFA    2 Inhaler    Inhale 2 puffs into the lungs every 6 hours as needed for shortness of breath / dyspnea or wheezing    Mild intermittent asthma without complication       calcitonin (salmon) 200 UNIT/ACT nasal spray    MIACALCIN    1 Bottle    Spray 1 spray into one nostril alternating nostrils daily Alternate nostril each day.    Chronic midline thoracic back pain       CALCIUM + D PO      Take  by mouth daily.        Capsaicin 0.1 % cream     42.5 g    Externally apply 1 g topically 2 times daily as needed    Right anterior knee pain       cyclobenzaprine 10 MG tablet    FLEXERIL    30 tablet    Take 0.5-1 tablets (5-10 mg) by mouth 3 times daily as needed for muscle spasms    DDD (degenerative disc disease), lumbar       DEPAKOTE PO           diclofenac 75 MG EC tablet    VOLTAREN    30 tablet    Take 1 tablet (75 mg) by mouth 2 times daily as needed for moderate pain    Thoracic degenerative disc disease       fenofibrate 48 MG tablet     90 tablet    Take 1 tablet (48 mg) by mouth daily    High blood triglycerides       fluticasone 50 MCG/ACT spray    FLONASE    16 g    Spray 2 sprays into both nostrils daily    Other seasonal allergic rhinitis       gabapentin 300 MG capsule    NEURONTIN     Indications: Headache Take 300mg po BID x 7 days, then increase to 300mg po TID x 7 days, then increase to 600mg po TID.        lisinopril 10 MG tablet    PRINIVIL/ZESTRIL    90 tablet    Take 1 tablet (10 mg) by mouth daily    Essential hypertension with goal blood pressure less than 140/90       loratadine-pseudoePHEDrine  MG per 24 hr tablet    CLARITIN-D 24-hour    30 tablet    Take 1 tablet by mouth daily    Environmental allergies       LORazepam  1 MG tablet    ATIVAN    2 tablet    Take 2 tablets (2 mg) by mouth once as needed for anxiety (take prior to MRI for claustrophobia)    Claustrophobia       meloxicam 15 MG tablet    MOBIC    30 tablet    Take 1 tablet (15 mg) by mouth daily    Patellofemoral pain syndrome of left knee       methocarbamol 500 MG tablet    ROBAXIN    30 tablet    Take 2 tablets (1,000 mg) by mouth 3 times daily as needed for muscle spasms    Thoracic degenerative disc disease       MIRENA (52 MG) 20 MCG/24HR IUD   Generic drug:  levonorgestrel      USE FOR UP TO 5 YEARS THEN REMOVE        order for DME     1 Device    Counter force forearm brace    Lateral epicondylitis of left elbow       * tiZANidine 4 MG tablet    ZANAFLEX    60 tablet    Take 1-2 tablets (4-8 mg) by mouth 3 times daily as needed    Degeneration of lumbar or lumbosacral intervertebral disc, Thoracic degenerative disc disease       * tiZANidine 4 MG tablet    ZANAFLEX    60 tablet    Take 1-2 tablets (4-8 mg) by mouth 3 times daily as needed    Lumbar paraspinal muscle spasm       traZODone 100 MG tablet    DESYREL    90 tablet    Take 1 tablet (100 mg) by mouth At Bedtime Please profile.    Primary insomnia       * venlafaxine 150 MG 24 hr capsule    EFFEXOR-XR    90 capsule    TAKE ONE CAPSULE BY MOUTH EVERY DAY (NEED TO BE SEEN IN CLINIC FOR FURTHER REFILLS)    Generalized anxiety disorder, Moderate recurrent major depression (H)       * venlafaxine 150 MG 24 hr capsule    EFFEXOR-XR    90 capsule    TAKE ONE CAPSULE BY MOUTH EVERY DAY    Generalized anxiety disorder, Moderate recurrent major depression (H)       * Notice:  This list has 4 medication(s) that are the same as other medications prescribed for you. Read the directions carefully, and ask your doctor or other care provider to review them with you.

## 2018-09-24 NOTE — PROGRESS NOTES

## 2018-10-09 ENCOUNTER — TELEPHONE (OUTPATIENT)
Dept: FAMILY MEDICINE | Facility: CLINIC | Age: 48
End: 2018-10-09

## 2018-10-13 ENCOUNTER — HEALTH MAINTENANCE LETTER (OUTPATIENT)
Age: 48
End: 2018-10-13

## 2018-10-16 ENCOUNTER — OFFICE VISIT (OUTPATIENT)
Dept: ORTHOPEDICS | Facility: CLINIC | Age: 48
End: 2018-10-16
Payer: COMMERCIAL

## 2018-10-16 VITALS — BODY MASS INDEX: 32.32 KG/M2 | WEIGHT: 194 LBS | HEIGHT: 65 IN

## 2018-10-16 DIAGNOSIS — M51.26 LUMBAR HERNIATED DISC: Primary | ICD-10-CM

## 2018-10-16 ASSESSMENT — ENCOUNTER SYMPTOMS
DEPRESSION: 1
DIZZINESS: 1
WEAKNESS: 1
NUMBNESS: 0
NERVOUS/ANXIOUS: 1
INCREASED ENERGY: 1
SPEECH CHANGE: 1
DISTURBANCES IN COORDINATION: 1
FEVER: 0
LOSS OF CONSCIOUSNESS: 0
TREMORS: 0
WEIGHT GAIN: 0
POLYDIPSIA: 0
CHILLS: 0
NIGHT SWEATS: 0
PANIC: 1
MEMORY LOSS: 1
WEIGHT LOSS: 0
HEADACHES: 1
ALTERED TEMPERATURE REGULATION: 0
HALLUCINATIONS: 0
PARALYSIS: 0
DECREASED APPETITE: 0
DECREASED CONCENTRATION: 1
POLYPHAGIA: 0
SEIZURES: 0
FATIGUE: 1
INSOMNIA: 0
TINGLING: 1
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
## 2018-10-16 NOTE — MR AVS SNAPSHOT
After Visit Summary   10/16/2018    Isabela Panchal    MRN: [id number]           Patient Information     Date Of Birth          1970        Visit Information        Provider Department      10/16/2018 3:00 PM Man Gilbert MD [facility]        Today's Diagnoses     Lumbar herniated disc    -  1       Follow-ups after your visit        Additional Services     PHYSICAL THERAPY REFERRAL (External-Prints)       Physical Therapy Referral                  Your next 10 appointments already scheduled     Oct 23, 2018  2:00 PM CDT   Office Visit with Felisha Briggs MD   [facility]    [address] [place] [address] [phone]           Bring a current list of meds and any records pertaining to this visit. For Physicals, please bring immunization records and any forms needing to be filled out. Please arrive 10 minutes early to complete paperwork.            Nov 07, 2018  8:30 AM CST   XR LUMBAR EPIDURAL INJECTION with UCXR3,  IMAGING NURSE, ABIOLA NEURO RAD   [facility] ([facility])    [address] [address] [phone]           How do I prepare for my exam? (Food and drink instructions) No Food and Drink Restrictions.  How do I prepare for my exam? (Other instructions) * If you take Coumadin (or any other blood thinners) you may need to stop taking them up to 5 days prior to the exam. Talk to your doctor before stopping. * If you take Plavix, Ticlid, Pletal or Persantine, please ask your doctor if you should stop these medicines. You may need extra tests on the morning of your scan. * If you take Xarelto (Rivaroxaban), you may need to stop taking it 24 hours before treatment. Talk to your doctor before stopping this medicine.  What should I wear: Wear comfortable clothes.  How long does the exam take: Injections take about 30  to 45 minutes. Most people spend up to 2 hours in the clinic or hospital.  What should I bring: Please bring any scans or X-rays taken at other hospitals, if similar tests were done. Also bring a list of your medicines, including vitamins, minerals and over-the-counter drugs. It is safest to leave personal items at home. You will need a  : Plan to have someone drive you home afterward.  What do I need to tell my doctor: Tell your doctor in advance: * If you are allergic to X-ray dye (contrast fluid). * If you may be pregnant.  What should I do after the exam: You may have slight cramping during this exam. The cramps should go away afterward. You may have some spotting after the exam.  What is this test: A spinal shot is done in or near the spine. You may receive steroid medicine (to reduce swelling) or contrast fluid (dye that makes the area show up more clearly on X-rays). A nerve root shot is a shot into the nerve near the spine. It may reduce inflammation near the nerve root or spinal cord and reduce pain in the arm or leg.  Who should I call with questions: If you have any questions, please call the Imaging Department where you will have your exam. Directions, parking instructions, and other information is available on our website, [email or web address].            Nov 30, 2018  2:20 PM CST   (Arrive by 2:05 PM)   Return Visit with Man Gilbert MD   [facility] ([facility])    [address] [place] [address] [phone]              Future tests that were ordered for you today     Open Future Orders        Priority Expected Expires Ordered    X-ray Lumbar epidural injection Routine 10/16/2018 10/16/2019 10/16/2018    PHYSICAL THERAPY REFERRAL (External-Prints) Routine  10/16/2019 10/16/2018            Who to contact     Please call your clinic at [phone] to:    Ask questions about your health    Make or cancel  "appointments    Discuss your medicines    Learn about your test results    Speak to your doctor            Additional Information About Your Visit        MyChart Information     [employer] is an electronic gateway that provides easy, online access to your medical records. With [employer], you can request a clinic appointment, read your test results, renew a prescription or communicate with your care team.     To sign up for [employer] visit the website at [email or web address]   You will be asked to enter the access code listed below, as well as some personal information. Please follow the directions to create your username and password.     Your access code is: [email or web address]  Expires: 2018  8:07 AM     Your access code will  in 90 days. If you need help or a new code, please contact your [facility] or call [phone] for assistance.        Care EveryWhere ID     This is your Care EveryWhere ID. This could be used by other organizations to access your [facility] medical records  [id number]        Your Vitals Were     Height BMI (Body Mass Index)                1.651 m (5' 5\") 32.28 kg/m2           Blood Pressure from Last 3 Encounters:   18 137/84   18 144/85   18 112/79    Weight from Last 3 Encounters:   10/16/18 88 kg (194 lb)   18 88 kg (194 lb)   18 88 kg (194 lb)                 Today's Medication Changes          These changes are accurate as of 10/16/18  3:56 PM.  If you have any questions, ask your nurse or doctor.               These medicines have changed or have updated prescriptions.        Dose/Directions    * tiZANidine 4 MG tablet   Commonly known as:  ZANAFLEX   This may have changed:  Another medication with the same name was added. Make sure you understand how and when to take each.   Used for:  Degeneration of lumbar or lumbosacral intervertebral disc, Thoracic degenerative disc disease   Changed by:  Man Gilbert MD "        Dose:  4-8 mg   Take 1-2 tablets (4-8 mg) by mouth 3 times daily as needed   Quantity:  60 tablet   Refills:  1       * tiZANidine 4 MG tablet   Commonly known as:  ZANAFLEX   This may have changed:  Another medication with the same name was added. Make sure you understand how and when to take each.   Used for:  Lumbar paraspinal muscle spasm   Changed by:  Man Gilbert MD        Dose:  4-8 mg   Take 1-2 tablets (4-8 mg) by mouth 3 times daily as needed   Quantity:  60 tablet   Refills:  1       * tiZANidine 4 MG tablet   Commonly known as:  ZANAFLEX   This may have changed:  You were already taking a medication with the same name, and this prescription was added. Make sure you understand how and when to take each.   Used for:  Lumbar herniated disc   Changed by:  Man Gilbert MD        Dose:  4-8 mg   Take 1-2 tablets (4-8 mg) by mouth nightly as needed   Quantity:  30 tablet   Refills:  1       * Notice:  This list has 3 medication(s) that are the same as other medications prescribed for you. Read the directions carefully, and ask your doctor or other care provider to review them with you.         Where to get your medicines      These medications were sent to [facility] [address]  [address] [facility] [address]     Phone:  [phone]     tiZANidine 4 MG tablet                Primary Care Provider Office Phone # Fax #    Felisha Imelda Briggs [phone]       [address]  [facility] [address]        Equal Access to Services     OSIEL GILLESPIE : Hadii mihir hudson hadasho Sotarynali, waaxda luqadaha, qaybta kaalmada adeegyada, tere idiin hayaan adevalerie guerrier. So [facility] [phone].    ATENCIÓN: Si habla español, tiene a snyder disposición servicios gratuitos de asistencia lingüística. Llame al [phone].    We comply with applicable federal civil rights laws and [place] laws. We do not discriminate on the basis of  race, color, national origin, age, disability, sex, sexual orientation, or gender identity.            Thank you!     Thank you for choosing [facility]  for your care. Our goal is always to provide you with excellent care. Hearing back from our patients is one way we can continue to improve our services. Please take a few minutes to complete the written survey that you may receive in the mail after your visit with us. Thank you!             Your Updated Medication List - Protect others around you: Learn how to safely use, store and throw away your medicines at www.disposemymeds.org.          This list is accurate as of 10/16/18  3:56 PM.  Always use your most recent med list.                   Brand Name Dispense Instructions for use Diagnosis    albuterol 108 (90 Base) MCG/ACT inhaler    PROAIR HFA/PROVENTIL HFA/VENTOLIN HFA    2 Inhaler    Inhale 2 puffs into the lungs every 6 hours as needed for shortness of breath / dyspnea or wheezing    Mild intermittent asthma without complication       calcitonin (salmon) 200 UNIT/ACT nasal spray    MIACALCIN    1 Bottle    Spray 1 spray into one nostril alternating nostrils daily Alternate nostril each day.    Chronic midline thoracic back pain       CALCIUM + D PO      Take  by mouth daily.        Capsaicin 0.1 % cream     42.5 g    Externally apply 1 g topically 2 times daily as needed    Right anterior knee pain       cyclobenzaprine 10 MG tablet    FLEXERIL    30 tablet    Take 0.5-1 tablets (5-10 mg) by mouth 3 times daily as needed for muscle spasms    DDD (degenerative disc disease), lumbar       DEPAKOTE PO           diclofenac 75 MG EC tablet    VOLTAREN    30 tablet    Take 1 tablet (75 mg) by mouth 2 times daily as needed for moderate pain    Thoracic degenerative disc disease       fenofibrate 48 MG tablet     90 tablet    Take 1 tablet (48 mg) by mouth daily    High blood triglycerides       fluticasone 50 MCG/ACT spray    FLONASE    16 g     Spray 2 sprays into both nostrils daily    Other seasonal allergic rhinitis       gabapentin 300 MG capsule    NEURONTIN     Indications: Headache Take 300mg po BID x 7 days, then increase to 300mg po TID x 7 days, then increase to 600mg po TID.        lisinopril 10 MG tablet    PRINIVIL/ZESTRIL    90 tablet    Take 1 tablet (10 mg) by mouth daily    Essential hypertension with goal blood pressure less than 140/90       loratadine-pseudoePHEDrine  MG per 24 hr tablet    CLARITIN-D 24-hour    30 tablet    Take 1 tablet by mouth daily    Environmental allergies       LORazepam 1 MG tablet    ATIVAN    2 tablet    Take 2 tablets (2 mg) by mouth once as needed for anxiety (take prior to MRI for claustrophobia)    Claustrophobia       meloxicam 15 MG tablet    MOBIC    30 tablet    Take 1 tablet (15 mg) by mouth daily    Patellofemoral pain syndrome of left knee       methocarbamol 500 MG tablet    ROBAXIN    30 tablet    Take 2 tablets (1,000 mg) by mouth 3 times daily as needed for muscle spasms    Thoracic degenerative disc disease       MIRENA (52 MG) 20 MCG/24HR IUD   Generic drug:  levonorgestrel      USE FOR UP TO 5 YEARS THEN REMOVE        order for DME     1 Device    Counter force forearm brace    Lateral epicondylitis of left elbow       * tiZANidine 4 MG tablet    ZANAFLEX    60 tablet    Take 1-2 tablets (4-8 mg) by mouth 3 times daily as needed    Degeneration of lumbar or lumbosacral intervertebral disc, Thoracic degenerative disc disease       * tiZANidine 4 MG tablet    ZANAFLEX    60 tablet    Take 1-2 tablets (4-8 mg) by mouth 3 times daily as needed    Lumbar paraspinal muscle spasm       * tiZANidine 4 MG tablet    ZANAFLEX    30 tablet    Take 1-2 tablets (4-8 mg) by mouth nightly as needed    Lumbar herniated disc       traZODone 100 MG tablet    DESYREL    90 tablet    Take 1 tablet (100 mg) by mouth At Bedtime Please profile.    Primary insomnia       * venlafaxine 150 MG 24 hr capsule     EFFEXOR-XR    90 capsule    TAKE ONE CAPSULE BY MOUTH EVERY DAY (NEED TO BE SEEN IN CLINIC FOR FURTHER REFILLS)    Generalized anxiety disorder, Moderate recurrent major depression (H)       * venlafaxine 150 MG 24 hr capsule    EFFEXOR-XR    90 capsule    TAKE ONE CAPSULE BY MOUTH EVERY DAY    Generalized anxiety disorder, Moderate recurrent major depression (H)       * Notice:  This list has 5 medication(s) that are the same as other medications prescribed for you. Read the directions carefully, and ask your doctor or other care provider to review them with you.
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
## 2018-10-30 ENCOUNTER — OFFICE VISIT (OUTPATIENT)
Dept: FAMILY MEDICINE | Facility: CLINIC | Age: 48
End: 2018-10-30
Payer: COMMERCIAL

## 2018-10-30 VITALS
HEART RATE: 73 BPM | TEMPERATURE: 99.1 F | OXYGEN SATURATION: 100 % | SYSTOLIC BLOOD PRESSURE: 126 MMHG | BODY MASS INDEX: 32.78 KG/M2 | DIASTOLIC BLOOD PRESSURE: 74 MMHG | WEIGHT: 197 LBS

## 2018-10-30 DIAGNOSIS — F41.1 GENERALIZED ANXIETY DISORDER: ICD-10-CM

## 2018-10-30 DIAGNOSIS — E78.5 HYPERLIPIDEMIA LDL GOAL <160: ICD-10-CM

## 2018-10-30 DIAGNOSIS — R73.01 IMPAIRED FASTING GLUCOSE: ICD-10-CM

## 2018-10-30 DIAGNOSIS — I10 ESSENTIAL HYPERTENSION WITH GOAL BLOOD PRESSURE LESS THAN 140/90: ICD-10-CM

## 2018-10-30 DIAGNOSIS — E78.1 HIGH BLOOD TRIGLYCERIDES: ICD-10-CM

## 2018-10-30 DIAGNOSIS — F07.81 POSTCONCUSSION SYNDROME: ICD-10-CM

## 2018-10-30 DIAGNOSIS — J45.20 MILD INTERMITTENT ASTHMA WITHOUT COMPLICATION: ICD-10-CM

## 2018-10-30 DIAGNOSIS — E55.9 VITAMIN D DEFICIENCY DISEASE: ICD-10-CM

## 2018-10-30 DIAGNOSIS — Z83.49 FAMILY HISTORY OF HYPOTHYROIDISM: ICD-10-CM

## 2018-10-30 DIAGNOSIS — Z11.59 NEED FOR HEPATITIS C SCREENING TEST: ICD-10-CM

## 2018-10-30 DIAGNOSIS — F79 INTELLECTUAL FUNCTIONING DISABILITY: ICD-10-CM

## 2018-10-30 DIAGNOSIS — F33.1 MODERATE RECURRENT MAJOR DEPRESSION (H): Primary | ICD-10-CM

## 2018-10-30 LAB
ALBUMIN SERPL-MCNC: 3.9 G/DL (ref 3.4–5)
ALP SERPL-CCNC: 67 U/L (ref 40–150)
ALT SERPL W P-5'-P-CCNC: 25 U/L (ref 0–50)
ANION GAP SERPL CALCULATED.3IONS-SCNC: 8 MMOL/L (ref 3–14)
AST SERPL W P-5'-P-CCNC: 13 U/L (ref 0–45)
BILIRUB SERPL-MCNC: 0.5 MG/DL (ref 0.2–1.3)
BUN SERPL-MCNC: 10 MG/DL (ref 7–30)
CALCIUM SERPL-MCNC: 9.2 MG/DL (ref 8.5–10.1)
CHLORIDE SERPL-SCNC: 109 MMOL/L (ref 94–109)
CHOLEST SERPL-MCNC: 202 MG/DL
CO2 SERPL-SCNC: 24 MMOL/L (ref 20–32)
CREAT SERPL-MCNC: 0.68 MG/DL (ref 0.52–1.04)
CREAT UR-MCNC: 164 MG/DL
GFR SERPL CREATININE-BSD FRML MDRD: >90 ML/MIN/1.7M2
GLUCOSE SERPL-MCNC: 93 MG/DL (ref 70–99)
HBA1C MFR BLD: 5.3 % (ref 0–5.6)
HDLC SERPL-MCNC: 32 MG/DL
LDLC SERPL CALC-MCNC: ABNORMAL MG/DL
LDLC SERPL DIRECT ASSAY-MCNC: 118 MG/DL
MICROALBUMIN UR-MCNC: 13 MG/L
MICROALBUMIN/CREAT UR: 8.05 MG/G CR (ref 0–25)
NONHDLC SERPL-MCNC: 170 MG/DL
POTASSIUM SERPL-SCNC: 4.4 MMOL/L (ref 3.4–5.3)
PROT SERPL-MCNC: 7.3 G/DL (ref 6.8–8.8)
SODIUM SERPL-SCNC: 141 MMOL/L (ref 133–144)
TRIGL SERPL-MCNC: 413 MG/DL

## 2018-10-30 PROCEDURE — 86803 HEPATITIS C AB TEST: CPT | Performed by: FAMILY MEDICINE

## 2018-10-30 PROCEDURE — 83721 ASSAY OF BLOOD LIPOPROTEIN: CPT | Mod: 59 | Performed by: FAMILY MEDICINE

## 2018-10-30 PROCEDURE — 82306 VITAMIN D 25 HYDROXY: CPT | Performed by: FAMILY MEDICINE

## 2018-10-30 PROCEDURE — 82043 UR ALBUMIN QUANTITATIVE: CPT | Performed by: FAMILY MEDICINE

## 2018-10-30 PROCEDURE — 36415 COLL VENOUS BLD VENIPUNCTURE: CPT | Performed by: FAMILY MEDICINE

## 2018-10-30 PROCEDURE — 80053 COMPREHEN METABOLIC PANEL: CPT | Performed by: FAMILY MEDICINE

## 2018-10-30 PROCEDURE — 80061 LIPID PANEL: CPT | Performed by: FAMILY MEDICINE

## 2018-10-30 PROCEDURE — 83036 HEMOGLOBIN GLYCOSYLATED A1C: CPT | Performed by: FAMILY MEDICINE

## 2018-10-30 PROCEDURE — 99214 OFFICE O/P EST MOD 30 MIN: CPT | Performed by: FAMILY MEDICINE

## 2018-10-30 RX ORDER — VENLAFAXINE HYDROCHLORIDE 150 MG/1
150 CAPSULE, EXTENDED RELEASE ORAL DAILY
Qty: 90 CAPSULE | Refills: 3 | Status: SHIPPED | OUTPATIENT
Start: 2018-10-30 | End: 2019-11-05

## 2018-10-30 ASSESSMENT — ANXIETY QUESTIONNAIRES
1. FEELING NERVOUS, ANXIOUS, OR ON EDGE: SEVERAL DAYS
GAD7 TOTAL SCORE: 6
IF YOU CHECKED OFF ANY PROBLEMS ON THIS QUESTIONNAIRE, HOW DIFFICULT HAVE THESE PROBLEMS MADE IT FOR YOU TO DO YOUR WORK, TAKE CARE OF THINGS AT HOME, OR GET ALONG WITH OTHER PEOPLE: SOMEWHAT DIFFICULT
5. BEING SO RESTLESS THAT IT IS HARD TO SIT STILL: NOT AT ALL
6. BECOMING EASILY ANNOYED OR IRRITABLE: NEARLY EVERY DAY
6. BECOMING EASILY ANNOYED OR IRRITABLE: NEARLY EVERY DAY
1. FEELING NERVOUS, ANXIOUS, OR ON EDGE: SEVERAL DAYS
7. FEELING AFRAID AS IF SOMETHING AWFUL MIGHT HAPPEN: SEVERAL DAYS
5. BEING SO RESTLESS THAT IT IS HARD TO SIT STILL: NOT AT ALL
2. NOT BEING ABLE TO STOP OR CONTROL WORRYING: NOT AT ALL
2. NOT BEING ABLE TO STOP OR CONTROL WORRYING: NOT AT ALL
3. WORRYING TOO MUCH ABOUT DIFFERENT THINGS: SEVERAL DAYS
GAD7 TOTAL SCORE: 6
7. FEELING AFRAID AS IF SOMETHING AWFUL MIGHT HAPPEN: SEVERAL DAYS
3. WORRYING TOO MUCH ABOUT DIFFERENT THINGS: SEVERAL DAYS

## 2018-10-30 ASSESSMENT — PATIENT HEALTH QUESTIONNAIRE - PHQ9
5. POOR APPETITE OR OVEREATING: NOT AT ALL
SUM OF ALL RESPONSES TO PHQ QUESTIONS 1-9: 18
5. POOR APPETITE OR OVEREATING: NOT AT ALL

## 2018-10-30 NOTE — PROGRESS NOTES
Hello! Thank you for getting labs done. Your lab test to check for diabetes, HgA1C, also called glycosylated hemoglobin, which measures the level of sugar in your blood over the past few months, is normal which is great!     Congratulations, you don't have diabetes!  However, since your fasting blood sugars have been high in the past, I will continue to screen your blood sugar every year.     If you have any questions, please contact the clinic or schedule an appointment with me, thank you!    Sincerely,    BONILLA RHOADES MD   10/30/2018

## 2018-10-30 NOTE — LETTER
[place] [address] [address] [address]  Phone: [phone]    October 30, 2018        Isabela Panchal  [address] [facility] [address]    Dear Isabela:    I looked into possible ways to get some help with organizational and functional tasks at home, and it looks like you can call [facility] at [phone], Monday through Friday, 8 a.m. to 4 p.m. You will speak with a  who will help you identify your needs.     Then, a  will meet with you in your home or community to discuss your options.    They provide information and referrals for a variety of supports, which could include:     Adult rehabilitative mental health services (ARMHS)   Community support program access   Intensive residential treatment  Outpatient mental health services  Case management  Assertive Community Treatment (ACT)  Supportive housing  Supportive employment  Chemical health assessment and treatment   Connections to resources in the community    To get started, please call [phone].    We'd talked specifically about an [facility] worker, but there may be other options as well.    Please contact me for questions or concerns.    Sincerely,  Dr. Felisha Briggs MD  10/30/2018

## 2018-10-30 NOTE — PATIENT INSTRUCTIONS
You can contact our clinic anytime to schedule an appointment with Man Young, our behavioralist here at [facility].      To be assessed for services, call [facility] at [phone], Monday through Friday, 8 a.m. to 4 p.m. You will speak with a  who will help you identify your needs.     Then, a  will meet with you in your home or community to discuss your options.    We will provide information and referrals for a variety of supports, which could include:     Adult rehabilitative mental health services (ARMHS)   Community support program access   Intensive residential treatment  Outpatient mental health services  Case management  Assertive Community Treatment (ACT)  Supportive housing  Supportive employment  Chemical health assessment and treatment   Connections to resources in the community  To get started, call [phone].
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
## 2018-10-30 NOTE — LETTER
[facility]  [address] [address] [phone]        March 7, 2019    Isabela Panchal  [address] [facility] [address]              Dear Isabela Panchal    This is to remind you that your fasting lab is due.    You may call our office at [phone] to schedule an appointment.    Please disregard this notice if you have already had your labs drawn or made an appointment.        Sincerely,        Felisha Briggs MD

## 2018-10-30 NOTE — MR AVS SNAPSHOT
After Visit Summary   10/30/2018    Isabela Panchal    MRN: [id number]           Patient Information     Date Of Birth          1970        Visit Information        Provider Department      10/30/2018 9:00 AM Felisha Briggs MD [facility]        Today's Diagnoses     Moderate recurrent major depression (H)    -  1    Generalized anxiety disorder        Essential hypertension with goal blood pressure less than 140/90        High blood triglycerides        Impaired fasting glucose        Postconcussion syndrome        Vitamin D deficiency disease        Family history of hypothyroidism        Need for hepatitis C screening test        Moderate recurrent major depression          Care Instructions    You can contact our clinic anytime to schedule an appointment with Man Young, our behavioralist here at [facility].            Follow-ups after your visit        Additional Services     MENTAL HEALTH REFERRAL  - Adult; Outpatient Treatment; Behavioral Health Home; Other: Not Listed - Enter Referral Details in Scheduling Comments Below       All scheduling is subject to the client's specific insurance plan & benefits, provider/location availability, and provider clinical specialities.  Please arrive 15 minutes early for your first appointment and bring your completed paperwork.    Please be aware that coverage of these services is subject to the terms and limitations of your health insurance plan.  Call member services at your health plan with any benefit or coverage questions.                            Your next 10 appointments already scheduled     Nov 01, 2018  4:15 PM CDT   Pool Evaluation with Mira Lo PT   [facility] ([facility])    [address] [place] [id number]   [phone]            Nov 07, 2018  8:30 AM CST   XR LUMBAR EPIDURAL INJECTION with UCXR3,  IMAGING NURSE,  NEURO  [facility] ([facility])    [address]  1st Floor  [facility] [address] [phone]           How do I prepare for my exam? (Food and drink instructions) No Food and Drink Restrictions.  How do I prepare for my exam? (Other instructions) * If you take Coumadin (or any other blood thinners) you may need to stop taking them up to 5 days prior to the exam. Talk to your doctor before stopping. * If you take Plavix, Ticlid, Pletal or Persantine, please ask your doctor if you should stop these medicines. You may need extra tests on the morning of your scan. * If you take Xarelto (Rivaroxaban), you may need to stop taking it 24 hours before treatment. Talk to your doctor before stopping this medicine.  What should I wear: Wear comfortable clothes.  How long does the exam take: Injections take about 30 to 45 minutes. Most people spend up to 2 hours in the clinic or hospital.  What should I bring: Please bring any scans or X-rays taken at other hospitals, if similar tests were done. Also bring a list of your medicines, including vitamins, minerals and over-the-counter drugs. It is safest to leave personal items at home. You will need a  : Plan to have someone drive you home afterward.  What do I need to tell my doctor: Tell your doctor in advance: * If you are allergic to X-ray dye (contrast fluid). * If you may be pregnant.  What should I do after the exam: You may have slight cramping during this exam. The cramps should go away afterward. You may have some spotting after the exam.  What is this test: A spinal shot is done in or near the spine. You may receive steroid medicine (to reduce swelling) or contrast fluid (dye that makes the area show up more clearly on X-rays). A nerve root shot is a shot into the nerve near the spine. It may reduce inflammation near the nerve root or spinal cord and reduce pain in the arm or leg.  Who should I call with  "questions: If you have any questions, please call the Imaging Department where you will have your exam. Directions, parking instructions, and other information is available on our website, [email or web address].            2018  2:20 PM CST   (Arrive by 2:05 PM)   Return Visit with Man Gilbert MD   [facility] ([facility])    [address] [place] [address] [phone]              Who to contact     If you have questions or need follow up information about today's clinic visit or your schedule please contact [facility] directly at [phone].  Normal or non-critical lab and imaging results will be communicated to you by MyChart, letter or phone within 4 business days after the clinic has received the results. If you do not hear from us within 7 days, please contact the clinic through MyChart or phone. If you have a critical or abnormal lab result, we will notify you by phone as soon as possible.  Submit refill requests through [employer] or call your pharmacy and they will forward the refill request to us. Please allow 3 business days for your refill to be completed.          Additional Information About Your Visit        MyChart Information     [employer] lets you send messages to your doctor, view your test results, renew your prescriptions, schedule appointments and more. To sign up, go to [email or web address] . Click on \"Log in\" on the left side of the screen, which will take you to the Welcome page. Then click on \"Sign up Now\" on the right side of the page.     You will be asked to enter the access code listed below, as well as some personal information. Please follow the directions to create your username and password.     Your access code is: [email or web address]  Expires: 2018  8:07 AM     Your access code will  in 90 days. If you need help or a new code, please call your [place] clinic or " [phone].        Care EveryWhere ID     This is your Care EveryWhere ID. This could be used by other organizations to access your [place] medical records  [id number]        Your Vitals Were     Pulse Temperature Pulse Oximetry BMI (Body Mass Index)          73 99.1  F (37.3  C) (Oral) 100% 32.78 kg/m2         Blood Pressure from Last 3 Encounters:   10/30/18 126/74   09/21/18 137/84   09/04/18 144/85    Weight from Last 3 Encounters:   10/30/18 197 lb (89.4 kg)   10/16/18 194 lb (88 kg)   09/07/18 194 lb (88 kg)              We Performed the Following     Albumin Random Urine Quantitative with Creat Ratio     Comprehensive metabolic panel     Hemoglobin A1c     Hepatitis C Screen Reflex to HCV RNA Quant and Genotype     Lipid panel reflex to direct LDL Fasting     MENTAL HEALTH REFERRAL  - Adult; Outpatient Treatment; Behavioral Health Home; Other: Not Listed - Enter Referral Details in Scheduling Comments Below     Vitamin D Deficiency          Today's Medication Changes          These changes are accurate as of 10/30/18 10:01 AM.  If you have any questions, ask your nurse or doctor.               These medicines have changed or have updated prescriptions.        Dose/Directions    venlafaxine 150 MG 24 hr capsule   Commonly known as:  EFFEXOR-XR   This may have changed:  See the new instructions.   Used for:  Generalized anxiety disorder, Moderate recurrent major depression (H)   Changed by:  Felisha Briggs MD        Dose:  150 mg   Take 1 capsule (150 mg) by mouth daily   Quantity:  90 capsule   Refills:  3            Where to get your medicines      These medications were sent to [facility] [address]  [address] [facility] [address]     Phone:  [phone]     venlafaxine 150 MG 24 hr capsule                Primary Care Provider Office Phone # Fax #    Felisha Briggs [phone] [address]  [facility] [address]         Equal Access to Services     [facility] : Hadii mihir hudson mychalrui Mimi, waaxda luqadaha, qaybta kalizethtere walker. So [facility] [phone].    ATENCIÓN: Si habla español, tiene a snyder disposición servicios gratuitos de asistencia lingüística. Debbieame al [phone].    We comply with applicable federal civil rights laws and [place] laws. We do not discriminate on the basis of race, color, national origin, age, disability, sex, sexual orientation, or gender identity.            Thank you!     Thank you for choosing [facility]  for your care. Our goal is always to provide you with excellent care. Hearing back from our patients is one way we can continue to improve our services. Please take a few minutes to complete the written survey that you may receive in the mail after your visit with us. Thank you!             Your Updated Medication List - Protect others around you: Learn how to safely use, store and throw away your medicines at www.disposemymeds.org.          This list is accurate as of 10/30/18 10:01 AM.  Always use your most recent med list.                   Brand Name Dispense Instructions for use Diagnosis    albuterol 108 (90 Base) MCG/ACT inhaler    PROAIR HFA/PROVENTIL HFA/VENTOLIN HFA    2 Inhaler    Inhale 2 puffs into the lungs every 6 hours as needed for shortness of breath / dyspnea or wheezing    Mild intermittent asthma without complication       calcitonin (salmon) 200 UNIT/ACT nasal spray    MIACALCIN    1 Bottle    Spray 1 spray into one nostril alternating nostrils daily Alternate nostril each day.    Chronic midline thoracic back pain       CALCIUM + D PO      Take  by mouth daily.        Capsaicin 0.1 % cream     42.5 g    Externally apply 1 g topically 2 times daily as needed    Right anterior knee pain       DEPAKOTE PO           fenofibrate 48 MG tablet     90 tablet    Take 1 tablet (48 mg) by mouth daily    High blood  triglycerides       fluticasone 50 MCG/ACT spray    FLONASE    16 g    Spray 2 sprays into both nostrils daily    Other seasonal allergic rhinitis       gabapentin 300 MG capsule    NEURONTIN     Indications: Headache Take 300mg po BID x 7 days, then increase to 300mg po TID x 7 days, then increase to 600mg po TID.        lisinopril 10 MG tablet    PRINIVIL/ZESTRIL    90 tablet    Take 1 tablet (10 mg) by mouth daily    Essential hypertension with goal blood pressure less than 140/90       loratadine-pseudoePHEDrine  MG per 24 hr tablet    CLARITIN-D 24-hour    30 tablet    Take 1 tablet by mouth daily    Environmental allergies       LORazepam 1 MG tablet    ATIVAN    2 tablet    Take 2 tablets (2 mg) by mouth once as needed for anxiety (take prior to MRI for claustrophobia)    Claustrophobia       meloxicam 15 MG tablet    MOBIC    30 tablet    Take 1 tablet (15 mg) by mouth daily    Patellofemoral pain syndrome of left knee       methocarbamol 500 MG tablet    ROBAXIN    30 tablet    Take 2 tablets (1,000 mg) by mouth 3 times daily as needed for muscle spasms    Thoracic degenerative disc disease       MIRENA (52 MG) 20 MCG/24HR IUD   Generic drug:  levonorgestrel      USE FOR UP TO 5 YEARS THEN REMOVE        order for DME     1 Device    Counter force forearm brace    Lateral epicondylitis of left elbow       * tiZANidine 4 MG tablet    ZANAFLEX    60 tablet    Take 1-2 tablets (4-8 mg) by mouth 3 times daily as needed    Degeneration of lumbar or lumbosacral intervertebral disc, Thoracic degenerative disc disease       * tiZANidine 4 MG tablet    ZANAFLEX    60 tablet    Take 1-2 tablets (4-8 mg) by mouth 3 times daily as needed    Lumbar paraspinal muscle spasm       * tiZANidine 4 MG tablet    ZANAFLEX    30 tablet    Take 1-2 tablets (4-8 mg) by mouth nightly as needed    Lumbar herniated disc       traZODone 100 MG tablet    DESYREL    90 tablet    Take 1 tablet (100 mg) by mouth At Bedtime Please  profile.    Primary insomnia       venlafaxine 150 MG 24 hr capsule    EFFEXOR-XR    90 capsule    Take 1 capsule (150 mg) by mouth daily    Generalized anxiety disorder, Moderate recurrent major depression (H)       * Notice:  This list has 3 medication(s) that are the same as other medications prescribed for you. Read the directions carefully, and ask your doctor or other care provider to review them with you.

## 2018-10-31 LAB
DEPRECATED CALCIDIOL+CALCIFEROL SERPL-MC: 26 UG/L (ref 20–75)
HCV AB SERPL QL IA: NONREACTIVE

## 2018-10-31 ASSESSMENT — ANXIETY QUESTIONNAIRES: GAD7 TOTAL SCORE: 6

## 2018-11-02 PROBLEM — E78.5 HYPERLIPIDEMIA LDL GOAL <160: Status: ACTIVE | Noted: 2018-11-02

## 2018-11-02 RX ORDER — ATORVASTATIN CALCIUM 40 MG/1
40 TABLET, FILM COATED ORAL DAILY
Qty: 90 TABLET | Refills: 1 | Status: SHIPPED | OUTPATIENT
Start: 2018-11-02 | End: 2019-03-26

## 2018-11-02 NOTE — PROGRESS NOTES
Hello!  It was a pleasure to see you in clinic!  Thank you for getting labs done.     Your microalbumen is normal, which is great news. This means you do not have protein in the urine, and that your kidneys are currently functioning well. Keeping blood pressure and blood fats low is the best way to preserve your kidney function over your lifetime.     Your vitamin D is normal but on the low end of normal. Please take 2000 units daily.     Your screening test was negative for Hepatitis C, which is great news! You have NOT been infected with this liver disease.  You do not need any further testing for Hepatitis C.     The testing of your blood sugar, kidney function, liver function and electrolytes was normal.     Your cholesterol is still pretty abnormal although your triglycerides are much better. I do recommend starting a cholesterol lowering medication called atorvastatin. I'll send the new prescription to your pharmacy. Please take it at night.  I recommend rechecking your fasting cholesterol test again in about 3 months.    If you have any questions, please contact the clinic or schedule an appointment with me, thank you!    Sincerely,  Dr. Felisha Briggs MD  11/2/2018

## 2018-11-07 ENCOUNTER — RADIANT APPOINTMENT (OUTPATIENT)
Dept: GENERAL RADIOLOGY | Facility: CLINIC | Age: 48
End: 2018-11-07
Attending: PREVENTIVE MEDICINE
Payer: COMMERCIAL

## 2018-11-07 VITALS
HEART RATE: 85 BPM | DIASTOLIC BLOOD PRESSURE: 82 MMHG | OXYGEN SATURATION: 98 % | RESPIRATION RATE: 16 BRPM | TEMPERATURE: 98.9 F | SYSTOLIC BLOOD PRESSURE: 148 MMHG

## 2018-11-07 DIAGNOSIS — M51.26 LUMBAR HERNIATED DISC: ICD-10-CM

## 2018-11-07 RX ORDER — METHYLPREDNISOLONE ACETATE 80 MG/ML
80 INJECTION, SUSPENSION INTRA-ARTICULAR; INTRALESIONAL; INTRAMUSCULAR; SOFT TISSUE ONCE
Status: COMPLETED | OUTPATIENT
Start: 2018-11-07 | End: 2018-11-07

## 2018-11-07 RX ORDER — LIDOCAINE HYDROCHLORIDE 10 MG/ML
30 INJECTION, SOLUTION EPIDURAL; INFILTRATION; INTRACAUDAL; PERINEURAL ONCE
Status: COMPLETED | OUTPATIENT
Start: 2018-11-07 | End: 2018-11-07

## 2018-11-07 RX ORDER — IOPAMIDOL 408 MG/ML
20 INJECTION, SOLUTION INTRATHECAL ONCE
Status: COMPLETED | OUTPATIENT
Start: 2018-11-07 | End: 2018-11-07

## 2018-11-07 RX ORDER — BUPIVACAINE HYDROCHLORIDE 5 MG/ML
10 INJECTION, SOLUTION PERINEURAL ONCE
Status: COMPLETED | OUTPATIENT
Start: 2018-11-07 | End: 2018-11-07

## 2018-11-07 RX ADMIN — IOPAMIDOL 2 ML: 408 INJECTION, SOLUTION INTRATHECAL at 08:44

## 2018-11-07 RX ADMIN — BUPIVACAINE HYDROCHLORIDE 2 ML: 5 INJECTION, SOLUTION PERINEURAL at 08:44

## 2018-11-07 RX ADMIN — METHYLPREDNISOLONE ACETATE 80 MG: 80 INJECTION, SUSPENSION INTRA-ARTICULAR; INTRALESIONAL; INTRAMUSCULAR; SOFT TISSUE at 08:44

## 2018-11-07 RX ADMIN — LIDOCAINE HYDROCHLORIDE 5 ML: 10 INJECTION, SOLUTION EPIDURAL; INFILTRATION; INTRACAUDAL; PERINEURAL at 08:44

## 2018-11-07 NOTE — PROGRESS NOTES
Pt did well with the procedure.  No complications. VSS. AVS given and pt escorted to the waiting room.    Vaishali, Imaging Nurse

## 2018-11-07 NOTE — MR AVS SNAPSHOT
MRN:[id number]                      After Visit Summary   11/7/2018    Isabela Panchal    MRN: [id number]           Visit Information        Provider Department      11/7/2018 8:30 AM  NEURO RAD;  IMAGING NURSE; XR3 [facility]           Review of your medicines          These changes are accurate as of 11/7/18  8:18 AM.  If you have any questions, ask your nurse or doctor.               CONTINUE these medicines which have NOT CHANGED        Dose / Directions    albuterol 108 (90 Base) MCG/ACT inhaler   Commonly known as:  PROAIR HFA/PROVENTIL HFA/VENTOLIN HFA   Used for:  Mild intermittent asthma without complication        Dose:  2 puff   Inhale 2 puffs into the lungs every 6 hours as needed for shortness of breath / dyspnea or wheezing   Quantity:  2 Inhaler   Refills:  11       atorvastatin 40 MG tablet   Commonly known as:  LIPITOR   Used for:  High blood triglycerides, Hyperlipidemia LDL goal <160        Dose:  40 mg   Take 1 tablet (40 mg) by mouth daily   Quantity:  90 tablet   Refills:  1       calcitonin (salmon) 200 UNIT/ACT nasal spray   Commonly known as:  MIACALCIN   Used for:  Chronic midline thoracic back pain        Dose:  1 spray   Spray 1 spray into one nostril alternating nostrils daily Alternate nostril each day.   Quantity:  1 Bottle   Refills:  1       CALCIUM + D PO        Take  by mouth daily.   Refills:  0       Capsaicin 0.1 % cream   Used for:  Right anterior knee pain        Dose:  1 g   Externally apply 1 g topically 2 times daily as needed   Quantity:  42.5 g   Refills:  2       DEPAKOTE PO        Refills:  0       fenofibrate 48 MG tablet   Used for:  High blood triglycerides        Dose:  48 mg   Take 1 tablet (48 mg) by mouth daily   Quantity:  90 tablet   Refills:  3       fluticasone 50 MCG/ACT spray   Commonly known as:  FLONASE   Used for:  Other seasonal allergic rhinitis        Dose:  2 spray   Spray 2 sprays into both nostrils  daily   Quantity:  16 g   Refills:  9       gabapentin 300 MG capsule   Commonly known as:  NEURONTIN        Indications: Headache Take 300mg po BID x 7 days, then increase to 300mg po TID x 7 days, then increase to 600mg po TID.   Refills:  0       lisinopril 10 MG tablet   Commonly known as:  PRINIVIL/ZESTRIL   Used for:  Essential hypertension with goal blood pressure less than 140/90        Dose:  10 mg   Take 1 tablet (10 mg) by mouth daily   Quantity:  90 tablet   Refills:  3       loratadine-pseudoePHEDrine  MG per 24 hr tablet   Commonly known as:  CLARITIN-D 24-hour   Used for:  Environmental allergies        Dose:  1 tablet   Take 1 tablet by mouth daily   Quantity:  30 tablet   Refills:  3       LORazepam 1 MG tablet   Commonly known as:  ATIVAN   Used for:  Claustrophobia        Dose:  2 mg   Take 2 tablets (2 mg) by mouth once as needed for anxiety (take prior to MRI for claustrophobia)   Quantity:  2 tablet   Refills:  0       meloxicam 15 MG tablet   Commonly known as:  MOBIC   Used for:  Patellofemoral pain syndrome of left knee        Dose:  15 mg   Take 1 tablet (15 mg) by mouth daily   Quantity:  30 tablet   Refills:  0       methocarbamol 500 MG tablet   Commonly known as:  ROBAXIN   Used for:  Thoracic degenerative disc disease        Dose:  1000 mg   Take 2 tablets (1,000 mg) by mouth 3 times daily as needed for muscle spasms   Quantity:  30 tablet   Refills:  1       MIRENA (52 MG) 20 MCG/24HR IUD   Generic drug:  levonorgestrel        USE FOR UP TO 5 YEARS THEN REMOVE   Refills:  0       order for DME   Used for:  Lateral epicondylitis of left elbow        Counter force forearm brace   Quantity:  1 Device   Refills:  0       * tiZANidine 4 MG tablet   Commonly known as:  ZANAFLEX   Used for:  Degeneration of lumbar or lumbosacral intervertebral disc, Thoracic degenerative disc disease        Dose:  4-8 mg   Take 1-2 tablets (4-8 mg) by mouth 3 times daily as needed   Quantity:  60  tablet   Refills:  1       * tiZANidine 4 MG tablet   Commonly known as:  ZANAFLEX   Used for:  Lumbar paraspinal muscle spasm        Dose:  4-8 mg   Take 1-2 tablets (4-8 mg) by mouth 3 times daily as needed   Quantity:  60 tablet   Refills:  1       * tiZANidine 4 MG tablet   Commonly known as:  ZANAFLEX   Used for:  Lumbar herniated disc        Dose:  4-8 mg   Take 1-2 tablets (4-8 mg) by mouth nightly as needed   Quantity:  30 tablet   Refills:  1       traZODone 100 MG tablet   Commonly known as:  DESYREL   Used for:  Primary insomnia        Dose:  100 mg   Take 1 tablet (100 mg) by mouth At Bedtime Please profile.   Quantity:  90 tablet   Refills:  3       venlafaxine 150 MG 24 hr capsule   Commonly known as:  EFFEXOR-XR   Used for:  Generalized anxiety disorder, Moderate recurrent major depression (H)        Dose:  150 mg   Take 1 capsule (150 mg) by mouth daily   Quantity:  90 capsule   Refills:  3       * Notice:  This list has 3 medication(s) that are the same as other medications prescribed for you. Read the directions carefully, and ask your doctor or other care provider to review them with you.             Protect others around you: Learn how to safely use, store and throw away your medicines at www.disposemymeds.org.         Follow-ups after your visit        Your next 10 appointments already scheduled     Nov 07, 2018  8:30 AM CST   XR LUMBAR EPIDURAL INJECTION with UCXR3, ABIOLA IMAGING NURSE, ABIOLA NEURO RAD   [facility] ([facility])    [address] [place] [id number] [phone]           How do I prepare for my exam? (Food and drink instructions) No Food and Drink Restrictions.  How do I prepare for my exam? (Other instructions) * If you take Coumadin (or any other blood thinners) you may need to stop taking them up to 5 days prior to the exam. Talk to your doctor before stopping. * If you take Plavix, Ticlid, Pletal or Persantine,  please ask your doctor if you should stop these medicines. You may need extra tests on the morning of your scan. * If you take Xarelto (Rivaroxaban), you may need to stop taking it 24 hours before treatment. Talk to your doctor before stopping this medicine.  What should I wear: Wear comfortable clothes.  How long does the exam take: Injections take about 30 to 45 minutes. Most people spend up to 2 hours in the clinic or hospital.  What should I bring: Please bring any scans or X-rays taken at other hospitals, if similar tests were done. Also bring a list of your medicines, including vitamins, minerals and over-the-counter drugs. It is safest to leave personal items at home. You will need a  : Plan to have someone drive you home afterward.  What do I need to tell my doctor: Tell your doctor in advance: * If you are allergic to X-ray dye (contrast fluid). * If you may be pregnant.  What should I do after the exam: You may have slight cramping during this exam. The cramps should go away afterward. You may have some spotting after the exam.  What is this test: A spinal shot is done in or near the spine. You may receive steroid medicine (to reduce swelling) or contrast fluid (dye that makes the area show up more clearly on X-rays). A nerve root shot is a shot into the nerve near the spine. It may reduce inflammation near the nerve root or spinal cord and reduce pain in the arm or leg.  Who should I call with questions: If you have any questions, please call the Imaging Department where you will have your exam. Directions, parking instructions, and other information is available on our website, [email or web address].            Nov 09, 2018  2:00 PM CST   New Visit with CAPRI Cardenas   [facility] ([facility])    [address] [place] [address] [phone]            Nov 30, 2018  2:20 PM CST   (Arrive by 2:05 PM)   Return Visit with Man Gilbert MD    [facility] ([facility])    [address]  4th Floor  [facility] [address] [phone]               Care Instructions        Further instructions from your care team       Discharge Instructions for Epidural Steroid Injection/Nerve Block    Care of injection site:    If you have new numbness down your leg after the injection, this may last up to 4-6 hours, but should go away. You may need help with walking until your leg feels normal.    Over the next 24 to 48 hours, pain at the injection site may increase before it gets better.    For the next 48 hours, use ice packs for 15 minutes,3-4 times a day for pain relief.    If you have a bandage, you may remove it the next morning     Do not submerge injection site in water for 24 hours, (no baths. pools, hot tubs). Showers are OK.            Activity:    You should relax today and then you may return to your regular activity tomorrow.    You may eat a normal diet.    Medicines:    Restart all your medicines tomorrow at your regular dose, including Coumadin (Warfarin).    If you are restarting Coumadin, talk to your doctor about having your INR checked.    If you received steroids: The steroid should help reduce swelling and pain. This may take from 3-14 days. Pain from the shot will be mild and go away in 2-3 days.     Common side effects may include:    Insomnia    Heartburn    Flushed face    Water retention    Increased appetite    Increased blood sugar      If you have diabetes, watch your blood sugar closely, If needed, call your doctor to help control your blood sugar.    Some patients will get lasting relief from a single injection. Others may require additional injections to get results.     Call your doctor or go to the Emergency Room if you have new severe pain or fever.     Additional Information About Your Visit        Care EveryWhere ID     This is your Care EveryWhere ID. This could be used by other  organizations to access your [facility] medical records  [id number]        Your Vitals Were     Blood Pressure Pulse Temperature Respirations Pulse Oximetry       141/82 101 98.9  F (37.2  C) (Oral) 16 98%        Primary Care Provider Office Phone # Fax #    Felisha Briggs [phone]      Equal Access to Services     [facility]: Hadii aad ku hadasho Soomaali, waaxda luqadaha, qaybta kaalmada adeegyada, waxay melaniain hayhailen adevalerie tolentino labyroneden . So [facility] [phone].    ATENCIÓN: Si habla español, tiene a snyder disposición servicios gratuitos de asistencia lingüística. Debbieame al [phone].    We comply with applicable federal civil rights laws and [place] laws. We do not discriminate on the basis of race, color, national origin, age, disability, sex, sexual orientation, or gender identity.            Thank you!     Thank you for choosing [facility] for your care. Our goal is always to provide you with excellent care. Hearing back from our patients is one way we can continue to improve our services. Please take a few minutes to complete the written survey that you may receive in the mail after you visit with us. Thank you!             Medication List: This is a list of all your medications and when to take them. Check marks below indicate your daily home schedule. Keep this list as a reference.          This list is accurate as of 11/7/18  8:18 AM.  Always use your most recent med list.               Medications           Morning Afternoon Evening Bedtime As Needed    albuterol 108 (90 Base) MCG/ACT inhaler   Commonly known as:  PROAIR HFA/PROVENTIL HFA/VENTOLIN HFA   Inhale 2 puffs into the lungs every 6 hours as needed for shortness of breath / dyspnea or wheezing                                atorvastatin 40 MG tablet   Commonly known as:  LIPITOR   Take 1 tablet (40 mg) by mouth daily                                calcitonin (salmon) 200 UNIT/ACT nasal spray   Commonly known as:   MIACALCIN   Spray 1 spray into one nostril alternating nostrils daily Alternate nostril each day.                                CALCIUM + D PO   Take  by mouth daily.                                Capsaicin 0.1 % cream   Externally apply 1 g topically 2 times daily as needed                                DEPAKOTE PO                                fenofibrate 48 MG tablet   Take 1 tablet (48 mg) by mouth daily                                fluticasone 50 MCG/ACT spray   Commonly known as:  FLONASE   Spray 2 sprays into both nostrils daily                                gabapentin 300 MG capsule   Commonly known as:  NEURONTIN   Indications: Headache Take 300mg po BID x 7 days, then increase to 300mg po TID x 7 days, then increase to 600mg po TID.                                lisinopril 10 MG tablet   Commonly known as:  PRINIVIL/ZESTRIL   Take 1 tablet (10 mg) by mouth daily                                loratadine-pseudoePHEDrine  MG per 24 hr tablet   Commonly known as:  CLARITIN-D 24-hour   Take 1 tablet by mouth daily                                LORazepam 1 MG tablet   Commonly known as:  ATIVAN   Take 2 tablets (2 mg) by mouth once as needed for anxiety (take prior to MRI for claustrophobia)                                meloxicam 15 MG tablet   Commonly known as:  MOBIC   Take 1 tablet (15 mg) by mouth daily                                methocarbamol 500 MG tablet   Commonly known as:  ROBAXIN   Take 2 tablets (1,000 mg) by mouth 3 times daily as needed for muscle spasms                                MIRENA (52 MG) 20 MCG/24HR IUD   USE FOR UP TO 5 YEARS THEN REMOVE   Generic drug:  levonorgestrel                                order for DME   Counter force forearm brace                                * tiZANidine 4 MG tablet   Commonly known as:  ZANAFLEX   Take 1-2 tablets (4-8 mg) by mouth 3 times daily as needed                                * tiZANidine 4 MG tablet   Commonly known as:   ZANAFLEX   Take 1-2 tablets (4-8 mg) by mouth 3 times daily as needed                                * tiZANidine 4 MG tablet   Commonly known as:  ZANAFLEX   Take 1-2 tablets (4-8 mg) by mouth nightly as needed                                traZODone 100 MG tablet   Commonly known as:  DESYREL   Take 1 tablet (100 mg) by mouth At Bedtime Please profile.                                venlafaxine 150 MG 24 hr capsule   Commonly known as:  EFFEXOR-XR   Take 1 capsule (150 mg) by mouth daily                                * Notice:  This list has 3 medication(s) that are the same as other medications prescribed for you. Read the directions carefully, and ask your doctor or other care provider to review them with you.

## 2018-11-09 ENCOUNTER — TELEPHONE (OUTPATIENT)
Dept: BEHAVIORAL HEALTH | Facility: CLINIC | Age: 48
End: 2018-11-09

## 2018-11-09 NOTE — TELEPHONE ENCOUNTER
Patient had initial appointment with the [facility] today.  No-show.  Reached out and left a message of support.  Noted had reviewed her records and was familiar with her chronic medical issues but also family stressors.  Acknowledged ambivalence about seeing a therapist.  Encouraged to contact [facility] by phone or to reschedule.

## 2018-11-26 ENCOUNTER — TELEPHONE (OUTPATIENT)
Dept: PSYCHIATRY | Facility: CLINIC | Age: 48
End: 2018-11-26
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
## 2018-12-02 ENCOUNTER — OFFICE VISIT (OUTPATIENT)
Dept: URGENT CARE | Facility: URGENT CARE | Age: 48
End: 2018-12-02
Payer: COMMERCIAL

## 2018-12-02 VITALS
DIASTOLIC BLOOD PRESSURE: 87 MMHG | BODY MASS INDEX: 32.45 KG/M2 | OXYGEN SATURATION: 97 % | WEIGHT: 195 LBS | SYSTOLIC BLOOD PRESSURE: 138 MMHG | TEMPERATURE: 97 F | HEART RATE: 100 BPM

## 2018-12-02 DIAGNOSIS — M77.8 TENDONITIS OF RIGHT HAND: Primary | ICD-10-CM

## 2018-12-02 PROCEDURE — 99213 OFFICE O/P EST LOW 20 MIN: CPT | Performed by: PHYSICIAN ASSISTANT

## 2018-12-02 NOTE — MR AVS SNAPSHOT
After Visit Summary   12/2/2018    Isabela Panchal    MRN: [id number]           Patient Information     Date Of Birth          1970        Visit Information        Provider Department      12/2/2018 6:10 PM Nohelia Reddy PA-C [facility]        Today's Diagnoses     Inflammatory monoarthritis of right hand    -  1    Tendonitis of right hand          Care Instructions      Tendonitis  A tendon is the thick fibrous cord that joins muscle to bone and allows joints to move. When a tendon becomes inflamed, it is called tendonitis. This can occur from overuse, injury, or infection. This usually involves the shoulders, forearm, wrist, hands and feet. Symptoms include pain, swelling and tenderness to the touch. Moving the joint increases the pain.  It takes 4 to 6 weeks or more for tendonitis to heal. It is treated by preventing motion of the tendon, occasionally with a splint or brace, and the use of anti-inflammatory medicine.  Home care    Some people find relief with ice packs. These can be crushed or cubed ice in a plastic bag or a bag of frozen vegetables wrapped in a thin towel. Other people get better relief with heat. This can include a hot shower, hot bath, or a moist towel warmed in a microwave. Try each and use the method that feels best, for 15 to 20 minutes several times a day.    Rest the inflamed joint and protect it from movement.    You may use over-the-counter ibuprofen or naproxen to treat pain and inflammation, unless another medicine was prescribed. If you can't take these medicines, acetaminophen may help with the pain, but does not treat inflammation. If you have chronic liver or kidney disease or ever had a stomach ulcer or gastrointestinal bleeding, talk with your doctor before using these medicines.    As your symptoms improve, begin gradual motion at the involved joint.  Follow-up care  Follow up with your healthcare provider if you are not  improving after 5 to 7 days of treatment.  When to seek medical advice  Call your healthcare provider right away if any of these occur:    Redness over the painful area    Increasing pain or swelling at the joint    Fever lasting 24 to 48 hours or chills, or as advised by your healthcare provider   Date Last Reviewed: 5/1/2018 2000-2018 [employer]. [address] [place] [address]. All rights reserved. This information is not intended as a substitute for professional medical care. Always follow your healthcare professional's instructions.                Follow-ups after your visit        Your next 10 appointments already scheduled     Dec 05, 2018 12:45 PM CST   Office Visit with [facility]  1   [facility]    [address] [place] [id number] [phone]           Bring a current list of meds and any records pertaining to this visit. For Physicals, please bring immunization records and any forms needing to be filled out. Please arrive 10 minutes early to complete paperwork.            Dec 05, 2018  1:00 PM CST   Colposcopy with Shruti Naranjo MD   [facility] ([facility])    [address] [place] [id number] [phone]            Jan 16, 2019  1:00 PM CST   Bipolar Evaluation with Nas Grimes MD   [facility] ([facility])    [place] [id number]  [address] [place] [id number] [phone]              Who to contact     If you have questions or need follow up information about today's clinic visit or your schedule please contact [facility] directly at [phone].  Normal or non-critical lab and imaging results will be communicated to you by MyChart, letter or phone within 4 business days after the clinic has received the results. If you do not hear from us within 7 days, please contact the clinic through  [employer] or phone. If you have a critical or abnormal lab result, we will notify you by phone as soon as possible.  Submit refill requests through [employer] or call your pharmacy and they will forward the refill request to us. Please allow 3 business days for your refill to be completed.          Additional Information About Your Visit        Care EveryWhere ID     This is your Care EveryWhere ID. This could be used by other organizations to access your [place] medical records  [id number]        Your Vitals Were     Pulse Temperature Pulse Oximetry BMI (Body Mass Index)          100 97  F (36.1  C) (Tympanic) 97% 32.45 kg/m2         Blood Pressure from Last 3 Encounters:   12/02/18 138/87   11/07/18 148/82   10/30/18 126/74    Weight from Last 3 Encounters:   12/02/18 195 lb (88.5 kg)   10/30/18 197 lb (89.4 kg)   10/16/18 194 lb (88 kg)              Today, you had the following     No orders found for display         Today's Medication Changes          These changes are accurate as of 12/2/18  6:56 PM.  If you have any questions, ask your nurse or doctor.               Start taking these medicines.        Dose/Directions    order for DME   Used for:  Tendonitis of right hand   Started by:  Nohelia Reddy PA-C        Equipment being ordered: wrist pain   Quantity:  1 Device   Refills:  0            Where to get your medicines      Some of these will need a paper prescription and others can be bought over the counter.  Ask your nurse if you have questions.     Bring a paper prescription for each of these medications     order for DME                Primary Care Provider Office Phone # Fax #    Felisha Briggs [phone] [address] [facility] [address]        Equal Access to Services     ZARI GILLESPIE : Thu Le, [place], tere villarreal. So [facility] [phone].    ATENCIÓN: Valeria mendez,  tiene a snyder disposición servicios gratuitos de asistencia lingüística. Adelina villagomez [phone].    We comply with applicable federal civil rights laws and [place] laws. We do not discriminate on the basis of race, color, national origin, age, disability, sex, sexual orientation, or gender identity.            Thank you!     Thank you for choosing [facility]  for your care. Our goal is always to provide you with excellent care. Hearing back from our patients is one way we can continue to improve our services. Please take a few minutes to complete the written survey that you may receive in the mail after your visit with us. Thank you!             Your Updated Medication List - Protect others around you: Learn how to safely use, store and throw away your medicines at www.disposemymeds.org.          This list is accurate as of 12/2/18  6:56 PM.  Always use your most recent med list.                   Brand Name Dispense Instructions for use Diagnosis    albuterol 108 (90 Base) MCG/ACT inhaler    PROAIR HFA/PROVENTIL HFA/VENTOLIN HFA    2 Inhaler    Inhale 2 puffs into the lungs every 6 hours as needed for shortness of breath / dyspnea or wheezing    Mild intermittent asthma without complication       atorvastatin 40 MG tablet    LIPITOR    90 tablet    Take 1 tablet (40 mg) by mouth daily    High blood triglycerides, Hyperlipidemia LDL goal <160       calcitonin (salmon) 200 UNIT/ACT nasal spray    MIACALCIN    1 Bottle    Spray 1 spray into one nostril alternating nostrils daily Alternate nostril each day.    Chronic midline thoracic back pain       CALCIUM + D PO      Take  by mouth daily.        Capsaicin 0.1 % cream     42.5 g    Externally apply 1 g topically 2 times daily as needed    Right anterior knee pain       DEPAKOTE PO           fenofibrate 48 MG tablet    TRICOR    90 tablet    Take 1 tablet (48 mg) by mouth daily    High blood triglycerides       fluticasone 50 MCG/ACT nasal spray     FLONASE    16 g    Spray 2 sprays into both nostrils daily    Other seasonal allergic rhinitis       gabapentin 300 MG capsule    NEURONTIN     Indications: Headache Take 300mg po BID x 7 days, then increase to 300mg po TID x 7 days, then increase to 600mg po TID.        lisinopril 10 MG tablet    PRINIVIL/ZESTRIL    90 tablet    Take 1 tablet (10 mg) by mouth daily    Essential hypertension with goal blood pressure less than 140/90       loratadine-pseudoePHEDrine  MG 24 hr tablet    CLARITIN-D 24-HOUR    30 tablet    Take 1 tablet by mouth daily    Environmental allergies       LORazepam 1 MG tablet    ATIVAN    2 tablet    Take 2 tablets (2 mg) by mouth once as needed for anxiety (take prior to MRI for claustrophobia)    Claustrophobia       meloxicam 15 MG tablet    MOBIC    30 tablet    Take 1 tablet (15 mg) by mouth daily    Patellofemoral pain syndrome of left knee       methocarbamol 500 MG tablet    ROBAXIN    30 tablet    Take 2 tablets (1,000 mg) by mouth 3 times daily as needed for muscle spasms    Thoracic degenerative disc disease       MIRENA (52 MG) 20 MCG/24HR IUD   Generic drug:  levonorgestrel      USE FOR UP TO 5 YEARS THEN REMOVE        order for DME     1 Device    Counter force forearm brace    Lateral epicondylitis of left elbow       order for DME     1 Device    Equipment being ordered: wrist pain    Tendonitis of right hand       * tiZANidine 4 MG tablet    ZANAFLEX    60 tablet    Take 1-2 tablets (4-8 mg) by mouth 3 times daily as needed    Degeneration of lumbar or lumbosacral intervertebral disc, Thoracic degenerative disc disease       * tiZANidine 4 MG tablet    ZANAFLEX    60 tablet    Take 1-2 tablets (4-8 mg) by mouth 3 times daily as needed    Lumbar paraspinal muscle spasm       * tiZANidine 4 MG tablet    ZANAFLEX    30 tablet    Take 1-2 tablets (4-8 mg) by mouth nightly as needed    Lumbar herniated disc       traZODone 100 MG tablet    DESYREL    90 tablet    Take 1  tablet (100 mg) by mouth At Bedtime Please profile.    Primary insomnia       venlafaxine 150 MG 24 hr capsule    EFFEXOR-XR    90 capsule    Take 1 capsule (150 mg) by mouth daily    Generalized anxiety disorder, Moderate recurrent major depression (H)       * Notice:  This list has 3 medication(s) that are the same as other medications prescribed for you. Read the directions carefully, and ask your doctor or other care provider to review them with you.

## 2018-12-03 NOTE — PROGRESS NOTES
SUBJECTIVE:  Chief Complaint   Patient presents with     Urgent Care     Pt in clinic to have eval for hand swelling.     Hand Problem     Isabela Panchal is a 48 year old female presents with a chief complaint of right hand pain. Patient notes that the symptoms have worsened over the past day. She notes that the pain is worse when she uses her hand frequently. She denies injury to the area. She has a history of tendonitis. Denies numbness or tingling into her fingertips.   Past Medical History:   Diagnosis Date     Allergic rhinitis due to allergen      Anemia      Breathing difficulty      Cervical high risk HPV (human papillomavirus) test positive 02/13/2018    type 16     Chest pain      Generalised anxiety disorder 9/6/2012     Headache      Heart trouble      High blood triglycerides 2/26/2016     History of blood transfusion     unsure     Hoarseness      Hypertension      Hypoglycemia 3/10/2013     Impaired fasting glucose 8/16/2014     Insomnia 9/6/2012     Irritable bowel syndrome      Itchy eyes      MEDICAL HISTORY OF -     hx of right wrist dislocation     Migraine      Mild intermittent asthma     allergy or URI triggered      Moderate recurrent major depression (H) 9/6/2012     Nasal congestion      Numbness      Obesity, unspecified (OBESE) 8/21/2014     Paroxysmal supraventricular tachycardia (H)     4/00     PONV (postoperative nausea and vomiting)      Ringing in ears      Sleep difficulties      Sneezing      Sore throat      Vertigo      Weakness      Weight gain      Current Outpatient Prescriptions   Medication Sig Dispense Refill     albuterol (PROAIR HFA/PROVENTIL HFA/VENTOLIN HFA) 108 (90 BASE) MCG/ACT Inhaler Inhale 2 puffs into the lungs every 6 hours as needed for shortness of breath / dyspnea or wheezing 2 Inhaler 11     atorvastatin (LIPITOR) 40 MG tablet Take 1 tablet (40 mg) by mouth daily 90 tablet 1     calcitonin, salmon, (MIACALCIN) 200 UNIT/ACT nasal spray [place] 1 spray  into one nostril alternating nostrils daily Alternate nostril each day. 1 Bottle 1     Calcium Carbonate-Vitamin D (CALCIUM + D PO) Take  by mouth daily.       Capsaicin 0.1 % CREA Externally apply 1 g topically 2 times daily as needed 42.5 g 2     Divalproex Sodium (DEPAKOTE PO)        fenofibrate 48 MG tablet Take 1 tablet (48 mg) by mouth daily 90 tablet 3     fluticasone (FLONASE) 50 MCG/ACT spray Spray 2 sprays into both nostrils daily 16 g 9     gabapentin (NEURONTIN) 300 MG capsule Indications: Headache Take 300mg po BID x 7 days, then increase to 300mg po TID x 7 days, then increase to 600mg po TID.       lisinopril (PRINIVIL/ZESTRIL) 10 MG tablet Take 1 tablet (10 mg) by mouth daily 90 tablet 3     loratadine-pseudoePHEDrine (CLARITIN-D 24-HOUR)  MG per tablet Take 1 tablet by mouth daily 30 tablet 3     LORazepam (ATIVAN) 1 MG tablet Take 2 tablets (2 mg) by mouth once as needed for anxiety (take prior to MRI for claustrophobia) 2 tablet 0     meloxicam (MOBIC) 15 MG tablet Take 1 tablet (15 mg) by mouth daily 30 tablet 0     methocarbamol (ROBAXIN) 500 MG tablet Take 2 tablets (1,000 mg) by mouth 3 times daily as needed for muscle spasms 30 tablet 1     MIRENA 20 MCG/24HR IU IUD USE FOR UP TO 5 YEARS THEN REMOVE       order for DME Equipment being ordered: wrist pain 1 Device 0     order for DME Counter force forearm brace 1 Device 0     tiZANidine (ZANAFLEX) 4 MG tablet Take 1-2 tablets (4-8 mg) by mouth nightly as needed 30 tablet 1     tiZANidine (ZANAFLEX) 4 MG tablet Take 1-2 tablets (4-8 mg) by mouth 3 times daily as needed 60 tablet 1     tiZANidine (ZANAFLEX) 4 MG tablet Take 1-2 tablets (4-8 mg) by mouth 3 times daily as needed 60 tablet 1     traZODone (DESYREL) 100 MG tablet Take 1 tablet (100 mg) by mouth At Bedtime Please profile. 90 tablet 3     venlafaxine (EFFEXOR-XR) 150 MG 24 hr capsule Take 1 capsule (150 mg) by mouth daily 90 capsule 3     Social History   Substance Use Topics      Smoking status: Never Smoker     Smokeless tobacco: Never Used     Alcohol use Yes      Comment: occ-1-2x/month, 2 drinks a time       ROS:  Review of systems negative except as stated above.    EXAM:   /87  Pulse 100  Temp 97  F (36.1  C) (Tympanic)  Wt 195 lb (88.5 kg)  SpO2 97%  BMI 32.45 kg/m2  Gen: healthy,alert,no distress  Extremity: Right hand has mild inflammation between 3rd and 4th MC. No erythema, bruising.   There is not compromise to the distal circulation.  Pulses are +2 and CRT is brisk  GENERAL APPEARANCE: healthy, alert and no distress  EXTREMITIES: peripheral pulses normal  SKIN: no suspicious lesions or rashes  NEURO: Normal strength and tone, sensory exam grossly normal, mentation intact and speech normal    X-RAY was not done.    ASSESSMENT / PLAN:  1. Tendonitis of right hand  Continue with aleve  Ice   Try to limit movements by using wrist brace  - order for DME; Equipment being ordered: wrist pain  Dispense: 1 Device; Refill: 0    I have discussed the patient's diagnosis and my plan of treatment with the patient. We went over any labs or imaging. Patient is aware to come back in with worsening symptoms or if no relief despite treatment plan.  Patient verbalizes understanding. All questions were addressed and answered.   Nohelia Reddy PA-C

## 2018-12-03 NOTE — PATIENT INSTRUCTIONS
Tendonitis  A tendon is the thick fibrous cord that joins muscle to bone and allows joints to move. When a tendon becomes inflamed, it is called tendonitis. This can occur from overuse, injury, or infection. This usually involves the shoulders, forearm, wrist, hands and feet. Symptoms include pain, swelling and tenderness to the touch. Moving the joint increases the pain.  It takes 4 to 6 weeks or more for tendonitis to heal. It is treated by preventing motion of the tendon, occasionally with a splint or brace, and the use of anti-inflammatory medicine.  Home care    Some people find relief with ice packs. These can be crushed or cubed ice in a plastic bag or a bag of frozen vegetables wrapped in a thin towel. Other people get better relief with heat. This can include a hot shower, hot bath, or a moist towel warmed in a microwave. Try each and use the method that feels best, for 15 to 20 minutes several times a day.    Rest the inflamed joint and protect it from movement.    You may use over-the-counter ibuprofen or naproxen to treat pain and inflammation, unless another medicine was prescribed. If you can't take these medicines, acetaminophen may help with the pain, but does not treat inflammation. If you have chronic liver or kidney disease or ever had a stomach ulcer or gastrointestinal bleeding, talk with your doctor before using these medicines.    As your symptoms improve, begin gradual motion at the involved joint.  Follow-up care  Follow up with your healthcare provider if you are not improving after 5 to 7 days of treatment.  When to seek medical advice  Call your healthcare provider right away if any of these occur:    Redness over the painful area    Increasing pain or swelling at the joint    Fever lasting 24 to 48 hours or chills, or as advised by your healthcare provider   Date Last Reviewed: 5/1/2018 2000-2018 [employer]. [address] [place] [address], [place] [address]. All rights  reserved. This information is not intended as a substitute for professional medical care. Always follow your healthcare professional's instructions.

## 2018-12-05 ENCOUNTER — APPOINTMENT (OUTPATIENT)
Dept: FAMILY MEDICINE | Facility: CLINIC | Age: 48
End: 2018-12-05
Payer: COMMERCIAL

## 2018-12-05 ENCOUNTER — OFFICE VISIT (OUTPATIENT)
Dept: OBGYN | Facility: CLINIC | Age: 48
End: 2018-12-05
Payer: COMMERCIAL

## 2018-12-05 DIAGNOSIS — R87.810 CERVICAL HIGH RISK HPV (HUMAN PAPILLOMAVIRUS) TEST POSITIVE: Primary | ICD-10-CM

## 2018-12-05 LAB — BETA HCG QUAL IFA URINE: NEGATIVE

## 2018-12-05 PROCEDURE — 88305 TISSUE EXAM BY PATHOLOGIST: CPT | Performed by: OBSTETRICS & GYNECOLOGY

## 2018-12-05 PROCEDURE — 84703 CHORIONIC GONADOTROPIN ASSAY: CPT | Performed by: OBSTETRICS & GYNECOLOGY

## 2018-12-05 PROCEDURE — 99201 ZZC OFFICE/OUTPT VISIT, NEW, LEVEL I: CPT | Mod: 25 | Performed by: OBSTETRICS & GYNECOLOGY

## 2018-12-05 PROCEDURE — 57455 BIOPSY OF CERVIX W/SCOPE: CPT | Performed by: OBSTETRICS & GYNECOLOGY

## 2018-12-05 NOTE — PATIENT INSTRUCTIONS

## 2018-12-05 NOTE — PROGRESS NOTES
INDICATION: Isabela Panchal is a 48 year old female who presents for colposcopy.  Pap smear 2/18 was reported as NIL, HPV 16 +.  See problem list.  We discussed cervical dysplasia, degrees of dysplasia, options of management.  We discussed high-grade GENE, cervical cancer, possible progression of disease into invasive cervical cancer.  We discussed HPV.  She also notes a small bump near her anus.  Exam shows a small hemorrhoid, she will consult with colorectal surgery.          Informed consent obtained for procedure.               COLPOSCOPIC EXAM:  Cervix is fully visualized.  Squamocolumnar junction is fully visualized.  IUD string present.     Using standard technique and acetic acid application, the cervix and vagina were examined using the colposcope.  The transformation zone appeared abnormal, with minimal acetowhite epithelium most prominent anterior, and representative biopsy of 12:00 was sent.     ECC was not performed.   Monsels applied for hemostasis.    Colposcopic Impression:   Low grade.        PLAN: Post Colposcopy Instructions were given.  Call in 1 week for biopsy results.  Will advise followup depending on results.      In addition to the procedure, I spent 10 minutes with the patient, with more than 50% spent in counseling/discussing the diagnosis and treatment options.

## 2018-12-05 NOTE — MR AVS SNAPSHOT
After Visit Summary   12/5/2018    Isabela Panchal    MRN: [id number]           Patient Information     Date Of Birth          1970        Visit Information        Provider Department      12/5/2018 1:00 PM Shruti Naranjo MD [facility] [place]        Today's Diagnoses     [place] for NIL pap, HPV 16 +    -  1      Care Instructions    Colposcopy Post-Procedure Patient Instructions      You may experience any of the following for a couple of days following colposcopy:    Mild cramping    Vaginal bleeding     Vaginal discharge that looks black and clumpy    Please call your healthcare provider if you have any of the following symptoms:    Fever--Temperature greater than 100 degrees    Cramping after 48 hours    Bleeding heavier than a normal period in the first 24-48 hours    If you are bleeding and soaking more than one pad an hour    Or any other worrisome problems.    We recommend that:    You use pads, not tampons for the bleeding.    You may resume sexual activity in 2-3 days, or after bleeding stops.    You may use Tylenol or ibuprofen (Motrin or Advil) for any discomfort.      [facility] - OB/GYN : [phone]            Follow-ups after your visit        Your next 10 appointments already scheduled     Jan 16, 2019  1:00 PM CST   Bipolar Evaluation with Nas Grimes MD   Psychiatry Clinic ([facility])    [place] [id number] [address] [phone]              Who to contact     If you have questions or need follow up information about today's clinic visit or your schedule please contact [facility] directly at [phone].  Normal or non-critical lab and imaging results will be communicated to you by MyChart, letter or phone within 4 business days after the clinic has received the results. If you do not hear from us within 7 days, please contact the clinic through MyChart or phone. If  you have a critical or abnormal lab result, we will notify you by phone as soon as possible.  Submit refill requests through [employer] or call your pharmacy and they will forward the refill request to us. Please allow 3 business days for your refill to be completed.          Additional Information About Your Visit        Care EveryWhere ID     This is your Care EveryWhere ID. This could be used by other organizations to access your [facility] medical records  [id number]         Blood Pressure from Last 3 Encounters:   12/02/18 138/87   11/07/18 148/82   10/30/18 126/74    Weight from Last 3 Encounters:   12/02/18 195 lb (88.5 kg)   10/30/18 197 lb (89.4 kg)   10/16/18 194 lb (88 kg)              We Performed the Following     Beta HCG qual IFA urine     COLP CERVIX/UPPER VAGINA W BX CERVIX     Surgical pathology exam        Primary Care Provider Office Phone # Fax #    Felisha Briggs [phone]       [address] [place] [address]        Equal Access to Services     ZARI GILLESPIE : Hadii aad ku hadasho Soomaali, waaxda luqadaha, qaybta kaalmada adeegyada, waxay idiin hayaan kristina springer . So [facility] [phone].    ATENCIÓN: Si habla español, tiene a snyder disposición servicios gratuitos de asistencia lingüística. [facility] [phone].    We comply with applicable federal civil rights laws and [place] laws. We do not discriminate on the basis of race, color, national origin, age, disability, sex, sexual orientation, or gender identity.            Thank you!     Thank you for choosing [facility][note 3]  for your care. Our goal is always to provide you with excellent care. Hearing back from our patients is one way we can continue to improve our services. Please take a few minutes to complete the written survey that you may receive in the mail after your visit with us. Thank you!             Your Updated Medication List - Protect others around you: Learn how to safely use,  store and throw away your medicines at www.disposemymeds.org.          This list is accurate as of 12/5/18  2:05 PM.  Always use your most recent med list.                   Brand Name Dispense Instructions for use Diagnosis    albuterol 108 (90 Base) MCG/ACT inhaler    PROAIR HFA/PROVENTIL HFA/VENTOLIN HFA    2 Inhaler    Inhale 2 puffs into the lungs every 6 hours as needed for shortness of breath / dyspnea or wheezing    Mild intermittent asthma without complication       atorvastatin 40 MG tablet    LIPITOR    90 tablet    Take 1 tablet (40 mg) by mouth daily    High blood triglycerides, Hyperlipidemia LDL goal <160       calcitonin (salmon) 200 UNIT/ACT nasal spray    MIACALCIN    1 Bottle    Spray 1 spray into one nostril alternating nostrils daily Alternate nostril each day.    Chronic midline thoracic back pain       CALCIUM + D PO      Take  by mouth daily.        Capsaicin 0.1 % cream     42.5 g    Externally apply 1 g topically 2 times daily as needed    Right anterior knee pain       DEPAKOTE PO           fenofibrate 48 MG tablet    TRICOR    90 tablet    Take 1 tablet (48 mg) by mouth daily    High blood triglycerides       fluticasone 50 MCG/ACT nasal spray    FLONASE    16 g    Spray 2 sprays into both nostrils daily    Other seasonal allergic rhinitis       gabapentin 300 MG capsule    NEURONTIN     Indications: Headache Take 300mg po BID x 7 days, then increase to 300mg po TID x 7 days, then increase to 600mg po TID.        lisinopril 10 MG tablet    PRINIVIL/ZESTRIL    90 tablet    Take 1 tablet (10 mg) by mouth daily    Essential hypertension with goal blood pressure less than 140/90       loratadine-pseudoePHEDrine  MG 24 hr tablet    CLARITIN-D 24-HOUR    30 tablet    Take 1 tablet by mouth daily    Environmental allergies       LORazepam 1 MG tablet    ATIVAN    2 tablet    Take 2 tablets (2 mg) by mouth once as needed for anxiety (take prior to MRI for claustrophobia)    Claustrophobia        meloxicam 15 MG tablet    MOBIC    30 tablet    Take 1 tablet (15 mg) by mouth daily    Patellofemoral pain syndrome of left knee       methocarbamol 500 MG tablet    ROBAXIN    30 tablet    Take 2 tablets (1,000 mg) by mouth 3 times daily as needed for muscle spasms    Thoracic degenerative disc disease       MIRENA (52 MG) 20 MCG/24HR IUD   Generic drug:  levonorgestrel      USE FOR UP TO 5 YEARS THEN REMOVE        order for DME     1 Device    Counter force forearm brace    Lateral epicondylitis of left elbow       order for DME     1 Device    Equipment being ordered: wrist pain    Tendonitis of right hand       * tiZANidine 4 MG tablet    ZANAFLEX    60 tablet    Take 1-2 tablets (4-8 mg) by mouth 3 times daily as needed    Degeneration of lumbar or lumbosacral intervertebral disc, Thoracic degenerative disc disease       * tiZANidine 4 MG tablet    ZANAFLEX    60 tablet    Take 1-2 tablets (4-8 mg) by mouth 3 times daily as needed    Lumbar paraspinal muscle spasm       * tiZANidine 4 MG tablet    ZANAFLEX    30 tablet    Take 1-2 tablets (4-8 mg) by mouth nightly as needed    Lumbar herniated disc       traZODone 100 MG tablet    DESYREL    90 tablet    Take 1 tablet (100 mg) by mouth At Bedtime Please profile.    Primary insomnia       venlafaxine 150 MG 24 hr capsule    EFFEXOR-XR    90 capsule    Take 1 capsule (150 mg) by mouth daily    Generalized anxiety disorder, Moderate recurrent major depression (H)       * Notice:  This list has 3 medication(s) that are the same as other medications prescribed for you. Read the directions carefully, and ask your doctor or other care provider to review them with you.

## 2018-12-10 LAB — COPATH REPORT: NORMAL

## 2019-01-15 ENCOUNTER — HOSPITAL ENCOUNTER (OUTPATIENT)
Dept: PHYSICAL THERAPY | Facility: CLINIC | Age: 49
Setting detail: THERAPIES SERIES
End: 2019-01-15
Attending: PREVENTIVE MEDICINE
Payer: COMMERCIAL

## 2019-01-15 PROCEDURE — 97162 PT EVAL MOD COMPLEX 30 MIN: CPT | Mod: GP | Performed by: PHYSICAL THERAPIST

## 2019-01-16 ENCOUNTER — APPOINTMENT (OUTPATIENT)
Dept: PSYCHIATRY | Facility: CLINIC | Age: 49
End: 2019-01-16
Attending: PSYCHIATRY & NEUROLOGY
Payer: COMMERCIAL
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
## 2019-02-01 ENCOUNTER — HOSPITAL ENCOUNTER (OUTPATIENT)
Dept: PHYSICAL THERAPY | Facility: CLINIC | Age: 49
Setting detail: THERAPIES SERIES
End: 2019-02-01
Attending: PREVENTIVE MEDICINE
Payer: COMMERCIAL

## 2019-02-01 PROCEDURE — 97113 AQUATIC THERAPY/EXERCISES: CPT | Mod: GP | Performed by: PHYSICAL THERAPIST

## 2019-02-13 ENCOUNTER — HOSPITAL ENCOUNTER (OUTPATIENT)
Dept: PHYSICAL THERAPY | Facility: CLINIC | Age: 49
Setting detail: THERAPIES SERIES
End: 2019-02-13
Attending: PREVENTIVE MEDICINE
Payer: COMMERCIAL

## 2019-02-13 PROCEDURE — 97113 AQUATIC THERAPY/EXERCISES: CPT | Mod: GP | Performed by: PHYSICAL THERAPIST

## 2019-02-22 ENCOUNTER — HOSPITAL ENCOUNTER (OUTPATIENT)
Dept: PHYSICAL THERAPY | Facility: CLINIC | Age: 49
Setting detail: THERAPIES SERIES
End: 2019-02-22
Attending: PREVENTIVE MEDICINE
Payer: COMMERCIAL

## 2019-02-22 PROCEDURE — 97113 AQUATIC THERAPY/EXERCISES: CPT | Mod: GP | Performed by: PHYSICAL THERAPIST

## 2019-02-26 ENCOUNTER — OFFICE VISIT (OUTPATIENT)
Dept: ORTHOPEDICS | Facility: CLINIC | Age: 49
End: 2019-02-26
Payer: COMMERCIAL

## 2019-02-26 VITALS — WEIGHT: 190 LBS | BODY MASS INDEX: 31.65 KG/M2 | HEIGHT: 65 IN

## 2019-02-26 DIAGNOSIS — M54.16 LUMBAR BACK PAIN WITH RADICULOPATHY AFFECTING LOWER EXTREMITY: Primary | ICD-10-CM

## 2019-02-26 DIAGNOSIS — I10 ESSENTIAL HYPERTENSION WITH GOAL BLOOD PRESSURE LESS THAN 140/90: ICD-10-CM

## 2019-02-26 DIAGNOSIS — E78.1 HIGH BLOOD TRIGLYCERIDES: ICD-10-CM

## 2019-02-26 RX ORDER — DICLOFENAC SODIUM 75 MG/1
75 TABLET, DELAYED RELEASE ORAL 2 TIMES DAILY
Qty: 20 TABLET | Refills: 0 | Status: SHIPPED | OUTPATIENT
Start: 2019-02-26 | End: 2019-03-26

## 2019-02-26 ASSESSMENT — MIFFLIN-ST. JEOR: SCORE: 1487.71

## 2019-02-26 NOTE — PROGRESS NOTES
[facility]  Orthopedics  Man Zuniga,   2019     Name: Isabela Panchal  MRN: [id number]  Age: 48 year old  : 1970  Referring provider: Referred Self     Chief Complaint: Pain of the Lower Back     Date of Injury: Approximately one week ago    History of Present Illness:   Isabela Panchal is a 48 year old female who presents today for evaluation of lower back pain sustained approximately one week ago during pool physical therapy. She notes endorsing increased aching and stabbing pain in her bilateral low back that radiate down her legs. She also notes numbness and tingling into her feet and calves. Pain is barely alleviated with rest, ice, and heat therapy. She also notes pain relief with her last injection back in December. Pain is exacerbated with standing, walking, sitting, and range of motion. She denies any preexisting numbness or tingling. She denies pain relief with Gabapentin. She also notes taking Tizanidine that has helped with sleep.     Patient does not believe she is interested in repeat injection today.  She is interested in exploring more definitive options today.    Epidural steroid injections  18 L4-L5 - no improvement  18 L5-S1 - improvement     Review of Systems:   A 10-point review of systems was obtained and is negative except for as noted in the HPI.     Allergies:  Benzoin  Adhesive Tape  Allegra  Cucumber Extract  Fexofenadine Hydrochloride  Ibuprofen  Lexapro  Peanuts  Seasonal allergies    Past Medical History:  Allergic rhinitis due to allergen  Anemia  Breathing difficulty  Cervical high risk HPV  Chest pain  Generalized anxiety disorder  Headache  Heart trouble  High blood triglycerides  History of blood transfusion  Hoarseness  Hypertension  Impaired fasting glucose  Insomnia  Irritable bowel syndrome  Itchy eyes  Migraine   Mild intermittent asthma  Moderate recurrent major depression  Nasal congestion  Numbness  Obesity  Paroxysmal  "supraventricular tachycardia  PONV  Ringing in ears  Sleep difficulties  Sneezing   Sore throat  Vertigo  Weakness   Weight gain    Social History:  Patient works as a clinic coordinator. She denies tobacco use and admits to alcohol use.     Physical Examination:  Ht 1.651 m (5' 5\")   Wt 86.2 kg (190 lb)   BMI 31.62 kg/m      General  - normal appearance, mild distress laying on exam table  CV  - normal peripheral perfusion  Pulm  - normal respiratory pattern, non-labored  Musculoskeletal - lumbar spine  - stance: normal gait without limp, no obvious leg length discrepancy, normal heel and toe walk  - inspection: normal bone and joint alignment, no obvious scoliosis  - palpation: Tenderness L4 to lumbar sacral region of bilateral paraspinal region  - ROM: Limited flexion, extension, sidebending and rotation with pain  - strength: 4/5 dorsiflexion of left versus 5/5 dorsiflexion on the right. Otherwise strength intact.  - special tests:  (-) straight leg raise bilaterally  (-) slump test  Neuro  - decreased sensation of all toes, both feet.   Skin  - no ecchymosis, erythema, warmth, or induration, no obvious rash  Psych  - interactive, appropriate, normal mood and affect    Assessment:   48 year old, female with history of chronic low back pain and degenerative disc disease presenting with acute onset of low back pain with bilateral lower extremity radiculopathy and numbness. We discussed consideration for repeat epidural injection vs. referral to spine surgeon. She would like to hold off on the injection and discuss surgical options at this time. I believe this is appropriate given increased numbness of toes of bilateral feet. EMG may be warranted but will refer this to our spine team.    Plan:   - Diclofenac for pain management.  - Tizanidine QHS  - Heat/Ice as needed  - Referral to ortho spine surgery provided    I, Man Zuniga DO, have reviewed the above note and agree with the scribe's notation as " written.    Scribe Disclosure:   I, Anuj Szymanski, am serving as a scribe to document services personally performed by Man Zuniga DO at this visit, based upon the provider's statements to me. All documentation has been reviewed by the aforementioned provider prior to being entered into the official medical record.
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
## 2019-02-26 NOTE — TELEPHONE ENCOUNTER
"Requested Prescriptions   Pending Prescriptions Disp Refills     fenofibrate (TRICOR) 48 MG tablet [Pharmacy Med Name: FENOFIBRATE 48MG TABS]  Last Written Prescription Date:  2/13/2018  Last Fill Quantity: 90 tablet,  # refills: 3   Last Office Visit: 10/30/2018   Future Office Visit:      90 tablet 3     Sig: TAKE ONE TABLET BY MOUTH DAILY    Fibrates Passed - 2/26/2019  1:24 PM       Passed - Lipid panel on file in past 12 months    Recent Labs   Lab Test 10/30/18  1013  08/08/14  0746   CHOL 202*   < > 177   TRIG 413*   < > 418*   HDL 32*   < > 33*   LDL Cannot estimate LDL when triglyceride exceeds 400 mg/dL  118*   < > Cannot estimate LDL when triglyceride exceeds 400 mg/dL  118   NHDL 170*   < >  --    VLDL  --   --  Cannot estimate VLDL when triglyceride exceeds 400 mg/dL.   CHOLHDLRATIO  --   --  5.4*    < > = values in this interval not displayed.          Passed - No abnormal creatine kinase in past 12 months    No lab results found.          Passed - Recent (12 mo) or future (30 days) visit within the authorizing provider's specialty    Patient had office visit in the last 12 months or has a visit in the next 30 days with authorizing provider or within the authorizing provider's specialty.  See \"Patient Info\" tab in inbasket, or \"Choose Columns\" in Meds & Orders section of the refill encounter.           Passed - Medication is active on med list       Passed - Patient is age 18 or older       Passed - No active pregnancy on record       Passed - No positive pregnancy test in past 12 months   _________________________________________________________________________________________________________________________       lisinopril (PRINIVIL/ZESTRIL) 10 MG tablet [Pharmacy Med Name: LISINOPRIL 10MG TABS]  Last Written Prescription Date:  2/13/2018  Last Fill Quantity: 90 tablet,  # refills: 3   Last Office Visit: 10/30/2018  Future Office Visit:      90 tablet 3     Sig: TAKE ONE TABLET BY MOUTH DAILY    " "ACE Inhibitors (Including Combos) Protocol Passed - 2/26/2019  1:24 PM       Passed - Blood pressure under 140/90 in past 12 months    BP Readings from Last 3 Encounters:   12/02/18 138/87   11/07/18 148/82   10/30/18 126/74          Passed - Recent (12 mo) or future (30 days) visit within the authorizing provider's specialty    Patient had office visit in the last 12 months or has a visit in the next 30 days with authorizing provider or within the authorizing provider's specialty.  See \"Patient Info\" tab in inbasket, or \"Choose Columns\" in Meds & Orders section of the refill encounter.           Passed - Medication is active on med list       Passed - Patient is age 18 or older       Passed - No active pregnancy on record       Passed - Normal serum creatinine on file in past 12 months    Recent Labs   Lab Test 10/30/18  1013   CR 0.68          Passed - Normal serum potassium on file in past 12 months    Recent Labs   Lab Test 10/30/18  1013   POTASSIUM 4.4          Passed - No positive pregnancy test within past 12 months          "

## 2019-02-27 RX ORDER — LISINOPRIL 10 MG/1
TABLET ORAL
Qty: 90 TABLET | Refills: 0 | Status: SHIPPED | OUTPATIENT
Start: 2019-02-27 | End: 2019-06-04

## 2019-02-27 NOTE — TELEPHONE ENCOUNTER
RECORDS RECEIVED FROM: Lumbar back pain with radiculopathy affecting lower extremity. All records in system.   DATE RECEIVED: 02/27/19    NOTES STATUS DETAILS   OFFICE NOTE from referring provider Internal Dr. Zuniga 2/26/19, 9/18/18...  Dr. Gilbert 10/16/18, 9/7/18...   OFFICE NOTE from other specialist Internal PT - 2/22/19, 2/13/19...   DISCHARGE SUMMARY from hospital N/A    DISCHARGE REPORT from the ER N/A    OPERATIVE REPORT N/A    MEDICATION LIST Internal    IMPLANT RECORD/STICKER N/A    LABS     CBC/DIFF N/A    CULTURES N/A    INJECTIONS DONE IN RADIOLOGY Internal 11/7/18, 9/21/17, 8/8/13   MRI Internal 6/30/18   CT SCAN N/A    XRAYS (IMAGES & REPORTS) Internal 6/22/18, 2/26/15 (t-spine)  7/18/13 l-spine   TUMOR     PATHOLOGY  Slides & report N/A

## 2019-02-27 NOTE — TELEPHONE ENCOUNTER
Routing refill request to provider for review/approval because:  Per result note on 10/30/18 patient was given atorvastatin for cholesterol -   Is patient to be taking BOTH fenofibrate and atorvastatin? Please advise     Per 10/30/18 result notes 3 month lipid panel recheck - routing to  to call patient to schedule fasting lab appt and f/u with pcp March 2019     Lisinopril refilled per FM protocol     Bita Roberto Registered Nurse   [facility]

## 2019-02-28 DIAGNOSIS — M54.50 LUMBAR PAIN: Primary | ICD-10-CM

## 2019-02-28 RX ORDER — FENOFIBRATE 48 MG/1
TABLET, COATED ORAL
Qty: 90 TABLET | Refills: 3 | Status: SHIPPED | OUTPATIENT
Start: 2019-02-28 | End: 2020-03-16

## 2019-02-28 NOTE — TELEPHONE ENCOUNTER
Spoke with patient and she will call back after her appointments that she already has that will determine when she can schedule her appointments

## 2019-03-01 ENCOUNTER — ANCILLARY PROCEDURE (OUTPATIENT)
Dept: GENERAL RADIOLOGY | Facility: CLINIC | Age: 49
End: 2019-03-01
Attending: ORTHOPAEDIC SURGERY
Payer: COMMERCIAL

## 2019-03-01 ENCOUNTER — PRE VISIT (OUTPATIENT)
Dept: ORTHOPEDICS | Facility: CLINIC | Age: 49
End: 2019-03-01

## 2019-03-01 ENCOUNTER — OFFICE VISIT (OUTPATIENT)
Dept: ORTHOPEDICS | Facility: CLINIC | Age: 49
End: 2019-03-01
Attending: FAMILY MEDICINE
Payer: COMMERCIAL

## 2019-03-01 VITALS — HEIGHT: 65 IN | BODY MASS INDEX: 31.65 KG/M2 | WEIGHT: 190 LBS

## 2019-03-01 DIAGNOSIS — G89.29 CHRONIC LOW BACK PAIN WITHOUT SCIATICA, UNSPECIFIED BACK PAIN LATERALITY: Primary | ICD-10-CM

## 2019-03-01 DIAGNOSIS — M54.50 CHRONIC LOW BACK PAIN WITHOUT SCIATICA, UNSPECIFIED BACK PAIN LATERALITY: Primary | ICD-10-CM

## 2019-03-01 ASSESSMENT — ENCOUNTER SYMPTOMS
ALTERED TEMPERATURE REGULATION: 1
NECK PAIN: 1
MEMORY LOSS: 1
WEIGHT GAIN: 0
DEPRESSION: 1
FEVER: 0
POLYDIPSIA: 0
NAIL CHANGES: 0
ARTHRALGIAS: 1
NIGHT SWEATS: 0
WEAKNESS: 1
HEADACHES: 1
STIFFNESS: 1
HALLUCINATIONS: 0
LIGHT-HEADEDNESS: 1
MYALGIAS: 1
EYE IRRITATION: 0
NERVOUS/ANXIOUS: 1
FATIGUE: 1
ORTHOPNEA: 0
PARALYSIS: 0
TINGLING: 1
EYE PAIN: 0
SYNCOPE: 0
SKIN CHANGES: 0
MUSCLE WEAKNESS: 1
EYE REDNESS: 0
NUMBNESS: 1
SLEEP DISTURBANCES DUE TO BREATHING: 0
SEIZURES: 0
CHILLS: 1
BACK PAIN: 1
INSOMNIA: 0
DECREASED APPETITE: 0
PALPITATIONS: 1
PANIC: 1
SPEECH CHANGE: 1
DECREASED CONCENTRATION: 1
POLYPHAGIA: 0
HYPOTENSION: 0
JOINT SWELLING: 1
LEG PAIN: 1
EYE WATERING: 0
POOR WOUND HEALING: 0
HYPERTENSION: 1
INCREASED ENERGY: 1

## 2019-03-01 ASSESSMENT — MIFFLIN-ST. JEOR: SCORE: 1487.71
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
## 2019-03-08 ENCOUNTER — HOSPITAL ENCOUNTER (OUTPATIENT)
Dept: PHYSICAL THERAPY | Facility: CLINIC | Age: 49
Setting detail: THERAPIES SERIES
End: 2019-03-08
Attending: PREVENTIVE MEDICINE
Payer: COMMERCIAL

## 2019-03-08 PROCEDURE — 97113 AQUATIC THERAPY/EXERCISES: CPT | Mod: GP | Performed by: PHYSICAL THERAPIST

## 2019-03-12 NOTE — PROGRESS NOTES
Outpatient Physical Therapy Discharge Note     Patient: Isabela Panchal  : 1970    Beginning/End Dates of Reporting Period:  1/15/19 to 3/12/2019    Referring Provider: Dr. Gilbert    Therapy Diagnosis: Low back pain, impaired mobility     Client Self Report: The pt is apologetic about being 30 min late to the appointment. Discussed the pt's difficulty with attending the pool regularly. The pt agrees that there is many stressors in her life right now. Understands that today needs to be the last visit due to attendnce policy  Goals:  NOT MET    Plan:  Discharge from therapy.    Discharge:    Reason for Discharge: Patient has not made expected progress due to interrupted treatment attendance.      Discharge Plan: The pt will return to pool PT when she feels like she has more time to consistently devote to therapy

## 2019-03-12 NOTE — ADDENDUM NOTE
Encounter addended by: Mira Lo, PT on: 3/12/2019 8:56 AM   Actions taken: Sign clinical note, Episode resolved

## 2019-03-13 DIAGNOSIS — E78.1 HIGH BLOOD TRIGLYCERIDES: ICD-10-CM

## 2019-03-13 DIAGNOSIS — E78.5 HYPERLIPIDEMIA LDL GOAL <160: ICD-10-CM

## 2019-03-13 PROCEDURE — 83721 ASSAY OF BLOOD LIPOPROTEIN: CPT | Mod: 59 | Performed by: FAMILY MEDICINE

## 2019-03-13 PROCEDURE — 36415 COLL VENOUS BLD VENIPUNCTURE: CPT | Performed by: FAMILY MEDICINE

## 2019-03-13 PROCEDURE — 80061 LIPID PANEL: CPT | Performed by: FAMILY MEDICINE

## 2019-03-13 PROCEDURE — 80053 COMPREHEN METABOLIC PANEL: CPT | Performed by: FAMILY MEDICINE

## 2019-03-13 NOTE — LETTER
March 22, 2019      Isabela Panchal  [address]  [facility] [address]        Dear ,    We are writing to inform you of your test results.    Hello!  Thank you for getting labs done.     The testing of your blood sugar, kidney function, liver function and electrolytes was normal.     Your triglycerides are still very high and your HDL is too low, but your total cholesterol and LDL are normal.     Thank you for getting your cholesterol checked!   Desired or goal levels are:   CHOLESTEROL: Desirable is less than 200.   HDL (Good Cholesterol): Desirable is greater than 40 in men and greater than 50 in women.   LDL (Bad Cholesterol): Desirable is less than 130   TRIGLYCERIDES: Desirable is less than 150.     Please follow the recommendations below and schedule a lab only appointment ([phone]) in 2-3 months for a repeat fasting test. Please don't have anything to eat or drink (except water) for 8 hours prior to the test.     I recommend that you take Omega-3 fatty acids (available in capsules) to improve your triglycerides. You need 2000 - 4000 mg daily of EPA + DHA. This usually means 4 - 8 capsules a day, depending on the strength you buy.  To keep triglycerides in check, reduce alcohol, sugar, juice, excess fruit, bread, pasta, rice and  Cereal in your diet. Increase exercise  and Omega 3 (fish oil supplements) with  Meals.     Also consider starting or increasing your aerobic activity; this is the best way to improve HDL (good) cholesterol. Exercise for at least 30 min 5 times per week, and lift weights 2-3 times per week.     As you may know, abnormal cholesterol is one factor that increases your risk for heart disease and stroke. You can improve your cholesterol by controlling the amount and type of fat you eat and by increasing your daily activity level.     Here are some ways to improve your nutrition (adapted from the American Academy of [employer] handout):   Eat less fat  (especially butter, Crisco and other saturated fats)   Buy lean cuts of meat; reduce your portions of red meat or substitute poultry or fish, or avoid meat altogether.   Use skim milk and low-fat dairy products   Eat no more than 4 egg yolks per week   Avoid fried or fast foods that are high in fat   Eat more fruits and vegetables, trying to make your plate of food at least half non-starchy vegetables.     If you have any questions, please contact the clinic or schedule an appointment with me, thank you!     Resulted Orders   **Comprehensive metabolic panel FUTURE 2mo   Result Value Ref Range    Sodium 141 133 - 144 mmol/L    Potassium 4.2 3.4 - 5.3 mmol/L    Chloride 109 94 - 109 mmol/L    Carbon Dioxide 27 20 - 32 mmol/L    Anion Gap 5 3 - 14 mmol/L    Glucose 109 (H) 70 - 99 mg/dL      Comment:      Fasting specimen    Urea Nitrogen 10 7 - 30 mg/dL    Creatinine 0.72 0.52 - 1.04 mg/dL    GFR Estimate >90 >60 mL/min/[1.73_m2]      Comment:      Non  GFR Calc  Starting 12/18/2018, serum creatinine based estimated GFR (eGFR) will be   calculated using the Chronic Kidney Disease Epidemiology Collaboration   (CKD-EPI) equation.      GFR Estimate If Black >90 >60 mL/min/[1.73_m2]      Comment:       GFR Calc  Starting 12/18/2018, serum creatinine based estimated GFR (eGFR) will be   calculated using the Chronic Kidney Disease Epidemiology Collaboration   (CKD-EPI) equation.      Calcium 9.5 8.5 - 10.1 mg/dL    Bilirubin Total 0.6 0.2 - 1.3 mg/dL    Albumin 4.2 3.4 - 5.0 g/dL    Protein Total 7.5 6.8 - 8.8 g/dL    Alkaline Phosphatase 82 40 - 150 U/L    ALT 64 (H) 0 - 50 U/L    AST 34 0 - 45 U/L   Lipid panel reflex to direct LDL Fasting   Result Value Ref Range    Cholesterol 171 <200 mg/dL    Triglycerides 409 (H) <150 mg/dL      Comment:      Borderline high:  150-199 mg/dl  High:             200-499 mg/dl  Very high:       >499 mg/dl  Fasting specimen      HDL Cholesterol 30 (L) >49  mg/dL    LDL Cholesterol Calculated  <100 mg/dL     Cannot estimate LDL when triglyceride exceeds 400 mg/dL    Non HDL Cholesterol 141 (H) <130 mg/dL      Comment:      Above Desirable:  130-159 mg/dl  Borderline high:  160-189 mg/dl  High:             190-219 mg/dl  Very high:       >219 mg/dl     LDL cholesterol direct   Result Value Ref Range    LDL Cholesterol Direct 78 <100 mg/dL      Comment:      Desirable:       <100 mg/dl       If you have any questions or concerns, please call the clinic at the number listed above.       Sincerely,    Felisha Briggs MD/nr

## 2019-03-14 LAB
ALBUMIN SERPL-MCNC: 4.2 G/DL (ref 3.4–5)
ALP SERPL-CCNC: 82 U/L (ref 40–150)
ALT SERPL W P-5'-P-CCNC: 64 U/L (ref 0–50)
ANION GAP SERPL CALCULATED.3IONS-SCNC: 5 MMOL/L (ref 3–14)
AST SERPL W P-5'-P-CCNC: 34 U/L (ref 0–45)
BILIRUB SERPL-MCNC: 0.6 MG/DL (ref 0.2–1.3)
BUN SERPL-MCNC: 10 MG/DL (ref 7–30)
CALCIUM SERPL-MCNC: 9.5 MG/DL (ref 8.5–10.1)
CHLORIDE SERPL-SCNC: 109 MMOL/L (ref 94–109)
CHOLEST SERPL-MCNC: 171 MG/DL
CO2 SERPL-SCNC: 27 MMOL/L (ref 20–32)
CREAT SERPL-MCNC: 0.72 MG/DL (ref 0.52–1.04)
GFR SERPL CREATININE-BSD FRML MDRD: >90 ML/MIN/{1.73_M2}
GLUCOSE SERPL-MCNC: 109 MG/DL (ref 70–99)
HDLC SERPL-MCNC: 30 MG/DL
LDLC SERPL CALC-MCNC: ABNORMAL MG/DL
LDLC SERPL DIRECT ASSAY-MCNC: 78 MG/DL
NONHDLC SERPL-MCNC: 141 MG/DL
POTASSIUM SERPL-SCNC: 4.2 MMOL/L (ref 3.4–5.3)
PROT SERPL-MCNC: 7.5 G/DL (ref 6.8–8.8)
SODIUM SERPL-SCNC: 141 MMOL/L (ref 133–144)
TRIGL SERPL-MCNC: 409 MG/DL

## 2019-03-15 ENCOUNTER — TELEPHONE (OUTPATIENT)
Dept: FAMILY MEDICINE | Facility: CLINIC | Age: 49
End: 2019-03-15

## 2019-03-15 NOTE — TELEPHONE ENCOUNTER
LVMTCB and speak to any MA regarding ACT questionnaire.    Bren Bazan MA on 3/15/2019 at 3:05 PM

## 2019-03-16 ASSESSMENT — ASTHMA QUESTIONNAIRES: ACT_TOTALSCORE: 25

## 2019-03-18 PROBLEM — M54.50 LUMBAGO: Status: RESOLVED | Noted: 2017-07-11 | Resolved: 2019-03-18

## 2019-03-18 PROBLEM — M99.03 SOMATIC DYSFUNCTION OF LUMBAR REGION: Status: RESOLVED | Noted: 2017-11-02 | Resolved: 2019-03-18

## 2019-03-18 PROBLEM — M54.6 CHRONIC MIDLINE THORACIC BACK PAIN: Status: RESOLVED | Noted: 2018-06-22 | Resolved: 2019-03-18

## 2019-03-18 PROBLEM — G89.29 CHRONIC MIDLINE THORACIC BACK PAIN: Status: RESOLVED | Noted: 2018-06-22 | Resolved: 2019-03-18

## 2019-03-18 PROBLEM — M25.561 RIGHT KNEE PAIN, UNSPECIFIED CHRONICITY: Status: RESOLVED | Noted: 2017-11-02 | Resolved: 2019-03-18

## 2019-03-18 PROBLEM — M99.02 THORACIC SEGMENT DYSFUNCTION: Status: RESOLVED | Noted: 2017-11-02 | Resolved: 2019-03-18

## 2019-03-18 PROBLEM — M99.01 CERVICAL SEGMENT DYSFUNCTION: Status: RESOLVED | Noted: 2017-11-02 | Resolved: 2019-03-18

## 2019-03-22 NOTE — RESULT ENCOUNTER NOTE
Hello!  Thank you for getting labs done.     The testing of your blood sugar, kidney function, liver function and electrolytes was normal.     Your triglycerides are still very high and your HDL is too low, but your total cholesterol and LDL are normal.    Thank you for getting your cholesterol checked!  Desired or goal levels are:  CHOLESTEROL: Desirable is less than 200.  HDL (Good Cholesterol): Desirable is greater than 40 in men and greater than 50 in women.  LDL (Bad Cholesterol): Desirable is less than 130  TRIGLYCERIDES: Desirable is less than 150.    Please follow the recommendations below and schedule a lab only appointment ([phone]) in 2-3 months for a repeat fasting test. Please don't have anything to eat or drink (except water) for 8 hours prior to the test.    I recommend that you take Omega-3 fatty acids (available in capsules) to improve your triglycerides. You need 2000 - 4000 mg daily of EPA + DHA. This usually means 4 - 8 capsules a day, depending on the strength you buy.  To keep triglycerides in check, reduce alcohol, sugar, juice, excess fruit, bread, pasta, rice and  Cereal in your diet. Increase exercise  and Omega 3 (fish oil supplements) with  Meals.    Also consider starting or increasing your aerobic activity; this is the best way to improve HDL (good) cholesterol. Exercise for at least 30 min 5 times per week, and lift weights 2-3 times per week.    As you may know, abnormal cholesterol is one factor that increases your risk for heart disease and stroke. You can improve your cholesterol by controlling the amount and type of fat you eat and by increasing your daily activity level.    Here are some ways to improve your nutrition (adapted from the American Academy of Family Practice handout):  Eat less fat (especially butter, Crisco and other saturated fats)  Buy lean cuts of meat; reduce your portions of red meat or substitute poultry or fish, or avoid meat altogether.  Use skim milk and  low-fat dairy products  Eat no more than 4 egg yolks per week  Avoid fried or fast foods that are high in fat  Eat more fruits and vegetables, trying to make your plate of food at least half non-starchy vegetables.    If you have any questions, please contact the clinic or schedule an appointment with me, thank you!    Sincerely,  Dr. Felisha Briggs MD  3/21/2019

## 2019-03-26 ENCOUNTER — OFFICE VISIT (OUTPATIENT)
Dept: FAMILY MEDICINE | Facility: CLINIC | Age: 49
End: 2019-03-26
Payer: COMMERCIAL

## 2019-03-26 ENCOUNTER — TELEPHONE (OUTPATIENT)
Dept: ORTHOPEDICS | Facility: CLINIC | Age: 49
End: 2019-03-26

## 2019-03-26 VITALS
BODY MASS INDEX: 32.7 KG/M2 | RESPIRATION RATE: 14 BRPM | TEMPERATURE: 98.2 F | WEIGHT: 196.5 LBS | HEART RATE: 103 BPM | SYSTOLIC BLOOD PRESSURE: 117 MMHG | OXYGEN SATURATION: 96 % | DIASTOLIC BLOOD PRESSURE: 80 MMHG

## 2019-03-26 DIAGNOSIS — G89.29 CHRONIC MIDLINE THORACIC BACK PAIN: ICD-10-CM

## 2019-03-26 DIAGNOSIS — F51.01 PRIMARY INSOMNIA: ICD-10-CM

## 2019-03-26 DIAGNOSIS — E78.1 HIGH BLOOD TRIGLYCERIDES: ICD-10-CM

## 2019-03-26 DIAGNOSIS — E78.5 HYPERLIPIDEMIA LDL GOAL <160: ICD-10-CM

## 2019-03-26 DIAGNOSIS — Z00.00 ROUTINE GENERAL MEDICAL EXAMINATION AT A HEALTH CARE FACILITY: Primary | ICD-10-CM

## 2019-03-26 DIAGNOSIS — M54.16 LUMBAR BACK PAIN WITH RADICULOPATHY AFFECTING LOWER EXTREMITY: ICD-10-CM

## 2019-03-26 DIAGNOSIS — M54.6 CHRONIC MIDLINE THORACIC BACK PAIN: ICD-10-CM

## 2019-03-26 DIAGNOSIS — J30.2 OTHER SEASONAL ALLERGIC RHINITIS: ICD-10-CM

## 2019-03-26 PROCEDURE — 99396 PREV VISIT EST AGE 40-64: CPT | Performed by: FAMILY MEDICINE

## 2019-03-26 RX ORDER — TRAZODONE HYDROCHLORIDE 100 MG/1
100 TABLET ORAL AT BEDTIME
Qty: 30 TABLET | Refills: 11 | Status: SHIPPED | OUTPATIENT
Start: 2019-03-26 | End: 2020-09-15

## 2019-03-26 RX ORDER — GABAPENTIN 300 MG/1
CAPSULE ORAL
Qty: 100 CAPSULE | Refills: 3 | Status: SHIPPED | OUTPATIENT
Start: 2019-03-26 | End: 2020-09-15 | Stop reason: SINTOL

## 2019-03-26 RX ORDER — ATORVASTATIN CALCIUM 40 MG/1
40 TABLET, FILM COATED ORAL DAILY
Qty: 30 TABLET | Refills: 11 | Status: SHIPPED | OUTPATIENT
Start: 2019-03-26 | End: 2020-04-14

## 2019-03-26 RX ORDER — CALCITONIN SALMON 200 [IU]/.09ML
1 SPRAY, METERED NASAL DAILY
Qty: 1 BOTTLE | Refills: 1 | Status: SHIPPED | OUTPATIENT
Start: 2019-03-26 | End: 2019-07-05

## 2019-03-26 RX ORDER — FLUTICASONE PROPIONATE 50 MCG
2 SPRAY, SUSPENSION (ML) NASAL DAILY
Qty: 16 G | Refills: 9 | Status: SHIPPED | OUTPATIENT
Start: 2019-03-26 | End: 2021-04-16

## 2019-03-26 RX ORDER — DICLOFENAC SODIUM 75 MG/1
75 TABLET, DELAYED RELEASE ORAL AT BEDTIME
Qty: 30 TABLET | Refills: 11 | Status: SHIPPED | OUTPATIENT
Start: 2019-03-26 | End: 2020-03-15

## 2019-03-26 ASSESSMENT — ENCOUNTER SYMPTOMS
HEADACHES: 1
NERVOUS/ANXIOUS: 1
EYE PAIN: 0
DIZZINESS: 1
ABDOMINAL PAIN: 0
CHILLS: 0
DIARRHEA: 0
COUGH: 0
CONSTIPATION: 0
HEMATOCHEZIA: 0
FEVER: 0
HEMATURIA: 0
FREQUENCY: 1

## 2019-03-26 ASSESSMENT — PATIENT HEALTH QUESTIONNAIRE - PHQ9
SUM OF ALL RESPONSES TO PHQ QUESTIONS 1-9: 19
10. IF YOU CHECKED OFF ANY PROBLEMS, HOW DIFFICULT HAVE THESE PROBLEMS MADE IT FOR YOU TO DO YOUR WORK, TAKE CARE OF THINGS AT HOME, OR GET ALONG WITH OTHER PEOPLE: VERY DIFFICULT
SUM OF ALL RESPONSES TO PHQ QUESTIONS 1-9: 19

## 2019-03-26 NOTE — PATIENT INSTRUCTIONS
Results for orders placed or performed in visit on 03/13/19   **Comprehensive metabolic panel FUTURE 2mo   Result Value Ref Range    Sodium 141 133 - 144 mmol/L    Potassium 4.2 3.4 - 5.3 mmol/L    Chloride 109 94 - 109 mmol/L    Carbon Dioxide 27 20 - 32 mmol/L    Anion Gap 5 3 - 14 mmol/L    Glucose 109 (H) 70 - 99 mg/dL    Urea Nitrogen 10 7 - 30 mg/dL    Creatinine 0.72 0.52 - 1.04 mg/dL    GFR Estimate >90 >60 mL/min/[1.73_m2]    GFR Estimate If Black >90 >60 mL/min/[1.73_m2]    Calcium 9.5 8.5 - 10.1 mg/dL    Bilirubin Total 0.6 0.2 - 1.3 mg/dL    Albumin 4.2 3.4 - 5.0 g/dL    Protein Total 7.5 6.8 - 8.8 g/dL    Alkaline Phosphatase 82 40 - 150 U/L    ALT 64 (H) 0 - 50 U/L    AST 34 0 - 45 U/L   Lipid panel reflex to direct LDL Fasting   Result Value Ref Range    Cholesterol 171 <200 mg/dL    Triglycerides 409 (H) <150 mg/dL    HDL Cholesterol 30 (L) >49 mg/dL    LDL Cholesterol Calculated  <100 mg/dL     Cannot estimate LDL when triglyceride exceeds 400 mg/dL    Non HDL Cholesterol 141 (H) <130 mg/dL   LDL cholesterol direct   Result Value Ref Range    LDL Cholesterol Direct 78 <100 mg/dL      Exercise for at least 30 min 5 times per week, and lift weights 2-3 times per week.   Fenofibrate.  Reduce carbohydrates in your diet (sweets, starches, bread, pasta, rice, cereal; carrots, potatoes, corn are all starches)  Serving 1/2 cup starch per meal    Steam vegetables: green beans, cauliflower, broccoli; 7 minutes!    Preventive Health Recommendations  Female Ages 40 to 49    Yearly exam:     See your health care provider every year in order to  1. Review health changes.   2. Discuss preventive care.    3. Review your medicines if your doctor prescribed any.      Get a Pap test every three years (unless you have an abnormal result and your provider advises testing more often).      If you get Pap tests with HPV test, you only need to test every 5 years, unless you have an abnormal result. You do not need a Pap  test if your uterus was removed (hysterectomy) and you have not had cancer.      You should be tested each year for STDs (sexually transmitted diseases), if you're at risk.     Ask your doctor if you should have a mammogram.      Have a colonoscopy (test for colon cancer) if someone in your family has had colon cancer or polyps before age 50.       Have a cholesterol test every 5 years.       Have a diabetes test (fasting glucose) after age 45. If you are at risk for diabetes, you should have this test every 3 years.    Shots: Get a flu shot each year. Get a tetanus shot every 10 years.     Nutrition:     Eat at least 5 servings of fruits and vegetables each day.    Eat whole-grain bread, whole-wheat pasta and brown rice instead of white grains and rice.    Get adequate Calcium and Vitamin D.      Lifestyle    Exercise at least 150 minutes a week (an average of 30 minutes a day, 5 days a week). This will help you control your weight and prevent disease.    Limit alcohol to one drink per day.    No smoking.     Wear sunscreen to prevent skin cancer.    See your dentist every six months for an exam and cleaning.

## 2019-03-26 NOTE — TELEPHONE ENCOUNTER
Island Pedicle Flap-Requiring Vessel Identification Text: The defect edges were debeveled with a #15 scalpel blade.  Given the location of the defect, shape of the defect and the proximity to free margins an island pedicle advancement flap was deemed most appropriate.  Using a sterile surgical marker, an appropriate advancement flap was drawn, based on the axial vessel mentioned above, incorporating the defect, outlining the appropriate donor tissue and placing the expected incisions within the relaxed skin tension lines where possible.    The area thus outlined was incised deep to adipose tissue with a #15 scalpel blade.  The skin margins were undermined to an appropriate distance in all directions around the primary defect and laterally outward around the island pedicle utilizing iris scissors.  There was minimal undermining beneath the pedicle flap. [facility] Call Center    Phone Message    May a detailed message be left on voicemail: yes    Reason for Call: Order(s): Other:   Reason for requested: Imaging Injection, Hip and Neck  Date needed: ASAP  Provider name: Man Gilbert      Action Taken: Message routed to:  [facility] Ortho     Show Surgeon Variable: Yes M-Plasty Intermediate Repair Preamble Text (Leave Blank If You Do Not Want): Undermining was performed with blunt dissection. Graft Donor Site Bandage (Optional-Leave Blank If You Don't Want In Note): Steri-strips and a pressure bandage were applied to the donor site. Intermediate / Complex Repair - Final Wound Length In Cm: 5.1 Melolabial Interpolation Flap Text: A decision was made to reconstruct the defect utilizing an interpolation axial flap and a staged reconstruction.  A telfa template was made of the defect.  This telfa template was then used to outline the melolabial interpolation flap.  The donor area for the pedicle flap was then injected with anesthesia.  The flap was excised through the skin and subcutaneous tissue down to the layer of the underlying musculature.  The pedicle flap was carefully excised within this deep plane to maintain its blood supply.  The edges of the donor site were undermined.   The donor site was closed in a primary fashion.  The pedicle was then rotated into position and sutured.  Once the tube was sutured into place, adequate blood supply was confirmed with blanching and refill.  The pedicle was then wrapped with xeroform gauze and dressed appropriately with a telfa and gauze bandage to ensure continued blood supply and protect the attached pedicle. Complex Repair And Modified Advancement Flap Text: The defect edges were debeveled with a #15 scalpel blade.  The primary defect was closed partially with a complex linear closure.  Given the location of the remaining defect, shape of the defect and the proximity to free margins a modified advancement flap was deemed most appropriate for complete closure of the defect.  Using a sterile surgical marker, an appropriate advancement flap was drawn incorporating the defect and placing the expected incisions within the relaxed skin tension lines where possible.    The area thus outlined was incised deep to adipose tissue with a #15 scalpel blade.  The skin margins were undermined to an appropriate distance in all directions utilizing iris scissors. Complex Repair And A-T Advancement Flap Text: The defect edges were debeveled with a #15 scalpel blade.  The primary defect was closed partially with a complex linear closure.  Given the location of the remaining defect, shape of the defect and the proximity to free margins an A-T advancement flap was deemed most appropriate for complete closure of the defect.  Using a sterile surgical marker, an appropriate advancement flap was drawn incorporating the defect and placing the expected incisions within the relaxed skin tension lines where possible.    The area thus outlined was incised deep to adipose tissue with a #15 scalpel blade.  The skin margins were undermined to an appropriate distance in all directions utilizing iris scissors. Advancement Flap (Single) Text: The defect edges were debeveled with a #15 scalpel blade.  Given the location of the defect and the proximity to free margins a single advancement flap was deemed most appropriate.  Using a sterile surgical marker, an appropriate advancement flap was drawn incorporating the defect and placing the expected incisions within the relaxed skin tension lines where possible.    The area thus outlined was incised deep to adipose tissue with a #15 scalpel blade.  The skin margins were undermined to an appropriate distance in all directions utilizing iris scissors. Muscle Hinge Flap Text: The defect edges were debeveled with a #15 scalpel blade.  Given the size, depth and location of the defect and the proximity to free margins a muscle hinge flap was deemed most appropriate.  Using a sterile surgical marker, an appropriate hinge flap was drawn incorporating the defect. The area thus outlined was incised with a #15 scalpel blade.  The skin margins were undermined to an appropriate distance in all directions utilizing iris scissors. Cheek Interpolation Flap Text: A decision was made to reconstruct the defect utilizing an interpolation axial flap and a staged reconstruction.  A telfa template was made of the defect.  This telfa template was then used to outline the Cheek Interpolation flap.  The donor area for the pedicle flap was then injected with anesthesia.  The flap was excised through the skin and subcutaneous tissue down to the layer of the underlying musculature.  The interpolation flap was carefully excised within this deep plane to maintain its blood supply.  The edges of the donor site were undermined.   The donor site was closed in a primary fashion.  The pedicle was then rotated into position and sutured.  Once the tube was sutured into place, adequate blood supply was confirmed with blanching and refill.  The pedicle was then wrapped with xeroform gauze and dressed appropriately with a telfa and gauze bandage to ensure continued blood supply and protect the attached pedicle. Purse String (Intermediate) Text: Given the location of the defect and the characteristics of the surrounding skin a purse string intermediate closure was deemed most appropriate.  Undermining was performed circumferentially around the surgical defect.  A purse string suture was then placed and tightened. Path Notes (To The Dermatopathologist): Please check margins. Hatchet Flap Text: The defect edges were debeveled with a #15 scalpel blade.  Given the location of the defect, shape of the defect and the proximity to free margins a hatchet flap was deemed most appropriate.  Using a sterile surgical marker, an appropriate hatchet flap was drawn incorporating the defect and placing the expected incisions within the relaxed skin tension lines where possible.    The area thus outlined was incised deep to adipose tissue with a #15 scalpel blade.  The skin margins were undermined to an appropriate distance in all directions utilizing iris scissors. Complex Repair And Melolabial Flap Text: The defect edges were debeveled with a #15 scalpel blade.  The primary defect was closed partially with a complex linear closure.  Given the location of the remaining defect, shape of the defect and the proximity to free margins a melolabial flap was deemed most appropriate for complete closure of the defect.  Using a sterile surgical marker, an appropriate advancement flap was drawn incorporating the defect and placing the expected incisions within the relaxed skin tension lines where possible.    The area thus outlined was incised deep to adipose tissue with a #15 scalpel blade.  The skin margins were undermined to an appropriate distance in all directions utilizing iris scissors. Add Nub Associated Diagnoses If Applicable When Selecting Medical Necessity: No Repair Performed By Another Provider Text (Leave Blank If You Do Not Want): After the tissue was excised the defect was repaired by another provider. Transposition Flap Text: The defect edges were debeveled with a #15 scalpel blade.  Given the location of the defect and the proximity to free margins a transposition flap was deemed most appropriate.  Using a sterile surgical marker, an appropriate transposition flap was drawn incorporating the defect.    The area thus outlined was incised deep to adipose tissue with a #15 scalpel blade.  The skin margins were undermined to an appropriate distance in all directions utilizing iris scissors. Complex Repair And V-Y Plasty Text: The defect edges were debeveled with a #15 scalpel blade.  The primary defect was closed partially with a complex linear closure.  Given the location of the remaining defect, shape of the defect and the proximity to free margins a V-Y plasty was deemed most appropriate for complete closure of the defect.  Using a sterile surgical marker, an appropriate advancement flap was drawn incorporating the defect and placing the expected incisions within the relaxed skin tension lines where possible.    The area thus outlined was incised deep to adipose tissue with a #15 scalpel blade.  The skin margins were undermined to an appropriate distance in all directions utilizing iris scissors. Ear Star Wedge Flap Text: The defect edges were debeveled with a #15 blade scalpel.  Given the location of the defect and the proximity to free margins (helical rim) an ear star wedge flap was deemed most appropriate.  Using a sterile surgical marker, the appropriate flap was drawn incorporating the defect and placing the expected incisions between the helical rim and antihelix where possible.  The area thus outlined was incised through and through with a #15 scalpel blade. W Plasty Text: The lesion was extirpated to the level of the fat with a #15 scalpel blade.  Given the location of the defect, shape of the defect and the proximity to free margins a W-plasty was deemed most appropriate for repair.  Using a sterile surgical marker, the appropriate transposition arms of the W-plasty were drawn incorporating the defect and placing the expected incisions within the relaxed skin tension lines where possible.    The area thus outlined was incised deep to adipose tissue with a #15 scalpel blade.  The skin margins were undermined to an appropriate distance in all directions utilizing iris scissors.  The opposing transposition arms were then transposed into place in opposite direction and anchored with interrupted buried subcutaneous sutures. O-L Flap Text: The defect edges were debeveled with a #15 scalpel blade.  Given the location of the defect, shape of the defect and the proximity to free margins an O-L flap was deemed most appropriate.  Using a sterile surgical marker, an appropriate advancement flap was drawn incorporating the defect and placing the expected incisions within the relaxed skin tension lines where possible.    The area thus outlined was incised deep to adipose tissue with a #15 scalpel blade.  The skin margins were undermined to an appropriate distance in all directions utilizing iris scissors. Complex Repair Preamble Text (Leave Blank If You Do Not Want): Extensive wide undermining was performed. Anesthesia Volume In Cc: 9.1 Xenograft Text: The defect edges were debeveled with a #15 scalpel blade.  Given the location of the defect, shape of the defect and the proximity to free margins a xenograft was deemed most appropriate.  The graft was then trimmed to fit the size of the defect.  The graft was then placed in the primary defect and oriented appropriately. Complex Repair And Dorsal Nasal Flap Text: The defect edges were debeveled with a #15 scalpel blade.  The primary defect was closed partially with a complex linear closure.  Given the location of the remaining defect, shape of the defect and the proximity to free margins a dorsal nasal flap was deemed most appropriate for complete closure of the defect.  Using a sterile surgical marker, an appropriate flap was drawn incorporating the defect and placing the expected incisions within the relaxed skin tension lines where possible.    The area thus outlined was incised deep to adipose tissue with a #15 scalpel blade.  The skin margins were undermined to an appropriate distance in all directions utilizing iris scissors. Lip Wedge Excision Repair Text: Given the location of the defect and the proximity to free margins a full thickness wedge repair was deemed most appropriate.  Using a sterile surgical marker, the appropriate repair was drawn incorporating the defect and placing the expected incisions perpendicular to the vermilion border.  The vermilion border was also meticulously outlined to ensure appropriate reapproximation during the repair.  The area thus outlined was incised through and through with a #15 scalpel blade.  The muscularis and dermis were reaproximated with deep sutures following hemostasis. Care was taken to realign the vermilion border before proceeding with the superficial closure.  Once the vermilion was realigned the superfical and mucosal closure was finished. Rotation Flap Text: The defect edges were debeveled with a #15 scalpel blade.  Given the location of the defect, shape of the defect and the proximity to free margins a rotation flap was deemed most appropriate.  Using a sterile surgical marker, an appropriate rotation flap was drawn incorporating the defect and placing the expected incisions within the relaxed skin tension lines where possible.    The area thus outlined was incised deep to adipose tissue with a #15 scalpel blade.  The skin margins were undermined to an appropriate distance in all directions utilizing iris scissors. Double Island Pedicle Flap Text: The defect edges were debeveled with a #15 scalpel blade.  Given the location of the defect, shape of the defect and the proximity to free margins a double island pedicle advancement flap was deemed most appropriate.  Using a sterile surgical marker, an appropriate advancement flap was drawn incorporating the defect, outlining the appropriate donor tissue and placing the expected incisions within the relaxed skin tension lines where possible.    The area thus outlined was incised deep to adipose tissue with a #15 scalpel blade.  The skin margins were undermined to an appropriate distance in all directions around the primary defect and laterally outward around the island pedicle utilizing iris scissors.  There was minimal undermining beneath the pedicle flap. Referring Physician (Optional): Valerie MAYFIELD Z Plasty Text: The lesion was extirpated to the level of the fat with a #15 scalpel blade.  Given the location of the defect, shape of the defect and the proximity to free margins a Z-plasty was deemed most appropriate for repair.  Using a sterile surgical marker, the appropriate transposition arms of the Z-plasty were drawn incorporating the defect and placing the expected incisions within the relaxed skin tension lines where possible.    The area thus outlined was incised deep to adipose tissue with a #15 scalpel blade.  The skin margins were undermined to an appropriate distance in all directions utilizing iris scissors.  The opposing transposition arms were then transposed into place in opposite direction and anchored with interrupted buried subcutaneous sutures. Excision Method: Elliptical Keystone Flap Text: The defect edges were debeveled with a #15 scalpel blade.  Given the location of the defect, shape of the defect a keystone flap was deemed most appropriate.  Using a sterile surgical marker, an appropriate keystone flap was drawn incorporating the defect, outlining the appropriate donor tissue and placing the expected incisions within the relaxed skin tension lines where possible. The area thus outlined was incised deep to adipose tissue with a #15 scalpel blade.  The skin margins were undermined to an appropriate distance in all directions around the primary defect and laterally outward around the flap utilizing iris scissors. Complex Repair And W Plasty Text: The defect edges were debeveled with a #15 scalpel blade.  The primary defect was closed partially with a complex linear closure.  Given the location of the remaining defect, shape of the defect and the proximity to free margins a W plasty was deemed most appropriate for complete closure of the defect.  Using a sterile surgical marker, an appropriate advancement flap was drawn incorporating the defect and placing the expected incisions within the relaxed skin tension lines where possible.    The area thus outlined was incised deep to adipose tissue with a #15 scalpel blade.  The skin margins were undermined to an appropriate distance in all directions utilizing iris scissors. Trilobed Flap Text: The defect edges were debeveled with a #15 scalpel blade.  Given the location of the defect and the proximity to free margins a trilobed flap was deemed most appropriate.  Using a sterile surgical marker, an appropriate trilobed flap drawn around the defect.    The area thus outlined was incised deep to adipose tissue with a #15 scalpel blade.  The skin margins were undermined to an appropriate distance in all directions utilizing iris scissors. Complex Repair And Bilobe Flap Text: The defect edges were debeveled with a #15 scalpel blade.  The primary defect was closed partially with a complex linear closure.  Given the location of the remaining defect, shape of the defect and the proximity to free margins a bilobe flap was deemed most appropriate for complete closure of the defect.  Using a sterile surgical marker, an appropriate advancement flap was drawn incorporating the defect and placing the expected incisions within the relaxed skin tension lines where possible.    The area thus outlined was incised deep to adipose tissue with a #15 scalpel blade.  The skin margins were undermined to an appropriate distance in all directions utilizing iris scissors. Saucerization Excision Additional Text (Leave Blank If You Do Not Want): The margin was drawn around the clinically apparent lesion.  Incisions were then made along these lines, in a tangential fashion, to the appropriate tissue plane and the lesion was extirpated. Ftsg Text: The defect edges were debeveled with a #15 scalpel blade.  Given the location of the defect, shape of the defect and the proximity to free margins a full thickness skin graft was deemed most appropriate.  Using a sterile surgical marker, the primary defect shape was transferred to the donor site. The area thus outlined was incised deep to adipose tissue with a #15 scalpel blade.  The harvested graft was then trimmed of adipose tissue until only dermis and epidermis was left.  The skin margins of the secondary defect were undermined to an appropriate distance in all directions utilizing iris scissors.  The secondary defect was closed with interrupted buried subcutaneous sutures.  The skin edges were then re-apposed with running  sutures.  The skin graft was then placed in the primary defect and oriented appropriately. Consent was obtained from the patient. The risks and benefits to therapy were discussed in detail. Specifically, the risks of infection, scarring, bleeding, prolonged wound healing, incomplete removal, allergy to anesthesia, nerve injury and recurrence were addressed. Prior to the procedure, the treatment site was clearly identified and confirmed by the patient. All components of Universal Protocol/PAUSE Rule completed. Composite Graft Text: The defect edges were debeveled with a #15 scalpel blade.  Given the location of the defect, shape of the defect, the proximity to free margins and the fact the defect was full thickness a composite graft was deemed most appropriate.  The defect was outline and then transferred to the donor site.  A full thickness graft was then excised from the donor site. The graft was then placed in the primary defect, oriented appropriately and then sutured into place.  The secondary defect was then repaired using a primary closure. Complex Repair And Double Advancement Flap Text: The defect edges were debeveled with a #15 scalpel blade.  The primary defect was closed partially with a complex linear closure.  Given the location of the remaining defect, shape of the defect and the proximity to free margins a double advancement flap was deemed most appropriate for complete closure of the defect.  Using a sterile surgical marker, an appropriate advancement flap was drawn incorporating the defect and placing the expected incisions within the relaxed skin tension lines where possible.    The area thus outlined was incised deep to adipose tissue with a #15 scalpel blade.  The skin margins were undermined to an appropriate distance in all directions utilizing iris scissors. Epidermal Closure Graft Donor Site (Optional): simple interrupted Estimated Blood Loss (Cc): minimal Secondary Defect Length (In Cm): 0 H Plasty Text: Given the location of the defect, shape of the defect and the proximity to free margins a H-plasty was deemed most appropriate for repair.  Using a sterile surgical marker, the appropriate advancement arms of the H-plasty were drawn incorporating the defect and placing the expected incisions within the relaxed skin tension lines where possible. The area thus outlined was incised deep to adipose tissue with a #15 scalpel blade. The skin margins were undermined to an appropriate distance in all directions utilizing iris scissors.  The opposing advancement arms were then advanced into place in opposite direction and anchored with interrupted buried subcutaneous sutures. Hemostasis: Electrocautery Detail Level: Detailed Star Wedge Flap Text: The defect edges were debeveled with a #15 scalpel blade.  Given the location of the defect, shape of the defect and the proximity to free margins a star wedge flap was deemed most appropriate.  Using a sterile surgical marker, an appropriate rotation flap was drawn incorporating the defect and placing the expected incisions within the relaxed skin tension lines where possible. The area thus outlined was incised deep to adipose tissue with a #15 scalpel blade.  The skin margins were undermined to an appropriate distance in all directions utilizing iris scissors. Bilobed Transposition Flap Text: The defect edges were debeveled with a #15 scalpel blade.  Given the location of the defect and the proximity to free margins a bilobed transposition flap was deemed most appropriate.  Using a sterile surgical marker, an appropriate bilobe flap drawn around the defect.    The area thus outlined was incised deep to adipose tissue with a #15 scalpel blade.  The skin margins were undermined to an appropriate distance in all directions utilizing iris scissors. Mercedes Flap Text: The defect edges were debeveled with a #15 scalpel blade.  Given the location of the defect, shape of the defect and the proximity to free margins a Mercedes flap was deemed most appropriate.  Using a sterile surgical marker, an appropriate advancement flap was drawn incorporating the defect and placing the expected incisions within the relaxed skin tension lines where possible. The area thus outlined was incised deep to adipose tissue with a #15 scalpel blade.  The skin margins were undermined to an appropriate distance in all directions utilizing iris scissors. Anesthesia Type: 1% lidocaine with 1:100,000 epinephrine and 408mcg clindamycin/ml and a 1:10 solution of 8.4% sodium bicarbonate Alar Island Pedicle Flap Text: The defect edges were debeveled with a #15 scalpel blade.  Given the location of the defect, shape of the defect and the proximity to the alar rim an island pedicle advancement flap was deemed most appropriate.  Using a sterile surgical marker, an appropriate advancement flap was drawn incorporating the defect, outlining the appropriate donor tissue and placing the expected incisions within the nasal ala running parallel to the alar rim. The area thus outlined was incised with a #15 scalpel blade.  The skin margins were undermined minimally to an appropriate distance in all directions around the primary defect and laterally outward around the island pedicle utilizing iris scissors.  There was minimal undermining beneath the pedicle flap. No Repair - Repaired With Adjacent Surgical Defect Text (Leave Blank If You Do Not Want): After the excision the defect was repaired concurrently with another surgical defect which was in close approximation. Date Of Previous Biopsy (Optional): 5/7/18 Spiral Flap Text: The defect edges were debeveled with a #15 scalpel blade.  Given the location of the defect, shape of the defect and the proximity to free margins a spiral flap was deemed most appropriate.  Using a sterile surgical marker, an appropriate rotation flap was drawn incorporating the defect and placing the expected incisions within the relaxed skin tension lines where possible. The area thus outlined was incised deep to adipose tissue with a #15 scalpel blade.  The skin margins were undermined to an appropriate distance in all directions utilizing iris scissors. Tissue Cultured Epidermal Autograft Text: The defect edges were debeveled with a #15 scalpel blade.  Given the location of the defect, shape of the defect and the proximity to free margins a tissue cultured epidermal autograft was deemed most appropriate.  The graft was then trimmed to fit the size of the defect.  The graft was then placed in the primary defect and oriented appropriately. Medical Necessity Clause: This procedure was medically necessary because the lesion that was treated was: Bilobed Flap Text: The defect edges were debeveled with a #15 scalpel blade.  Given the location of the defect and the proximity to free margins a bilobe flap was deemed most appropriate.  Using a sterile surgical marker, an appropriate bilobe flap drawn around the defect.    The area thus outlined was incised deep to adipose tissue with a #15 scalpel blade.  The skin margins were undermined to an appropriate distance in all directions utilizing iris scissors. Perilesional Excision Additional Text (Leave Blank If You Do Not Want): The margin was drawn around the clinically apparent lesion. Incisions were then made along these lines to the appropriate tissue plane and the lesion was extirpated. Epidermal Closure: running subcuticular O-T Plasty Text: The defect edges were debeveled with a #15 scalpel blade.  Given the location of the defect, shape of the defect and the proximity to free margins an O-T plasty was deemed most appropriate.  Using a sterile surgical marker, an appropriate O-T plasty was drawn incorporating the defect and placing the expected incisions within the relaxed skin tension lines where possible.    The area thus outlined was incised deep to adipose tissue with a #15 scalpel blade.  The skin margins were undermined to an appropriate distance in all directions utilizing iris scissors. Scalpel Size: 15 blade Slit Excision Additional Text (Leave Blank If You Do Not Want): A linear line was drawn on the skin overlying the lesion. An incision was made slowly until the lesion was visualized.  Once visualized, the lesion was removed with blunt dissection. V-Y Flap Text: The defect edges were debeveled with a #15 scalpel blade.  Given the location of the defect, shape of the defect and the proximity to free margins a V-Y flap was deemed most appropriate.  Using a sterile surgical marker, an appropriate advancement flap was drawn incorporating the defect and placing the expected incisions within the relaxed skin tension lines where possible.    The area thus outlined was incised deep to adipose tissue with a #15 scalpel blade.  The skin margins were undermined to an appropriate distance in all directions utilizing iris scissors. Complex Repair And Z Plasty Text: The defect edges were debeveled with a #15 scalpel blade.  The primary defect was closed partially with a complex linear closure.  Given the location of the remaining defect, shape of the defect and the proximity to free margins a Z plasty was deemed most appropriate for complete closure of the defect.  Using a sterile surgical marker, an appropriate advancement flap was drawn incorporating the defect and placing the expected incisions within the relaxed skin tension lines where possible.    The area thus outlined was incised deep to adipose tissue with a #15 scalpel blade.  The skin margins were undermined to an appropriate distance in all directions utilizing iris scissors. Complex Repair And Rotation Flap Text: The defect edges were debeveled with a #15 scalpel blade.  The primary defect was closed partially with a complex linear closure.  Given the location of the remaining defect, shape of the defect and the proximity to free margins a rotation flap was deemed most appropriate for complete closure of the defect.  Using a sterile surgical marker, an appropriate advancement flap was drawn incorporating the defect and placing the expected incisions within the relaxed skin tension lines where possible.    The area thus outlined was incised deep to adipose tissue with a #15 scalpel blade.  The skin margins were undermined to an appropriate distance in all directions utilizing iris scissors. Interpolation Flap Text: A decision was made to reconstruct the defect utilizing an interpolation axial flap and a staged reconstruction.  A telfa template was made of the defect.  This telfa template was then used to outline the interpolation flap.  The donor area for the pedicle flap was then injected with anesthesia.  The flap was excised through the skin and subcutaneous tissue down to the layer of the underlying musculature.  The interpolation flap was carefully excised within this deep plane to maintain its blood supply.  The edges of the donor site were undermined.   The donor site was closed in a primary fashion.  The pedicle was then rotated into position and sutured.  Once the tube was sutured into place, adequate blood supply was confirmed with blanching and refill.  The pedicle was then wrapped with xeroform gauze and dressed appropriately with a telfa and gauze bandage to ensure continued blood supply and protect the attached pedicle. Split-Thickness Skin Graft Text: The defect edges were debeveled with a #15 scalpel blade.  Given the location of the defect, shape of the defect and the proximity to free margins a split thickness skin graft was deemed most appropriate.  Using a sterile surgical marker, the primary defect shape was transferred to the donor site. The split thickness graft was then harvested.  The skin graft was then placed in the primary defect and oriented appropriately. Deep Sutures: 2-0 Vicryl Complex Repair And Single Advancement Flap Text: The defect edges were debeveled with a #15 scalpel blade.  The primary defect was closed partially with a complex linear closure.  Given the location of the remaining defect, shape of the defect and the proximity to free margins a single advancement flap was deemed most appropriate for complete closure of the defect.  Using a sterile surgical marker, an appropriate advancement flap was drawn incorporating the defect and placing the expected incisions within the relaxed skin tension lines where possible.    The area thus outlined was incised deep to adipose tissue with a #15 scalpel blade.  The skin margins were undermined to an appropriate distance in all directions utilizing iris scissors. Home Suture Removal Text: Patient was provided a home suture removal kit and will remove their sutures at home.  If they have any questions or difficulties they will call the office. Surgeon Performing Repair (Optional): Penelope Complex Repair And Rhombic Flap Text: The defect edges were debeveled with a #15 scalpel blade.  The primary defect was closed partially with a complex linear closure.  Given the location of the remaining defect, shape of the defect and the proximity to free margins a rhombic flap was deemed most appropriate for complete closure of the defect.  Using a sterile surgical marker, an appropriate advancement flap was drawn incorporating the defect and placing the expected incisions within the relaxed skin tension lines where possible.    The area thus outlined was incised deep to adipose tissue with a #15 scalpel blade.  The skin margins were undermined to an appropriate distance in all directions utilizing iris scissors. Medical Necessity Information: It is in your best interest to select a reason for this procedure from the list below. All of these items fulfill various CMS LCD requirements except lesion extends to a margin. Dressing: steri-strips Modified Advancement Flap Text: The defect edges were debeveled with a #15 scalpel blade.  Given the location of the defect, shape of the defect and the proximity to free margins a modified advancement flap was deemed most appropriate.  Using a sterile surgical marker, an appropriate advancement flap was drawn incorporating the defect and placing the expected incisions within the relaxed skin tension lines where possible.    The area thus outlined was incised deep to adipose tissue with a #15 scalpel blade.  The skin margins were undermined to an appropriate distance in all directions utilizing iris scissors. Repair Type: Complex O-Z Plasty Text: The defect edges were debeveled with a #15 scalpel blade.  Given the location of the defect, shape of the defect and the proximity to free margins an O-Z plasty (double transposition flap) was deemed most appropriate.  Using a sterile surgical marker, the appropriate transposition flaps were drawn incorporating the defect and placing the expected incisions within the relaxed skin tension lines where possible.    The area thus outlined was incised deep to adipose tissue with a #15 scalpel blade.  The skin margins were undermined to an appropriate distance in all directions utilizing iris scissors.  Hemostasis was achieved with electrocautery.  The flaps were then transposed into place, one clockwise and the other counterclockwise, and anchored with interrupted buried subcutaneous sutures. V-Y Plasty Text: The defect edges were debeveled with a #15 scalpel blade.  Given the location of the defect, shape of the defect and the proximity to free margins an V-Y advancement flap was deemed most appropriate.  Using a sterile surgical marker, an appropriate advancement flap was drawn incorporating the defect and placing the expected incisions within the relaxed skin tension lines where possible.    The area thus outlined was incised deep to adipose tissue with a #15 scalpel blade.  The skin margins were undermined to an appropriate distance in all directions utilizing iris scissors. Epidermal Autograft Text: The defect edges were debeveled with a #15 scalpel blade.  Given the location of the defect, shape of the defect and the proximity to free margins an epidermal autograft was deemed most appropriate.  Using a sterile surgical marker, the primary defect shape was transferred to the donor site. The epidermal graft was then harvested.  The skin graft was then placed in the primary defect and oriented appropriately. Banner Transposition Flap Text: The defect edges were debeveled with a #15 scalpel blade.  Given the location of the defect and the proximity to free margins a Banner transposition flap was deemed most appropriate.  Using a sterile surgical marker, an appropriate flap drawn around the defect. The area thus outlined was incised deep to adipose tissue with a #15 scalpel blade.  The skin margins were undermined to an appropriate distance in all directions utilizing iris scissors. Dermal Autograft Text: The defect edges were debeveled with a #15 scalpel blade.  Given the location of the defect, shape of the defect and the proximity to free margins a dermal autograft was deemed most appropriate.  Using a sterile surgical marker, the primary defect shape was transferred to the donor site. The area thus outlined was incised deep to adipose tissue with a #15 scalpel blade.  The harvested graft was then trimmed of adipose and epidermal tissue until only dermis was left.  The skin graft was then placed in the primary defect and oriented appropriately. Fusiform Excision Additional Text (Leave Blank If You Do Not Want): The margin was drawn around the clinically apparent lesion.  A fusiform shape was then drawn on the skin incorporating the lesion and margins.  Incisions were then made along these lines to the appropriate tissue plane and the lesion was extirpated. Complex Repair And Dermal Autograft Text: The defect edges were debeveled with a #15 scalpel blade.  The primary defect was closed partially with a complex linear closure.  Given the location of the defect, shape of the defect and the proximity to free margins an dermal autograft was deemed most appropriate to repair the remaining defect.  The graft was trimmed to fit the size of the remaining defect.  The graft was then placed in the primary defect, oriented appropriately, and sutured into place. Bi-Rhombic Flap Text: The defect edges were debeveled with a #15 scalpel blade.  Given the location of the defect and the proximity to free margins a bi-rhombic flap was deemed most appropriate.  Using a sterile surgical marker, an appropriate rhombic flap was drawn incorporating the defect. The area thus outlined was incised deep to adipose tissue with a #15 scalpel blade.  The skin margins were undermined to an appropriate distance in all directions utilizing iris scissors. Burow's Advancement Flap Text: The defect edges were debeveled with a #15 scalpel blade.  Given the location of the defect and the proximity to free margins a Burow's advancement flap was deemed most appropriate.  Using a sterile surgical marker, the appropriate advancement flap was drawn incorporating the defect and placing the expected incisions within the relaxed skin tension lines where possible.    The area thus outlined was incised deep to adipose tissue with a #15 scalpel blade.  The skin margins were undermined to an appropriate distance in all directions utilizing iris scissors. Complex Repair And Split-Thickness Skin Graft Text: The defect edges were debeveled with a #15 scalpel blade.  The primary defect was closed partially with a complex linear closure.  Given the location of the defect, shape of the defect and the proximity to free margins a split thickness skin graft was deemed most appropriate to repair the remaining defect.  The graft was trimmed to fit the size of the remaining defect.  The graft was then placed in the primary defect, oriented appropriately, and sutured into place. Lab: [id number] Complex Repair And O-L Flap Text: The defect edges were debeveled with a #15 scalpel blade.  The primary defect was closed partially with a complex linear closure.  Given the location of the remaining defect, shape of the defect and the proximity to free margins an O-L flap was deemed most appropriate for complete closure of the defect.  Using a sterile surgical marker, an appropriate flap was drawn incorporating the defect and placing the expected incisions within the relaxed skin tension lines where possible.    The area thus outlined was incised deep to adipose tissue with a #15 scalpel blade.  The skin margins were undermined to an appropriate distance in all directions utilizing iris scissors. Complex Repair And M Plasty Text: The defect edges were debeveled with a #15 scalpel blade.  The primary defect was closed partially with a complex linear closure.  Given the location of the remaining defect, shape of the defect and the proximity to free margins an M plasty was deemed most appropriate for complete closure of the defect.  Using a sterile surgical marker, an appropriate advancement flap was drawn incorporating the defect and placing the expected incisions within the relaxed skin tension lines where possible.    The area thus outlined was incised deep to adipose tissue with a #15 scalpel blade.  The skin margins were undermined to an appropriate distance in all directions utilizing iris scissors. Cheek-To-Nose Interpolation Flap Text: A decision was made to reconstruct the defect utilizing an interpolation axial flap and a staged reconstruction.  A telfa template was made of the defect.  This telfa template was then used to outline the Cheek-To-Nose Interpolation flap.  The donor area for the pedicle flap was then injected with anesthesia.  The flap was excised through the skin and subcutaneous tissue down to the layer of the underlying musculature.  The interpolation flap was carefully excised within this deep plane to maintain its blood supply.  The edges of the donor site were undermined.   The donor site was closed in a primary fashion.  The pedicle was then rotated into position and sutured.  Once the tube was sutured into place, adequate blood supply was confirmed with blanching and refill.  The pedicle was then wrapped with xeroform gauze and dressed appropriately with a telfa and gauze bandage to ensure continued blood supply and protect the attached pedicle. Excision Depth: adipose tissue Size Of Margin In Cm: 0.2 Billing Type: Third-Party Bill A-T Advancement Flap Text: The defect edges were debeveled with a #15 scalpel blade.  Given the location of the defect, shape of the defect and the proximity to free margins an A-T advancement flap was deemed most appropriate.  Using a sterile surgical marker, an appropriate advancement flap was drawn incorporating the defect and placing the expected incisions within the relaxed skin tension lines where possible.    The area thus outlined was incised deep to adipose tissue with a #15 scalpel blade.  The skin margins were undermined to an appropriate distance in all directions utilizing iris scissors. Dermal Closure: buried vertical mattress Complex Repair And Skin Substitute Graft Text: The defect edges were debeveled with a #15 scalpel blade.  The primary defect was closed partially with a complex linear closure.  Given the location of the remaining defect, shape of the defect and the proximity to free margins a skin substitute graft was deemed most appropriate to repair the remaining defect.  The graft was trimmed to fit the size of the remaining defect.  The graft was then placed in the primary defect, oriented appropriately, and sutured into place. Complex Repair And Epidermal Autograft Text: The defect edges were debeveled with a #15 scalpel blade.  The primary defect was closed partially with a complex linear closure.  Given the location of the defect, shape of the defect and the proximity to free margins an epidermal autograft was deemed most appropriate to repair the remaining defect.  The graft was trimmed to fit the size of the remaining defect.  The graft was then placed in the primary defect, oriented appropriately, and sutured into place. Helical Rim Advancement Flap Text: The defect edges were debeveled with a #15 blade scalpel.  Given the location of the defect and the proximity to free margins (helical rim) a double helical rim advancement flap was deemed most appropriate.  Using a sterile surgical marker, the appropriate advancement flaps were drawn incorporating the defect and placing the expected incisions between the helical rim and antihelix where possible.  The area thus outlined was incised through and through with a #15 scalpel blade.  With a skin hook and iris scissors, the flaps were gently and sharply undermined and freed up. Crescentic Advancement Flap Text: The defect edges were debeveled with a #15 scalpel blade.  Given the location of the defect and the proximity to free margins a crescentic advancement flap was deemed most appropriate.  Using a sterile surgical marker, the appropriate advancement flap was drawn incorporating the defect and placing the expected incisions within the relaxed skin tension lines where possible.    The area thus outlined was incised deep to adipose tissue with a #15 scalpel blade.  The skin margins were undermined to an appropriate distance in all directions utilizing iris scissors. Excisional Biopsy Additional Text (Leave Blank If You Do Not Want): The margin was drawn around the clinically apparent lesion. An elliptical shape was then drawn on the skin incorporating the lesion and margins.  Incisions were then made along these lines to the appropriate tissue plane and the lesion was extirpated. Cartilage Graft Text: The defect edges were debeveled with a #15 scalpel blade.  Given the location of the defect, shape of the defect, the fact the defect involved a full thickness cartilage defect a cartilage graft was deemed most appropriate.  An appropriate donor site was identified, cleansed, and anesthetized. The cartilage graft was then harvested and transferred to the recipient site, oriented appropriately and then sutured into place.  The secondary defect was then repaired using a primary closure. Island Pedicle Flap With Canthal Suspension Text: The defect edges were debeveled with a #15 scalpel blade.  Given the location of the defect, shape of the defect and the proximity to free margins an island pedicle advancement flap was deemed most appropriate.  Using a sterile surgical marker, an appropriate advancement flap was drawn incorporating the defect, outlining the appropriate donor tissue and placing the expected incisions within the relaxed skin tension lines where possible. The area thus outlined was incised deep to adipose tissue with a #15 scalpel blade.  The skin margins were undermined to an appropriate distance in all directions around the primary defect and laterally outward around the island pedicle utilizing iris scissors.  There was minimal undermining beneath the pedicle flap. A suspension suture was placed in the canthal tendon to prevent tension and prevent ectropion. Complex Repair And Tissue Cultured Epidermal Autograft Text: The defect edges were debeveled with a #15 scalpel blade.  The primary defect was closed partially with a complex linear closure.  Given the location of the defect, shape of the defect and the proximity to free margins an tissue cultured epidermal autograft was deemed most appropriate to repair the remaining defect.  The graft was trimmed to fit the size of the remaining defect.  The graft was then placed in the primary defect, oriented appropriately, and sutured into place. Wound Care: Bacitracin Rhombic Flap Text: The defect edges were debeveled with a #15 scalpel blade.  Given the location of the defect and the proximity to free margins a rhombic flap was deemed most appropriate.  Using a sterile surgical marker, an appropriate rhombic flap was drawn incorporating the defect.    The area thus outlined was incised deep to adipose tissue with a #15 scalpel blade.  The skin margins were undermined to an appropriate distance in all directions utilizing iris scissors. Size Of Lesion In Cm: 2.4 Lab Facility: [id number] Complex Repair And O-T Advancement Flap Text: The defect edges were debeveled with a #15 scalpel blade.  The primary defect was closed partially with a complex linear closure.  Given the location of the remaining defect, shape of the defect and the proximity to free margins an O-T advancement flap was deemed most appropriate for complete closure of the defect.  Using a sterile surgical marker, an appropriate advancement flap was drawn incorporating the defect and placing the expected incisions within the relaxed skin tension lines where possible.    The area thus outlined was incised deep to adipose tissue with a #15 scalpel blade.  The skin margins were undermined to an appropriate distance in all directions utilizing iris scissors. Melolabial Transposition Flap Text: The defect edges were debeveled with a #15 scalpel blade.  Given the location of the defect and the proximity to free margins a melolabial flap was deemed most appropriate.  Using a sterile surgical marker, an appropriate melolabial transposition flap was drawn incorporating the defect.    The area thus outlined was incised deep to adipose tissue with a #15 scalpel blade.  The skin margins were undermined to an appropriate distance in all directions utilizing iris scissors. S Plasty Text: Given the location and shape of the defect, and the orientation of relaxed skin tension lines, an S-plasty was deemed most appropriate for repair.  Using a sterile surgical marker, the appropriate outline of the S-plasty was drawn, incorporating the defect and placing the expected incisions within the relaxed skin tension lines where possible.  The area thus outlined was incised deep to adipose tissue with a #15 scalpel blade.  The skin margins were undermined to an appropriate distance in all directions utilizing iris scissors. The skin flaps were advanced over the defect.  The opposing margins were then approximated with interrupted buried subcutaneous sutures. Dorsal Nasal Flap Text: The defect edges were debeveled with a #15 scalpel blade.  Given the location of the defect and the proximity to free margins a dorsal nasal flap was deemed most appropriate.  Using a sterile surgical marker, an appropriate dorsal nasal flap was drawn around the defect.    The area thus outlined was incised deep to adipose tissue with a #15 scalpel blade.  The skin margins were undermined to an appropriate distance in all directions utilizing iris scissors. Complex Repair And Transposition Flap Text: The defect edges were debeveled with a #15 scalpel blade.  The primary defect was closed partially with a complex linear closure.  Given the location of the remaining defect, shape of the defect and the proximity to free margins a transposition flap was deemed most appropriate for complete closure of the defect.  Using a sterile surgical marker, an appropriate advancement flap was drawn incorporating the defect and placing the expected incisions within the relaxed skin tension lines where possible.    The area thus outlined was incised deep to adipose tissue with a #15 scalpel blade.  The skin margins were undermined to an appropriate distance in all directions utilizing iris scissors. Mastoid Interpolation Flap Text: A decision was made to reconstruct the defect utilizing an interpolation axial flap and a staged reconstruction.  A telfa template was made of the defect.  This telfa template was then used to outline the mastoid interpolation flap.  The donor area for the pedicle flap was then injected with anesthesia.  The flap was excised through the skin and subcutaneous tissue down to the layer of the underlying musculature.  The pedicle flap was carefully excised within this deep plane to maintain its blood supply.  The edges of the donor site were undermined.   The donor site was closed in a primary fashion.  The pedicle was then rotated into position and sutured.  Once the tube was sutured into place, adequate blood supply was confirmed with blanching and refill.  The pedicle was then wrapped with xeroform gauze and dressed appropriately with a telfa and gauze bandage to ensure continued blood supply and protect the attached pedicle. Bilateral Helical Rim Advancement Flap Text: The defect edges were debeveled with a #15 blade scalpel.  Given the location of the defect and the proximity to free margins (helical rim) a bilateral helical rim advancement flap was deemed most appropriate.  Using a sterile surgical marker, the appropriate advancement flaps were drawn incorporating the defect and placing the expected incisions between the helical rim and antihelix where possible.  The area thus outlined was incised through and through with a #15 scalpel blade.  With a skin hook and iris scissors, the flaps were gently and sharply undermined and freed up. Paramedian Forehead Flap Text: A decision was made to reconstruct the defect utilizing an interpolation axial flap and a staged reconstruction.  A telfa template was made of the defect.  This telfa template was then used to outline the paramedian forehead pedicle flap.  The donor area for the pedicle flap was then injected with anesthesia.  The flap was excised through the skin and subcutaneous tissue down to the layer of the underlying musculature.  The pedicle flap was carefully excised within this deep plane to maintain its blood supply.  The edges of the donor site were undermined.   The donor site was closed in a primary fashion.  The pedicle was then rotated into position and sutured.  Once the tube was sutured into place, adequate blood supply was confirmed with blanching and refill.  The pedicle was then wrapped with xeroform gauze and dressed appropriately with a telfa and gauze bandage to ensure continued blood supply and protect the attached pedicle. Skin Substitute Text: The defect edges were debeveled with a #15 scalpel blade.  Given the location of the defect, shape of the defect and the proximity to free margins a skin substitute graft was deemed most appropriate.  The graft material was trimmed to fit the size of the defect. The graft was then placed in the primary defect and oriented appropriately. Complex Repair And Xenograft Text: The defect edges were debeveled with a #15 scalpel blade.  The primary defect was closed partially with a complex linear closure.  Given the location of the defect, shape of the defect and the proximity to free margins a xenograft was deemed most appropriate to repair the remaining defect.  The graft was trimmed to fit the size of the remaining defect.  The graft was then placed in the primary defect, oriented appropriately, and sutured into place. Island Pedicle Flap Text: The defect edges were debeveled with a #15 scalpel blade.  Given the location of the defect, shape of the defect and the proximity to free margins an island pedicle advancement flap was deemed most appropriate.  Using a sterile surgical marker, an appropriate advancement flap was drawn incorporating the defect, outlining the appropriate donor tissue and placing the expected incisions within the relaxed skin tension lines where possible.    The area thus outlined was incised deep to adipose tissue with a #15 scalpel blade.  The skin margins were undermined to an appropriate distance in all directions around the primary defect and laterally outward around the island pedicle utilizing iris scissors.  There was minimal undermining beneath the pedicle flap. Complex Repair And Double M Plasty Text: The defect edges were debeveled with a #15 scalpel blade.  The primary defect was closed partially with a complex linear closure.  Given the location of the remaining defect, shape of the defect and the proximity to free margins a double M plasty was deemed most appropriate for complete closure of the defect.  Using a sterile surgical marker, an appropriate advancement flap was drawn incorporating the defect and placing the expected incisions within the relaxed skin tension lines where possible.    The area thus outlined was incised deep to adipose tissue with a #15 scalpel blade.  The skin margins were undermined to an appropriate distance in all directions utilizing iris scissors. Post-Care Instructions: I reviewed with the patient in detail post-care instructions:\\n1. Apply bacitracin over the steri-strips.  \\n2. Cut non-stick pad (Telfa) to cover the steri-strips\\n3. Apply tape (hypafix) over the non-stick pad\\n4. Change once per day for 5 days\\n5. Shower with bandage on, change bandage after shower\\n\\nPatient is not to engage in any heavy lifting, exercise, hot tub, or swimming for the next 14 days. Should the patient develop any fevers, chills, bleeding, severe pain patient will contact the office immediately. Purse String (Simple) Text: Given the location of the defect and the characteristics of the surrounding skin a purse string simple closure was deemed most appropriate.  Undermining was performed circumferentially around the surgical defect.  A purse string suture was then placed and tightened. Mucosal Advancement Flap Text: Given the location of the defect, shape of the defect and the proximity to free margins a mucosal advancement flap was deemed most appropriate. Incisions were made with a 15 blade scalpel in the appropriate fashion along the cutaneous vermillion border and the mucosal lip. The remaining actinically damaged mucosal tissue was excised.  The mucosal advancement flap was then elevated to the gingival sulcus with care taken to preserve the neurovascular structures and advanced into the primary defect. Care was taken to ensure that precise realignment of the vermilion border was achieved. Additional Anesthesia Volume In Cc: 6 Repair Anesthesia Method: local infiltration Posterior Auricular Interpolation Flap Text: A decision was made to reconstruct the defect utilizing an interpolation axial flap and a staged reconstruction.  A telfa template was made of the defect.  This telfa template was then used to outline the posterior auricular interpolation flap.  The donor area for the pedicle flap was then injected with anesthesia.  The flap was excised through the skin and subcutaneous tissue down to the layer of the underlying musculature.  The pedicle flap was carefully excised within this deep plane to maintain its blood supply.  The edges of the donor site were undermined.   The donor site was closed in a primary fashion.  The pedicle was then rotated into position and sutured.  Once the tube was sutured into place, adequate blood supply was confirmed with blanching and refill.  The pedicle was then wrapped with xeroform gauze and dressed appropriately with a telfa and gauze bandage to ensure continued blood supply and protect the attached pedicle. Advancement Flap (Double) Text: The defect edges were debeveled with a #15 scalpel blade.  Given the location of the defect and the proximity to free margins a double advancement flap was deemed most appropriate.  Using a sterile surgical marker, the appropriate advancement flaps were drawn incorporating the defect and placing the expected incisions within the relaxed skin tension lines where possible.    The area thus outlined was incised deep to adipose tissue with a #15 scalpel blade.  The skin margins were undermined to an appropriate distance in all directions utilizing iris scissors. Complex Repair And Ftsg Text: The defect edges were debeveled with a #15 scalpel blade.  The primary defect was closed partially with a complex linear closure.  Given the location of the defect, shape of the defect and the proximity to free margins a full thickness skin graft was deemed most appropriate to repair the remaining defect.  The graft was trimmed to fit the size of the remaining defect.  The graft was then placed in the primary defect, oriented appropriately, and sutured into place. O-T Advancement Flap Text: The defect edges were debeveled with a #15 scalpel blade.  Given the location of the defect, shape of the defect and the proximity to free margins an O-T advancement flap was deemed most appropriate.  Using a sterile surgical marker, an appropriate advancement flap was drawn incorporating the defect and placing the expected incisions within the relaxed skin tension lines where possible.    The area thus outlined was incised deep to adipose tissue with a #15 scalpel blade.  The skin margins were undermined to an appropriate distance in all directions utilizing iris scissors. Epidermal Sutures: 5-0 Vicryl Rapide Previous Accession (Optional): [id number] Eliptical Excision Additional Text (Leave Blank If You Do Not Want): The margin was drawn around the clinically apparent lesion.  An elliptical shape was then drawn on the skin incorporating the lesion and margins.  Incisions were then made along these lines to the appropriate tissue plane and the lesion was extirpated.
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
## 2019-03-26 NOTE — TELEPHONE ENCOUNTER
Called pt to discuss orders. Pt has appointment with pain clinic tomorrow so I informed her that we would not fill her order request at this time. She should wait until she sees Dr. Castillo in the pain clinic and follow the recommendations set forward in the pain clinic. If they recommend the having injections via Dr. Gilbert then we can readdress the orders. Pt voiced understanding and thanked me for the call.    Blair FERRIS ATC

## 2019-03-27 ASSESSMENT — PATIENT HEALTH QUESTIONNAIRE - PHQ9: SUM OF ALL RESPONSES TO PHQ QUESTIONS 1-9: 19

## 2019-03-29 DIAGNOSIS — M25.561 RIGHT KNEE PAIN: Primary | ICD-10-CM

## 2019-04-01 NOTE — TELEPHONE ENCOUNTER
RECORDS RECEIVED FROM: Discuss possible meniscal tear, rt knee.    DATE RECEIVED: 04/01/19    NOTES STATUS DETAILS   OFFICE NOTE from referring provider N/A    OFFICE NOTE from other specialist N/A    DISCHARGE SUMMARY from hospital N/A    DISCHARGE REPORT from the ER N/A    OPERATIVE REPORT N/A    MEDICATION LIST Internal    IMPLANT RECORD/STICKER N/A    LABS     CBC/DIFF N/A    CULTURES N/A    INJECTIONS DONE IN RADIOLOGY N/A    MRI N/A    CT SCAN N/A    XRAYS (IMAGES & REPORTS) Internal 11/7/17   TUMOR     PATHOLOGY  Slides & report N/A

## 2019-04-03 ENCOUNTER — PRE VISIT (OUTPATIENT)
Dept: ORTHOPEDICS | Facility: CLINIC | Age: 49
End: 2019-04-03

## 2019-04-05 ENCOUNTER — OFFICE VISIT (OUTPATIENT)
Dept: FAMILY MEDICINE | Facility: CLINIC | Age: 49
End: 2019-04-05
Payer: COMMERCIAL

## 2019-04-05 VITALS
DIASTOLIC BLOOD PRESSURE: 72 MMHG | WEIGHT: 196 LBS | TEMPERATURE: 98.8 F | OXYGEN SATURATION: 97 % | HEART RATE: 95 BPM | SYSTOLIC BLOOD PRESSURE: 110 MMHG | BODY MASS INDEX: 32.62 KG/M2 | RESPIRATION RATE: 21 BRPM

## 2019-04-05 DIAGNOSIS — L71.9 ROSACEA: Primary | ICD-10-CM

## 2019-04-05 PROCEDURE — 99213 OFFICE O/P EST LOW 20 MIN: CPT | Performed by: PHYSICIAN ASSISTANT

## 2019-04-05 RX ORDER — METRONIDAZOLE 7.5 MG/G
GEL TOPICAL 2 TIMES DAILY
Qty: 45 G | Refills: 3 | Status: SHIPPED | OUTPATIENT
Start: 2019-04-05 | End: 2020-09-15

## 2019-04-05 NOTE — PATIENT INSTRUCTIONS
Patient Education     Treating Rosacea    There is no cure for rosacea. But rosacea can be controlled in most cases, particularly with medical treatment to manage your symptoms.  Your healthcare provider may prescribe 1 or more treatments to put on your skin each day. You may also be given medicines to take by mouth if you have more severe rosacea symptoms. To relieve rosacea eye symptoms, you may need prescription eye drops. Avoid things that can easily cause your rosacea to flare up. These include spicy foods, alcohol, getting embarrassed, and going from a cold environment to a warm environment. You may have surgery to fix the more severe forms of scarring rosacea of the nose. This is called rhinophyma and means the redness and swelling that cause the nose to enlarge.  Your role in medical treatment  How well your treatment works depends partly on you. Follow your healthcare provider s treatment plan. Rosacea symptoms often get better with medicines. But they tend to get worse again if you stop taking the medicines. If your symptoms continue or get worse, ask about other treatment options, including combinations of treatments.  Rosacea self-care  Besides sticking with your treatment plan, follow these tips to care for your skin:    Wash your face twice a day with a gentle facial cleanser. Rinse your skin well with warm (not hot) water. Pat your skin dry with a cotton towel.    Don t scrub your skin or use sponges, brushes, or other abrasive tools. Doing so can irritate your skin.    Avoid harsh scrubs or astringents. These products can irritate your skin.    If you shave your face, use an electric razor.    Apply sunscreen with at least SPF 15 or more every day. Sun exposure can make rosacea symptoms worse.    Choose skin care products and cosmetics that do not irritate the skin, and are oil-free and fragrance-free.    Avoid putting steroids on the skin sores. Steroids may make rosacea worse.   Getting good  results  Learning about rosacea and treating it early is the first step toward controlling this disease. With proper treatment and self-care, you can manage your symptoms and feel better about your skin. The National Rosacea Society is a great resource for information.   What causes rosacea flare-ups?  It s often hard to pinpoint the factors that cause rosacea flare-ups. Different people can be affected by different triggers. Common triggers include weather extremes, sun exposure, alcoholic or hot beverages, spicy food, physical exertion, stress, illness, some skin products, and medicines. To prevent flare-ups, keep a list of things that seem to make your rosacea worse. Then try to avoid them.  Date Last Reviewed: 2/1/2017 2000-2018 [employer]. [address] [place] [address], [place] [address]. All rights reserved. This information is not intended as a substitute for professional medical care. Always follow your healthcare professional's instructions.
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
## 2019-05-29 ENCOUNTER — OFFICE VISIT (OUTPATIENT)
Dept: ANESTHESIOLOGY | Facility: CLINIC | Age: 49
End: 2019-05-29
Payer: COMMERCIAL

## 2019-05-29 VITALS
HEART RATE: 89 BPM | SYSTOLIC BLOOD PRESSURE: 113 MMHG | DIASTOLIC BLOOD PRESSURE: 77 MMHG | HEIGHT: 65 IN | RESPIRATION RATE: 16 BRPM | WEIGHT: 196 LBS | BODY MASS INDEX: 32.65 KG/M2

## 2019-05-29 DIAGNOSIS — M79.18 MYOFASCIAL PAIN: ICD-10-CM

## 2019-05-29 DIAGNOSIS — M47.27 LUMBOSACRAL SPONDYLOSIS WITH RADICULOPATHY: Primary | ICD-10-CM

## 2019-05-29 ASSESSMENT — ANXIETY QUESTIONNAIRES
3. WORRYING TOO MUCH ABOUT DIFFERENT THINGS: SEVERAL DAYS
7. FEELING AFRAID AS IF SOMETHING AWFUL MIGHT HAPPEN: SEVERAL DAYS
7. FEELING AFRAID AS IF SOMETHING AWFUL MIGHT HAPPEN: SEVERAL DAYS
1. FEELING NERVOUS, ANXIOUS, OR ON EDGE: SEVERAL DAYS
GAD7 TOTAL SCORE: 6
2. NOT BEING ABLE TO STOP OR CONTROL WORRYING: SEVERAL DAYS
GAD7 TOTAL SCORE: 6
6. BECOMING EASILY ANNOYED OR IRRITABLE: MORE THAN HALF THE DAYS
4. TROUBLE RELAXING: NOT AT ALL
5. BEING SO RESTLESS THAT IT IS HARD TO SIT STILL: NOT AT ALL

## 2019-05-29 ASSESSMENT — ENCOUNTER SYMPTOMS
COUGH: 1
SPEECH CHANGE: 1
NECK PAIN: 1
NERVOUS/ANXIOUS: 1
NECK MASS: 0
WEAKNESS: 1
TASTE DISTURBANCE: 0
SORE THROAT: 1
HEADACHES: 1
SEIZURES: 0
LOSS OF CONSCIOUSNESS: 0
WHEEZING: 1
DIZZINESS: 1
DISTURBANCES IN COORDINATION: 1
MUSCLE WEAKNESS: 1
MYALGIAS: 1
BACK PAIN: 1
SMELL DISTURBANCE: 0
DECREASED CONCENTRATION: 1
PARALYSIS: 0
STIFFNESS: 1
JOINT SWELLING: 1
DYSPNEA ON EXERTION: 1
INSOMNIA: 0
COUGH DISTURBING SLEEP: 0
MEMORY LOSS: 1
SPUTUM PRODUCTION: 1
HOARSE VOICE: 1
SINUS CONGESTION: 1
HEMOPTYSIS: 0
MUSCLE CRAMPS: 1
ARTHRALGIAS: 1
POSTURAL DYSPNEA: 0
NUMBNESS: 1
SHORTNESS OF BREATH: 1
PANIC: 1
DEPRESSION: 1

## 2019-05-29 ASSESSMENT — MIFFLIN-ST. JEOR: SCORE: 1514.93

## 2019-05-29 ASSESSMENT — PAIN SCALES - GENERAL: PAINLEVEL: MODERATE PAIN (5)
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
## 2019-05-29 NOTE — PATIENT INSTRUCTIONS
1. Call to schedule your procedure.     2. Physical Therapy Referral placed- Pool therapy.    If you have not heard from the scheduling office within 2 business days, please call [phone] for all locations    3. TENS UNIT Ordered- Please call your insurance to Verify coverage and locations to  the device.     When calling to schedule your procedure appointment, also make your follow up clinic appointment 6 weeks after the procedure.    Please call [phone] to schedule, reschedule, or cancel your procedure appointment.   Phones are answered Monday - Friday from 7:30 - 4:00pm.  Leave a voicemail with your name, birth date, and phone number if no one is available to take your call.     Your procedure: Interlaminar Lumbar Epidural Steroid Injections.     On the day of the procedure  1. Arrive 1 hour earlier than your scheduled time, to the [facility]  Address: [address] [place] [id number]  2. Check in on the 5th floor for your procedure    If you must reschedule your procedure more than two times, you must follow up in clinic before rescheduling again.    Preparing for your procedure    CAUTION - FAILURE TO FOLLOW THESE PRE-PROCEDURE INSTRUCTIONS WILL RESULT IN YOUR PROCEDURE BEING RESCHEDULED.      Pregnancy  If you are pregnant, or think you may be pregnant, please notify our staff. This may or may not affect the ability to perform the procedure.       You must have a  take you home after your procedure. Transportation by taxi or para-transit is okay as long as you have a responsible adult accompany you. You must provide your 's full name and contact number at time of check in.     Fasting Protocol You may have NOTHING SOLID TO EAT 8 HOURS prior to arrival at the procedure area.     You may have CLEAR LIQUIDS UP TO 2 HOURS prior to arrival.    Broth and candy are considered solid food and require an eight hour fast.     Clear liquids include water, clear  fruit juice (no pulp), carbonated beverages, ice, black coffee, black tea, clear jello. No alcohol containing beverages.   Medications If you take any medications, DO NOT STOP. Take your medications as usual the day of your procedure with a sip of water AT LEAST 2 HOURS PRIOR TO ARRIVAL.    Antibiotics If you are currently taking antibiotics, you must complete the entire dose 7 days prior to your scheduled procedure. You must be clear of any signs or symptoms of infection. If you begin antibiotics, please contact our clinic for instructions.     Fever, Chills, or Rash If you experience a fever of higher than 100 degrees, chills, rash, or open wounds during the one week before your procedure, please call the clinic to see if you may proceed with your procedure.      Medication Hold List  **Patients under Cardiology/Neurology care should consult their provider prior to the pain procedure to verify pre-procedure medication instructions. The information below contains general guidelines.**    Blood Thinners If you are taking daily ASPIRIN, PLAVIX, OR OTHER BLOOD THINNERS SUCH AS COUMADIN/WARFARIN, we will need your prescribing doctor to sign a release permitting you to stop these medications. Once approved by your prescribing doctor - STOP ALL BLOOD THINNERS BASED ON THE TIME TABLE BELOW PRIOR TO YOUR PROCEDURE. If you have been instructed to stop WARFARIN(COUMADIN), you must have an INR lab drawn the day before your procedure. . Your INR must be within normal limits before we can perform your injection. MEDICATIONS CAN BE RESTARTED AFTER YOUR PROCEDURE.    14 DAY HOLD  Ticlid (ticlopidine)    10 DAY HOLD  Effient (Prasugel)    3 DAY HOLD  Xarelto (rivaroxaban) 7 DAY HOLD  Anacin, Bufferin, Ecotrin, Excedrin, Aggrenox (Aspirin)  Brilinta (ticagrelor)  Coumadin (Warfarin)  Pradexa (Dabigatran)  Elmiron (Pentosan)  Plavix (Clopidogrel Bisulfate)  Pletal (Cilostazol)    24 HOUR HOLD  Lovenox (enoxaparin)  Agrylin  (Anagrelide)                To speak with a nurse, schedule/reschedule/cancel a clinic appointment, or request a medication refill call: [phone]     You can also reach us by [employer]: [email or web address]

## 2019-05-29 NOTE — NURSING NOTE
LPN reviewed AVS with Pt.  Pt verbalized an understanding of information, and was asked to contact clinic with questions.    LPN read through the instructions with pt for the recommended procedure: Interlaminar Lumbar FATUMA  Pt verbalized understanding to holding appropriate medication per protocol, and was agreeable to NPO policy and needing a .      Alexandria Singleton, BEVERLEY

## 2019-05-30 ASSESSMENT — ANXIETY QUESTIONNAIRES: GAD7 TOTAL SCORE: 6
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
## 2019-06-06 ENCOUNTER — OFFICE VISIT (OUTPATIENT)
Dept: FAMILY MEDICINE | Facility: CLINIC | Age: 49
End: 2019-06-06
Payer: MEDICAID

## 2019-06-06 VITALS
RESPIRATION RATE: 16 BRPM | BODY MASS INDEX: 32.45 KG/M2 | HEART RATE: 90 BPM | WEIGHT: 195 LBS | OXYGEN SATURATION: 99 % | TEMPERATURE: 98.5 F | DIASTOLIC BLOOD PRESSURE: 72 MMHG | SYSTOLIC BLOOD PRESSURE: 126 MMHG

## 2019-06-06 DIAGNOSIS — I10 ESSENTIAL HYPERTENSION WITH GOAL BLOOD PRESSURE LESS THAN 140/90: ICD-10-CM

## 2019-06-06 DIAGNOSIS — J45.20 MILD INTERMITTENT ASTHMA WITHOUT COMPLICATION: ICD-10-CM

## 2019-06-06 PROCEDURE — 99214 OFFICE O/P EST MOD 30 MIN: CPT | Performed by: FAMILY MEDICINE

## 2019-06-06 RX ORDER — CETIRIZINE HYDROCHLORIDE 10 MG/1
10 TABLET ORAL DAILY
Qty: 90 TABLET | Refills: 3 | Status: SHIPPED | OUTPATIENT
Start: 2019-06-06 | End: 2021-09-21

## 2019-06-06 RX ORDER — LISINOPRIL 10 MG/1
10 TABLET ORAL DAILY
Qty: 90 TABLET | Refills: 3 | Status: SHIPPED | OUTPATIENT
Start: 2019-06-06 | End: 2020-06-10

## 2019-06-06 RX ORDER — MONTELUKAST SODIUM 10 MG/1
10 TABLET ORAL AT BEDTIME
Qty: 90 TABLET | Refills: 3 | Status: SHIPPED | OUTPATIENT
Start: 2019-06-06 | End: 2021-04-16

## 2019-06-06 RX ORDER — ALBUTEROL SULFATE 90 UG/1
2 AEROSOL, METERED RESPIRATORY (INHALATION) EVERY 6 HOURS PRN
Qty: 1 INHALER | Refills: 11 | Status: SHIPPED | OUTPATIENT
Start: 2019-06-06 | End: 2021-09-21
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
## 2019-07-05 ENCOUNTER — TELEPHONE (OUTPATIENT)
Dept: FAMILY MEDICINE | Facility: CLINIC | Age: 49
End: 2019-07-05

## 2019-07-05 ENCOUNTER — OFFICE VISIT (OUTPATIENT)
Dept: FAMILY MEDICINE | Facility: CLINIC | Age: 49
End: 2019-07-05
Payer: COMMERCIAL

## 2019-07-05 VITALS
BODY MASS INDEX: 32.45 KG/M2 | TEMPERATURE: 97.9 F | SYSTOLIC BLOOD PRESSURE: 125 MMHG | HEART RATE: 86 BPM | WEIGHT: 195 LBS | OXYGEN SATURATION: 98 % | DIASTOLIC BLOOD PRESSURE: 85 MMHG | RESPIRATION RATE: 16 BRPM

## 2019-07-05 DIAGNOSIS — R87.810 CERVICAL HIGH RISK HPV (HUMAN PAPILLOMAVIRUS) TEST POSITIVE: ICD-10-CM

## 2019-07-05 DIAGNOSIS — F07.81 POSTCONCUSSION SYNDROME: Primary | ICD-10-CM

## 2019-07-05 DIAGNOSIS — L56.8 PHOTOSENSITIVITY: ICD-10-CM

## 2019-07-05 DIAGNOSIS — Z87.820 HISTORY OF TRAUMATIC BRAIN INJURY: ICD-10-CM

## 2019-07-05 PROCEDURE — G0124 SCREEN C/V THIN LAYER BY MD: HCPCS | Performed by: FAMILY MEDICINE

## 2019-07-05 PROCEDURE — G0145 SCR C/V CYTO,THINLAYER,RESCR: HCPCS | Performed by: FAMILY MEDICINE

## 2019-07-05 PROCEDURE — 87624 HPV HI-RISK TYP POOLED RSLT: CPT | Performed by: FAMILY MEDICINE

## 2019-07-05 PROCEDURE — 99214 OFFICE O/P EST MOD 30 MIN: CPT | Performed by: FAMILY MEDICINE

## 2019-07-05 ASSESSMENT — PATIENT HEALTH QUESTIONNAIRE - PHQ9
SUM OF ALL RESPONSES TO PHQ QUESTIONS 1-9: 15
10. IF YOU CHECKED OFF ANY PROBLEMS, HOW DIFFICULT HAVE THESE PROBLEMS MADE IT FOR YOU TO DO YOUR WORK, TAKE CARE OF THINGS AT HOME, OR GET ALONG WITH OTHER PEOPLE: EXTREMELY DIFFICULT
SUM OF ALL RESPONSES TO PHQ QUESTIONS 1-9: 15

## 2019-07-05 ASSESSMENT — ANXIETY QUESTIONNAIRES
GAD7 TOTAL SCORE: 8
4. TROUBLE RELAXING: SEVERAL DAYS
7. FEELING AFRAID AS IF SOMETHING AWFUL MIGHT HAPPEN: SEVERAL DAYS
6. BECOMING EASILY ANNOYED OR IRRITABLE: NEARLY EVERY DAY
3. WORRYING TOO MUCH ABOUT DIFFERENT THINGS: SEVERAL DAYS
5. BEING SO RESTLESS THAT IT IS HARD TO SIT STILL: NOT AT ALL
7. FEELING AFRAID AS IF SOMETHING AWFUL MIGHT HAPPEN: SEVERAL DAYS
1. FEELING NERVOUS, ANXIOUS, OR ON EDGE: SEVERAL DAYS
GAD7 TOTAL SCORE: 8
2. NOT BEING ABLE TO STOP OR CONTROL WORRYING: SEVERAL DAYS
GAD7 TOTAL SCORE: 8

## 2019-07-05 NOTE — LETTER
[address] [address]  Phone: [phone]      REPORT OF WORK ABILITY  Date: 7/5/2019                     Employee Name: Iasbela Panchal         YOB: 1970  Medical Record Number: [id number]   Soc.Sec.No: [id number]  Employer: None                Date of Injury: 2/26/2015    Diagnosis: TBI with hx of concussion, headache, with ongoing balance and visual symptoms  Ambient visual processing deficits  Photophobia  Headaches  Dizziness  Balance issues  Work Related: no     MMI: YES - Date: 1/23/2017   Permanent Partial Disability(PPD): YES    EMPLOYEE IS UNABLE TO WORK     RESTRICTIONS IF ANY:  She is unable to tolerate reading print books or working on computer for more than 10 minutes due to photopobia and processing deficits causing headaches and blurred vision, which precludes working    Sincerely,  Dr. Felisha Briggs MD  7/5/2019
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
## 2019-07-05 NOTE — TELEPHONE ENCOUNTER
Left message letting patient know, her from is ready to be picked up. Placed at      Gretchen Burr on 7/5/2019 at 5:16 PM

## 2019-07-06 ASSESSMENT — ANXIETY QUESTIONNAIRES: GAD7 TOTAL SCORE: 8

## 2019-07-10 LAB
COPATH REPORT: ABNORMAL
PAP: ABNORMAL

## 2019-08-14 ENCOUNTER — OFFICE VISIT (OUTPATIENT)
Dept: OBGYN | Facility: CLINIC | Age: 49
End: 2019-08-14
Payer: COMMERCIAL

## 2019-08-14 VITALS
HEIGHT: 65 IN | BODY MASS INDEX: 32.15 KG/M2 | HEART RATE: 99 BPM | WEIGHT: 193 LBS | SYSTOLIC BLOOD PRESSURE: 145 MMHG | DIASTOLIC BLOOD PRESSURE: 91 MMHG

## 2019-08-14 DIAGNOSIS — R87.810 CERVICAL HIGH RISK HPV (HUMAN PAPILLOMAVIRUS) TEST POSITIVE: Primary | ICD-10-CM

## 2019-08-14 LAB — HCG UR QL: NEGATIVE

## 2019-08-14 PROCEDURE — 99212 OFFICE O/P EST SF 10 MIN: CPT | Performed by: OBSTETRICS & GYNECOLOGY

## 2019-08-14 PROCEDURE — 81025 URINE PREGNANCY TEST: CPT | Performed by: OBSTETRICS & GYNECOLOGY

## 2019-08-14 ASSESSMENT — MIFFLIN-ST. JEOR: SCORE: 1501.32

## 2019-08-14 NOTE — PROGRESS NOTES
INDICATION: Isabela Panchal is a 48 year old female who presents for colposcopy.  Pap smear was reported as ASCUS, HPV 16 +.  She is status post colposcopy for NIL Pap, HPV 16+ 12/5/2018.  Biopsy of 12:00 showed squamous epithelium with atypia of undetermined significance, favor reactive change.  Intended plan had been repeating a Pap and HPV test December 2019, but these tests were repeated in July.    We discussed options of management.  We discussed HPV, cervical dysplasia, cervical cancer.  After discussion, we will defer today's colposcopy, instead planning a repeat Pap and HPV test in December 2019.  If colposcopy is indicated at that time we will proceed.  Questions answered, she agrees with the plan.    Please note greater than 50% of this 10 minute appointment were spent in counseling with the patient of the issues described above in the history of present illness and in the plan, including evaluation and management of cervical dysplasia, HPV on Pap smear.

## 2019-08-14 NOTE — PATIENT INSTRUCTIONS

## 2019-10-24 ENCOUNTER — OFFICE VISIT (OUTPATIENT)
Dept: URGENT CARE | Facility: URGENT CARE | Age: 49
End: 2019-10-24
Payer: COMMERCIAL

## 2019-10-24 VITALS
BODY MASS INDEX: 32.15 KG/M2 | WEIGHT: 193 LBS | HEART RATE: 94 BPM | HEIGHT: 65 IN | TEMPERATURE: 98.8 F | DIASTOLIC BLOOD PRESSURE: 81 MMHG | SYSTOLIC BLOOD PRESSURE: 132 MMHG | OXYGEN SATURATION: 98 %

## 2019-10-24 DIAGNOSIS — S61.032A PUNCTURE WOUND OF LEFT THUMB, INITIAL ENCOUNTER: Primary | ICD-10-CM

## 2019-10-24 PROCEDURE — 99212 OFFICE O/P EST SF 10 MIN: CPT | Performed by: FAMILY MEDICINE

## 2019-10-24 ASSESSMENT — MIFFLIN-ST. JEOR: SCORE: 1496.32
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
## 2019-10-31 ENCOUNTER — OFFICE VISIT (OUTPATIENT)
Dept: FAMILY MEDICINE | Facility: CLINIC | Age: 49
End: 2019-10-31
Payer: COMMERCIAL

## 2019-10-31 VITALS
SYSTOLIC BLOOD PRESSURE: 130 MMHG | TEMPERATURE: 98.3 F | RESPIRATION RATE: 18 BRPM | HEART RATE: 69 BPM | WEIGHT: 193 LBS | DIASTOLIC BLOOD PRESSURE: 80 MMHG | BODY MASS INDEX: 32.15 KG/M2 | OXYGEN SATURATION: 97 % | HEIGHT: 65 IN

## 2019-10-31 DIAGNOSIS — H53.143 EYES SENSITIVE TO LIGHT, BILATERAL: ICD-10-CM

## 2019-10-31 DIAGNOSIS — H53.8 BLURRY VISION, BILATERAL: ICD-10-CM

## 2019-10-31 DIAGNOSIS — G89.29 CHRONIC PATELLOFEMORAL PAIN OF RIGHT KNEE: ICD-10-CM

## 2019-10-31 DIAGNOSIS — G56.03 BILATERAL CARPAL TUNNEL SYNDROME: ICD-10-CM

## 2019-10-31 DIAGNOSIS — S06.9X0D TRAUMATIC BRAIN INJURY, WITHOUT LOSS OF CONSCIOUSNESS, SUBSEQUENT ENCOUNTER: ICD-10-CM

## 2019-10-31 DIAGNOSIS — Z23 NEED FOR PROPHYLACTIC VACCINATION AND INOCULATION AGAINST INFLUENZA: ICD-10-CM

## 2019-10-31 DIAGNOSIS — F33.1 MODERATE RECURRENT MAJOR DEPRESSION (H): ICD-10-CM

## 2019-10-31 DIAGNOSIS — M25.561 ACUTE PAIN OF RIGHT KNEE: Primary | ICD-10-CM

## 2019-10-31 DIAGNOSIS — M25.561 CHRONIC PATELLOFEMORAL PAIN OF RIGHT KNEE: ICD-10-CM

## 2019-10-31 PROCEDURE — 90682 RIV4 VACC RECOMBINANT DNA IM: CPT | Performed by: FAMILY MEDICINE

## 2019-10-31 PROCEDURE — 90471 IMMUNIZATION ADMIN: CPT | Performed by: FAMILY MEDICINE

## 2019-10-31 PROCEDURE — 99214 OFFICE O/P EST MOD 30 MIN: CPT | Mod: 25 | Performed by: FAMILY MEDICINE

## 2019-10-31 ASSESSMENT — MIFFLIN-ST. JEOR: SCORE: 1496.32

## 2019-10-31 NOTE — LETTER
10/31/2019    INSURER: Payor: NORM / Plan: NORM MA / Product Type: HMO /     Re: Prior Authorization Request/Medical necessity  Patient: Isabela Panchal  Policy ID#:  [id number]  : 1970      To Whom it May Concern:    I am writing to formally request a prior authorization of coverage for my patient,  Isabela Panchal, for progressive bifocal corrective lenses to treat both presbyopia and myopia, with intolerance with regular bifocals describes as feeling unsteady, nauseated, and being at risk of falling. She is at higher risk of tolerating regular lenses because of chronic TBI symptoms including heightened visual sensitivity.      I firmly believe that this therapy is clinically appropriate and that Isabela Panchal would benefit from improved clinical outcomes and quality of life if allowed the opportunity to receive this treatment.  Please contact me at Dept: [phone] if you require additional information to ensure the prompt approval for coverage.    Thank you for your time and attention!    Sincerely,      Felisha Briggs MD
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
## 2019-10-31 NOTE — PATIENT INSTRUCTIONS
Health Maintenance Due   Topic Date Due     MAMMO SCREENING  1970     INFLUENZA VACCINE (1) 09/01/2019     A1C  10/30/2019     MICROALBUMIN  10/30/2019     ZOSTER IMMUNIZATION (1 of 2) 09/03/2019     ASTHMA ACTION PLAN  11/02/2019     HPV  01/05/2020     PAP  01/05/2020

## 2019-11-02 ASSESSMENT — ASTHMA QUESTIONNAIRES: ACT_TOTALSCORE: 24

## 2019-11-08 DIAGNOSIS — G89.29 CHRONIC PATELLOFEMORAL PAIN OF RIGHT KNEE: ICD-10-CM

## 2019-11-08 DIAGNOSIS — M25.561 ACUTE PAIN OF RIGHT KNEE: ICD-10-CM

## 2019-11-08 DIAGNOSIS — M25.561 CHRONIC PATELLOFEMORAL PAIN OF RIGHT KNEE: ICD-10-CM

## 2019-11-08 PROCEDURE — 73560 X-RAY EXAM OF KNEE 1 OR 2: CPT | Mod: RT

## 2019-11-09 NOTE — RESULT ENCOUNTER NOTE
You have very mild narrowing your knee joint which can indicate mild osteoarthritis. I do recommend seeing an orthopedic specialist as we discussed for further evaluation and perhaps additional imaging.    Sincerely,  Dr. Felisha Briggs MD  11/8/2019      RIGHT KNEE ONE TO TWO VIEWS  11/8/2019 10:41 AM      HISTORY:  Acute pain of right knee. Chronic patellofemoral pain of  right knee.     COMPARISON:  11/7/2017       IMPRESSION: Mild lateral compartment narrowing. No fracture  identified.      JOSE LAZO MD

## 2019-11-14 ENCOUNTER — DOCUMENTATION ONLY (OUTPATIENT)
Dept: CARE COORDINATION | Facility: CLINIC | Age: 49
End: 2019-11-14

## 2019-11-16 NOTE — TELEPHONE ENCOUNTER
RECORDS RECEIVED FROM: Acute pain of right knee/Chronic patellofemoral pain of right knee/Dr Briggs/no images/[facility]/ortho con   DATE RECEIVED: Nov 25, 2019   NOTES STATUS DETAILS   OFFICE NOTE from referring provider Felisha Pinto MD      OFFICE NOTE from other specialist N/A    DISCHARGE SUMMARY from hospital N/A    DISCHARGE REPORT from the ER N/A    OPERATIVE REPORT N/A    MEDICATION LIST Internal    IMPLANT RECORD/STICKER N/A    LABS     CBC/DIFF N/A    CULTURES N/A    INJECTIONS DONE IN RADIOLOGY N/A    MRI N/A    CT SCAN N/A    XRAYS (IMAGES & REPORTS) Internal    TUMOR     PATHOLOGY  Slides & report N/A      11/16/19   10:54 AM   Pre-visit complete  Ángela Glamm, CMA

## 2019-11-16 NOTE — TELEPHONE ENCOUNTER
DIAGNOSIS: Bilateral carpal tunnel syndrome/Dr Briggs/no images/[facility]/ortho con  pt would also like to discuss sciatic pain if there is time   APPOINTMENT DATE: 12/03/2019   NOTES STATUS DETAILS   OFFICE NOTE from referring provider Internal 10/31/2019   OFFICE NOTE from other specialist Internal LUMBAR ISSUES 10/16/2018  KNEE ISSUES-11/07/2017   DISCHARGE SUMMARY from hospital N/A    DISCHARGE REPORT from the ER N/A    OPERATIVE REPORT N/A    MEDICATION LIST N/A    IMPLANT RECORD/STICKER N/A    LABS     CBC/DIFF N/A    CULTURES N/A    INJECTIONS DONE IN RADIOLOGY N/A    MRI N/A NONE PERTAINING TO DX   CT SCAN N/A    XRAYS (IMAGES & REPORTS) Internal 06/22/2018 03/23/2015   TUMOR     PATHOLOGY  Slides & report N/A

## 2019-11-25 ENCOUNTER — OFFICE VISIT (OUTPATIENT)
Dept: ORTHOPEDICS | Facility: CLINIC | Age: 49
End: 2019-11-25
Payer: COMMERCIAL

## 2019-11-25 ENCOUNTER — PRE VISIT (OUTPATIENT)
Dept: ORTHOPEDICS | Facility: CLINIC | Age: 49
End: 2019-11-25

## 2019-11-25 VITALS — WEIGHT: 190 LBS | HEIGHT: 65 IN | BODY MASS INDEX: 31.65 KG/M2

## 2019-11-25 DIAGNOSIS — M25.562 CHRONIC PAIN OF BOTH KNEES: Primary | ICD-10-CM

## 2019-11-25 DIAGNOSIS — M25.561 CHRONIC PAIN OF BOTH KNEES: Primary | ICD-10-CM

## 2019-11-25 DIAGNOSIS — G89.29 CHRONIC PAIN OF BOTH KNEES: Primary | ICD-10-CM

## 2019-11-25 RX ADMIN — LIDOCAINE HYDROCHLORIDE 8 ML: 5 INJECTION, SOLUTION INFILTRATION; INTRAVENOUS at 15:25

## 2019-11-25 RX ADMIN — TRIAMCINOLONE ACETONIDE 40 MG: 40 INJECTION, SUSPENSION INTRA-ARTICULAR; INTRAMUSCULAR at 15:25

## 2019-11-25 ASSESSMENT — MIFFLIN-ST. JEOR: SCORE: 1482.71

## 2019-11-25 NOTE — NURSING NOTE
[address] [address]  Dept: [phone]  ______________________________________________________________________________    Patient: Isabela Panchal   : 1970   MRN: [id number]   2019    INVASIVE PROCEDURE SAFETY CHECKLIST    Date: 19   Procedure: Bilateral knee CSI with Kenalog  Patient Name: Isabela Panchal  MRN: [id number]  YOB: 1970    Action: Complete sections as appropriate. Any discrepancy results in a HARD COPY until resolved.     PRE PROCEDURE:  Patient ID verified with 2 identifiers (name and  or MRN): Yes  Procedure and site verified with patient/designee (when able): Yes  Accurate consent documentation in medical record: Yes  H&P (or appropriate assessment) documented in medical record: Yes  H&P must be up to 20 days prior to procedure and updates within 24 hours of procedure as applicable: NA  Relevant diagnostic and radiology test results appropriately labeled and displayed as applicable: Yes  Procedure site(s) marked with provider initials: NA    TIMEOUT:  Time-Out performed immediately prior to starting procedure, including verbal and active participation of all team members addressing the following:Yes  * Correct patient identify  * Confirmed that the correct side and site are marked  * An accurate procedure consent form  * Agreement on the procedure to be done  * Correct patient position  * Relevant images and results are properly labeled and appropriately displayed  * The need to administer antibiotics or fluids for irrigation purposes during the procedure as applicable   * Safety precautions based on patient history or medication use    DURING PROCEDURE: Verification of correct person, site, and procedures any time the responsibility for care of the patient is transferred to another member of the care team.       Prior to injection, verified patient identity using patient's name  and date of birth.  Due to injection administration, patient instructed to remain in clinic for 15 minutes  afterwards, and to report any adverse reaction to me immediately.    Joint injection was performed.      Drug Amount Wasted:  34 of 50mg Lidocaine wasted  Vial/Syringe: Single dose vial  Expiration Date:  10/21      Toshia Sears [facility]  November 25, 2019

## 2019-11-25 NOTE — PROGRESS NOTES
Large Joint Injection/Arthocentesis: bilateral knee  Date/Time: 11/25/2019 3:25 PM  Performed by: Zane Eckert MD  Authorized by: Zane Eckert MD     Needle Size:  22 G  Guidance: landmark guided    Approach:  Anterolateral  Location:  Knee  Laterality:  Bilateral      Medications (Right):  40 mg triamcinolone 40 MG/ML; 8 mL lidocaine (PF) 0.5 %  Medications (Left):  40 mg triamcinolone 40 MG/ML; 8 mL lidocaine (PF) 0.5 %  Outcome:  Tolerated well, no immediate complications  Procedure discussed: discussed risks, benefits, and alternatives    Consent Given by:  Patient  Timeout: timeout called immediately prior to procedure    Prep: patient was prepped and draped in usual sterile fashion     Scribed by Toshia Sears ATC for Dr. Eckert on 11/25/19 at 3PM, based on the provider s statements to me.

## 2019-11-25 NOTE — LETTER
11/25/2019       RE: Isabela Panchal  [address]  [place] [address]     Dear Colleague,    Thank you for referring your patient, Isabela Panchal, to the [facility] at [facility]. Please see a copy of my visit note below.          Large Joint Injection/Arthocentesis: bilateral knee  Date/Time: 11/25/2019 3:25 PM  Performed by: Zane Eckert MD  Authorized by: Zane Eckert MD     Needle Size:  22 G  Guidance: landmark guided    Approach:  Anterolateral  Location:  Knee  Laterality:  Bilateral      Medications (Right):  40 mg triamcinolone 40 MG/ML; 8 mL lidocaine (PF) 0.5 %  Medications (Left):  40 mg triamcinolone 40 MG/ML; 8 mL lidocaine (PF) 0.5 %  Outcome:  Tolerated well, no immediate complications  Procedure discussed: discussed risks, benefits, and alternatives    Consent Given by:  Patient  Timeout: timeout called immediately prior to procedure    Prep: patient was prepped and draped in usual sterile fashion     Scribed by Toshia Sears ATC for Dr. Eckert on 11/25/19 at 3PM, based on the provider s statements to me.            CHIEF CONCERN: Bilateral knee pain    HISTORY:   Very pleasant 49-year-old female.  Complains of pain for the last couple years seems to be worse when she goes up and down stairs.  She has had difficulty doing the things that she wants to do.  She was recently doing some moving flared up.  There is been very painful to her she is noted crepitation.  She enjoys traveling she does not really do sports    PAST MEDICAL HISTORY: (Reviewed with the patient and in the [facility] medical record)  1. Depression  2. Hypertension  3. Allergies  4. High cholesterol    PAST SURGICAL HISTORY: (Reviewed with the patient and in the [facility] medical record)  1. She did describe surgery on her left knee when a small bone growth was removed this is a number of years ago    MEDICATIONS: (Reviewed with  the patient and in the [facility] medical record)    Notable medications include: No blood thinners or narcotics    ALLERGIES: (Reviewed with the patient and in the [facility] medical record)  1. Adhesive dressings, seasonal allergy medications, Lexapro      SOCIAL HISTORY: (Reviewed with the patient and in the medical record)  --Tobacco: Non-smoker  --Occupation: She is not currently working  --Avocation/Sport: She enjoys traveling she does not play sports    FAMILY HISTORY: (Reviewed with the patient and in the medical record)  -- No family history of bleeding, clotting, or difficulty with anesthesia    REVIEW OF SYSTEMS: (Reviewed with the patient and on the health intake form)  -- A comprehensive 10 point review of systems was conducted and is negative except as noted in the HPI    EXAM:     General: Awake, Alert and Oriented, No acute Distress. Articulate and Interactive    Body mass index is 31.62 kg/m .    Right lower extremity :    Skin is Warm and Well perfused, no suggestion of infection    Trace effusion    Positive crepitation    Full range of motion 0 to 135 degrees    Stable to varus and valgus stress testing.  Stable anterior and posterior drawer testing.  No pivot shift.    1 quadrant medial lateral translation of the patella.    EHL/FHL/TA/GS 5/5    Sensation intact L3-S1    2+ Dorsalis Pedis Pulse    Left lower extremity :    Skin is Warm and Well perfused, no suggestion of infection    Trace effusion    Positive crepitation    Full range of motion 0 to 135 degrees    Stable to varus and valgus stress testing.  Stable anterior and posterior drawer testing.  No pivot shift.    1 quadrant medial lateral translation of the patella.    EHL/FHL/TA/GS 5/5    Sensation intact L3-S1    2+ Dorsalis Pedis Pulse    IMAGING:    Plain Radiographs: Plain radiographs do show some degenerative changes the patellofemoral compartment    MRI: None    ASSESSMENT:  1. Presumed early patellofemoral arthritis    PLAN:  1. At this  point we will try a cortical steroid injection to each knee as well as a course of physical therapy.  The patient is hoping to avoid surgery.  I think is very reasonable.    After written informed consent obtained and signed, after sufficient prepping and sterile technique, 40 mg of kenalog and , 8 cc of 1% lidocaine were injected without complication into the right knee. The patient tolerated the injection well and a sterile dressing was applied.    After written informed consent obtained and signed, after sufficient prepping and sterile technique, 40 mg of kenalog and , 8 cc of 1% lidocaine were injected without complication into the left knee. The patient tolerated the injection well and a sterile dressing was applied.    He can follow with me on an as-needed basis going forward.  She is locked up to 3 injections/year.  No lifetime maximum.  These failed to provide lasting benefit may need to work-up her knee (s) with an MRI.         Again, thank you for allowing me to participate in the care of your patient.      Sincerely,    Zane Eckert MD

## 2019-12-03 ENCOUNTER — PRE VISIT (OUTPATIENT)
Dept: ORTHOPEDICS | Facility: CLINIC | Age: 49
End: 2019-12-03

## 2019-12-03 ENCOUNTER — OFFICE VISIT (OUTPATIENT)
Dept: ORTHOPEDICS | Facility: CLINIC | Age: 49
End: 2019-12-03
Payer: COMMERCIAL

## 2019-12-03 DIAGNOSIS — M50.20 HERNIATED DISC, CERVICAL: Primary | ICD-10-CM

## 2019-12-03 NOTE — NURSING NOTE
Reason For Visit:   Chief Complaint   Patient presents with     Consult     bilateral carpal tunnel syndrome        There were no vitals taken for this visit.    Pain Assessment  Patient Currently in Pain: Yes  0-10 Pain Scale: 1  Primary Pain Location: Wrist    Ryanne Romero ATC
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
## 2019-12-04 ENCOUNTER — HOSPITAL ENCOUNTER (OUTPATIENT)
Facility: CLINIC | Age: 49
End: 2019-12-04
Payer: COMMERCIAL

## 2019-12-04 RX ORDER — LIDOCAINE HYDROCHLORIDE 5 MG/ML
8 INJECTION, SOLUTION INFILTRATION; INTRAVENOUS
Status: DISCONTINUED | OUTPATIENT
Start: 2019-11-25 | End: 2021-09-21

## 2019-12-04 RX ORDER — TRIAMCINOLONE ACETONIDE 40 MG/ML
40 INJECTION, SUSPENSION INTRA-ARTICULAR; INTRAMUSCULAR
Status: DISCONTINUED | OUTPATIENT
Start: 2019-11-25 | End: 2021-09-21

## 2019-12-04 NOTE — PROGRESS NOTES
CHIEF CONCERN: Bilateral knee pain    HISTORY:   Very pleasant 49-year-old female.  Complains of pain for the last couple years seems to be worse when she goes up and down stairs.  She has had difficulty doing the things that she wants to do.  She was recently doing some moving flared up.  There is been very painful to her she is noted crepitation.  She enjoys traveling she does not really do sports    PAST MEDICAL HISTORY: (Reviewed with the patient and in the EPIC medical record)  1. Depression  2. Hypertension  3. Allergies  4. High cholesterol    PAST SURGICAL HISTORY: (Reviewed with the patient and in the EPIC medical record)  1. She did describe surgery on her left knee when a small bone growth was removed this is a number of years ago    MEDICATIONS: (Reviewed with the patient and in the EPIC medical record)    Notable medications include: No blood thinners or narcotics    ALLERGIES: (Reviewed with the patient and in the EPIC medical record)  1. Adhesive dressings, seasonal allergy medications, Lexapro      SOCIAL HISTORY: (Reviewed with the patient and in the medical record)  --Tobacco: Non-smoker  --Occupation: She is not currently working  --Avocation/Sport: She enjoys traveling she does not play sports    FAMILY HISTORY: (Reviewed with the patient and in the medical record)  -- No family history of bleeding, clotting, or difficulty with anesthesia    REVIEW OF SYSTEMS: (Reviewed with the patient and on the health intake form)  -- A comprehensive 10 point review of systems was conducted and is negative except as noted in the HPI    EXAM:     General: Awake, Alert and Oriented, No acute Distress. Articulate and Interactive    Body mass index is 31.62 kg/m .    Right lower extremity :    Skin is Warm and Well perfused, no suggestion of infection    Trace effusion    Positive crepitation    Full range of motion 0 to 135 degrees    Stable to varus and valgus stress testing.  Stable anterior and posterior  drawer testing.  No pivot shift.    1 quadrant medial lateral translation of the patella.    EHL/FHL/TA/GS 5/5    Sensation intact L3-S1    2+ Dorsalis Pedis Pulse    Left lower extremity :    Skin is Warm and Well perfused, no suggestion of infection    Trace effusion    Positive crepitation    Full range of motion 0 to 135 degrees    Stable to varus and valgus stress testing.  Stable anterior and posterior drawer testing.  No pivot shift.    1 quadrant medial lateral translation of the patella.    EHL/FHL/TA/GS 5/5    Sensation intact L3-S1    2+ Dorsalis Pedis Pulse    IMAGING:    Plain Radiographs: Plain radiographs do show some degenerative changes the patellofemoral compartment    MRI: None    ASSESSMENT:  1. Presumed early patellofemoral arthritis    PLAN:  1. At this point we will try a cortical steroid injection to each knee as well as a course of physical therapy.  The patient is hoping to avoid surgery.  I think is very reasonable.    After written informed consent obtained and signed, after sufficient prepping and sterile technique, 40 mg of kenalog and , 8 cc of 1% lidocaine were injected without complication into the right knee. The patient tolerated the injection well and a sterile dressing was applied.    After written informed consent obtained and signed, after sufficient prepping and sterile technique, 40 mg of kenalog and , 8 cc of 1% lidocaine were injected without complication into the left knee. The patient tolerated the injection well and a sterile dressing was applied.    He can follow with me on an as-needed basis going forward.  She is locked up to 3 injections/year.  No lifetime maximum.  These failed to provide lasting benefit may need to work-up her knee (s) with an MRI.

## 2019-12-09 ENCOUNTER — THERAPY VISIT (OUTPATIENT)
Dept: PHYSICAL THERAPY | Facility: CLINIC | Age: 49
End: 2019-12-09
Payer: COMMERCIAL

## 2019-12-09 DIAGNOSIS — M25.561 RIGHT KNEE PAIN: Primary | ICD-10-CM

## 2019-12-09 PROCEDURE — 97110 THERAPEUTIC EXERCISES: CPT | Mod: GP | Performed by: PHYSICAL THERAPIST

## 2019-12-09 PROCEDURE — 97162 PT EVAL MOD COMPLEX 30 MIN: CPT | Mod: GP | Performed by: PHYSICAL THERAPIST

## 2019-12-09 ASSESSMENT — ACTIVITIES OF DAILY LIVING (ADL)
AS_A_RESULT_OF_YOUR_KNEE_INJURY,_HOW_WOULD_YOU_RATE_YOUR_CURRENT_LEVEL_OF_DAILY_ACTIVITY?: ABNORMAL
WEAKNESS: NOT ANSWERED
RISE FROM A CHAIR: ACTIVITY IS FAIRLY DIFFICULT
LIMPING: NOT ANSWERED
STAND: NOT ANSWERED
PAIN: NOT ANSWERED
GIVING WAY, BUCKLING OR SHIFTING OF KNEE: THE SYMPTOM AFFECTS MY ACTIVITY SEVERELY
GO UP STAIRS: ACTIVITY IS MINIMALLY DIFFICULT
KNEE_ACTIVITY_OF_DAILY_LIVING_SUM: 20
WALK: ACTIVITY IS MINIMALLY DIFFICULT
KNEEL ON THE FRONT OF YOUR KNEE: ACTIVITY IS FAIRLY DIFFICULT
HOW_WOULD_YOU_RATE_THE_OVERALL_FUNCTION_OF_YOUR_KNEE_DURING_YOUR_USUAL_DAILY_ACTIVITIES?: ABNORMAL
SWELLING: NOT ANSWERED
STIFFNESS: THE SYMPTOM PREVENTS ME FROM ALL DAILY ACTIVITIES
GO DOWN STAIRS: ACTIVITY IS MINIMALLY DIFFICULT
SQUAT: ACTIVITY IS MINIMALLY DIFFICULT
SIT WITH YOUR KNEE BENT: NOT ANSWERED

## 2019-12-09 NOTE — PROGRESS NOTES
Isabela Panchal is a 49 year old with a R knee complaint.  Symptoms commenced as a result of: B injections in knees recently. Knees hurt with ADLs, mostly stairs, kneeling. Reports several episodes of losing balance.  Condition occurred in the following environment: home.   Onset of symptoms: 5 months ago.   Location of symptoms: b anterior knees.   Pain level on number scale: 6/10.   Quality of pain: sharp, achy.   Associated symptoms: numbness/tinling (feels like it is back related).   Pain frequency (constant/intermittent): constant.   Symptoms are exacerbated by: stairs, kneeling.   Symptoms are relieved by: rest.  Progression of symptoms since onset (same/better/worse): same.   Special tests (x-ray, MRI, CT scan, EMG, bone scan): x-ray.  General health as reported by patient is fair.  Pertinent medical history includes: See Epic.   Medical allergies: see Epic.   Other pertinent surgeries: see Epic.   Current medications: See Epic.   Occupation: None reported.   Barriers at home/work: None reported by patient.   Red flags: PMH None reported by patient.    Objective:  KNEE EVALUATION      Knee PROM Extension Flexion   Left WNL WNL   Right WNL WNL       Hip and Knee Strength   MMT Hip Abduction Hip Extension Hip ER Knee Flexion Knee Ext   Left 4-/5 4-/5 4-/5 4-/5 4/5   Right 4-/5 4-/5 4-/5 4-/5 4/5     Knee MMT Right Left  Quad Set Fair Fair   SLR Unable due to pain Fair    Knee ligaments and meniscus screen: No sig findings    Patellofemoral screen: No sig findings    PALPATION  Left: Anterior knee pain  Right: Anterior knee pain      Assessment/Plan:    Patient has the following significant findings with corresponding treatment plan.                Diagnosis 1:  R knee pain  Pain -  manual therapy, splint/taping/bracing/orthotics, self management, education and home program  Decreased ROM/flexibility - manual therapy and therapeutic exercise  Decreased joint mobility - manual therapy and therapeutic  exercise  Decreased strength - therapeutic exercise and therapeutic activities  Impaired balance - neuro re-education and therapeutic activities    Therapy Evaluation Codes:   1) Clinical presentation characteristics are:   Evolving/Changing.  2) Decision-Making    Moderate complexity using standardized patient assessment instrument and/or measureable assessment of functional outcome.  Cumulative Therapy Evaluation is: Moderate complexity.    Previous and current functional limitations:  (See Goal Flow Sheet for this information)    Short term and Long term goals: (See Goal Flow Sheet for this information)     Communication ability:  Patient appears to be able to clearly communicate and understand verbal and written communication and follow directions correctly.  Treatment Explanation - The following has been discussed with the patient:   RX ordered/plan of care  Anticipated outcomes  Possible risks and side effects  This patient would benefit from PT intervention to resume normal activities.   Rehab potential is fair.    Frequency:  2 X a month, once daily  Duration:  for 3 weeks  Discharge Plan:  Achieve all LTG.  Independent in home treatment program.  Reach maximal therapeutic benefit.    Please refer to the daily flowsheet for treatment today, total treatment time and time spent performing 1:1 timed codes.

## 2019-12-16 ENCOUNTER — ANESTHESIA EVENT (OUTPATIENT)
Dept: SURGERY | Facility: CLINIC | Age: 49
End: 2019-12-16

## 2019-12-17 DIAGNOSIS — R73.01 IMPAIRED FASTING GLUCOSE: ICD-10-CM

## 2019-12-17 DIAGNOSIS — M51.369 DDD (DEGENERATIVE DISC DISEASE), LUMBAR: ICD-10-CM

## 2019-12-17 DIAGNOSIS — Z01.818 PREOP GENERAL PHYSICAL EXAM: Primary | ICD-10-CM

## 2019-12-17 DIAGNOSIS — M51.26 LUMBAR HERNIATED DISC: Primary | ICD-10-CM

## 2019-12-17 DIAGNOSIS — F43.23 ADJUSTMENT DISORDER WITH MIXED ANXIETY AND DEPRESSED MOOD: ICD-10-CM

## 2019-12-17 DIAGNOSIS — I10 ESSENTIAL HYPERTENSION WITH GOAL BLOOD PRESSURE LESS THAN 140/90: ICD-10-CM

## 2019-12-17 DIAGNOSIS — F40.240 CLAUSTROPHOBIA: Primary | ICD-10-CM

## 2019-12-17 RX ORDER — LORAZEPAM 0.5 MG/1
.5-1 TABLET ORAL
Qty: 2 TABLET | Refills: 0 | Status: SHIPPED | OUTPATIENT
Start: 2019-12-17 | End: 2019-12-18

## 2019-12-17 RX ORDER — LORAZEPAM 1 MG/1
1 TABLET ORAL
Qty: 1 TABLET | Refills: 0 | Status: SHIPPED | OUTPATIENT
Start: 2019-12-17 | End: 2020-09-15

## 2019-12-17 RX ORDER — LORAZEPAM 1 MG/1
1 TABLET ORAL EVERY 6 HOURS PRN
Qty: 1 TABLET | Refills: 0 | Status: CANCELLED | OUTPATIENT
Start: 2019-12-17

## 2019-12-17 NOTE — PROGRESS NOTES
ASSESSMENT / PLAN:  (F43.23) ADJUSTMENT DISORDER W MIXED MOOD  Comment:   Isabela was scheduled for MRI under sedation for tomorrow, but needs a pre-op, which she just found out about and hasn't had done.  When she called the orthopedic office she asked to be scheduled for a regular MRI instead, but requests ativan to be able to tolerate the procedure.  She presented to our pharmacy today to  prescription.  When I checked the chart for her I don't see an MRI rescheduled, just the cancelled one.  I recommend she call her orthopedic surgeon back to figure to get appropriate imaging rescheduled.  I did print out ativan prescription but will keep at my desk for now, I did not give it to her today as I'm not sure when and if she's getting imaging.  My recommendation would be do get a preop and then have the MRI under sedation as she's due for labs anyway (see health maintenance below).    Plan: LORazepam (ATIVAN) 0.5 MG tablet        Sincerely,  Dr. Felisha Briggs MD  12/17/2019      Health Maintenance Due   Topic Date Due     MAMMO SCREENING  1970     A1C  10/30/2019     MICROALBUMIN  10/30/2019     ASTHMA ACTION PLAN  11/02/2019     ZOSTER IMMUNIZATION (1 of 2) 09/03/2019     HPV  01/05/2020     PAP  01/05/2020     PHQ-9  01/05/2020

## 2019-12-18 ENCOUNTER — HOSPITAL ENCOUNTER (OUTPATIENT)
Dept: MRI IMAGING | Facility: CLINIC | Age: 49
End: 2019-12-18
Attending: PREVENTIVE MEDICINE
Payer: COMMERCIAL

## 2019-12-18 ENCOUNTER — TELEPHONE (OUTPATIENT)
Dept: FAMILY MEDICINE | Facility: CLINIC | Age: 49
End: 2019-12-18

## 2019-12-18 ENCOUNTER — HOSPITAL ENCOUNTER (OUTPATIENT)
Dept: MRI IMAGING | Facility: CLINIC | Age: 49
Discharge: HOME | End: 2019-12-18
Attending: PREVENTIVE MEDICINE | Admitting: PREVENTIVE MEDICINE
Payer: COMMERCIAL

## 2019-12-18 ENCOUNTER — TELEPHONE (OUTPATIENT)
Dept: ORTHOPEDICS | Facility: CLINIC | Age: 49
End: 2019-12-18

## 2019-12-18 ENCOUNTER — ANESTHESIA (OUTPATIENT)
Dept: SURGERY | Facility: CLINIC | Age: 49
End: 2019-12-18

## 2019-12-18 DIAGNOSIS — M51.369 DDD (DEGENERATIVE DISC DISEASE), LUMBAR: ICD-10-CM

## 2019-12-18 DIAGNOSIS — M51.26 LUMBAR HERNIATED DISC: ICD-10-CM

## 2019-12-18 PROCEDURE — 72141 MRI NECK SPINE W/O DYE: CPT

## 2019-12-18 PROCEDURE — 72148 MRI LUMBAR SPINE W/O DYE: CPT

## 2019-12-18 NOTE — TELEPHONE ENCOUNTER
Panel Management Review      Patient has the following on her problem list:     Depression / Dysthymia review    Measure:  Needs PHQ-9 score of 4 or less during index window.  Administer PHQ-9 and if score is 5 or more, send encounter to provider for next steps.    5 - 7 month window range:     PHQ-9 SCORE 10/30/2018 3/26/2019 7/5/2019   PHQ-9 Total Score - - -   PHQ-9 Total Score MyChart - 19 (Moderately severe depression) 15 (Moderately severe depression)   PHQ-9 Total Score 18 19 15       If PHQ-9 recheck is 5 or more, route to provider for next steps.    Patient is due for:  PHQ9      Composite cancer screening  Chart review shows that this patient is due/due soon for the following Mammogram  Summary:    Patient is due/failing the following:   MAMMOGRAM and PHQ9    Action needed:   Patient needs to do PHQ9. and Patient needs referral/order: mammo    Type of outreach:    Sent letter.    Questions for provider review:    None                                                                                   Stella Bryant CMA on 12/18/2019 at 3:57 PM

## 2019-12-18 NOTE — TELEPHONE ENCOUNTER
Pt is past due for f/u pap smear.  [facility] clinic and schedule.    Jessica Estrada  Pap Tracking

## 2019-12-18 NOTE — LETTER
[facility]  [address] [place] [address]  PHONE: [phone]  FAX: [phone]    12/18/2019      Isabela Panchal  [address]  [facility] [address]        Dear Isabela Jogh Ansley,    We are committed to making sure our patients get the best care, including preventative care.     Part of that commitment includes making sure you are receiving your recommended routine health screening, like mammograms, colonoscopy, and pap smears. The [facility] Team has noted that you are currently overdue for your mammogram.     If you have had a mammogram in the past 2 years please let us know so we can update our records. You can send us a message via [employer] or call the clinic at the phone number listed above so we can update your medical record.    If you have not had a mammogram, we encourage you to schedule by contacting Central Scheduling at [phone] Monday- Friday 9:00 am -8:00 pm and Saturday 9:00 am - 6:00 pm., or toll free at [phone] 24 hours a day. They will assist you with finding the most convenient time and location. You can also go to [email or web address].    If you are under/uninsured, we recommend you contact the [facility]. They offer mammograms at no charge or on a sliding fee charge. You can schedule with them at [phone]. Ask them to send us the results.      We would like to thank you in advance for taking the time to take care of your health.    Sincerely,     Your [facility]

## 2019-12-30 ENCOUNTER — TELEPHONE (OUTPATIENT)
Dept: ORTHOPEDICS | Facility: CLINIC | Age: 49
End: 2019-12-30

## 2019-12-30 DIAGNOSIS — M51.26 LUMBAR HERNIATED DISC: Primary | ICD-10-CM

## 2019-12-30 NOTE — TELEPHONE ENCOUNTER
I called and spoke to Isabela. Pt requested Lumbar FATUMA due to her pain being more severe in her lumbar over her cervical spine. She stated she didn't hear back about her result from her MRI yet. Forwarded message along to Dr Gilbert.

## 2020-01-14 ENCOUNTER — TELEPHONE (OUTPATIENT)
Dept: FAMILY MEDICINE | Facility: CLINIC | Age: 50
End: 2020-01-14

## 2020-01-14 ASSESSMENT — PATIENT HEALTH QUESTIONNAIRE - PHQ9: SUM OF ALL RESPONSES TO PHQ QUESTIONS 1-9: 15

## 2020-01-15 ENCOUNTER — OFFICE VISIT (OUTPATIENT)
Dept: OBGYN | Facility: CLINIC | Age: 50
End: 2020-01-15
Payer: COMMERCIAL

## 2020-01-15 VITALS
HEART RATE: 85 BPM | WEIGHT: 190 LBS | SYSTOLIC BLOOD PRESSURE: 131 MMHG | BODY MASS INDEX: 31.62 KG/M2 | DIASTOLIC BLOOD PRESSURE: 91 MMHG

## 2020-01-15 DIAGNOSIS — R87.810 CERVICAL HIGH RISK HPV (HUMAN PAPILLOMAVIRUS) TEST POSITIVE: Primary | ICD-10-CM

## 2020-01-15 LAB — HCG UR QL: NEGATIVE

## 2020-01-15 PROCEDURE — 88342 IMHCHEM/IMCYTCHM 1ST ANTB: CPT | Performed by: OBSTETRICS & GYNECOLOGY

## 2020-01-15 PROCEDURE — 81025 URINE PREGNANCY TEST: CPT | Performed by: OBSTETRICS & GYNECOLOGY

## 2020-01-15 PROCEDURE — 88175 CYTOPATH C/V AUTO FLUID REDO: CPT | Performed by: OBSTETRICS & GYNECOLOGY

## 2020-01-15 PROCEDURE — 88305 TISSUE EXAM BY PATHOLOGIST: CPT | Performed by: OBSTETRICS & GYNECOLOGY

## 2020-01-15 PROCEDURE — 88341 IMHCHEM/IMCYTCHM EA ADD ANTB: CPT | Performed by: OBSTETRICS & GYNECOLOGY

## 2020-01-15 PROCEDURE — 87624 HPV HI-RISK TYP POOLED RSLT: CPT | Performed by: OBSTETRICS & GYNECOLOGY

## 2020-01-15 PROCEDURE — 99212 OFFICE O/P EST SF 10 MIN: CPT | Mod: 25 | Performed by: OBSTETRICS & GYNECOLOGY

## 2020-01-15 PROCEDURE — 57454 BX/CURETT OF CERVIX W/SCOPE: CPT | Performed by: OBSTETRICS & GYNECOLOGY

## 2020-01-15 NOTE — PROGRESS NOTES
INDICATION: Isabela Panchal is a 49 year old female who presents for colposcopy.  Pap 2/13/18 was NIL, HPV 16 +.  Hundred done 12/5/18, biopsy atypia, favor reactive change.  Pap was planned in one year, but was done 7/5/19:  ASCUS, HPV 16 +.  She presented for colp 8/14/19 but after discussion, we decided to defer to pap and HPV 12/2019, with plan for colposcopy if indicated.   We reviewed HPV, cervical dysplasia, options. After discussion, she wishes to proceed with Pap/HPV/colp.        Informed consent obtained for procedure.               COLPOSCOPIC EXAM:  Cervix is fully visualized.  Squamocolumnar junction is fully visualized.  IUD string present.  Pap is sent.      Using standard technique and acetic acid application, the cervix and vagina were examined using the colposcope.  The transformation zone appeared abnormal, with acetowhite epithelium most prominent posterior, and representative biopsy of 6:00 was sent.     ECC was performed.   Monsels applied for hemostasis.    Colposcopic Impression:   Low grade.       PLAN: Post Colposcopy Instructions were given.  Call in 1 week for biopsy results.  Will advise followup depending on results.      In addition to the procedure, I spent 10 minutes with the patient, with more than 50% spent in counseling/discussing the diagnosis and treatment options.

## 2020-01-15 NOTE — TELEPHONE ENCOUNTER
Panel Management Review      Patient has the following on her problem list:     Depression / Dysthymia review    Measure:  Needs PHQ-9 score of 4 or less during index window.  Administer PHQ-9 and if score is 5 or more, send encounter to provider for next steps.    5 - 7 month window range:     PHQ-9 SCORE 10/30/2018 3/26/2019 7/5/2019   PHQ-9 Total Score - - -   PHQ-9 Total Score MyChart - 19 (Moderately severe depression) 15 (Moderately severe depression)   PHQ-9 Total Score 18 19 15       If PHQ-9 recheck is 5 or more, route to provider for next steps.    Patient is due for:  PHQ9      Composite cancer screening  Chart review shows that this patient is due/due soon for the following None  Summary:    Patient is due/failing the following:   PHQ9    Action needed:   Patient needs office visit for Depression F/U.    Type of outreach:    Phone, spoke to patient.  Completed PHQ9 and score 6. Offered an office visit. Schedule Jan 30 to see PCP.    Questions for provider review:    None                                                                                                                                    Cristel Tee MA       Chart routed to Provider .

## 2020-01-15 NOTE — LETTER
January 24, 2020    Isabela Panchal  [address]  [facility] [address]    Dear ,  This letter is regarding your recent Pap smear (cervical cancer screening) and Human Papillomavirus (HPV) test.  We are happy to inform you that your Pap smear result is normal. Cervical cancer is closely linked with certain types of HPV. Your results showed no evidence of high-risk HPV.  We recommend you have your next PAP smear and HPV test in 1 year.  You will still need to return to the clinic every year for an annual exam and other preventive tests.  If you have additional questions regarding this result, please call our registered nurse, Katty at [phone].  Sincerely,    Shruti Naranjo MD //[email or web address]

## 2020-01-17 ASSESSMENT — PATIENT HEALTH QUESTIONNAIRE - PHQ9: SUM OF ALL RESPONSES TO PHQ QUESTIONS 1-9: 6

## 2020-01-20 LAB
COPATH REPORT: NORMAL
PAP: NORMAL

## 2020-01-22 LAB
COPATH REPORT: NORMAL
FINAL DIAGNOSIS: NORMAL
HPV HR 12 DNA CVX QL NAA+PROBE: NEGATIVE
HPV16 DNA SPEC QL NAA+PROBE: NEGATIVE
HPV18 DNA SPEC QL NAA+PROBE: NEGATIVE
SPECIMEN DESCRIPTION: NORMAL
SPECIMEN SOURCE CVX/VAG CYTO: NORMAL

## 2020-01-29 ENCOUNTER — ANCILLARY PROCEDURE (OUTPATIENT)
Dept: GENERAL RADIOLOGY | Facility: CLINIC | Age: 50
End: 2020-01-29
Attending: PREVENTIVE MEDICINE
Payer: COMMERCIAL

## 2020-01-29 VITALS
SYSTOLIC BLOOD PRESSURE: 130 MMHG | DIASTOLIC BLOOD PRESSURE: 81 MMHG | OXYGEN SATURATION: 97 % | TEMPERATURE: 98.1 F | HEART RATE: 94 BPM | RESPIRATION RATE: 18 BRPM

## 2020-01-29 DIAGNOSIS — M51.26 LUMBAR HERNIATED DISC: ICD-10-CM

## 2020-01-29 RX ORDER — BUPIVACAINE HYDROCHLORIDE 5 MG/ML
10 INJECTION, SOLUTION EPIDURAL; INTRACAUDAL ONCE
Status: COMPLETED | OUTPATIENT
Start: 2020-01-29 | End: 2020-01-29

## 2020-01-29 RX ORDER — METHYLPREDNISOLONE ACETATE 80 MG/ML
80 INJECTION, SUSPENSION INTRA-ARTICULAR; INTRALESIONAL; INTRAMUSCULAR; SOFT TISSUE ONCE
Status: COMPLETED | OUTPATIENT
Start: 2020-01-29 | End: 2020-01-29

## 2020-01-29 RX ORDER — IOPAMIDOL 408 MG/ML
20 INJECTION, SOLUTION INTRATHECAL ONCE
Status: COMPLETED | OUTPATIENT
Start: 2020-01-29 | End: 2020-01-29

## 2020-01-29 RX ORDER — LIDOCAINE HYDROCHLORIDE 10 MG/ML
30 INJECTION, SOLUTION EPIDURAL; INFILTRATION; INTRACAUDAL; PERINEURAL ONCE
Status: COMPLETED | OUTPATIENT
Start: 2020-01-29 | End: 2020-01-29

## 2020-01-29 RX ADMIN — LIDOCAINE HYDROCHLORIDE 5 ML: 10 INJECTION, SOLUTION EPIDURAL; INFILTRATION; INTRACAUDAL; PERINEURAL at 09:11

## 2020-01-29 RX ADMIN — BUPIVACAINE HYDROCHLORIDE 10 MG: 5 INJECTION, SOLUTION EPIDURAL; INTRACAUDAL at 09:12

## 2020-01-29 RX ADMIN — METHYLPREDNISOLONE ACETATE 80 MG: 80 INJECTION, SUSPENSION INTRA-ARTICULAR; INTRALESIONAL; INTRAMUSCULAR; SOFT TISSUE at 09:12

## 2020-01-29 RX ADMIN — IOPAMIDOL 2 ML: 408 INJECTION, SOLUTION INTRATHECAL at 09:12

## 2020-02-13 ENCOUNTER — TELEPHONE (OUTPATIENT)
Dept: ORTHOPEDICS | Facility: CLINIC | Age: 50
End: 2020-02-13

## 2020-02-13 NOTE — TELEPHONE ENCOUNTER
Called and spoke to hannah.     She informed me of the injection caused a lot of pain. The Right side caused more pain. While the Left side provided relief for the patient.     Scheduled Follow-up with Dr Gilbert to discuss moving forward with her care. All questions were answered. Encouraged her to call us back.

## 2020-02-13 NOTE — TELEPHONE ENCOUNTER
[facility] Call Center    Phone Message    May a detailed message be left on voicemail: yes     Reason for Call: Other: Pt would like to speak Dr. Gilbert's nurse about the most recent procedure she had done by Dr. Gilbert because it's not working for the patient. Please call patient to lake this. Thank you     Action Taken: Message routed to:  [facility] Ortho    Travel Screening: Not Applicable

## 2020-02-20 ENCOUNTER — OFFICE VISIT (OUTPATIENT)
Dept: BEHAVIORAL HEALTH | Facility: CLINIC | Age: 50
End: 2020-02-20
Payer: COMMERCIAL

## 2020-02-20 DIAGNOSIS — F41.1 GAD (GENERALIZED ANXIETY DISORDER): Primary | ICD-10-CM

## 2020-02-20 PROCEDURE — 90791 PSYCH DIAGNOSTIC EVALUATION: CPT | Performed by: SOCIAL WORKER
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
## 2020-02-25 ENCOUNTER — OFFICE VISIT (OUTPATIENT)
Dept: FAMILY MEDICINE | Facility: CLINIC | Age: 50
End: 2020-02-25
Payer: COMMERCIAL

## 2020-02-25 VITALS
BODY MASS INDEX: 31.02 KG/M2 | OXYGEN SATURATION: 98 % | HEIGHT: 66 IN | SYSTOLIC BLOOD PRESSURE: 128 MMHG | RESPIRATION RATE: 16 BRPM | HEART RATE: 107 BPM | TEMPERATURE: 98.8 F | DIASTOLIC BLOOD PRESSURE: 84 MMHG | WEIGHT: 193 LBS

## 2020-02-25 DIAGNOSIS — J45.20 MILD INTERMITTENT ASTHMA WITHOUT COMPLICATION: ICD-10-CM

## 2020-02-25 DIAGNOSIS — I10 ESSENTIAL HYPERTENSION WITH GOAL BLOOD PRESSURE LESS THAN 140/90: ICD-10-CM

## 2020-02-25 DIAGNOSIS — E55.9 VITAMIN D DEFICIENCY DISEASE: ICD-10-CM

## 2020-02-25 DIAGNOSIS — R73.01 IMPAIRED FASTING GLUCOSE: ICD-10-CM

## 2020-02-25 DIAGNOSIS — M54.41 CHRONIC MIDLINE LOW BACK PAIN WITH RIGHT-SIDED SCIATICA: ICD-10-CM

## 2020-02-25 DIAGNOSIS — Z12.31 ENCOUNTER FOR SCREENING MAMMOGRAM FOR BREAST CANCER: ICD-10-CM

## 2020-02-25 DIAGNOSIS — E78.1 HIGH BLOOD TRIGLYCERIDES: ICD-10-CM

## 2020-02-25 DIAGNOSIS — E78.5 HYPERLIPIDEMIA LDL GOAL <160: ICD-10-CM

## 2020-02-25 DIAGNOSIS — G89.29 CHRONIC MIDLINE LOW BACK PAIN WITH RIGHT-SIDED SCIATICA: ICD-10-CM

## 2020-02-25 DIAGNOSIS — Z83.49 FAMILY HISTORY OF HYPOTHYROIDISM: ICD-10-CM

## 2020-02-25 DIAGNOSIS — M51.369 DDD (DEGENERATIVE DISC DISEASE), LUMBAR: Primary | ICD-10-CM

## 2020-02-25 LAB — HBA1C MFR BLD: 5.2 % (ref 0–5.6)

## 2020-02-25 PROCEDURE — 80061 LIPID PANEL: CPT | Performed by: FAMILY MEDICINE

## 2020-02-25 PROCEDURE — 36415 COLL VENOUS BLD VENIPUNCTURE: CPT | Performed by: FAMILY MEDICINE

## 2020-02-25 PROCEDURE — 82306 VITAMIN D 25 HYDROXY: CPT | Performed by: FAMILY MEDICINE

## 2020-02-25 PROCEDURE — 82043 UR ALBUMIN QUANTITATIVE: CPT | Performed by: FAMILY MEDICINE

## 2020-02-25 PROCEDURE — 80053 COMPREHEN METABOLIC PANEL: CPT | Performed by: FAMILY MEDICINE

## 2020-02-25 PROCEDURE — 99214 OFFICE O/P EST MOD 30 MIN: CPT | Performed by: FAMILY MEDICINE

## 2020-02-25 PROCEDURE — 84443 ASSAY THYROID STIM HORMONE: CPT | Performed by: FAMILY MEDICINE

## 2020-02-25 PROCEDURE — 83036 HEMOGLOBIN GLYCOSYLATED A1C: CPT | Performed by: FAMILY MEDICINE

## 2020-02-25 ASSESSMENT — MIFFLIN-ST. JEOR: SCORE: 1504.25

## 2020-02-25 NOTE — LETTER
[facility]  [address] [place] [id number] [phone]        March 2, 2020    Isabela Joso Espinosason                                                                                                                     [address] [facility] [address]          Dear Isabela,  It was a pleasure to see you in clinic!  Thank you for getting labs done.   Your vitamin D is normal but on the low end of normal. Please take 2000 units daily.   Your thyroid function is normal, which is good news.  This means that you do not have hyperthyroidism or hypothyroidism.   Your microalbumen is normal, which is great news. This means you do not have protein in the urine, and that your kidneys are currently functioning well. Keeping blood pressure and blood fats low is the best way to preserve your kidney function over your lifetime.    Thank you for getting labs done. Your lab test to check for diabetes, HgA1C, also called glycosylated hemoglobin, which measures the level of sugar in your blood over the past few months, is normal which is great! Congratulations, you don't have diabetes!   The testing of your blood sugar, kidney function, liver function and electrolytes was normal.   Thank you for getting your cholesterol checked!   Desired or goal levels are:   CHOLESTEROL: Desirable is less than 200.   HDL (Good Cholesterol): Desirable is greater than 40 in men and greater than 50 in women.   LDL (Bad Cholesterol): Desirable is less than 130   TRIGLYCERIDES: Desirable is less than 150.   I recommend that you take Omega-3 fatty acids (available in capsules) to improve your triglycerides. You need 2000 - 4000 mg daily of EPA + DHA. This usually means 4 - 8 capsules a day, depending on the strength you buy.  To keep triglycerides in check, reduce sugar in your diet including alcohol, juice, excess fruit, bread, pasta, rice, potatoes and cereal in your diet.   Also consider starting or  increasing your aerobic activity; this is the best way to improve HDL (good) cholesterol. Exercise for at least 30 min 5 times per week, and lift weights 2-3 times per week.   As you may know, abnormal cholesterol is one factor that increases your risk for heart disease and stroke. You can improve your cholesterol by controlling the amount and type of fat you eat and by increasing your daily activity level.   Here are some ways to improve your nutrition (adapted from the American Academy of Family Practice handout):   Eat less fat (especially butter, Crisco and other saturated fats)   Buy lean cuts of meat; reduce your portions of red meat or substitute poultry or fish, or avoid meat altogether.   Use skim milk and low-fat dairy products   Eat no more than 4 egg yolks per week   Avoid fried or fast foods that are high in fat   Eat more fruits and vegetables, trying to make your plate of food at least half non-starchy vegetables.     Results for orders placed or performed in visit on 02/25/20   HEMOGLOBIN A1C     Status: None   Result Value Ref Range    Hemoglobin A1C 5.2 0 - 5.6 %   Albumin Random Urine Quantitative with Creat Ratio     Status: None   Result Value Ref Range    Creatinine Urine 187 mg/dL    Albumin Urine mg/L 30 mg/L    Albumin Urine mg/g Cr 16.04 0 - 25 mg/g Cr   Lipid panel reflex to direct LDL Fasting     Status: Abnormal   Result Value Ref Range    Cholesterol 174 <200 mg/dL    Triglycerides 303 (H) <150 mg/dL    HDL Cholesterol 37 (L) >49 mg/dL    LDL Cholesterol Calculated 76 <100 mg/dL    Non HDL Cholesterol 137 (H) <130 mg/dL   Comprehensive metabolic panel     Status: Abnormal   Result Value Ref Range    Sodium 139 133 - 144 mmol/L    Potassium 3.8 3.4 - 5.3 mmol/L    Chloride 107 94 - 109 mmol/L    Carbon Dioxide 25 20 - 32 mmol/L    Anion Gap 7 3 - 14 mmol/L    Glucose 133 (H) 70 - 99 mg/dL    Urea Nitrogen 9 7 - 30 mg/dL    Creatinine 0.59 0.52 - 1.04 mg/dL    GFR Estimate >90 >60  mL/min/[1.73_m2]    GFR Estimate If Black >90 >60 mL/min/[1.73_m2]    Calcium 9.2 8.5 - 10.1 mg/dL    Bilirubin Total 0.6 0.2 - 1.3 mg/dL    Albumin 3.9 3.4 - 5.0 g/dL    Protein Total 7.3 6.8 - 8.8 g/dL    Alkaline Phosphatase 77 40 - 150 U/L    ALT 37 0 - 50 U/L    AST 17 0 - 45 U/L   TSH with free T4 reflex     Status: None   Result Value Ref Range    TSH 1.18 0.40 - 4.00 mU/L   Vitamin D Deficiency     Status: None   Result Value Ref Range    Vitamin D Deficiency screening 27 20 - 75 ug/L        If you have any questions, please contact the clinic or schedule an appointment with       Sincerely,     Felisha Briggs MD

## 2020-02-25 NOTE — LETTER
My Asthma Action Plan    Name: Isabela Panchal   YOB: 1970  Date: 2/25/2020   My doctor: Felisha Briggs MD   My clinic: [facility]        My Rescue Medicine:   Albuterol inhaler (Proair/Ventolin/Proventil HFA)  2-4 puffs EVERY 4 HOURS as needed. Use a spacer if recommended by your provider.   My Asthma Severity:   Intermittent / Exercise Induced  Know your asthma triggers: cold air  upper respiratory infections          GREEN ZONE   Good Control    I feel good    No cough or wheeze    Can work, sleep and play without asthma symptoms       Take your asthma control medicine every day.     1. If exercise triggers your asthma, take your rescue medication    15 minutes before exercise or sports, and    During exercise if you have asthma symptoms  2. Spacer to use with inhaler: If you have a spacer, make sure to use it with your inhaler             YELLOW ZONE Getting Worse  I have ANY of these:    I do not feel good    Cough or wheeze    Chest feels tight    Wake up at night   1. Keep taking your Green Zone medications  2. Start taking your rescue medicine:    every 20 minutes for up to 1 hour. Then every 4 hours for 24-48 hours.  3. If you stay in the Yellow Zone for more than 12-24 hours, contact your doctor.  4. If you do not return to the Green Zone in 12-24 hours or you get worse, start taking your oral steroid medicine if prescribed by your provider.           RED ZONE Medical Alert - Get Help  I have ANY of these:    I feel awful    Medicine is not helping    Breathing getting harder    Trouble walking or talking    Nose opens wide to breathe       1. Take your rescue medicine NOW  2. If your provider has prescribed an oral steroid medicine, start taking it NOW  3. Call your doctor NOW  4. If you are still in the Red Zone after 20 minutes and you have not reached your doctor:    Take your rescue medicine again and    Call 911 or go to the emergency room right away    See  your regular doctor within 2 weeks of an Emergency Room or Urgent Care visit for follow-up treatment.          Annual Reminders:  Meet with Asthma Educator,  Flu Shot in the Fall, consider Pneumonia Vaccination for patients with asthma (aged 19 and older).    Pharmacy:    [facility] [id number]    Electronically signed by Felisha Briggs MD   Date: 02/25/20                    Asthma Triggers  How To Control Things That Make Your Asthma Worse    Triggers are things that make your asthma worse.  Look at the list below to help you find your triggers and   what you can do about them. You can help prevent asthma flare-ups by staying away from your triggers.      Trigger                                                          What you can do   Cigarette Smoke  Tobacco smoke can make asthma worse. Do not allow smoking in your home, car or around you.  Be sure no one smokes at a child s day care or school.  If you smoke, ask your health care provider for ways to help you quit.  Ask family members to quit too.  Ask your health care provider for a referral to Quit Plan to help you quit smoking, or call 1-342-510-PLAN.     Colds, Flu, Bronchitis  These are common triggers of asthma. Wash your hands often.  Don t touch your eyes, nose or mouth.  Get a flu shot every year.     Dust Mites  These are tiny bugs that live in cloth or carpet. They are too small to see. Wash sheets and blankets in hot water every week.   Encase pillows and mattress in dust mite proof covers.  Avoid having carpet if you can. If you have carpet, vacuum weekly.   Use a dust mask and HEPA vacuum.   Pollen and Outdoor Mold  Some people are allergic to trees, grass, or weed pollen, or molds. Try to keep your windows closed.  Limit time out doors when pollen count is high.   Ask you health care provider about taking medicine during allergy season.     Animal Dander  Some people  are allergic to skin flakes, urine or saliva from pets with fur or feathers. Keep pets with fur or feathers out of your home.    If you can t keep the pet outdoors, then keep the pet out of your bedroom.  Keep the bedroom door closed.  Keep pets off cloth furniture and away from stuffed toys.     Mice, Rats, and Cockroaches  Some people are allergic to the waste from these pests.   Cover food and garbage.  Clean up spills and food crumbs.  Store grease in the refrigerator.   Keep food out of the bedroom.   Indoor Mold  This can be a trigger if your home has high moisture. Fix leaking faucets, pipes, or other sources of water.   Clean moldy surfaces.  Dehumidify basement if it is damp and smelly.   Smoke, Strong Odors, and Sprays  These can reduce air quality. Stay away from strong odors and sprays, such as perfume, powder, hair spray, paints, smoke incense, paint, cleaning products, candles and new carpet.   Exercise or Sports  Some people with asthma have this trigger. Be active!  Ask your doctor about taking medicine before sports or exercise to prevent symptoms.    Warm up for 5-10 minutes before and after sports or exercise.     Other Triggers of Asthma  Cold air:  Cover your nose and mouth with a scarf.  Sometimes laughing or crying can be a trigger.  Some medicines and food can trigger asthma.
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
## 2020-02-25 NOTE — PATIENT INSTRUCTIONS
Health Maintenance Due   Topic Date Due     MAMMO SCREENING  1970     A1C  10/30/2019     MICROALBUMIN  10/30/2019     ASTHMA ACTION PLAN  11/02/2019     ZOSTER IMMUNIZATION (1 of 2) 09/03/2019     BMP  03/13/2020     LIPID  03/13/2020     PAP FOLLOW-UP  01/15/2021     HPV FOLLOW-UP  01/15/2021      Mammogram due  You can call any of the centers below to schedule your mammogram.  1.  Mammography [facility]  Appointment line [phone]  2   [facility]   Appointment line [phone]   3.   [facility]   Appointment line [phone]

## 2020-02-26 ENCOUNTER — OFFICE VISIT (OUTPATIENT)
Dept: BEHAVIORAL HEALTH | Facility: CLINIC | Age: 50
End: 2020-02-26
Payer: COMMERCIAL

## 2020-02-26 DIAGNOSIS — R69 DIAGNOSIS DEFERRED: Primary | ICD-10-CM

## 2020-02-26 LAB
ALBUMIN SERPL-MCNC: 3.9 G/DL (ref 3.4–5)
ALP SERPL-CCNC: 77 U/L (ref 40–150)
ALT SERPL W P-5'-P-CCNC: 37 U/L (ref 0–50)
ANION GAP SERPL CALCULATED.3IONS-SCNC: 7 MMOL/L (ref 3–14)
AST SERPL W P-5'-P-CCNC: 17 U/L (ref 0–45)
BILIRUB SERPL-MCNC: 0.6 MG/DL (ref 0.2–1.3)
BUN SERPL-MCNC: 9 MG/DL (ref 7–30)
CALCIUM SERPL-MCNC: 9.2 MG/DL (ref 8.5–10.1)
CHLORIDE SERPL-SCNC: 107 MMOL/L (ref 94–109)
CHOLEST SERPL-MCNC: 174 MG/DL
CO2 SERPL-SCNC: 25 MMOL/L (ref 20–32)
CREAT SERPL-MCNC: 0.59 MG/DL (ref 0.52–1.04)
CREAT UR-MCNC: 187 MG/DL
DEPRECATED CALCIDIOL+CALCIFEROL SERPL-MC: 27 UG/L (ref 20–75)
GFR SERPL CREATININE-BSD FRML MDRD: >90 ML/MIN/{1.73_M2}
GLUCOSE SERPL-MCNC: 133 MG/DL (ref 70–99)
HDLC SERPL-MCNC: 37 MG/DL
LDLC SERPL CALC-MCNC: 76 MG/DL
MICROALBUMIN UR-MCNC: 30 MG/L
MICROALBUMIN/CREAT UR: 16.04 MG/G CR (ref 0–25)
NONHDLC SERPL-MCNC: 137 MG/DL
POTASSIUM SERPL-SCNC: 3.8 MMOL/L (ref 3.4–5.3)
PROT SERPL-MCNC: 7.3 G/DL (ref 6.8–8.8)
SODIUM SERPL-SCNC: 139 MMOL/L (ref 133–144)
TRIGL SERPL-MCNC: 303 MG/DL
TSH SERPL DL<=0.005 MIU/L-ACNC: 1.18 MU/L (ref 0.4–4)

## 2020-02-26 NOTE — PROGRESS NOTES
[facility]al [facility]  No data recorded    Social Work Care Navigator Note    Patient: Isabela Panchal  Date: February 26, 2020  Preferred Name: Isabela    Previous PHQ-9:   PHQ-9 SCORE 3/26/2019 7/5/2019 1/17/2020   PHQ-9 Total Score - - -   PHQ-9 Total Score MyChart 19 (Moderately severe depression) 15 (Moderately severe depression) -   PHQ-9 Total Score 19 15 6     Previous JOSEFINA-7:   JOSEFINA-7 SCORE 10/30/2018 5/29/2019 7/5/2019   Total Score - - -   Total Score - 6 (mild anxiety) 8 (mild anxiety)   Total Score 6 6 8     MYKEL LEVEL:  MYKEL Score (Last Two) 11/5/2010   MYKEL Raw Score 51   Activation Score 91.6   MYKEL Level 4     Preferred Contact:  No data recorded    Type of Contact Today: Face to Face in Clinic    Data: (subjective / Objective):    [facility] Introduction:  Hi my name is GAVIN Ponce from your (name) primary care clinic.     I work closely with your primary care provider, Felisha Briggs.     If it's ok I'd like to talk about some new services available to you, at no out of pocket cost to you.      Before we get started can you verify your insurance for me?     What social work or case management services do you receive? (If so, are you receiving ACT or TCM?).  NO    Getting to Know You - Whole Person Care:  This new service is called Behavioral Health Home services, which is designed to support you as a whole person beyond just your medical needs.      Tell me about what types of things that are causing you stress OR impacting your quality of life?  Finances memory worrying about family    I'm here to be a central point of contact for your healthcare needs and to help with:    Housing    Transportation    Financial resources    Comprehensive Health needs (appointment help, medication costs, etc.)    Employment    Education    Health Insurance applications    And connecting with social supports or community resources    Out of the things I mentioned what would you find  helpful?  General support finances     To get started:   If patient has a Diagnostic Assessment -   You can stop in and meet with me in the clinic or we can schedule an appointment right now.      When you come into the clinic there will be a few forms for you to fill out in the lobby.    We'll work together on a brief assessment to better understand how we can help and then put together a plan to meet your needs.    If patient does not have a Diagnostic Assessment -   We'll schedule you for an appointment with (Name of [facility]) to do an assessment and then I'll meet with you briefly afterwards to help you get enrolled.    When you come into the clinic there will be a few forms for you to fill out in the lobby.    Patient response to [facility] Service offering:   Patient enrolled in [facility] today Pt Enrolled by Behavioral Health Clinician on 2/20/2020    GAVIN Ponce, Social Work Care Coordinator         Next 5 appointments (look out 90 days)    Mar 04, 2020  2:00 PM CST  [facility]IN [facility]once  [facility] [address] [place] [id number] [phone]   Mar 05, 2020 11:00 AM CST  Return Visit with CAPRI Cardenas  [facility] [address] [place] [id number] [phone]

## 2020-02-28 ENCOUNTER — OFFICE VISIT (OUTPATIENT)
Dept: ORTHOPEDICS | Facility: CLINIC | Age: 50
End: 2020-02-28
Payer: COMMERCIAL

## 2020-02-28 VITALS
HEART RATE: 99 BPM | HEIGHT: 66 IN | SYSTOLIC BLOOD PRESSURE: 134 MMHG | BODY MASS INDEX: 31.02 KG/M2 | WEIGHT: 193 LBS | DIASTOLIC BLOOD PRESSURE: 87 MMHG

## 2020-02-28 DIAGNOSIS — G89.29 CHRONIC LOW BACK PAIN WITHOUT SCIATICA, UNSPECIFIED BACK PAIN LATERALITY: ICD-10-CM

## 2020-02-28 DIAGNOSIS — M48.061 SPINAL STENOSIS OF LUMBAR REGION, UNSPECIFIED WHETHER NEUROGENIC CLAUDICATION PRESENT: ICD-10-CM

## 2020-02-28 DIAGNOSIS — M51.369 DDD (DEGENERATIVE DISC DISEASE), LUMBAR: ICD-10-CM

## 2020-02-28 DIAGNOSIS — M54.50 CHRONIC LOW BACK PAIN WITHOUT SCIATICA, UNSPECIFIED BACK PAIN LATERALITY: ICD-10-CM

## 2020-02-28 DIAGNOSIS — M51.26 LUMBAR HERNIATED DISC: Primary | ICD-10-CM

## 2020-02-28 ASSESSMENT — ENCOUNTER SYMPTOMS
LEG PAIN: 1
TREMORS: 0
MEMORY LOSS: 1
CHILLS: 0
BACK PAIN: 1
PALPITATIONS: 0
SYNCOPE: 0
EYE WATERING: 0
BRUISES/BLEEDS EASILY: 0
DECREASED APPETITE: 0
ORTHOPNEA: 0
EYE REDNESS: 0
FEVER: 0
LOSS OF CONSCIOUSNESS: 0
POOR WOUND HEALING: 0
SWOLLEN GLANDS: 0
NIGHT SWEATS: 0
INCREASED ENERGY: 0
EYE PAIN: 1
INSOMNIA: 0
NAIL CHANGES: 0
PANIC: 1
EXERCISE INTOLERANCE: 1
HEADACHES: 1
WEIGHT GAIN: 0
LIGHT-HEADEDNESS: 1
SLEEP DISTURBANCES DUE TO BREATHING: 0
FATIGUE: 1
DISTURBANCES IN COORDINATION: 1
NERVOUS/ANXIOUS: 0
ALTERED TEMPERATURE REGULATION: 1
WEIGHT LOSS: 0
DEPRESSION: 1
HYPOTENSION: 0
SPEECH CHANGE: 1
TINGLING: 0
DECREASED CONCENTRATION: 1
SKIN CHANGES: 0
POLYPHAGIA: 0
POLYDIPSIA: 0
HALLUCINATIONS: 0
HYPERTENSION: 0

## 2020-02-28 ASSESSMENT — MIFFLIN-ST. JEOR: SCORE: 1504.44

## 2020-02-28 NOTE — NURSING NOTE
"Reason For Visit:   Chief Complaint   Patient presents with     RECHECK     FU FATUMA        /87 (BP Location: Right arm, Patient Position: Chair)   Pulse 99   Ht 1.664 m (5' 5.51\")   Wt 87.5 kg (193 lb)   BMI 31.62 kg/m      Pain Assessment  Patient Currently in Pain: Yes  0-10 Pain Scale: 6  Primary Pain Location: Back    Ryanne Romero ATC   "
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
## 2020-02-28 NOTE — RESULT ENCOUNTER NOTE
Hello!  It was a pleasure to see you in clinic!  Thank you for getting labs done.     Your vitamin D is normal but on the low end of normal. Please take 2000 units daily.     Your thyroid function is normal, which is good news.  This means that you do not have hyperthyroidism or hypothyroidism.     Your microalbumen is normal, which is great news. This means you do not have protein in the urine, and that your kidneys are currently functioning well. Keeping blood pressure and blood fats low is the best way to preserve your kidney function over your lifetime.      Thank you for getting labs done. Your lab test to check for diabetes, HgA1C, also called glycosylated hemoglobin, which measures the level of sugar in your blood over the past few months, is normal which is great! Congratulations, you don't have diabetes!     The testing of your blood sugar, kidney function, liver function and electrolytes was normal.     Thank you for getting your cholesterol checked!  Desired or goal levels are:  CHOLESTEROL: Desirable is less than 200.  HDL (Good Cholesterol): Desirable is greater than 40 in men and greater than 50 in women.  LDL (Bad Cholesterol): Desirable is less than 130  TRIGLYCERIDES: Desirable is less than 150.    I recommend that you take Omega-3 fatty acids (available in capsules) to improve your triglycerides. You need 2000 - 4000 mg daily of EPA + DHA. This usually means 4 - 8 capsules a day, depending on the strength you buy.  To keep triglycerides in check, reduce sugar in your diet including alcohol, juice, excess fruit, bread, pasta, rice, potatoes and cereal in your diet.     Also consider starting or increasing your aerobic activity; this is the best way to improve HDL (good) cholesterol. Exercise for at least 30 min 5 times per week, and lift weights 2-3 times per week.    As you may know, abnormal cholesterol is one factor that increases your risk for heart disease and stroke. You can improve your  cholesterol by controlling the amount and type of fat you eat and by increasing your daily activity level.    Here are some ways to improve your nutrition (adapted from the American Academy of Family [employer] handout):  Eat less fat (especially butter, Crisco and other saturated fats)  Buy lean cuts of meat; reduce your portions of red meat or substitute poultry or fish, or avoid meat altogether.  Use skim milk and low-fat dairy products  Eat no more than 4 egg yolks per week  Avoid fried or fast foods that are high in fat  Eat more fruits and vegetables, trying to make your plate of food at least half non-starchy vegetables.       If you have any questions, please contact the clinic or schedule an appointment with me, thank you!    Sincerely,    BONILLA RHOADES MD   2/28/2020
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
## 2020-03-04 ENCOUNTER — OFFICE VISIT (OUTPATIENT)
Dept: BEHAVIORAL HEALTH | Facility: CLINIC | Age: 50
End: 2020-03-04
Payer: COMMERCIAL

## 2020-03-04 DIAGNOSIS — R69 DIAGNOSIS DEFERRED: Primary | ICD-10-CM

## 2020-03-04 ASSESSMENT — PATIENT HEALTH QUESTIONNAIRE - PHQ9: SUM OF ALL RESPONSES TO PHQ QUESTIONS 1-9: 18

## 2020-03-04 ASSESSMENT — ANXIETY QUESTIONNAIRES
GAD7 TOTAL SCORE: 10
2. NOT BEING ABLE TO STOP OR CONTROL WORRYING: SEVERAL DAYS
1. FEELING NERVOUS, ANXIOUS, OR ON EDGE: SEVERAL DAYS
5. BEING SO RESTLESS THAT IT IS HARD TO SIT STILL: SEVERAL DAYS
3. WORRYING TOO MUCH ABOUT DIFFERENT THINGS: NEARLY EVERY DAY
IF YOU CHECKED OFF ANY PROBLEMS ON THIS QUESTIONNAIRE, HOW DIFFICULT HAVE THESE PROBLEMS MADE IT FOR YOU TO DO YOUR WORK, TAKE CARE OF THINGS AT HOME, OR GET ALONG WITH OTHER PEOPLE: SOMEWHAT DIFFICULT
4. TROUBLE RELAXING: SEVERAL DAYS
6. BECOMING EASILY ANNOYED OR IRRITABLE: NEARLY EVERY DAY
7. FEELING AFRAID AS IF SOMETHING AWFUL MIGHT HAPPEN: NOT AT ALL

## 2020-03-04 NOTE — LETTER
Behavioral Health Home ([facility]): Health Action Plan  [facility]: [place]    [facility]    Name: Isabela Panchal  Preferred Name: Isabela  : 1970  MRN: [id number]    How to Contact me  Need for : No  Preferred Contact: Cell    Home Phone [phone]   Mobile [phone]     My Goals  Goal Areas: Health;Mental Health;Social and Interpersonal Relationships;Employment / Volunteer;Financial and Social Service Benefits;Safety and Risks    Patient stated goals: Isabela would like to be approved for SSDI; Due to TBI Isabela would like/needs prescription sunglasses and to be less restless and be less easily fatigued; Isabela stated she would like to improve her nutrition plan and increase ability to exercise. 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    Strengths related to each goal: Isabela has good follow through at scheduling and attending appointments; she is able to compensate for her TBI;  Per [facility] input Isabela utilizes family/friend support and has good insight,intelligence;and a good sense of humor.    Services and Supports Needed: [facility], assit with SSDI through disability specialists;     Activities / Actions of Team to support goal(s): The [facility] team will provide support and referrals as needed to assist you in working toward your goals. The Behavioal Health Clinician, Social work Care Coordinator and Community Health Worker will also be providing ongoing monthly contact via office visit, phone contact as well as community visits as needed to continue monitor progress on your goals.     Activities / Actions of Patient / Parent / Guardian to support goal(s): Follow through with [facility] team recommendationds and attend scheduled appointments with Behavioal Health Clinician, Social Work Care Coordinator, Community Health Worker and/or PCP.     Recommended Referral  Tobacco cessation referrals made?:  No  Mental Health / Chemical Dependency Referrals: Yes  Substance Use Referrals: Behavioral Health Clinician  Mental Health Referrals: MH Services: [facility], [facility], Community MH Provider  Community Health Referrals: [facility]  Dentist      My Team Members and Their Contact Information  Patient Care Team       Relationship Specialty Notifications Start End    Felisha Briggs MD PCP - General Family Practice  4/29/15     Phone: [phone] Fax: [phone]         [address] [facility] [address]    Alexandria Bates MD MD Family Medicine - Sports Medicine  4/1/15     Phone: [phone] Fax: [phone]         [address] [facility] [address]    Felisha Briggs MD Assigned PCP   3/20/16     Phone: [phone] Fax: [phone]         [address] [facility] [address]    Man Gilbert MD MD Family Medicine - Sports Medicine  11/16/19     Phone: [phone] Fax: [phone]         [facility] [address] [address]    Man Young [facility] Therapist  - Clinical Admissions 2/26/20     [facility]    Phone: [phone] Fax: [phone]         [facility] [address] [facility] [address]    Mildred Tidwell LSW  Clinic Admissions 2/26/20     [facility]    Phone: [phone] Fax: [phone]         [address] [facility] [address]    [facility] Dent[facility] Dentist Dentist  3/6/20     Isabela sees who ever is available    Phone: [phone] Fax: [phone]        [address] [facility]          My Wellness Plan    Safety Concerns: Suicide Ideation   Risk factors for suicide ideation were discussed with Behavioral Health Clinician. Patient reports currently feeling safe with no intent or plan at the present time. If risk level changes, patient will contact [facility] Team or Community  Provider.    Recommendations / Plan for safety concerns: Patient may call the Mental Health Crisis Line (ph:  [phone]) and/or local [facility] crisis team COPE [phone] or dial 911. If patient has questions in regards to medication compliance it is recommended to schedule a face to face with [facility] team in clinic.      Isabela Panchal co-developed the Health Action Plan with the [facility] Team and received a copy of this document.  Date Health Action Plan Completed/Updated: 03/06/20

## 2020-03-04 NOTE — PROGRESS NOTES
Behavioral Health Home Services  [facility]: [place]    Social Work Care Navigator Note    Patient: Isabela Panchal  Date: March 4, 2020  Preferred Name: Isabela    Previous PHQ-9:   PHQ-9 SCORE 7/5/2019 1/17/2020 3/4/2020   PHQ-9 Total Score - - -   PHQ-9 Total Score MyChart 15 (Moderately severe depression) - -   PHQ-9 Total Score 15 6 18     Previous JOSEFINA-7:   JOSEFINA-7 SCORE 5/29/2019 7/5/2019 3/4/2020   Total Score - - -   Total Score 6 (mild anxiety) 8 (mild anxiety) -   Total Score 6 8 10     MYKEL LEVEL:  MYKEL Score (Last Two) 11/5/2010 3/4/2020   MYKEL Raw Score 51 32   Activation Score 91.6 62.6   MYKEL Level 4 3     Preferred Contact:  Need for : No  Preferred Contact: Cell    Type of Contact Today: Face to Face in Clinic    Data: (subjective / Objective):    Patient came in to complete the comprehensive wellness assessment for Behavioral Health Home Services.  See [facility] Flowsheets for details on the assessment.  See [facility], Behavior[facility] Health Home for a copy of the patient's care plan.    Social Work Care Coordinator Impression:    Isabela was very talkative throughout the assessment today. She had writer read her the questions to complete the PHQ-9 JOSEFINA - 7 WHODAS and MYKEL. (see scores above). Isabela came to meeting today without her cane, said she left home in a hurry. She limped to elevator; once on 2nd level of clinic was able to ambulate to meeting room; after face to face meeting Isabela walked to the elevator and then out to her car without incident. Isabela admitted it was hard to listen to so much information being read to her and then to answer the Health & Wellness questions.  Isabela was able to verbalize some of her strengths and goals for her Care Plan. Isabela has nothing else planned for tonight and stated she can rest.    GAVIN Ponce, Social Work Care Coordinator     Addend: Behavioral Health Clinician has approved Health Action Plan. Will print and mail to patient   Reyna  Diann ALONSO  [facility] Care Coordinator-  [facility]  Tele: [phone]          Next 5 appointments (look out 90 days)    Mar 05, 2020 11:00 AM CST  Return Visit with Man Young [facility] ([facility]) [address] [place] [id number] [phone]

## 2020-03-05 ENCOUNTER — OFFICE VISIT (OUTPATIENT)
Dept: BEHAVIORAL HEALTH | Facility: CLINIC | Age: 50
End: 2020-03-05
Payer: COMMERCIAL

## 2020-03-05 DIAGNOSIS — G89.29 CHRONIC LOW BACK PAIN WITHOUT SCIATICA, UNSPECIFIED BACK PAIN LATERALITY: Primary | ICD-10-CM

## 2020-03-05 DIAGNOSIS — M54.50 CHRONIC LOW BACK PAIN WITHOUT SCIATICA, UNSPECIFIED BACK PAIN LATERALITY: Primary | ICD-10-CM

## 2020-03-05 DIAGNOSIS — F41.1 GAD (GENERALIZED ANXIETY DISORDER): Primary | ICD-10-CM

## 2020-03-05 PROCEDURE — 90834 PSYTX W PT 45 MINUTES: CPT | Performed by: SOCIAL WORKER

## 2020-03-05 RX ORDER — ETODOLAC 500 MG
500 TABLET ORAL 2 TIMES DAILY
Qty: 60 TABLET | Refills: 1 | Status: SHIPPED | OUTPATIENT
Start: 2020-03-05 | End: 2020-08-12

## 2020-03-05 ASSESSMENT — ANXIETY QUESTIONNAIRES: GAD7 TOTAL SCORE: 10
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
## 2020-03-16 DIAGNOSIS — E78.1 HIGH BLOOD TRIGLYCERIDES: ICD-10-CM

## 2020-03-16 RX ORDER — FENOFIBRATE 48 MG/1
TABLET, COATED ORAL
Qty: 90 TABLET | Refills: 3 | Status: SHIPPED | OUTPATIENT
Start: 2020-03-16 | End: 2021-04-14

## 2020-03-17 NOTE — TELEPHONE ENCOUNTER
"Requested Prescriptions   Pending Prescriptions Disp Refills     fenofibrate (TRICOR) 48 MG tablet [Pharmacy Med Name: FENOFIBRATE 48MG TABS] 90 tablet 3     Sig: TAKE ONE TABLET BY MOUTH ONCE DAILY       Fibrates Passed - 3/16/2020 11:00 AM        Passed - Lipid panel on file in past 12 months     Recent Labs   Lab Test 02/25/20  1509  08/08/14  0746   CHOL 174   < > 177   TRIG 303*   < > 418*   HDL 37*   < > 33*   LDL 76   < > Cannot estimate LDL when triglyceride exceeds 400 mg/dL  118   NHDL 137*   < >  --    VLDL  --   --  Cannot estimate VLDL when triglyceride exceeds 400 mg/dL.   CHOLHDLRATIO  --   --  5.4*    < > = values in this interval not displayed.               Passed - No abnormal creatine kinase in past 12 months     No lab results found.             Passed - Recent (12 mo) or future (30 days) visit within the authorizing provider's specialty     Patient has had an office visit with the authorizing provider or a provider within the authorizing providers department within the previous 12 mos or has a future within next 30 days. See \"Patient Info\" tab in inbasket, or \"Choose Columns\" in Meds & Orders section of the refill encounter.              Passed - Medication is active on med list        Passed - Patient is age 18 or older        Passed - No active pregnancy on record        Passed - No positive pregnancy test in past 12 months           Signed Prescriptions:                        Disp   Refills    fenofibrate (TRICOR) 48 MG tablet          90 tab*3        Sig: TAKE ONE TABLET BY MOUTH ONCE DAILY  Authorizing Provider: BONILLA RHOADES  Ordering User: SCARLETT TAYLOR      "

## 2020-03-19 ENCOUNTER — TELEPHONE (OUTPATIENT)
Dept: BEHAVIORAL HEALTH | Facility: CLINIC | Age: 50
End: 2020-03-19

## 2020-03-19 NOTE — TELEPHONE ENCOUNTER
Behavioral Health Home Services  [facility]: [place]    Social Work Care Navigator Note    Patient: Isabela Panchal  Date: March 19, 2020  Preferred Name: Isabela    Previous PHQ-9:   PHQ-9 SCORE 7/5/2019 1/17/2020 3/4/2020   PHQ-9 Total Score - - -   PHQ-9 Total Score MyChart 15 (Moderately severe depression) - -   PHQ-9 Total Score 15 6 18     Previous JOSEFINA-7:   JOSEFINA-7 SCORE 5/29/2019 7/5/2019 3/4/2020   Total Score - - -   Total Score 6 (mild anxiety) 8 (mild anxiety) -   Total Score 6 8 10     MYKEL LEVEL:  MYKEL Score (Last Two) 11/5/2010 3/4/2020   MYKEL Raw Score 51 32   Activation Score 91.6 62.6   MYKEL Level 4 3     Preferred Contact:  Need for : No  Preferred Contact: Cell    Type of Contact Today: Phone call (patient / identified key support person reached)      Data: (subjective / Objective):  Recent ED/IP Admission or Discharge?   None    Patient Goals:  Goal Areas: Health;Mental Health;Social and Interpersonal Relationships;Employment / Volunteer;Financial and Social Service Benefits;Safety and Risks  Patient stated goals: Isabela would like to be approved for SSDI; Due to TBI Isabela would like/needs prescription sunglassesfeel less restless and be less easily fatigued; Isabela stated she would like to improver her nutrition plan and increase ability to exercise. Isabela would like support from the school system and community resources to ensure her 12 yo's education out of the classroom.    [facility] Core Service Provided:  Health and Wellness Promotion    Current Stressors / Issues / Care Plan Objective Addressed Today:  Isabela was talkative on the phone today. Her dtr Chapis has been assigned a home school  from the school district but with the COVID 19 state of emergency this service is suspended until further notice. Isabela talked about watching Netflix with her significant other. Thinks it weird that there was a program on about a pandemic and now we are in the middle of  one.    Intervention:  Motivational Interviewing: Expressed Empathy/Understanding and Supported Autonomy, Collaboration, Evocation   Target Behavior(s):     Assessment: (Progress on Goals / Homework):  Isabela will let Social Work Care Coordinator know when she would like to schedule a telephonic counseling session with Behavioral Health Clinician. Health Action Plan was approved by Behavioral Health Clinician on 3/6/2020.  Plan: (Homework, other):  Patient was encouraged to continue to seek condition-related information and education.    Scheduled a Phone follow up appointment with [facility] in 2 weeks     Patient has set self-identified goals and will monitor progress until the next appointment      GAVIN Ponce, Social Work Care Coordinator

## 2020-03-31 ENCOUNTER — TELEPHONE (OUTPATIENT)
Dept: BEHAVIORAL HEALTH | Facility: CLINIC | Age: 50
End: 2020-03-31

## 2020-03-31 NOTE — TELEPHONE ENCOUNTER
[facility]  [facility]: [place]    Social Work Care Navigator Note    Patient: Isabela Panchal  Date: March 31, 2020  Preferred Name: Isabela    Previous PHQ-9:   PHQ-9 SCORE 7/5/2019 1/17/2020 3/4/2020   PHQ-9 Total Score - - -   PHQ-9 Total Score MyChart 15 (Moderately severe depression) - -   PHQ-9 Total Score 15 6 18     Previous JOSEFINA-7:   JOSEFINA-7 SCORE 5/29/2019 7/5/2019 3/4/2020   Total Score - - -   Total Score 6 (mild anxiety) 8 (mild anxiety) -   Total Score 6 8 10     MYKEL LEVEL:  MYKEL Score (Last Two) 11/5/2010 3/4/2020   MYKEL Raw Score 51 32   Activation Score 91.6 62.6   MYKEL Level 4 3     Preferred Contact:  Need for : No  Preferred Contact: Cell    Type of Contact Today: Phone call (patient / identified key support person reached)      Data: (subjective / Objective):  Recent ED/IP Admission or Discharge?   None    Patient Goals:  Goal Areas: Health;Mental Health;Social and Interpersonal Relationships;Employment / Volunteer;Financial and Social Service Benefits;Safety and Risks  Patient stated goals: Isabela would like to be approved for SSDI; Due to TBI Isabela would like/needs prescription sunglassesfeel less restless and be less easily fatigued; Isabela stated she would like to improver her nutrition plan and increase ability to exercise. Isabela would like support from the school system and community resources to ensure her 12 yo's education out of the classroom.    [facility] Core Service Provided:  Health and Wellness Promotion    Current Stressors / Issues / Care Plan Objective Addressed Today:  Isabela's father is helping her manage her bills. Mortgage has gone up very high due to going in/out of foreclosure. Jerry is paying child support.     Intervention:  Motivational Interviewing: Supported Autonomy, Collaboration, Evocation   Target Behavior(s): anxiety    Assessment: (Progress on Goals / Homework):  Isabela has a TBI and can lose her train of thought or change the  subject throughout our conversation. Social Work Care Coordinator offered a Behavioral Health Clinician visit but Isabela feels like she is ok at this point in time.    Plan: (Homework, other):  Patient was encouraged to continue to seek condition-related information and education.    Scheduled a Phone follow up appointment with JASON PIERSON in 2 weeks     Patient has set self-identified goals and will monitor progress until the next appointment       GAVIN Ponce, Social Work Care Coordinator

## 2020-04-13 DIAGNOSIS — E78.1 HIGH BLOOD TRIGLYCERIDES: ICD-10-CM

## 2020-04-13 DIAGNOSIS — E78.5 HYPERLIPIDEMIA LDL GOAL <160: ICD-10-CM

## 2020-04-13 NOTE — TELEPHONE ENCOUNTER
"Requested Prescriptions   Pending Prescriptions Disp Refills     atorvastatin (LIPITOR) 40 MG tablet [Pharmacy Med Name: ATORVASTATIN CALCIUM 40MG TABS] 30 tablet 11     Sig: TAKE ONE TABLET BY MOUTH ONCE DAILY  Last Written Prescription Date:  3/26/2019  Last Fill Quantity: 30 tablet,  # refills: 11   Last Office Visit: 2/25/2020   Future Office Visit:            Statins Protocol Passed - 4/13/2020 12:45 PM        Passed - LDL on file in past 12 months     Recent Labs   Lab Test 02/25/20  1509   LDL 76           Passed - No abnormal creatine kinase in past 12 months     No lab results found.           Passed - Recent (12 mo) or future (30 days) visit within the authorizing provider's specialty     Patient has had an office visit with the authorizing provider or a provider within the authorizing providers department within the previous 12 mos or has a future within next 30 days. See \"Patient Info\" tab in inbasket, or \"Choose Columns\" in Meds & Orders section of the refill encounter.            Passed - Medication is active on med list        Passed - Patient is age 18 or older        Passed - No active pregnancy on record        Passed - No positive pregnancy test in past 12 months             "

## 2020-04-14 RX ORDER — ATORVASTATIN CALCIUM 40 MG/1
TABLET, FILM COATED ORAL
Qty: 30 TABLET | Refills: 9 | Status: SHIPPED | OUTPATIENT
Start: 2020-04-14 | End: 2021-03-14

## 2020-04-29 ENCOUNTER — DOCUMENTATION ONLY (OUTPATIENT)
Dept: FAMILY MEDICINE | Facility: CLINIC | Age: 50
End: 2020-04-29

## 2020-05-08 ENCOUNTER — TELEPHONE (OUTPATIENT)
Dept: BEHAVIORAL HEALTH | Facility: CLINIC | Age: 50
End: 2020-05-08

## 2020-05-08 NOTE — TELEPHONE ENCOUNTER
Behavioral Health Home [facility]  [facility]: [place]        Social Work Care Navigator Note      Patient: Isabela Panchal  Date: May 8, 2020  Preferred Name: Isabela    Previous PHQ-9:   PHQ-9 SCORE 7/5/2019 1/17/2020 3/4/2020   PHQ-9 Total Score - - -   PHQ-9 Total Score MyChart 15 (Moderately severe depression) - -   PHQ-9 Total Score 15 6 18     Previous JOSEFINA-7:   JOSEFINA-7 SCORE 5/29/2019 7/5/2019 3/4/2020   Total Score - - -   Total Score 6 (mild anxiety) 8 (mild anxiety) -   Total Score 6 8 10     MYKEL LEVEL:  MYKEL Score (Last Two) 11/5/2010 3/4/2020   MYKEL Raw Score 51 32   Activation Score 91.6 62.6   MYKEL Level 4 3       Preferred Contact:  Need for : No  Preferred Contact: Cell      Type of Contact Today: Phone call (patient / identified key support person reached)      Data: (subjective / Objective):  Recent ED/IP Admission or Discharge?   None    Patient Goals:  Goal Areas: Health;Mental Health;Social and Interpersonal Relationships;Employment / Volunteer;Financial and Social Service Benefits;Safety and Risks  Patient stated goals: Isabela would like to be approved for SSDI; Due to TBI Isabela would like/needs prescription sunglassesfeel less restless and be less easily fatigued; Isabela stated she would like to improver her nutrition plan and increase ability to exercise. Isabela would like support from the school system and community resources to ensure her 12 yo's education out of the classroom.    [facility] Core Service Provided:  Health and Wellness Promotion    Current Stressors / Issues / Care Plan Objective Addressed Today:  Isabela has a TBI    Intervention:  Motivational Interviewing: Supported Autonomy, Collaboration, Evocation   Target Behavior(s): anxiety    Assessment: (Progress on Goals / Homework):  Social Work Care Coordinator provided a detailed reminder regarding Chapis's neuro appt on May 15 th. Isabela wrote the appt on her large calendar. Writer explained off duty next week so  needed to make it a priority to call today. Isabela sounded sleepy; said she stayed up until 5 am and was just waking up now. Social Work Care Coordinator also documented in Chapis's chart 5/8/2020. Isabela asked if I could please schedule her some time with Behavioral Health Clinician next week.    Plan: (Homework, other):  Patient was encouraged to continue to seek condition-related information and education.      Scheduled a Phone follow up appointment with [facility] in 1 week     Patient has set self-identified goals and will monitor progress until the next appointment        GAVIN Ponce, Social Work Care Coordinator

## 2020-05-12 ENCOUNTER — VIRTUAL VISIT (OUTPATIENT)
Dept: BEHAVIORAL HEALTH | Facility: CLINIC | Age: 50
End: 2020-05-12
Payer: COMMERCIAL

## 2020-05-12 ENCOUNTER — TELEPHONE (OUTPATIENT)
Dept: FAMILY MEDICINE | Facility: CLINIC | Age: 50
End: 2020-05-12

## 2020-05-12 DIAGNOSIS — F41.1 GAD (GENERALIZED ANXIETY DISORDER): Primary | ICD-10-CM

## 2020-05-12 PROCEDURE — 90832 PSYTX W PT 30 MINUTES: CPT | Mod: 95 | Performed by: SOCIAL WORKER

## 2020-05-12 NOTE — TELEPHONE ENCOUNTER
Health Maintenance Due   Topic Date Due     MAMMO SCREENING  1970     PREVENTIVE CARE VISIT  03/26/2020     ASTHMA CONTROL TEST  04/30/2020     ZOSTER IMMUNIZATION (1 of 2) 09/03/2019     PAP FOLLOW-UP  01/15/2021     HPV FOLLOW-UP  01/15/2021      PHQ 7/5/2019 1/17/2020 3/4/2020   PHQ-9 Total Score 15 6 18   Q9: Thoughts of better off dead/self-harm past 2 weeks Not at all Not at all Not at all   F/U: Thoughts of suicide or self-harm - - -   F/U: Safety concerns - - -     Please call her to schedule a depression and asthma virtual visit with me, thanks!  Sincerely,  Dr. Felisha Briggs MD  5/12/2020
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
## 2020-05-18 ENCOUNTER — VIRTUAL VISIT (OUTPATIENT)
Dept: BEHAVIORAL HEALTH | Facility: CLINIC | Age: 50
End: 2020-05-18
Payer: COMMERCIAL

## 2020-05-18 DIAGNOSIS — F41.1 GAD (GENERALIZED ANXIETY DISORDER): Primary | ICD-10-CM

## 2020-05-18 PROCEDURE — 90834 PSYTX W PT 45 MINUTES: CPT | Mod: TEL | Performed by: SOCIAL WORKER
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
## 2020-06-08 DIAGNOSIS — I10 ESSENTIAL HYPERTENSION WITH GOAL BLOOD PRESSURE LESS THAN 140/90: ICD-10-CM

## 2020-06-10 RX ORDER — LISINOPRIL 10 MG/1
TABLET ORAL
Qty: 90 TABLET | Refills: 0 | Status: SHIPPED | OUTPATIENT
Start: 2020-06-10 | End: 2020-09-10

## 2020-06-10 NOTE — TELEPHONE ENCOUNTER
Prescription approved per [facility] Refill Protocol.  Yasemin Huynh RN

## 2020-06-18 ENCOUNTER — TELEPHONE (OUTPATIENT)
Dept: BEHAVIORAL HEALTH | Facility: CLINIC | Age: 50
End: 2020-06-18

## 2020-06-18 NOTE — TELEPHONE ENCOUNTER
Behavioral Health Home Services  [facility]: [place]        Social Work Care Navigator Note      Patient: Isabela Panchal  Date: June 18, 2020  Preferred Name: Isabela    Previous PHQ-9:   PHQ-9 SCORE 7/5/2019 1/17/2020 3/4/2020   PHQ-9 Total Score - - -   PHQ-9 Total Score MyChart 15 (Moderately severe depression) - -   PHQ-9 Total Score 15 6 18     Previous JOSEFINA-7:   JOSEFINA-7 SCORE 5/29/2019 7/5/2019 3/4/2020   Total Score - - -   Total Score 6 (mild anxiety) 8 (mild anxiety) -   Total Score 6 8 10     MYKEL LEVEL:  MYKEL Score (Last Two) 11/5/2010 3/4/2020   MYKEL Raw Score 51 32   Activation Score 91.6 62.6   MYKEL Level 4 3       Preferred Contact:  Need for : No  Preferred Contact: Cell      Type of Contact Today: Phone call (patient / identified key support person reached)      Data: (subjective / Objective):  Recent ED/IP Admission or Discharge?   None    Patient Goals:  Goal Areas: Health;Mental Health;Social and Interpersonal Relationships;Employment / Volunteer;Financial and Social Service Benefits;Safety and Risks  Patient stated goals: Isabela would like to be approved for SSDI; Due to TBI Isabela would like/needs prescription sunglassesfeel less restless and be less easily fatigued; Isabela stated she would like to improver her nutrition plan and increase ability to exercise. 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        [facility] Core Service Provided:  Health and Wellness Promotion    Current Stressors / Issues / Care Plan Objective Addressed Today:  [employer] has Disability Specialists helping her  Met criteria for CADI WAIVER; Renee is working with Isabela to keep calendar on track    Intervention:  Motivational Interviewing: Supported Autonomy, Collaboration, Evocation   Target Behavior(s): depression    Assessment: (Progress on Goals / Homework):  Isabela is struggling with memory. She had totally forgot she had a  therapy appt scheduled with Behavioral Health Clinician. Today we scheduled another counseling session, I waited on phone until she locate her calendar provided by Renee and could write down next Wed at 10:30. We also discussed her dtr Chapis. I will document in Chapis's chart.    Plan: (Homework, other):  Patient was encouraged to continue to seek condition-related information and education.      Scheduled a Phone follow up appointment with [facility] in 1 week     Patient has set self-identified goals and will monitor progress until the next appointment        GAVIN Ponce, Social Work Care Coordinator

## 2020-06-24 ENCOUNTER — TELEPHONE (OUTPATIENT)
Dept: FAMILY MEDICINE | Facility: CLINIC | Age: 50
End: 2020-06-24

## 2020-06-25 ENCOUNTER — VIRTUAL VISIT (OUTPATIENT)
Dept: BEHAVIORAL HEALTH | Facility: CLINIC | Age: 50
End: 2020-06-25
Payer: COMMERCIAL

## 2020-06-25 DIAGNOSIS — F41.1 GAD (GENERALIZED ANXIETY DISORDER): Primary | ICD-10-CM

## 2020-06-25 DIAGNOSIS — L71.9 ROSACEA: ICD-10-CM

## 2020-06-25 PROCEDURE — 90834 PSYTX W PT 45 MINUTES: CPT | Mod: TEL | Performed by: SOCIAL WORKER
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
## 2020-07-01 ENCOUNTER — VIRTUAL VISIT (OUTPATIENT)
Dept: BEHAVIORAL HEALTH | Facility: CLINIC | Age: 50
End: 2020-07-01
Payer: COMMERCIAL

## 2020-07-01 DIAGNOSIS — R69 DIAGNOSIS DEFERRED: Primary | ICD-10-CM

## 2020-07-01 NOTE — PROGRESS NOTES
Behavioral Health Home Services  [facility]: [place]        Social Work Care Navigator Note      Patient: Isabela Panchal  Date: July 1, 2020  Preferred Name: Isabela    Previous PHQ-9:   PHQ-9 SCORE 7/5/2019 1/17/2020 3/4/2020   PHQ-9 Total Score - - -   PHQ-9 Total Score MyChart 15 (Moderately severe depression) - -   PHQ-9 Total Score 15 6 18     Previous JOSEFINA-7:   JOSEFINA-7 SCORE 5/29/2019 7/5/2019 3/4/2020   Total Score - - -   Total Score 6 (mild anxiety) 8 (mild anxiety) -   Total Score 6 8 10     MYKEL LEVEL:  MYKEL Score (Last Two) 11/5/2010 3/4/2020   MYKEL Raw Score 51 32   Activation Score 91.6 62.6   MYKEL Level 4 3       Preferred Contact:  Need for : No  Preferred Contact: Cell      Type of Contact Today: Phone call (patient / identified key support person reached)      Data: (subjective / Objective):  Recent ED/IP Admission or Discharge?   None    Patient Goals:  Goal Areas: Health;Mental Health;Social and Interpersonal Relationships;Employment / Volunteer;Financial and Social Service Benefits;Safety and Risks  Patient stated goals: Isabela would like to be approved for SSDI; Due to TBI Isabela would like/needs prescription sunglassesfeel less restless and be less easily fatigued; Isabela stated she would like to improver her nutrition plan and increase ability to exercise. 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        [facility] Core Service Provided:  Health and Wellness Promotion    Current Stressors / Issues / Care Plan Objective Addressed Today:  Making medical appointments    Intervention:  Motivational Interviewing: Supported Autonomy, Collaboration, Evocation   Target Behavior(s): TBI depression    Assessment: (Progress on Goals / Homework):  Isabela was able to hang up from our conversation yesterday and immediately call [facility] to schedule Chapis's appt which will be on Tuesday July 28th at 8:30 AM.    Plan:  (Homework, other):  Patient was encouraged to continue to seek condition-related information and education.      Scheduled a Phone follow up appointment with JASON PIERSON in 4 weeks     Patient has set self-identified goals and will monitor progress until the next appointment on: 07/28/2020.       GAVIN Ponce, Social Work Care Coordinator

## 2020-07-09 ENCOUNTER — VIRTUAL VISIT (OUTPATIENT)
Dept: BEHAVIORAL HEALTH | Facility: CLINIC | Age: 50
End: 2020-07-09
Payer: MEDICAID

## 2020-07-09 DIAGNOSIS — F41.1 GAD (GENERALIZED ANXIETY DISORDER): Primary | ICD-10-CM

## 2020-07-09 PROCEDURE — 90832 PSYTX W PT 30 MINUTES: CPT | Mod: 95 | Performed by: SOCIAL WORKER
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
## 2020-07-23 ENCOUNTER — VIRTUAL VISIT (OUTPATIENT)
Dept: BEHAVIORAL HEALTH | Facility: CLINIC | Age: 50
End: 2020-07-23
Payer: MEDICAID

## 2020-07-23 DIAGNOSIS — F41.1 GAD (GENERALIZED ANXIETY DISORDER): Primary | ICD-10-CM

## 2020-07-23 PROCEDURE — 90832 PSYTX W PT 30 MINUTES: CPT | Mod: 95 | Performed by: SOCIAL WORKER
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
## 2020-07-24 ENCOUNTER — DOCUMENTATION ONLY (OUTPATIENT)
Dept: BEHAVIORAL HEALTH | Facility: CLINIC | Age: 50
End: 2020-07-24

## 2020-07-24 NOTE — PROGRESS NOTES
DOCUMENTATION ONLY    Social Work Care Coordinator called pt's prior neurology office to ask if a verbal consent via telehealth visit would be acceptable on an Release of Information to obtain medical records.  Per  staff they would require an actual signature.    Social Work Care Coordinator updated Behavioral Health Clinician, place form back in his mailbox and touted this note to Wesley.    Reyna Tidwell [facility] Care Coordinator-  [facility]  Tele: [phone]

## 2020-07-27 ENCOUNTER — VIRTUAL VISIT (OUTPATIENT)
Dept: BEHAVIORAL HEALTH | Facility: CLINIC | Age: 50
End: 2020-07-27
Payer: COMMERCIAL

## 2020-07-27 DIAGNOSIS — R69 DIAGNOSIS DEFERRED: Primary | ICD-10-CM

## 2020-07-27 NOTE — PROGRESS NOTES
Behavioral Health Home Services  [facility]: [place]        Social Work Care Navigator Note      Patient: Isabela Panchal  Date: July 27, 2020  Preferred Name: Isabela    Previous PHQ-9:   PHQ-9 SCORE 7/5/2019 1/17/2020 3/4/2020   PHQ-9 Total Score - - -   PHQ-9 Total Score MyChart 15 (Moderately severe depression) - -   PHQ-9 Total Score 15 6 18     Previous JOSEFINA-7:   JOSEFINA-7 SCORE 5/29/2019 7/5/2019 3/4/2020   Total Score - - -   Total Score 6 (mild anxiety) 8 (mild anxiety) -   Total Score 6 8 10     MYKEL LEVEL:  MYKEL Score (Last Two) 11/5/2010 3/4/2020   MYKEL Raw Score 51 32   Activation Score 91.6 62.6   MYKEL Level 4 3       Preferred Contact:  Need for : No  Preferred Contact: Cell      Type of Contact Today: Phone call (patient / identified key support person reached)      Data: (subjective / Objective):  Recent ED/IP Admission or Discharge?   None    Patient Goals:  Goal Areas: Health;Mental Health;Social and Interpersonal Relationships;Employment / Volunteer;Financial and Social Service Benefits;Safety and Risks  Patient stated goals: Isabela would like to be approved for SSDI; Due to TBI Isabela would like/needs prescription sunglassesfeel less restless and be less easily fatigued; Isabela stated she would like to improver her nutrition plan and increase ability to exercise. 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    [facility] Core Service Provided:  Health and Wellness Promotion    Current Stressors / Issues / Care Plan Objective Addressed Today:  Social Work Care Coordinator spoke with Isabela today. Isabela is ready for Chapis's neurology appt tomorrow. Chapis is still experiencing nausea, vomiting, and is not walking. Isabela's S.O. is back home now that he is negative for COVID.     Assessment: (Progress on Goals / Homework):  Isabela sounds like she is managing her household and feels good about how things are  going. Isabela converses with writer about a variety of things; the only time she appeared to have a memory loss issue was when we were talking about the movies she likes to watch; struggled with the names and details. Isabela has not called her [employer] worker back yet but will do so more in the fall. Isabela is hoping the neurologist can get to the bottom of Chapis's problems. Jerry, Chapis's father visits a couple times per month. He usually brings over a take out type of meal. Isabela says their divorce is amicable. Isabela agrees I may call her tomorrow afternoon to see how Chapis's appt went.    Plan: (Homework, other):  Patient was encouraged to continue to seek condition-related information and education.      Scheduled a Phone follow up appointment with JASON PIERSON in 1 week     Patient has set self-identified goals and will monitor progress until the next appointment       GAVIN Ponce, Social Work Care Coordinator               Next 5 appointments (look out 90 days)    Aug 10, 2020  2:00 PM CDT  [facility] ([facility]) [address] [place] [id number] [phone]

## 2020-07-28 ENCOUNTER — VIRTUAL VISIT (OUTPATIENT)
Dept: BEHAVIORAL HEALTH | Facility: CLINIC | Age: 50
End: 2020-07-28
Payer: COMMERCIAL

## 2020-07-28 ENCOUNTER — TELEPHONE (OUTPATIENT)
Dept: FAMILY MEDICINE | Facility: CLINIC | Age: 50
End: 2020-07-28

## 2020-07-28 ENCOUNTER — TELEPHONE (OUTPATIENT)
Dept: BEHAVIORAL HEALTH | Facility: CLINIC | Age: 50
End: 2020-07-28

## 2020-07-28 DIAGNOSIS — R69 DIAGNOSIS DEFERRED: Primary | ICD-10-CM

## 2020-07-28 NOTE — PROGRESS NOTES
Behavioral Health Home Services  [facility]: [place]        Social Work Care Navigator Note      Patient: Isabela Panchal  Date: July 28, 2020  Preferred Name: Isabela    Previous PHQ-9:   PHQ-9 SCORE 7/5/2019 1/17/2020 3/4/2020   PHQ-9 Total Score - - -   PHQ-9 Total Score MyChart 15 (Moderately severe depression) - -   PHQ-9 Total Score 15 6 18     Previous JOSEFINA-7:   JOSEFINA-7 SCORE 5/29/2019 7/5/2019 3/4/2020   Total Score - - -   Total Score 6 (mild anxiety) 8 (mild anxiety) -   Total Score 6 8 10     MYKEL LEVEL:  MYKEL Score (Last Two) 11/5/2010 3/4/2020   MYKEL Raw Score 51 32   Activation Score 91.6 62.6   MYKEL Level 4 3       Preferred Contact:  Need for : No  Preferred Contact: Cell      Type of Contact Today: Phone call (patient / identified key support person reached)      Data: (subjective / Objective):  Recent ED/IP Admission or Discharge?   None    Patient Goals:  Goal Areas: Health;Mental Health;Social and Interpersonal Relationships;Employment / Volunteer;Financial and Social Service Benefits;Safety and Risks  Patient stated goals: Isabela would like to be approved for SSDI; Due to TBI Isabela would like/needs prescription sunglassesfeel less restless and be less easily fatigued; Isabela stated she would like to improver her nutrition plan and increase ability to exercise. Isabela would like support from the school system and community resources to ensure her 12 yo's education out of the classroom.    [facility] Core Service Provided:  Health and Wellness Promotion    Current Stressors / Issues / Care Plan Objective Addressed Today:  Isabela was shocked I called today. Did not wake up to take Chapis to neurologist. Will hang up and call them right now to reschedule.    Intervention:  Motivational Interviewing: Supported Autonomy, Collaboration, Evocation   Target Behavior(s): Memory issues TBI    Assessment: (Progress on Goals / Homework):  Social Work Care Coordinator is questioning if this will  become a reportable situation.    Plan: (Homework, other):  Patient was encouraged to continue to seek condition-related information and education.      Scheduled a Phone follow up appointment with [facility] in 2 weeks     Patient has set self-identified goals and will monitor progress until the next appointment  .     GAVIN Ponce, Social Work Care Coordinator               Next 5 appointments (look out 90 days)    Aug 10, 2020  2:00 PM CDT  Telephone Visit with [facility] ([facility]) [address] [place] [id number] [phone]

## 2020-07-28 NOTE — TELEPHONE ENCOUNTER
Health Maintenance Due   Topic Date Due     MAMMO SCREENING  1970     PREVENTIVE CARE VISIT  03/26/2020     ASTHMA CONTROL TEST  04/30/2020     ZOSTER IMMUNIZATION (1 of 2) 09/03/2019     PAP FOLLOW-UP  01/15/2021     HPV FOLLOW-UP  01/15/2021      Asthma Follow-Up    Was ACT completed today?    Yes    ACT Total Scores 10/31/2019   ACT TOTAL SCORE -   ASTHMA ER VISITS -   ASTHMA HOSPITALIZATIONS -   ACT TOTAL SCORE (Goal Greater than or Equal to 20) 24   In the past 12 months, how many times did you visit the emergency room for your asthma without being admitted to the hospital? 0   In the past 12 months, how many times were you hospitalized overnight because of your asthma? 0     Please call Isabela to to do ACT, thanks! If < 20, please ask her to schedule an asthma virtual visit with me, thank you!  Sincerely,  Dr. Felisha Briggs MD  7/28/2020

## 2020-07-29 ASSESSMENT — ASTHMA QUESTIONNAIRES: ACT_TOTALSCORE: 25

## 2020-07-29 NOTE — TELEPHONE ENCOUNTER
Behavioral Health Home Services  [facility]: [place]        Social Work Care Navigator Note      Patient: Isabela Panchal  Date: July 29, 2020  Preferred Name: Isabela    Previous PHQ-9:   PHQ-9 SCORE 7/5/2019 1/17/2020 3/4/2020   PHQ-9 Total Score - - -   PHQ-9 Total Score MyChart 15 (Moderately severe depression) - -   PHQ-9 Total Score 15 6 18     Previous JOSEFINA-7:   JOSEFINA-7 SCORE 5/29/2019 7/5/2019 3/4/2020   Total Score - - -   Total Score 6 (mild anxiety) 8 (mild anxiety) -   Total Score 6 8 10     MYKEL LEVEL:  MYKEL Score (Last Two) 11/5/2010 3/4/2020   MYKEL Raw Score 51 32   Activation Score 91.6 62.6   MYKEL Level 4 3       Preferred Contact:  Need for : No  Preferred Contact: Cell      Type of Contact Today: Phone call (not reached/unavailable)      Data: (subjective / Objective):  Attempted to reach patient, but was unsuccessful.  Plan to attempt again.  GAVIN Ponce Psychiatric        Next 5 appointments (look out 90 days)    Aug 10, 2020  2:00 PM CDT  Telephone Visit with CAPRI Cardenas  [facility] ([facility]) [address] [place] [id number] [phone]

## 2020-08-10 ENCOUNTER — TELEPHONE (OUTPATIENT)
Dept: FAMILY MEDICINE | Facility: CLINIC | Age: 50
End: 2020-08-10

## 2020-08-10 NOTE — TELEPHONE ENCOUNTER
Patient had 2 PM appointment.  Left message with patient of the reminder and encouraged to call the clinic back to reschedule.

## 2020-08-12 DIAGNOSIS — M54.50 CHRONIC LOW BACK PAIN WITHOUT SCIATICA, UNSPECIFIED BACK PAIN LATERALITY: ICD-10-CM

## 2020-08-12 DIAGNOSIS — G89.29 CHRONIC LOW BACK PAIN WITHOUT SCIATICA, UNSPECIFIED BACK PAIN LATERALITY: ICD-10-CM

## 2020-08-12 DIAGNOSIS — M51.26 LUMBAR HERNIATED DISC: ICD-10-CM

## 2020-08-12 RX ORDER — ETODOLAC 500 MG
TABLET ORAL
Qty: 60 TABLET | Refills: 1 | Status: SHIPPED | OUTPATIENT
Start: 2020-08-12 | End: 2021-09-21

## 2020-08-13 ENCOUNTER — TELEPHONE (OUTPATIENT)
Dept: BEHAVIORAL HEALTH | Facility: CLINIC | Age: 50
End: 2020-08-13

## 2020-08-13 NOTE — TELEPHONE ENCOUNTER
Behavioral Health Home Services  [facility]: [place]        Social Work Care Navigator Note      Patient: Isabela Panchal  Date: August 13, 2020  Preferred Name: Isabela    Previous PHQ-9:   PHQ-9 SCORE 7/5/2019 1/17/2020 3/4/2020   PHQ-9 Total Score - - -   PHQ-9 Total Score MyChart 15 (Moderately severe depression) - -   PHQ-9 Total Score 15 6 18     Previous JOSEFINA-7:   JOSEFINA-7 SCORE 5/29/2019 7/5/2019 3/4/2020   Total Score - - -   Total Score 6 (mild anxiety) 8 (mild anxiety) -   Total Score 6 8 10     MYKEL LEVEL:  MYKEL Score (Last Two) 11/5/2010 3/4/2020   MYKEL Raw Score 51 32   Activation Score 91.6 62.6   MYKEL Level 4 3       Preferred Contact:  Need for : No  Preferred Contact: Cell      Type of Contact Today: Phone call (not reached/unavailable)      Data: (subjective / Objective): Social Work Care Coordinator would like to confirm that a new neuro-psych eval has been scheduled for her dtr Chapis. Patient's goals remain unmet; continue same goals.  Attempted to reach patient, but was unsuccessful.  Plan to attempt again.      GAVIN Ponce [facility]

## 2020-08-19 ENCOUNTER — DOCUMENTATION ONLY (OUTPATIENT)
Dept: FAMILY MEDICINE | Facility: CLINIC | Age: 50
End: 2020-08-19

## 2020-08-25 ENCOUNTER — VIRTUAL VISIT (OUTPATIENT)
Dept: BEHAVIORAL HEALTH | Facility: CLINIC | Age: 50
End: 2020-08-25
Payer: COMMERCIAL

## 2020-08-25 DIAGNOSIS — R69 DIAGNOSIS DEFERRED: Primary | ICD-10-CM

## 2020-08-25 NOTE — PROGRESS NOTES
Behavioral Health Home Services  [facility]: [place]        Social Work Care Navigator Note      Patient: Isabela Panchal  Date: August 25, 2020  Preferred Name: Isabela    Previous PHQ-9:   PHQ-9 SCORE 7/5/2019 1/17/2020 3/4/2020   PHQ-9 Total Score - - -   PHQ-9 Total Score MyChart 15 (Moderately severe depression) - -   PHQ-9 Total Score 15 6 18     Previous JOSEFINA-7:   JOSEFINA-7 SCORE 5/29/2019 7/5/2019 3/4/2020   Total Score - - -   Total Score 6 (mild anxiety) 8 (mild anxiety) -   Total Score 6 8 10     MYKEL LEVEL:  MYKEL Score (Last Two) 11/5/2010 3/4/2020   MYKEL Raw Score 51 32   Activation Score 91.6 62.6   MYKEL Level 4 3       Preferred Contact:  Need for : No  Preferred Contact: Cell      Type of Contact Today: Phone call (patient / identified key support person reached)      Data: (subjective / Objective):  Recent ED/IP Admission or Discharge?   None    Patient Goals:  Goal Areas: Health;Mental Health;Social and Interpersonal Relationships;Employment / Volunteer;Financial and Social Service Benefits;Safety and Risks  Patient stated goals: Isabela would like to be approved for SSDI; Due to TBI Isabela would like/needs prescription sunglassesfeel less restless and be less easily fatigued; Isabela stated she would like to improver her nutrition plan and increase ability to exercise. 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        [facility] Core Service Provided:  Health and Wellness Promotion    Current Stressors / Issues / Care Plan Objective Addressed Today:  Isabela is aware I am leaving this position. She will look forward to being assigned a new SWr to cover her needs.  Isabela has an ILS worker assigned to her specifically to help her remember her appointments.  Isabela has a neuro eval arranged for Mansi on August 26th at [facility] at 2PM.    Intervention:  Motivational Interviewing: Supported Autonomy,  Collaboration, Evocation   Target Behavior(s): TBI    Assessment: (Progress on Goals / Homework):  Isabela uses [facility] for general support, someone to call and touch base, discuss everything from weather to children to S.O to her memory issues.  Isabela is happy to have her and her dtr Mansi enrolled in [facility].     Plan: (Homework, other):  Patient was encouraged to continue to seek condition-related information and education.      Scheduled a Phone follow up appointment with [facility] in 2 weeks     Patient has set self-identified goals and will monitor progress until the next appointment on:      GAVIN Ponce, Social Work Care Coordinator

## 2020-08-25 NOTE — LETTER
[facility]  [address] [place] [id number]  [phone]        [facility]   [address] [facility] [address]    8/25/2020    Isabela Panchal  [facility] [place] [id number]      Dear Isabela,     I am writing to let you know that effective September 4, 2020, I will no longer be the Behavioral Health Home Social Work Care Coordinator for the [facility]. Thank you for allowing me to be a part of your care team. It has been a privilege to work with you. The [facility] team is dedicated to continuing to provide quality patient care through this transition. Our Behavioral Health Clinician and/or Community Health Worker is still available to provide support to you. There will be a [facility]  who will be available to assist you by phone during business hours. Thank you for giving me the opportunity to work with you.         Kind Regards,     Reyna Tidwell  Behavioral Health Home  Social Work Care Coordinator

## 2020-09-11 ENCOUNTER — VIRTUAL VISIT (OUTPATIENT)
Dept: BEHAVIORAL HEALTH | Facility: CLINIC | Age: 50
End: 2020-09-11
Payer: COMMERCIAL

## 2020-09-11 DIAGNOSIS — F41.1 GAD (GENERALIZED ANXIETY DISORDER): Primary | ICD-10-CM

## 2020-09-11 PROCEDURE — 90834 PSYTX W PT 45 MINUTES: CPT | Mod: 95 | Performed by: SOCIAL WORKER

## 2020-09-11 NOTE — Clinical Note
Isabela Flores Dr has an appointment with you on Tuesday, September 15 to discuss a referral to a neurologist.  She continues to present with cognitive impairments.  I placed a referral for neuropsych evaluation today but encouraged her to follow-up with you to discuss a referral to Dr. Huerta as well.    Thank you,   W[place]
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
## 2020-09-15 ENCOUNTER — VIRTUAL VISIT (OUTPATIENT)
Dept: FAMILY MEDICINE | Facility: CLINIC | Age: 50
End: 2020-09-15
Payer: COMMERCIAL

## 2020-09-15 DIAGNOSIS — Z87.820 HISTORY OF TRAUMATIC BRAIN INJURY: ICD-10-CM

## 2020-09-15 DIAGNOSIS — F41.1 GENERALIZED ANXIETY DISORDER: ICD-10-CM

## 2020-09-15 DIAGNOSIS — F07.81 POSTCONCUSSION SYNDROME: ICD-10-CM

## 2020-09-15 DIAGNOSIS — F33.1 MODERATE RECURRENT MAJOR DEPRESSION (H): ICD-10-CM

## 2020-09-15 DIAGNOSIS — R41.9 COGNITIVE COMPLAINTS: Primary | ICD-10-CM
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
## 2020-09-15 PROBLEM — G44.309 POST-TRAUMATIC HEADACHE: Status: ACTIVE | Noted: 2017-12-05

## 2020-09-15 PROCEDURE — 99214 OFFICE O/P EST MOD 30 MIN: CPT | Mod: 95 | Performed by: FAMILY MEDICINE

## 2020-09-15 PROCEDURE — 96127 BRIEF EMOTIONAL/BEHAV ASSMT: CPT | Performed by: FAMILY MEDICINE

## 2020-09-15 ASSESSMENT — ANXIETY QUESTIONNAIRES
IF YOU CHECKED OFF ANY PROBLEMS ON THIS QUESTIONNAIRE, HOW DIFFICULT HAVE THESE PROBLEMS MADE IT FOR YOU TO DO YOUR WORK, TAKE CARE OF THINGS AT HOME, OR GET ALONG WITH OTHER PEOPLE: SOMEWHAT DIFFICULT
6. BECOMING EASILY ANNOYED OR IRRITABLE: SEVERAL DAYS
GAD7 TOTAL SCORE: 9
3. WORRYING TOO MUCH ABOUT DIFFERENT THINGS: NEARLY EVERY DAY
2. NOT BEING ABLE TO STOP OR CONTROL WORRYING: SEVERAL DAYS
7. FEELING AFRAID AS IF SOMETHING AWFUL MIGHT HAPPEN: SEVERAL DAYS
5. BEING SO RESTLESS THAT IT IS HARD TO SIT STILL: NOT AT ALL
1. FEELING NERVOUS, ANXIOUS, OR ON EDGE: SEVERAL DAYS

## 2020-09-15 ASSESSMENT — PATIENT HEALTH QUESTIONNAIRE - PHQ9
SUM OF ALL RESPONSES TO PHQ QUESTIONS 1-9: 9
5. POOR APPETITE OR OVEREATING: MORE THAN HALF THE DAYS

## 2020-09-15 NOTE — PATIENT INSTRUCTIONS
1. You are due for a mammogram!  You can call any of the centers below to schedule your mammogram.  1.  Mammography [facility]  Appointment line [phone]  2   [facility]   Appointment line [phone]   3.   [facility]   Appointment line [phone]    2.  Shingles vaccine  I strongly recommend getting the shingles vaccine (Shingrix) if you are over age 50, but please check with your insurance to see how much you might have to pay for this vaccine! If you are on most insurance plans including Medicare, it's covered if you get it at a pharmacy but not in a clinic.    This shot will prevent shingles, a painful skin rash that you are at risk for getting if you have ever had chicken pox. Sometimes the pain will last the rest of your life, even after the rash has healed, and there is not much that we can do to relieve the pain. The vaccine is 98% effective at preventing shingles.    Please consider getting this vaccine! You'll need #2 vaccines 2-4 months apart to be protected.    3. Please get a flu shot at your earliest convenience!    4. Please schedule your well woman exam with pap Jan 2021 with Dr. Naranjo, you could have the IUD removed at that time if you'd like to do this.
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
## 2020-09-16 ASSESSMENT — ANXIETY QUESTIONNAIRES: GAD7 TOTAL SCORE: 9

## 2020-09-25 ENCOUNTER — VIRTUAL VISIT (OUTPATIENT)
Dept: BEHAVIORAL HEALTH | Facility: CLINIC | Age: 50
End: 2020-09-25
Payer: COMMERCIAL

## 2020-09-25 DIAGNOSIS — F41.1 GAD (GENERALIZED ANXIETY DISORDER): Primary | ICD-10-CM

## 2020-09-25 PROCEDURE — 90832 PSYTX W PT 30 MINUTES: CPT | Mod: 95 | Performed by: SOCIAL WORKER
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
## 2020-10-06 ENCOUNTER — TRANSFERRED RECORDS (OUTPATIENT)
Dept: HEALTH INFORMATION MANAGEMENT | Facility: CLINIC | Age: 50
End: 2020-10-06

## 2020-10-14 ENCOUNTER — VIRTUAL VISIT (OUTPATIENT)
Dept: BEHAVIORAL HEALTH | Facility: CLINIC | Age: 50
End: 2020-10-14
Payer: COMMERCIAL

## 2020-10-14 DIAGNOSIS — R69 DIAGNOSIS DEFERRED: Primary | ICD-10-CM

## 2020-10-14 NOTE — PROGRESS NOTES
Behavioral Health Home Services  [facility]: [place]        Social Work Care Navigator Note      Patient: Isabela Panchal  Date: October 14, 2020  Preferred Name: Isabela    Previous PHQ-9:   PHQ-9 SCORE 1/17/2020 3/4/2020 9/15/2020   PHQ-9 Total Score - - -   PHQ-9 Total Score MyChart - - -   PHQ-9 Total Score 6 18 9     Previous JOSEFINA-7:   JOSEFINA-7 SCORE 7/5/2019 3/4/2020 9/15/2020   Total Score - - -   Total Score 8 (mild anxiety) - -   Total Score 8 10 9     MYKEL LEVEL:  MYKEL Score (Last Two) 11/5/2010 3/4/2020   MYKEL Raw Score 51 32   Activation Score 91.6 62.6   MYKEL Level 4 3       Preferred Contact:  Need for : No  Preferred Contact: Cell      Type of Contact Today: Phone call (patient / identified key support person reached)      Data: (subjective / Objective):  Recent ED/IP Admission or Discharge?   None    Patient Goals:  Goal Areas: Health;Mental Health;Social and Interpersonal Relationships;Employment / Volunteer;Financial and Social Service Benefits;Safety and Risks  Patient stated goals: Isabela would like to be approved for SSDI; Due to TBI Isabela would like/needs prescription sunglassesfeel less restless and be less easily fatigued; Isabela stated she would like to improver her nutrition plan and increase ability to exercise. 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        [facility] Core Service Provided:  Care Coordination: provided care management services/referrals necessary to ensure patient and their identified supports have access to medical, behavioral health, pharmacology and recovery support services.  Ensured that patient's care is integrated across all settings and services.     Current Stressors / Issues / Care Plan Objective Addressed Today:  [facility] called patient after consulting with Wesley ([facility]) about a request from [facility] department at "[employer]". [facility] informed patient that clinic received a call  from [employer] requesting a form to be faxed. [facility] asked for permission ot call this agency back. Patient provided verbal consent for this. [facility] asked patient if she is getting BHH services though [employer]. Patient stated that she was not sure. [facility] explained that if they are also providing BHH that she would have choose between getting BHH at [facility] or [facility]. [facility] will follow up with her on this.     [facility] contacted Renee at [employer]. Renee confirmed that they are also providing H services and have been providing services to patient since October 2019. Renee has been helping patient with applying for SSDI as well as helping her get on a CADI waiver. [facility] explained that [place] has also been providing BHH and that patient will have to pick one agency to continue working with. Renee wanted to know if patient can continue to see Wesley if she continues BHH through [employer]. [facility] advised that she would check on this. Renee also wanted to know if Wesley can send a letter about patients disability to help with SSDI application. [facility] will check with Wesley on this. Renee is also looking to get Wesley's direct number. [facility] could not find this off hand but will get this information back to patient.     [facility] routed note to supervisor asking if patient can still see Wesley if discharged from [facility].    Intervention:  Motivational Interviewing: Expressed Empathy/Understanding and Permission to raise concern or advise   Target Behavior(s): Explored current social supports and reinforced opportunities to increase engagement    Assessment: (Progress on Goals / Homework):  Patient would benefit from continued coordination in reaching their goals set for the Behavioral Health Home ([facility]) program. [facility] reviewed Health Action Plan goals and will continue to monitor progress and work with patient and their care team.    Plan: (Homework, other):  Patient was encouraged to continue to seek condition-related information and education.  SWCC, patient, and team will continue to work towards progress on reaching goals set for the Behavioral Health Home (BHH) program.     Mini Suggs [facility]  Behavioral Health Home ([facility])   [facility]  [phone]

## 2020-10-14 NOTE — Clinical Note
Wesley- This patient has been receiving [facility] services from [employer] for a year now. Patient will have to choose which agency she wants to continue working with. Patient wants to know if she can continue to see you for therapy if she has [facility] services through [employer]? I was not sure about this. I know she cannot work with me.

## 2020-10-15 ENCOUNTER — TELEPHONE (OUTPATIENT)
Dept: BEHAVIORAL HEALTH | Facility: CLINIC | Age: 50
End: 2020-10-15

## 2020-10-15 NOTE — TELEPHONE ENCOUNTER
Behavioral Health Home Services  [facility]: [place]        Social Work Care Navigator Note      Patient: Isabela Panchal  Date: October 15, 2020  Preferred Name: Isabela    Previous PHQ-9:   PHQ-9 SCORE 1/17/2020 3/4/2020 9/15/2020   PHQ-9 Total Score - - -   PHQ-9 Total Score MyChart - - -   PHQ-9 Total Score 6 18 9     Previous JOSEFINA-7:   JOSEFINA-7 SCORE 7/5/2019 3/4/2020 9/15/2020   Total Score - - -   Total Score 8 (mild anxiety) - -   Total Score 8 10 9     MYKEL LEVEL:  MYKEL Score (Last Two) 11/5/2010 3/4/2020   MYKEL Raw Score 51 32   Activation Score 91.6 62.6   MYKEL Level 4 3       Preferred Contact:  Need for : No  Preferred Contact: Cell      Type of Contact Today: Phone call (not reached/unavailable)      Data: (subjective / Objective):  [facility] left voicemail for Renee at [employer] letting her know that patient can still work with [facility] if she discharges from [facility]. [facility] also provided Renee with Wesley's phone number and advised that [facility] followed up with him about the letter needing for .  [facility] left voicemail for patient asking for a call back to discuss services.     CANDIS Kang  Behavioral Health Home ([facility]   [facility]  [phone]

## 2020-10-16 ENCOUNTER — VIRTUAL VISIT (OUTPATIENT)
Dept: BEHAVIORAL HEALTH | Facility: CLINIC | Age: 50
End: 2020-10-16
Payer: COMMERCIAL

## 2020-10-16 DIAGNOSIS — F41.1 GAD (GENERALIZED ANXIETY DISORDER): Primary | ICD-10-CM

## 2020-10-16 PROCEDURE — 90832 PSYTX W PT 30 MINUTES: CPT | Mod: 95 | Performed by: SOCIAL WORKER
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
## 2020-10-21 ENCOUNTER — TELEPHONE (OUTPATIENT)
Dept: FAMILY MEDICINE | Facility: CLINIC | Age: 50
End: 2020-10-21

## 2020-10-21 ENCOUNTER — PATIENT OUTREACH (OUTPATIENT)
Dept: CARE COORDINATION | Facility: CLINIC | Age: 50
End: 2020-10-21

## 2020-10-21 DIAGNOSIS — G44.309 POST-TRAUMATIC HEADACHE: Primary | ICD-10-CM

## 2020-10-21 NOTE — TELEPHONE ENCOUNTER
Renee called with [employer] Stating patient was referred by Wesley for a psych eval however, per referral given they apparently do not take [facility] clients      Renee is asking if there is another place Wesley suggests?     Please advise     Writer received the above message and contacted [facility] to advise the referral that placed does not except [place] patients.  Scheduling will contact patient this a.m. to reschedule a neuropsychological evaluation.

## 2020-10-21 NOTE — PROGRESS NOTES
Clinic Care Coordination Contact  [facility]/Voicemail    Clinical Data: Care Coordinator Outreach  Outreach attempted x 1.  Left message on patient's voicemail with call back information and requested return call.  Plan: Care Coordinator will try to reach patient again in 1-2 business days.      Reason for Referral: Financial Support: SSDI  Additional pertinent details: Was previously enrolled in [facility], no longer eligible since she has a behavioral health  outside of . [facility] is handing off to  asking for help getting set up with SSDI. Has a TBI and needs further assistance.   Clinical Staff have discussed the Care Coordination Referral with the patient and/or caregiver: no

## 2020-10-23 NOTE — PROGRESS NOTES
Clinic Care Coordination Contact  Community Health Worker Initial Outreach    CHW Initial Information Gathering:  Referral Source: Other, specify([facility])  Current living arrangement:: I live in a private home with family  Community Resources: Other (see comment)([facility] services through [employer]; sees clinic [facility])  Informal Support system:: Children, Family  No PCP office visit in Past Year: No       Patient accepts CC: Yes. Patient scheduled for assessment with CC JASON Orellana on Thursday, October 29th at 1 pm by phone. Patient noted desire to discuss support for her and her daughter, Shanel.     Pt is working with Renee at [employer] and receiving [facility] services (Renee's phone number is listed in care team in pt's chart) to apply for SSDI. [facility] discharged pt due to duplication of services & referred pt to CCC to ensure pt continues to have support with resources and benefits. Pt would also like to discuss supports/resources for her 13 y/o daughter, Shanel. Shanel has a neuropsychological assessment scheduled 10/28. I explained my role and the reason for my call to pt. She appreciated the outreach and would like to work with us.

## 2020-10-29 ENCOUNTER — PATIENT OUTREACH (OUTPATIENT)
Dept: NURSING | Facility: CLINIC | Age: 50
End: 2020-10-29
Payer: COMMERCIAL

## 2020-10-29 NOTE — PROGRESS NOTES
Clinic Care Coordination Contact    Clinic Care Coordination Contact  OUTREACH    Referral Information:  Referral Source: Other, specify([facility])    Primary Diagnosis: Psychosocial    Chief Complaint   Patient presents with     Clinic Care Coordination - Initial             Derry Utilization: [facility]   Clinic Utilization  Difficulty keeping appointments:: Yes  Compliance Concerns: No  No-Show Concerns: No  No PCP office visit in Past Year: No  Utilization    Last refreshed: 11/17/2020  2:30 PM: Hospital Admissions 0           Last refreshed: 11/17/2020  2:30 PM: ED Visits 0           Last refreshed: 11/17/2020  2:30 PM: No Show Count (past year) 9              Current as of: 11/17/2020  2:30 PM              Clinical Concerns:  Current Medical Concerns:  Pt shared that she has many medical problems, but she is getting the appropriate care that she needs by providers.   Pt stated that she is in need of getting on social security disability benefits. Pt stated she does have another [facility] helping her out in the community. Pt discussed that she has problems keeping her appointments straight and often get's confused.      Current Behavioral Concerns: Pt shared that she meets with a [facility] to discuss mental health. Pt shared that she continues to get frustrated and is working on that with [facility].     Education Provided to patient: SW'er discussed what CC can help with. SW'er discussed the service of disability specialists as they can help her apply for SSDI.      Pain  Pain (GOAL):: No  Health Maintenance Reviewed:    Clinical Pathway: None    Medication Management:  Pt manages medications independently.      Functional Status:  Dependent ADLs:: Ambulation-cane  Bed or wheelchair confined:: No  Mobility Status: Independent w/Device  Fallen 2 or more times in the past year?: Yes  Any fall with injury in the past year?: No    Living Situation:  Current living arrangement:: I live in a private home with family  Type of  residence[facility]    Lifestyle & Psychosocial Needs:        Diet:: Regular  Inadequate nutrition (GOAL):: No  Tube Feeding: No  Inadequate activity/exercise (GOAL):: No  Significant changes in sleep pattern (GOAL): No  Transportation means:: Regular car     Episcopal or spiritual beliefs that impact treatment:: No  Mental health DX:: Yes  Mental health DX how managed:: [facility] at [facility], Medication  Mental health management concern (GOAL):: No  Chemical Dependency Status: Not Applicable  Informal Support system:: Children, Family   Socioeconomic History     Marital status:      Spouse name: Jerry     Number of children: 2     Years of education: 13     Highest education level: Not on file   Occupational History     Occupation: Clinic Coordinator- [employer]     Comment: [employer]     Tobacco Use     Smoking status: Never Smoker     Smokeless tobacco: Never Used   Substance and Sexual Activity     Alcohol use: Yes     Comment: occ-1-2x/month, 2 drinks a time     Drug use: No     Sexual activity: Yes     Partners: Male     Birth control/protection: I.U.D.             Resources and Interventions:  Current Resources:   List of home care services:: (water therapy)  Community Resources: Other (see comment), [facility] Programs([facility] services through [employer]; sees clinic [facility], [facility] and ILS worker)  Supplies used at home:: Nebulizer tubing  Equipment Currently Used at Home: cane, regan  Employment Status: disabled)   )    Advance Care Plan/Directive  Advanced Care Plans/Directives on file:: No  Advanced Care Plan/Directive Status: Considering Options          Goals:   Goals        General    Financial Wellbeing- SSDI (pt-stated)     Notes - Note created  11/17/2020  3:56 PM by Renetta Orellana BSW    Goal Statement: I would like help applying and getting approved for Social Security Disability in the next 6 months.   Date Goal set: 10/29/2020  Barriers: Getting denied   Strengths:  Asking for help   Date to Achieve By: 4/29/2020  Patient expressed understanding of goal: yes   Action steps to achieve this goal:  1. I will get connected with the disability specialists to get SSDI.   2. I will ask Care coordination for further help if needed.                 Patient/Caregiver understanding: yes        Future Appointments              In 1 month Felisha Briggs MD [facility],     In 2 months Mikael Vieira, PhD [facility] Neuropsychology [facility]          Plan: 1) SW will send CC intro and complex medical plan.   2) SW will send info on the disability specialists.   3) CHW will follow up with pt in 30 days.   4) SW will follow up with pt in 45 days.     Renetta Orellana MSW  Clinic Care Coordinator   [facility]  [phone]

## 2020-10-29 NOTE — LETTER
[place] CARE COORDINATION  November 17, 2020    Isabela Mlso Espinosason  [address]  [facility] [address]      Dear Isabela,    I am a clinic care coordinator who works with Felisha Briggs MD at [facility]. I wanted to thank you for spending the time to talk with me.  Below is a description of clinic care coordination and how I can further assist you.      The clinic care coordination team is made up of a registered nurse,  and community health worker who understand the health care system. The goal of clinic care coordination is to help you manage your health and improve access to the health care system in the most efficient manner. The team can assist you in meeting your health care goals by providing education, coordinating services, strengthening the communication among your providers and supporting you with any resource needs.    Please feel free to contact the Community Health Worker at [phone] with any questions or concerns. We are focused on providing you with the highest-quality healthcare experience possible and that all starts with you.     Sincerely,     LOC Davis  Clinic Care Coordinator   [facility]  [phone]      Enclosed: I have enclosed a copy of the Complex Care Plan. This has helpful information and goals that we have talked about. Please keep this in an easy to access place to use as needed.

## 2020-10-29 NOTE — LETTER
[facility]  Complex Care Plan  About Me:    Patient Name:  Isabela Panchal    YOB: 1970  Age:         50 year old   Jayshree MRN:    [id number] Telephone Information:  Home Phone [phone]   Mobile [phone]       Address:  [address]  [facility] [address] Email address:  [email or web address]      Emergency Contact(s)    Name Relationship Lgl Grd Work Phone Home Phone Mobile Phone   1. CHARO SHELDON Mother   [phone]   2. WHITNEY SHELDON Father   [phone]           Primary language:  English     needed? No   [employer]:  [phone] op. 1  Other communication barriers: None  Preferred Method of Communication:  Mail  Current living arrangement: I live in a private home with family  Mobility Status/ Medical Equipment: Independent w/Device    Health Maintenance  Health Maintenance Reviewed:      My Access Plan  Medical Emergency 911   Primary Clinic Line [facility] [phone]   24 Hour Appointment Line [phone] or  [phone]) (toll-free)   24 Hour Nurse Line [phone] (toll-free)   Preferred Urgent Care [facility] [phone]   [facility]  [phone]   Preferred Pharmacy [facility] [address]     Behavioral Health Crisis Line The National Suicide Prevention Lifeline at 1-958.568.9528 or 911             My Care Team Members  Patient Care Team       Relationship Specialty Notifications Start End    Felisha Briggs MD PCP - General Family Practice  4/29/15     Phone: [phone] Fax: [phone] [address] [facility] [address]    Alexandria Bates MD MD Family Medicine - Sports Medicine  4/1/15     Phone: [phone] Fax: [phone]         [address] [facility] [address]    [place]  Felisha Segura MD Assigned PCP   3/20/16     Phone: [phone] Fax: [phone]         [address] [facility] [address]    Man Gilbert MD MD Family Medicine - Sports Medicine  11/16/19     Phone: [phone] Fax: [phone]         [address] [facility] [address] [facility] [address]    Man Young, [facility] Therapist  - Clinical Admissions 2/26/20     [facility]    Phone: [phone] Fax: [phone]         [facility] [place] [address] [facility] [address]    [facility] Dentistry Dentist Dentist  3/6/20     Isabela mccallum who ever is available    Phone: [phone] Fax: [phone] [address] [facility] Case Management Case Management Specialist   7/1/20     Monie Briggs    E-Mail: [email or web address]    Renee Other (see comments)   10/21/20     [facility]     Phone: [phone]         Shruti Naranjo MD Assigned OBGYN Provider   10/23/20     Phone: [phone] Fax: [phone]         [address] [facility] [address] [facility] [address]    Man Gilbert MD Assigned Musculoskeletal Provider   10/23/20     Phone: [phone] Fax: [phone]         [facility] [address] [place] [address]    Renetta Orellana BSW Lead Care Coordinator Primary Care - CC Admissions 11/17/20     Phone: [phone]         Alexandria Trejo Community Health Worker  Admissions 11/17/20     Phone: [phone] Fax: [phone]                My Care Plans  Self Management and Treatment Plan  Goals and (Comments)  Goals        General    Financial Wellbeing- SSDI (pt-stated)     Notes - Note created  11/17/2020  3:56 PM by Renetta Orellana BSW    Goal Statement: I would like help applying and getting approved for Social Security Disability in the next 6 months.   Date Goal set: 10/29/2020  Barriers: Getting denied   Strengths: Asking for help   Date to Achieve By: 4/29/2020  Patient expressed understanding of goal: yes   Action  steps to achieve this goal:  1. I will get connected with the disability specialists to get SSDI.   2. I will ask Care coordination for further help if needed.                  Action Plans on File:   Asthma        Depression          Advance Care Plans/Directives Type:          Current Medications and Allergies:  See printed Medication Report.    Care Coordination Start Date: 10/29/2020   Frequency of Care Coordination:     Form Last Updated: 11/17/2020

## 2020-11-02 ENCOUNTER — TELEPHONE (OUTPATIENT)
Dept: FAMILY MEDICINE | Facility: CLINIC | Age: 50
End: 2020-11-02

## 2020-11-02 NOTE — TELEPHONE ENCOUNTER
Returning patient's call.  Please see epic.  Left voicemail with patient that her appointment on Friday was at 1230 and 1930.  Counseled that I left her a voicemail at 1230.

## 2020-11-06 ENCOUNTER — VIRTUAL VISIT (OUTPATIENT)
Dept: BEHAVIORAL HEALTH | Facility: CLINIC | Age: 50
End: 2020-11-06
Payer: COMMERCIAL

## 2020-11-06 DIAGNOSIS — F41.1 GAD (GENERALIZED ANXIETY DISORDER): Primary | ICD-10-CM

## 2020-11-06 PROCEDURE — 90832 PSYTX W PT 30 MINUTES: CPT | Mod: 95 | Performed by: SOCIAL WORKER

## 2020-11-09 DIAGNOSIS — F41.1 GENERALIZED ANXIETY DISORDER: ICD-10-CM

## 2020-11-09 DIAGNOSIS — F33.1 MODERATE RECURRENT MAJOR DEPRESSION (H): ICD-10-CM
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
## 2020-11-12 RX ORDER — VENLAFAXINE HYDROCHLORIDE 150 MG/1
CAPSULE, EXTENDED RELEASE ORAL
Qty: 90 CAPSULE | Refills: 3 | Status: SHIPPED | OUTPATIENT
Start: 2020-11-12 | End: 2021-05-25

## 2020-11-12 NOTE — TELEPHONE ENCOUNTER
Please call to schedule-Return in about 4 months (around 1/15/2021) for preventive visit (wellness/annual physical exam) with PAP.  Routing refill request to provider for review/approval because:  PHQ-9 score:    PHQ 9/15/2020   PHQ-9 Total Score 9   Q9: Thoughts of better off dead/self-harm past 2 weeks Not at all   F/U: Thoughts of suicide or self-harm -   F/U: Safety concerns -

## 2020-11-13 NOTE — TELEPHONE ENCOUNTER
Reason for call:  Medication   If this is a refill request, has the caller requested the refill from the pharmacy already? Yes  Will the patient be using a [facility]? Yes  Name of the pharmacy and phone number for the current request: [facility] or another  pharmacy    Name of the medication requested: Venlafaxine    Other request: Patent is almost out of medication and is hoping for a refill today. She is also wondering if she is due to schedule an appointment with Dr. Briggs.     Phone number to reach patient:  Home number on file [phone] (home)    Best Time:  Asap    Can we leave a detailed message on this number?  NO    Travel screening: Not Applicable

## 2020-11-14 NOTE — TELEPHONE ENCOUNTER
Pt has a scheduled appt on 01/15/21 with Dr. Briggs.    Angela MURCIA  [facility]

## 2020-11-16 ENCOUNTER — TELEPHONE (OUTPATIENT)
Dept: FAMILY MEDICINE | Facility: CLINIC | Age: 50
End: 2020-11-16

## 2020-11-16 NOTE — TELEPHONE ENCOUNTER
Received a message that patient has not been contacted by [facility] neuro psychological clinic.  About the message with the clinic that following up with referral from November 6, 2020.  Requested a contact via [employer] or email to this writer.

## 2020-12-04 ENCOUNTER — VIRTUAL VISIT (OUTPATIENT)
Dept: BEHAVIORAL HEALTH | Facility: CLINIC | Age: 50
End: 2020-12-04
Payer: COMMERCIAL

## 2020-12-04 DIAGNOSIS — F41.1 GAD (GENERALIZED ANXIETY DISORDER): Primary | ICD-10-CM

## 2020-12-04 PROCEDURE — 90832 PSYTX W PT 30 MINUTES: CPT | Mod: 95 | Performed by: SOCIAL WORKER

## 2020-12-07 ENCOUNTER — TELEPHONE (OUTPATIENT)
Dept: NEUROLOGY | Facility: CLINIC | Age: 50
End: 2020-12-07

## 2020-12-07 ENCOUNTER — PATIENT OUTREACH (OUTPATIENT)
Dept: CARE COORDINATION | Facility: CLINIC | Age: 50
End: 2020-12-07

## 2020-12-07 NOTE — PROGRESS NOTES
"Clinic Care Coordination Contact  Care Team Conversations    Called and spoke with Renee, pt's [facility] care coordinator through [employer] Introduced myself, gave Renee my contact information and explained my role. Let her know that I just spoke with pt and she gave verbal permission for us to speak. They've been working together since October 2019. Pt was denied disability. Renee said pt was referred to [place] by a [facility] program (contact there for pt is named Michael) but they won't be assisting pt and Renee until March 2021.    Renee said she is concerned about pt's decline with numbers and ability to remember and manage her own appointments. She said pt also needs help with her children's situations and healthcare, too. Confirmed with Renee that pt is the only one from the family enrolled in [facility] currently. Renee said pt's boyfriend helps pt with her medications and supports her with things at home. Pt does pool therapy once a week and has a CADI waiver.    Renee has been trying to help pt get a neuropsych assessment for \"a while\" with no success. Pt has a court hearing for Social Security on February 4th. Renee said a \"vocational person from [place]\" will be representing the Social Security Administration at the hearing who has never met the pt. Shared what I told pt about the neurology RN's chart note with two neuropsychologists' information. Noted the \"forensic neuropsych\" comment, and Renee said she isn't sure where that came from. Renee said she spoke with one of the RNs from the Neurology department to explain what she is trying to help pt with. Renee said they have been working with [employer] on this for over a year. Offered to email her the neuropsychologist referral information, and she agreed. I let her know I will message the Neurology RN to get clarification and make sure that they cannot take her case.    Renee gave me her email ([email or web address]). I thanked her for her " time and let her know I will reach out again via email once I get a response from the neurology RN. Sent Renee an email after our call ended with the information from the 12/7 neurology telephone call note.

## 2020-12-07 NOTE — PROGRESS NOTES
"Clinic Care Coordination Contact  Community Health Worker Follow Up    Goals:   Goals Addressed as of 12/8/2020 at 9:33 AM        Patient Stated      Financial Wellbeing- SSDI (pt-stated)     Added 11/17/20 by Renetta Orellana BSW     Goal Statement: I would like help applying and getting approved for Social Security Disability in the next 6 months.   Date Goal set: 10/29/2020  Barriers: Getting denied   Strengths: Asking for help   Date to Achieve By: 4/29/2020  Patient expressed understanding of goal: yes     Action steps to achieve this goal:  1. I will continue working with Disability Specialists and my [facility] SW through [employer] on my Social Security denial.  2. I will get a neuropsychological assessment to support me at my upcoming February 4th, 2021, court hearing in front of an .   3. I will give the CHW updates at outreach calls.    Updated: 12/7/20                Intervention and Education during outreach:     Called pt to check in. Asked if she was able to connect with Disability Specialists and where she's at with the Social Security application process. Pt said she \"isn't sure\" if she's connected with [employer]ability Specialists but is working with Renee, her [facility] SW through [employer] on it. Pt said she has been denied twice and has a hearing coming up in February. Pt said she has an ILS worker through [facility] named Michael who is also helping with this. Updated goal.    Pt said she is \"trying to get an assessment\" and \"it's been tough to get communication back.\" Reviewed pt's chart with her on the phone and noted [facility]'s referral for a neuropsychological assessment in early November. Also shared Neurology RN's telephone note and two referrals they were suggesting for pt. Offered to email her the information, but she declined. She asked me if I would be willing to speak with Renee at [employer]. Pt said she \"gets a lot of phone calls\" and \"feels overwhelmed with " "paperwork.\" Pt said Renee will appreciate hearing from me and the update. Pt said she has \"been having a hard time writing down numbers\" and said she used an \"entire box of envelopes\" the other day because she kept having to \"redo the address and couldn't get it right.\" Let her know I will reach out to Renee to connect and discussed exactly what I will share (my role, neurology note and to learn more about how Renee is supporting pt).     Pt mentioned that her two daughters are having some difficulties right now. One is 12 years old and the other is 21 years old. Pt said she is working with health professionals to try and \"get them help.\" Pt said she has one 20 y/o son who lives with his girlfriend in [facility] [place].     Noted pt's upcoming phone appointment with [facility]. Pt said she has it written on her calendar and said that Renee \"helps with appointment reminders.\" Offered to follow up with pt early next month, and she agreed. Thanked her for speaking with me and asked if she needed anything else right now. Pt declined.     CHW Plan: Pt will continue working with Renee to prepare for her February hearing for her Social Security denial. Pt will attend her upcoming phone appointment with [facility]. CHW will call Renee from [employer]. CHW will follow up with pt in one month.     "

## 2020-12-07 NOTE — TELEPHONE ENCOUNTER
From: Delfino Leonard, PhD   Sent: 12/2/2020   7:11 AM CST   To: Elham Ortiz   We have told them on several occasions we are not going to take this case because they are going to use it for court. Our policy is that we do not take cases where they are going to court. They should go to a forensic neuropsychologist. We have provided these names before, but would you give them this contact information:     Mray Chen, PhD   [phone]     Santos Rivera, PhD  [phone]     These neuropsychologists will be able to help them.   Thanks   Marky

## 2020-12-10 ENCOUNTER — PATIENT OUTREACH (OUTPATIENT)
Dept: CARE COORDINATION | Facility: CLINIC | Age: 50
End: 2020-12-10

## 2020-12-10 NOTE — PROGRESS NOTES
Clinic Care Coordination Contact  Care Team Conversations    Received an e-mail asking for help to submit letters of support for pt's upcoming Social Security appeal hearing in February from Renee Lal at [employer] Ernee said she spoke with Deanna Hale from [employer] and learned that they never received letters from pt's PCP and [facility]. Renee said Disability Specialists reached out to the clinic on August 18th, October 13th and December 7th.     Renee said these need to go to the  as soon as possible. She asked us to fax them directly to the Disability [employer]ists at [phone], attn: Deanna Hale. Per chart review, pt signed a Consent for Release of Information on August 19th that is scanned into pt's chart on September 14th. Letter from [employer]  outlines what letter of support needs to include from PCP.    Routing to [facility] and pt's PCP to check in on letters of support.

## 2020-12-14 ENCOUNTER — PATIENT OUTREACH (OUTPATIENT)
Dept: CARE COORDINATION | Facility: CLINIC | Age: 50
End: 2020-12-14

## 2020-12-14 NOTE — PROGRESS NOTES
Clinic Care Coordination Contact    Situation: Patient chart reviewed by care coordinator.    Background:SW CC's initial assessment and enrollment to Care Coordination was 10/29/20.   Patient centered goals were developed with participation from patient. CC handed patient off to CHW for continued outreach every 30 days.     Assessment: CHW has been in contact with patient monthly. Patient has made progress to goals. Pt has been working with CHW and organizations in the community to help her get approved for social security disability insurance. PT continues to work on achieving this goal at this time.     Plan/Recommendations: CHW will involve  CC as needed or if patient is ready to move to maintenance.  SW CC will continue to monitor progress to goals and CHW outreaches every 6 weeks.    Renetta Orellana MSW  Clinic Care Coordinator   [facility]  [phone]      
bilateral lower extremity ROM was WFL (within functional limits)/bilateral upper extremity ROM was WFL (within functional limits)

## 2020-12-18 ENCOUNTER — VIRTUAL VISIT (OUTPATIENT)
Dept: BEHAVIORAL HEALTH | Facility: CLINIC | Age: 50
End: 2020-12-18
Payer: COMMERCIAL

## 2020-12-18 ENCOUNTER — DOCUMENTATION ONLY (OUTPATIENT)
Dept: BEHAVIORAL HEALTH | Facility: CLINIC | Age: 50
End: 2020-12-18

## 2020-12-18 DIAGNOSIS — F33.1 MODERATE RECURRENT MAJOR DEPRESSION (H): Primary | ICD-10-CM

## 2020-12-18 PROCEDURE — 90832 PSYTX W PT 30 MINUTES: CPT | Mod: 95 | Performed by: SOCIAL WORKER
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
## 2020-12-22 ENCOUNTER — TRANSFERRED RECORDS (OUTPATIENT)
Dept: HEALTH INFORMATION MANAGEMENT | Facility: CLINIC | Age: 50
End: 2020-12-22

## 2020-12-28 ENCOUNTER — PATIENT OUTREACH (OUTPATIENT)
Dept: CARE COORDINATION | Facility: CLINIC | Age: 50
End: 2020-12-28

## 2020-12-28 NOTE — LETTER
[place] [address]  [place] [address]  [phone]          2020    RE: Isabela Panchal                                                                                                               [address]  [facility] [id number]    : 1970          To Whom It May Concern:    Isabela Panchal is under my professional care and has the following complex medical diagnoses:  Major depressive disorder, recurrent severe  Generalized anxiety disorder  Postconcussion syndrome  History of traumatic brain injury  Vertigo  Primary insomnia  Blurry vision, bilateral  Chronic pain of right knee  Chronic midline low back pain with right-sided sciatica  Post-traumatic headache    Over the past year these medical conditions caused and continue to cause significant impairment in Isabela's ability to function cognitively.  She is also under the care of mental health specialists and has an [facility] worker.  She has been referred for Neuropsychological testing, which is delayed due to the COVID-19 pandemic.         Sincerely,         Caroline Lima (electronically signed on behalf of Felisha Briggs MD/at)

## 2020-12-28 NOTE — PROGRESS NOTES
"Clinic Care Coordination Contact  Care Team Conversations    Received another email from Renee Lal from [employer] to check in and see if [facility] and PCP have provided letters of support for pt's upcoming Social Security hearing. According to Renee's email, \"everything needs to be turned into court by the first week of January 2021.\"     Responded to Renee's email and let her know that [facility] submitted letter directly to pt via [employer] on 12/18 and faxed it to [employer] (attn: Deanna Hale, [phone]). Let her know that I will follow up again with PCP and PCP's team to ask for letter of support ASAP.            "

## 2020-12-29 NOTE — TELEPHONE ENCOUNTER
Clinic provider-Please review and advise:  1. Letter of support for upcoming Social Security hearing is needed by next week and patient's PCP not available this week  2. Letter pended    Thank you!  BRETT CastanedaN, RN

## 2020-12-29 NOTE — TELEPHONE ENCOUNTER
Printed out signed letter and faxed to [employer] (attn: Deanna Hale, [phone].  KETTY Wallis

## 2021-01-05 ENCOUNTER — PATIENT OUTREACH (OUTPATIENT)
Dept: FAMILY MEDICINE | Facility: CLINIC | Age: 51
End: 2021-01-05

## 2021-01-05 PROBLEM — R87.810 CERVICAL HIGH RISK HPV (HUMAN PAPILLOMAVIRUS) TEST POSITIVE: Status: ACTIVE | Noted: 2018-02-13

## 2021-01-05 NOTE — TELEPHONE ENCOUNTER
01/15/20: [place] Bx benign, chronic inflammation present. ECC benign,chronic inflammation present, small amount of atypical squamous epithelium. NIL Pap, Neg HR HPV result. Plan cotest in 1 year.   01/15/21 Appt scheduled.

## 2021-01-15 ENCOUNTER — HEALTH MAINTENANCE LETTER (OUTPATIENT)
Age: 51
End: 2021-01-15

## 2021-01-18 ENCOUNTER — TELEPHONE (OUTPATIENT)
Dept: BEHAVIORAL HEALTH | Facility: CLINIC | Age: 51
End: 2021-01-18

## 2021-01-18 NOTE — TELEPHONE ENCOUNTER
----- Message from Thea Quezada sent at 12/21/2020 11:49 AM CST -----  Regarding: Referral Outcome      Isabela Panchal was transferred to [facility] [address], [address] to schedule a Neuropsych Eval appointment.     Thank you for your referral,  [facility]

## 2021-01-22 ENCOUNTER — VIRTUAL VISIT (OUTPATIENT)
Dept: BEHAVIORAL HEALTH | Facility: CLINIC | Age: 51
End: 2021-01-22
Payer: COMMERCIAL

## 2021-01-22 DIAGNOSIS — F33.1 MODERATE RECURRENT MAJOR DEPRESSION (H): Primary | ICD-10-CM

## 2021-01-22 PROCEDURE — 90832 PSYTX W PT 30 MINUTES: CPT | Mod: 95 | Performed by: SOCIAL WORKER
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
## 2021-01-27 ENCOUNTER — PATIENT OUTREACH (OUTPATIENT)
Dept: CARE COORDINATION | Facility: CLINIC | Age: 51
End: 2021-01-27

## 2021-01-27 NOTE — PROGRESS NOTES
Clinic Care Coordination Contact  Care Team Conversations    Responded via secure email to Renee Lal from [employer] to check in and confirm that [employer] received everything they needed for Isabela's upcoming Social Security hearing. Noted that PCP's team sent letter directly to Deanna Hale at [employer] on December 29th. Also shared that I saw that pt scheduled a neuropsych evaluation appointment with the [facility]. Asked if there was anything else I could do at this time to be a support to pt.

## 2021-01-27 NOTE — PROGRESS NOTES
Clinic Care Coordination Contact  [facility]/Voicemail       Clinical Data: Care Coordinator Outreach  Outreach attempted x 1.  Left message on patient's voicemail with call back information and requested return call.  Plan: Care Coordinator will try to reach patient again in 1 month.

## 2021-01-29 NOTE — PROGRESS NOTES
"Clinic Care Coordination Contact  Care Team Conversations    Received an email back from Renee Lal [facility] SW with [employer]. She confirmed that [employer] received the letters and information they'd requested from pt's providers, and she thanked me for the help coordinating and following up on this. Pt has neuropsych evaluation scheduled today at [facility]. Renee reported that pt continues to \"struggle with numbers, dates and times.\"     Renee asked if I could confirm pt's next meeting with [facility] provider at the clinic so she can make sure pt's ILS worker can help with reminders.    Emailed Renee back to thank her for the update and sent information about pt's upcoming phone visit with [facility] provider on 2/5 at 12:30 pm.   "

## 2021-02-02 ENCOUNTER — TRANSFERRED RECORDS (OUTPATIENT)
Dept: HEALTH INFORMATION MANAGEMENT | Facility: CLINIC | Age: 51
End: 2021-02-02

## 2021-02-05 ENCOUNTER — PATIENT OUTREACH (OUTPATIENT)
Dept: CARE COORDINATION | Facility: CLINIC | Age: 51
End: 2021-02-05

## 2021-02-05 ENCOUNTER — VIRTUAL VISIT (OUTPATIENT)
Dept: BEHAVIORAL HEALTH | Facility: CLINIC | Age: 51
End: 2021-02-05
Payer: COMMERCIAL

## 2021-02-05 DIAGNOSIS — F33.1 MODERATE RECURRENT MAJOR DEPRESSION (H): Primary | ICD-10-CM

## 2021-02-05 PROCEDURE — 90832 PSYTX W PT 30 MINUTES: CPT | Mod: 95 | Performed by: SOCIAL WORKER

## 2021-02-05 NOTE — PROGRESS NOTES
Clinic Care Coordination Contact  Care Team Conversations    Spoke with Wesley Yougn [facility], regarding this pt. Wesley just had a phone appointment with pt today. They discussed pt's Social Security hearing that took place yesterday. We discussed my last chart note regarding pt's neuropsych assessment at [facility] with [facility]URC[facility] that is scheduled for today, 2/5. Wesley asked if I would be willing to follow up with Renee Lal and request a copy of the report, whenever it is available. I agreed and let him know that I will continue to keep him posted as I get updates.    Sent Renee Lal an email to check in. Asked her how pt's Social Security hearing went yesterday and if she knows the result. Also asked if she would help get me/us a copy of the neuropsych assessment report so it can be shared with Haines's healthcare providers.
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
## 2021-02-08 NOTE — PROGRESS NOTES
"Clinic Care Coordination Contact  Care Team Conversations    Received an email from Marta Lovelace, pt's [facility] . She attached current ROIs signed by pt giving her authorization to discuss pt's care. She shared updates about pt's recent Social Security hearing and said it \"went well\" but there won't be any news about the result for at least a few weeks.     Marta also shared that pt had an intake appointment at [facility] on Friday, January 29th for a neuropsychological evaluation. She wrote the following:    \"As far as a neuropsychological evaluation, Isabela had an intake appointment at [facility] last Friday (not today) where we hoped she would get a referral for further testing, but after speaking with both [facility] and Sara Cody [facility] s team) at [facility] at length yesterday and today, the three of us decided that [facility] cannot provide Isabela any services above and beyond what [facility] can. (They additionally made it sound like [facility] can provide better quality services, which I also have to agree with).     That being said, Johnson and Sara at [facility] both strongly suggested that [facility] (be it [facility] or Dr. Briggs) give Isabela a referral for neurological testing, not neuropsychological testing. Their impression was that her psychological condition is being managed by [facility] and Dr. Briggs well (which is why she will not continue to meet with anyone at [facility]), but they believe that her issues with motor skills, memory, numbers, etc. would be better addressed by an actual neurologist, and I concur.\"    I responded to Marta's email and thanked her for the information. I let her know that I will speak with pt's PCP and C regarding the recommendation that pt have neurological testing instead of neuropsychological testing.    I forwarded the email to pt's PCP and C. Asked them to let me know what they decide and if/when they place the " referral so I can help with coordinating and following up with pt and her support team.

## 2021-02-09 ENCOUNTER — TELEPHONE (OUTPATIENT)
Dept: BEHAVIORAL HEALTH | Facility: CLINIC | Age: 51
End: 2021-02-09
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
## 2021-02-10 ENCOUNTER — PATIENT OUTREACH (OUTPATIENT)
Dept: FAMILY MEDICINE | Facility: CLINIC | Age: 51
End: 2021-02-10

## 2021-02-10 NOTE — LETTER
February 10, 2021      Isabela Panchal  [address]  [facility] [address]        Dear ,    This letter is to remind you that you are due for your follow-up Pap smear and Human Papillomavirus (HPV) test.    Please call [phone] to schedule your appointment at your earliest convenience.    If you have completed the appointment outside of the [facility] system, please have the records forwarded to our office. We will update your chart for your provider to review before your next annual wellness visit.     Thank you for choosing [facility]!      Sincerely,    Your [facility] Care Team

## 2021-02-11 DIAGNOSIS — I10 ESSENTIAL HYPERTENSION WITH GOAL BLOOD PRESSURE LESS THAN 140/90: ICD-10-CM

## 2021-02-12 RX ORDER — LISINOPRIL 10 MG/1
TABLET ORAL
Qty: 90 TABLET | Refills: 0 | Status: SHIPPED | OUTPATIENT
Start: 2021-02-12 | End: 2021-04-16

## 2021-02-19 ENCOUNTER — VIRTUAL VISIT (OUTPATIENT)
Dept: BEHAVIORAL HEALTH | Facility: CLINIC | Age: 51
End: 2021-02-19
Payer: COMMERCIAL

## 2021-02-19 DIAGNOSIS — F33.1 MODERATE RECURRENT MAJOR DEPRESSION (H): Primary | ICD-10-CM

## 2021-02-19 PROCEDURE — 90832 PSYTX W PT 30 MINUTES: CPT | Mod: 95 | Performed by: SOCIAL WORKER
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
## 2021-02-22 ENCOUNTER — PATIENT OUTREACH (OUTPATIENT)
Dept: CARE COORDINATION | Facility: CLINIC | Age: 51
End: 2021-02-22

## 2021-02-22 NOTE — PROGRESS NOTES
Clinic Care Coordination Contact  Chart Review- 6 weeks- JASON PIERSON    Situation: Patient chart reviewed by care coordinator.     Background: [facility]'s initial assessment and enrollment to Care Coordination was 10/29/20.   Patient centered goals were developed with participation from patient.  SW CC handed patient off to CHW for continued outreach every 30 days.      Assessment: CHW has been in contact with patient monthly. Patient has made 50% progress to goals. Patient has applied for Disability, is working with Disability specialists. CHW found out patient recently had her hearing and is waiting to hear. Patient has a [facility] health  that she is working with as well. Patient is due for updated Complex Care Plan.     Plan/Recommendations: CHW will involve  CC as needed or if patient is ready to move to maintenance.   CC will continue to monitor progress to goals and CHW outreaches every 6 weeks.     Complex Care Plan updated and mailed to patient: yes- via [place].     GAVIN Horton for [place]  [facility] Care Coordination Supervisor   [facility] [place] - Care Coordination   2/22/2021 2:23 PM  [phone]
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
## 2021-03-03 ENCOUNTER — TELEPHONE (OUTPATIENT)
Dept: FAMILY MEDICINE | Facility: CLINIC | Age: 51
End: 2021-03-03

## 2021-03-03 DIAGNOSIS — M54.41 CHRONIC MIDLINE LOW BACK PAIN WITH RIGHT-SIDED SCIATICA: ICD-10-CM

## 2021-03-03 DIAGNOSIS — M25.559 HIP PAIN: ICD-10-CM

## 2021-03-03 DIAGNOSIS — G89.29 CHRONIC PAIN OF RIGHT KNEE: ICD-10-CM

## 2021-03-03 DIAGNOSIS — M25.561 CHRONIC PAIN OF RIGHT KNEE: ICD-10-CM

## 2021-03-03 DIAGNOSIS — M51.369 DDD (DEGENERATIVE DISC DISEASE), LUMBAR: ICD-10-CM

## 2021-03-03 DIAGNOSIS — M79.606 PAIN OF LOWER EXTREMITY, UNSPECIFIED LATERALITY: ICD-10-CM
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
## 2021-03-03 DIAGNOSIS — G89.29 CHRONIC MIDLINE LOW BACK PAIN WITH RIGHT-SIDED SCIATICA: ICD-10-CM

## 2021-03-03 DIAGNOSIS — M54.2 CERVICALGIA: ICD-10-CM

## 2021-03-03 NOTE — TELEPHONE ENCOUNTER
"Pt states wants physical therapy. Currently doing aquatic therapy but now wants \"land therapy\" for back, leg, and hip pain at [facility]. Referral pended. Fax [phone]  "

## 2021-03-03 NOTE — TELEPHONE ENCOUNTER
Reason for Call:  Other call back    Detailed comments: Patient called and is requesting an order for therapy but she would like to speak with someone in regards to this. Thank you    Phone Number Patient can be reached at: Home number on file [phone] (home)    Best Time: anytime    Can we leave a detailed message on this number? YES    Call taken on 3/3/2021 at 1:06 PM by Kaur Wilkins

## 2021-03-04 ENCOUNTER — PATIENT OUTREACH (OUTPATIENT)
Dept: CARE COORDINATION | Facility: CLINIC | Age: 51
End: 2021-03-04

## 2021-03-04 NOTE — TELEPHONE ENCOUNTER
Dora Bahena-Please advise if patient can be referred to [facility] for Physical Therapy?    Thank you!  BRETT CastanedaN, RN  [facility]

## 2021-03-04 NOTE — PROGRESS NOTES
"Clinic Care Coordination Contact  Care Team Conversations    Received an email from pt's [facility] SW at [employer], Renee Lal. She asked if pt has any upcoming visits with the [facility] at the clinic and wrote that she would be sending him an email to check in. She also asked if I knew someone named Mini from [facility] who was going to be \"helping set up a  to work with Mansi\" (pt's daughter). She thanked me for the help and time.    I responded to Renee and thanked her for reaching out. I let her know that pt does not currently have another appointment with the [facility] set up. I also let her know that I don't have access to pt's daughter's chart and am not sure who Mini is, but am glad to hear that pt is getting some assistance for her daughter.     Asked if they'd heard any updates on pt's Social Security hearing yet or if there was anything else that pt needed from us at this time.    "
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
## 2021-03-08 ENCOUNTER — PATIENT OUTREACH (OUTPATIENT)
Dept: CARE COORDINATION | Facility: CLINIC | Age: 51
End: 2021-03-08

## 2021-03-08 NOTE — PROGRESS NOTES
"Clinic Care Coordination Contact  Care Team Conversations    Received an email from pt's [facility] SW, Renee Lal, from [employer] She asked me for an update on whether or not pt's PCP \"has done a referral for neurological testing\" and if so, the details of the appointment. Renee wrote,\"It needs to happen and after that is when we can take that information with to be seen at [facility] to get a neuropsychological testing done.  This can't happen until she sees the neurologist for updated testing. Currently, I am under the impression that you were checking with Dr. Briggs to make sure the referral was done.  Please update me on the status.\"    She also shared that they haven't heard any updates on pt's Social Security hearing yet. She also asked if I knew who Mini Suggs is and if she has scheduled time with pt to discuss pt's daughter, Mansi.     I responded to Renee. Thanked her for checking in. I let her know that [facility] spoke with Marta Lovelace, pt's [facility] , last month regarding this situation. I told her that I was not a part of the conversation, but my understanding is that Marta was going to follow up with [facility] regarding [facility]'s recommendations. I offered to follow up with the [facility] and ask that he reach out to Renee to update and clarify. I apologized for the confusion and encouraged her to follow up directly with the [facility], since there are so many people involved in pt's care currently.    I also let Renee know that I am not working with pt's daughter but can reach out to Mini Suggs and ask that she touch base with Renee. Renee would like to help pt manage/remember pt's and pt's daughter's appointments. Asked Renee if I may share her contact information with Mini. I am unsure whether or not there is a consent to communicate on file signed by pt authorizing Mini to speak with pt's [facility].    CHW Plan: CHW will follow up with pt's [facility] regarding Renee's questions about neurological testing and ask " that he reach out to Renee directly. CHW will reach out to Mini Annael and ask that she follow up with Renee regarding appointments for pt's daughter. CHW will follow up with pt/pt's [facility] SW in one month.

## 2021-03-10 NOTE — TELEPHONE ENCOUNTER
Pt's insurance is [employer] [place] ONLY and [facility] clinic is in this [facility] insurance plan network.    Please fax [phone] PT Order to [facility]  for back, leg and hip pain.    TWIN Garzon [facility] Referral Rep

## 2021-03-11 ENCOUNTER — TELEPHONE (OUTPATIENT)
Dept: BEHAVIORAL HEALTH | Facility: CLINIC | Age: 51
End: 2021-03-11

## 2021-03-11 NOTE — TELEPHONE ENCOUNTER
Spoke to Renee at the [facility], patient's [facility] .  Patient has several different care coordination services which appear to be causing further obstacles for the patient who already has significant memory issues.      Updated Renee on conversations with Michael from [facility] and of the recent [facility] diagnostic assessment that was requesting neurological testing.  Clarified do not understand what neurological testing was compared to neuropsychological testing which is what the [facility] had recommended for the past year.  Renee reports that today they had received records from the [facility] which had been forwarded to [facility] who will schedule a neuro psychological evaluation.      Renee reports that patient's memory and cognitive functioning is deteriorating further.  Renee spoke to her elder adult daughter who states her mother cannot control her mood lability and at times yells and screams.  Counseled Renee that different relaxation or self-regulation interventions seem to be ineffective as patient does not recall her process verbal information.  Renee was able to convince Isabela to have her father take over the mental health and medical care of her youngest daughter Shanel.    Provided information to Renee that [facility] is providing [facility] services to Shanel will help coordinate those appointments with her grandfather.

## 2021-03-11 NOTE — TELEPHONE ENCOUNTER
Physical therapy order faxed to [facility].    Patient informed referral faxed to [facility].    Patient verbalized understanding and in agreement with plan.  BRETT CastanedaN, RN  [facility]

## 2021-03-12 RX ORDER — CYCLOSPORINE 0.5 MG/ML
1 EMULSION OPHTHALMIC DAILY PRN
COMMUNITY
Start: 2021-01-22

## 2021-03-14 ENCOUNTER — HEALTH MAINTENANCE LETTER (OUTPATIENT)
Age: 51
End: 2021-03-14

## 2021-03-15 ENCOUNTER — PATIENT OUTREACH (OUTPATIENT)
Dept: CARE COORDINATION | Facility: CLINIC | Age: 51
End: 2021-03-15

## 2021-03-15 NOTE — PROGRESS NOTES
Clinic Care Coordination Contact  Community Health Worker Follow Up    Goals:   Goals Addressed as of 3/15/2021 at 9:42 AM                 Today    3/4/21       Patient Stated      Financial Wellbeing- SSDI (pt-stated)   60%  60%    Added 11/17/20 by Renetta Orellana BSW     Goal Statement: I would like help applying and getting approved for Social Security Disability in the next 6 months.   Date Goal set: 10/29/2020  Barriers: Getting denied   Strengths: Asking for help   Date to Achieve By: 4/29/2021  Patient expressed understanding of goal: yes     Action steps to achieve this goal:  1. My [employer] [facility] SW and I will meet with the Disability Specialists on March 26th to discuss  2. I will work with my support team to get a neurological test completed at [facility]. (ongoing)  3. I will give the CHW updates at outreach calls.    Updated: 3/4/21                Intervention and Education during outreach:     Received an email from pt's [facility] SW, Renee Lla, from [employer]. She let me know that they heard back about pt's Social Security hearing and pt was denied again. Renee wrote that they have a meeting with the Disability Specialists on March 26th to discuss next steps. Renee also shared that she spoke with the [facility] at the [facility] regarding the neuropsychological testing and that they will be working with [facility] to get this completed for pt.     Responded to Renee, thanked her for the update and encouraged her and pt to reach out if they have any other updates or questions.     CHW Plan: Pt and pt's formerly [facility] SW will meet with the Disability Specialists on March 26th to discuss pt's denial and next steps. Pt or pt's formerly [facility] SW will reach out to the CHW with updates or questions before next outreach. CHW will follow up in one month.

## 2021-03-16 ENCOUNTER — TRANSFERRED RECORDS (OUTPATIENT)
Dept: HEALTH INFORMATION MANAGEMENT | Facility: CLINIC | Age: 51
End: 2021-03-16

## 2021-03-21 ENCOUNTER — NURSE TRIAGE (OUTPATIENT)
Dept: NURSING | Facility: CLINIC | Age: 51
End: 2021-03-21

## 2021-03-21 ENCOUNTER — HOSPITAL ENCOUNTER (EMERGENCY)
Facility: CLINIC | Age: 51
Discharge: HOME | End: 2021-03-21
Attending: EMERGENCY MEDICINE | Admitting: EMERGENCY MEDICINE
Payer: COMMERCIAL

## 2021-03-21 VITALS
HEART RATE: 81 BPM | TEMPERATURE: 97.6 F | RESPIRATION RATE: 18 BRPM | SYSTOLIC BLOOD PRESSURE: 141 MMHG | OXYGEN SATURATION: 99 % | DIASTOLIC BLOOD PRESSURE: 99 MMHG

## 2021-03-21 DIAGNOSIS — Z76.0 ENCOUNTER FOR MEDICATION REFILL: ICD-10-CM

## 2021-03-21 PROCEDURE — 99283 EMERGENCY DEPT VISIT LOW MDM: CPT | Performed by: EMERGENCY MEDICINE

## 2021-03-21 PROCEDURE — 250N000013 HC RX MED GY IP 250 OP 250 PS 637: Performed by: EMERGENCY MEDICINE

## 2021-03-21 RX ORDER — VENLAFAXINE HYDROCHLORIDE 75 MG/1
75 TABLET, EXTENDED RELEASE ORAL ONCE
Status: COMPLETED | OUTPATIENT
Start: 2021-03-21 | End: 2021-03-21

## 2021-03-21 RX ORDER — ACETAMINOPHEN 500 MG
1000 TABLET ORAL ONCE
Status: COMPLETED | OUTPATIENT
Start: 2021-03-21 | End: 2021-03-21

## 2021-03-21 RX ADMIN — VENLAFAXINE HYDROCHLORIDE 75 MG: 75 TABLET, EXTENDED RELEASE ORAL at 15:33

## 2021-03-21 RX ADMIN — ACETAMINOPHEN 1000 MG: 500 TABLET, FILM COATED ORAL at 15:33

## 2021-03-21 ASSESSMENT — ENCOUNTER SYMPTOMS: HEADACHES: 1

## 2021-03-21 NOTE — TELEPHONE ENCOUNTER
She is having a withdrawal from her Effexor and it started last night.  She went to get her prescription and the pharmacy had closed so she was unable to get her prescription. She has been up during the night because of nausea and throwing up. She reports hurting all over, not thinking straight, and weak.  Care advice is to go to the ED to be evaluated.    Ysabel Romero RN/ [facility]      Reason for Disposition    [1] Worsening confusion AND [2] new onset (hours to 3 days)    Additional Information    Negative: [1] Difficult to awaken or acting confused (e.g., disoriented, slurred speech) AND [2] present now AND [3] new onset AND [4] has diabetes (diabetes mellitus)    Negative: [1] Difficult to awaken or acting confused (e.g., disoriented, slurred speech) AND [2] present now AND [3] new onset    Negative: [1] Weakness of the face, arm, or leg on one side of the body AND [2] new onset    Negative: [1] Numbness of the face, arm, or leg on one side of the body AND [2] new onset    Negative: [1] Loss of speech or garbled speech AND [2] new onset    Negative: Shock suspected (e.g., cold/pale/clammy skin, too weak to stand, low BP, rapid pulse)    Negative: Sounds like a life-threatening emergency to the triager    Protocols used: DEMENTIA SYMPTOMS AND [id number]-SE

## 2021-03-21 NOTE — ED PROVIDER NOTES
[facility] EMERGENCY DEPARTMENT ([place] [facility])    3/21/21        History     Chief Complaint   Patient presents with     Medication Reaction     Withdrawl from effexor 75 mg.  Did not get to the drug store in time to fill it  Nausea and vomiting.     The history is provided by the patient and medical records.     Isabela Panchal is a 50 year old female with a history of depression, anxiety, TBI, HTN, and HLD who presents to the Emergency Department seeking a dose of Effexor. Patient reports she was trying to refill her medication but was unable to get to a pharmacy before it closed for the weekend. She states she takes Effexor 75 mg daily but has not had it in a couple of years. Patient now has ringing in her ears. She states she also had a headache and notes she is a TBI patient.     Past Medical History  Past Medical History:   Diagnosis Date     Abnormal Pap smear of cervix 2019    see problem list     Allergic rhinitis due to allergen      Anemia      Breathing difficulty      Cervical high risk HPV (human papillomavirus) test positive 02/13/2018, 2019    type 16; see problem list     Chest pain      Generalised anxiety disorder 9/6/2012     Headache      Heart trouble      High blood triglycerides 2/26/2016     History of blood transfusion     unsure     Hoarseness      Hypertension      Hypoglycemia 3/10/2013     Impaired fasting glucose 8/16/2014     Insomnia 9/6/2012     Irritable bowel syndrome      Itchy eyes      MEDICAL HISTORY OF -     hx of right wrist dislocation     Migraine      Mild intermittent asthma     allergy or URI triggered      Moderate recurrent major depression (H) 9/6/2012     Nasal congestion      Numbness      Obesity, unspecified (OBESE) 8/21/2014     Paroxysmal supraventricular tachycardia (H)     4/00     PONV (postoperative nausea and vomiting)      Ringing in ears      Sleep difficulties      Sneezing      Sore throat      Vertigo      Weakness      Weight gain   "    Past Surgical History:   Procedure Laterality Date     COLONOSCOPY  4/26/2013    Procedure: COLONOSCOPY;;  Surgeon: Jim Humphries MD;  Location:  GI     ENT SURGERY      deviated septum     HEAD & NECK SURGERY       LAPAROSCOPIC SALPINGO-OOPHORECTOMY  1/20/2014    Procedure: LAPAROSCOPIC SALPINGO-OOPHORECTOMY;  Laparoscopic Left Salpingo Oophorectomy ;  Surgeon: Chani Del Rosario MD;  Location: [facility] OR     LUMPECTOMY BREAST      right     ORTHOPEDIC SURGERY      knee     ORTHOPEDIC SURGERY      foot     SOFT TISSUE SURGERY      lymphoma face and armpit     SOFT TISSUE SURGERY       ZZC NONSPECIFIC PROCEDURE      Plastic repair of facial lac     ZZC NONSPECIFIC PROCEDURE      Lipoma removed from arm close to the tail of te breast     ZZC NONSPECIFIC PROCEDURE      Plastic repair of facial lac     ZZC NONSPECIFIC PROCEDURE      Knee surgery     ZZC NONSPECIFIC PROCEDURE      Miscarriage x 2     atorvastatin (LIPITOR) 40 MG tablet  Calcium Carbonate-Vitamin D (CALCIUM + D PO)  fenofibrate (TRICOR) 48 MG tablet  lisinopril (ZESTRIL) 10 MG tablet  RESTASIS 0.05 % ophthalmic emulsion  albuterol (PROAIR HFA/PROVENTIL HFA/VENTOLIN HFA) 108 (90 Base) MCG/ACT inhaler  cetirizine (ZYRTEC) 10 MG tablet  Divalproex Sodium (DEPAKOTE PO)  etodolac (LODINE) 500 MG tablet  fluticasone (FLONASE) 50 MCG/ACT nasal spray  loratadine-pseudoePHEDrine (CLARITIN-D 24-HOUR)  MG per tablet  metroNIDAZOLE (METROCREAM) 0.75 % external cream  MIRENA 20 MCG/24HR IU IUD  montelukast (SINGULAIR) 10 MG tablet  tiZANidine (ZANAFLEX) 4 MG tablet  venlafaxine (EFFEXOR-XR) 150 MG 24 hr capsule      Allergies   Allergen Reactions     Benzoin Rash     Adhesive Tape      blistering     Allegra [Fexofenadine]      Kidney pain     Cucumber Extract      Throat and ears itchy and throat feels \"icky\"     Fexofenadine Hydrochloride      allegra - low back pain     Ibuprofen      palpitations     Lexapro      Wt gain     No Clinical " "Screening - See Comments      Ramon      Itchy ears and throat, throat feel \"icky\"     Peanuts [Nuts]      Itchy ears and throat, throat feel \"icky\"     Seasonal Allergies      Family History  Family History   Problem Relation Age of Onset     Alzheimer Disease Maternal Grandfather      Arthritis Maternal Grandmother      Heart Disease Maternal Grandmother      Heart Failure Maternal Grandmother      Thyroid Disease Mother      Allergy (Severe) Father      Social History   Social History     Tobacco Use     Smoking status: Never Smoker     Smokeless tobacco: Never Used   Substance Use Topics     Alcohol use: Not Currently     Drug use: No      Past medical history, past surgical history, medications, allergies, family history, and social history were reviewed with the patient. No additional pertinent items.       Review of Systems   HENT: Positive for tinnitus.    Neurological: Positive for headaches.   All other systems reviewed and are negative.    A complete review of systems was performed with pertinent positives and negatives noted in the HPI, and all other systems negative.    Physical Exam   BP: 123/88  Pulse: 80  Temp: 97.6  F (36.4  C)  Resp: 18  SpO2: 99 %  Physical Exam  Vitals signs and nursing note reviewed.   Constitutional:       General: She is not in acute distress.     Appearance: Normal appearance. She is not diaphoretic.   HENT:      Head: Atraumatic.      Mouth/Throat:      Pharynx: No oropharyngeal exudate.   Eyes:      General: No scleral icterus.     Pupils: Pupils are equal, round, and reactive to light.   Cardiovascular:      Heart sounds: Normal heart sounds.   Pulmonary:      Effort: No respiratory distress.      Breath sounds: Normal breath sounds.   Abdominal:      General: Bowel sounds are normal.      Palpations: Abdomen is soft.      Tenderness: There is no abdominal tenderness.   Musculoskeletal:         General: No tenderness.   Skin:     General: Skin is warm.      Findings: No " rash.   Neurological:      General: No focal deficit present.      Mental Status: She is alert and oriented to person, place, and time.             ED Course   3:16 PM  The patient was seen and examined by Rogerio Holbrook MD in Room ED05.     Procedures                         No results found for any visits on 03/21/21.  Medications - No data to display     Assessments & Plan (with Medical Decision Making)     50 year old female with a history of depression, anxiety, TBI, HTN, and HLD who presents to the Emergency Department seeking a dose of Effexor.  Patient reports that her headache and tinnitus are similar to prior symptoms when she stopped her Effexor.  Patient was given a dose of Effexor 75 mg p.o. x1 here in the emergency department, she will follow-up with her pharmacy which happens on Monday to refill her prescription.    I have reviewed the nursing notes. I have reviewed the findings, diagnosis, plan and need for follow up with the patient.    New Prescriptions    No medications on file       Final diagnoses:   Encounter for medication refill     I, Flor Melissa, am serving as a trained medical scribe to document services personally performed by Rogerio Holbrook MD, based on the provider's statements to me.      I, Rogerio Holbrook MD, was physically present and have reviewed and verified the accuracy of this note documented by Flor Melissa.     --  Rogerio Holbrook MD  [facility] EMERGENCY DEPARTMENT  3/21/2021     Rogerio Holbrook MD  03/23/21 [id number]

## 2021-03-29 ENCOUNTER — PATIENT OUTREACH (OUTPATIENT)
Dept: CARE COORDINATION | Facility: CLINIC | Age: 51
End: 2021-03-29

## 2021-03-29 NOTE — PROGRESS NOTES
"Clinic Care Coordination Contact  Community Health Worker Follow Up    Goals:   Goals Addressed as of 3/29/2021 at 9:03 AM                 Today    3/15/21       Patient Stated      Financial Wellbeing- SSDI (pt-stated)   70%  60%    Added 11/17/20 by Renetta Orellana BSW     Goal Statement: I would like help applying and getting approved for Social Security Disability in the next 6 months.   Date Goal set: 10/29/2020  Barriers: Getting denied   Strengths: Asking for help   Date to Achieve By: 4/29/2021  Patient expressed understanding of goal: yes     Action steps to achieve this goal:  1. My [employer] [facility] SW and I will meet with the Disability Specialists on March 26th to discuss next steps and options with my denial.  2. I will attend my first appointment [facility] on March 30th with Johnson   3. I will give the CHW updates at outreach calls.    Updated: 3/4/21                Intervention and Education during outreach:     Received an email update from pt's [facility]  from [employer], Renee Lal. She included Wesley Young, pt's [facility] [place], on the message. She wrote that pt decided to move forward with [facility] and will be meeting with Johnson Novak on Tuesday, March 30th. Renee shared that she has talked with Johnson's supervisor, Sara Cody, and they are working together to get pt scheduled for neuropsychological testing.     Renee also shared that pt's father, Claude, is \"helping Isabela with her daughters\" and asked that all communication go through him in regards to pt's daughter Mansi. Mansi is working with a [facility] , Chayo Foreman.     I responded to Renee's email and thanked her for the update.     CHW Plan: Pt will attend her appointment at [facility] tomorrow. Pt will work with her [facility] and [facility] to schedule neuropsychological testing. CHW will follow up with pt in 2 weeks by phone.  "

## 2021-04-02 ENCOUNTER — OFFICE VISIT (OUTPATIENT)
Dept: OBGYN | Facility: CLINIC | Age: 51
End: 2021-04-02
Payer: COMMERCIAL

## 2021-04-02 VITALS
BODY MASS INDEX: 32.76 KG/M2 | SYSTOLIC BLOOD PRESSURE: 119 MMHG | DIASTOLIC BLOOD PRESSURE: 85 MMHG | OXYGEN SATURATION: 97 % | WEIGHT: 200 LBS | HEART RATE: 99 BPM

## 2021-04-02 DIAGNOSIS — Z12.4 SCREENING FOR MALIGNANT NEOPLASM OF CERVIX: Primary | ICD-10-CM

## 2021-04-02 DIAGNOSIS — Z30.433 ENCOUNTER FOR REMOVAL AND REINSERTION OF INTRAUTERINE CONTRACEPTIVE DEVICE (IUD): ICD-10-CM

## 2021-04-02 PROCEDURE — 87624 HPV HI-RISK TYP POOLED RSLT: CPT | Performed by: OBSTETRICS & GYNECOLOGY

## 2021-04-02 PROCEDURE — G0145 SCR C/V CYTO,THINLAYER,RESCR: HCPCS | Performed by: OBSTETRICS & GYNECOLOGY

## 2021-04-02 PROCEDURE — 58301 REMOVE INTRAUTERINE DEVICE: CPT | Performed by: OBSTETRICS & GYNECOLOGY

## 2021-04-02 PROCEDURE — 58300 INSERT INTRAUTERINE DEVICE: CPT | Performed by: OBSTETRICS & GYNECOLOGY

## 2021-04-02 NOTE — PROGRESS NOTES
Isabela Panchal is a 50 year old female who presents for Pap smear, and IUD removal and re-insertion. Option of IUD was discussed.  Risks and benefits were reviewed.  Risk of perforation, and subsequent need for surgical removal with laparoscopy or laparotomy was reviewed.  Her questions were answered.    OBJECTIVE: healthy female in NAD.  /85   Pulse 99   Wt 90.7 kg (200 lb)   LMP 03/17/2021 (Exact Date)   SpO2 97%   Breastfeeding No   BMI 32.76 kg/m  .  Speculum was placed in the vagina, pap sent.  The uterus was normal sized, mid-position.  Hibiclens prep used.  The IUD string is grasped with a ring forceps, and removed without difficulty.  The cervix was grasped anteriorly with a tenaculum. The uterus sounded to 8 cm.  Using standard technique, a Mirena IUD was placed in the endometrial cavity without difficulty.  The string was trimmed.  The instruments were removed.  She tolerated the procedure well.    ASSESSMENT: IUD removal and replacement.  Pap smear.      PLAN: RTC routinely or prn.  Standard precautions.

## 2021-04-06 ENCOUNTER — TELEPHONE (OUTPATIENT)
Dept: BEHAVIORAL HEALTH | Facility: CLINIC | Age: 51
End: 2021-04-06

## 2021-04-06 NOTE — TELEPHONE ENCOUNTER
email from [facility] JASON Duran that pt is transferring mh care to [facility].        Quick update on Isabela.  I have been in touch with Sara Cody who is the Supervisor for Johnson Novak at [facility].  She is working on getting Isabela set up for neuropsychology testing.      After talking with Isabela, it was decided that she was going to try working with Johnson Novak.  Her first appointment is scheduled for Tuesday, March 30.      I will keep you both updated.  I appreciate all your help and concern for Isabela.      I hope Jayshree will be able to be continuing to help with Isabela's daughters.  As always just a reminder that Isabela needs help with everything herself so that everyone is aware to go through her dad Claude.  I am grateful her father Claude will be helping with her minor child Mansi.     Thank you,    [facility]  [facility] ([facility])  [email or web address]: [phone]

## 2021-04-07 LAB
COPATH REPORT: NORMAL
PAP: NORMAL

## 2021-04-09 ENCOUNTER — PATIENT OUTREACH (OUTPATIENT)
Dept: OBGYN | Facility: CLINIC | Age: 51
End: 2021-04-09

## 2021-04-09 ENCOUNTER — PATIENT OUTREACH (OUTPATIENT)
Dept: CARE COORDINATION | Facility: CLINIC | Age: 51
End: 2021-04-09

## 2021-04-09 NOTE — PROGRESS NOTES
Care Coordination Clinician Chart Review  Situation: Patient chart reviewed by care coordinator.       Background: Care Coordination initial assessment and enrollment to Care Coordination was 1029/20 .   Patient centered goals were developed with participation from patient.  JASON CC handed patient off to CHW for continued outreach every 30 days.        Assessment: Per chart review, patient outreach completed by CC CHW on 3/29/21.  Patient is actively working to accomplish goal.  Patient's goal remains appropriate and relevant at this time.        Goals        Patient Stated      Financial Wellbeing- SSDI (pt-stated)      Goal Statement: I would like help applying and getting approved for Social Security Disability in the next 6 months.   Date Goal set: 10/29/2020  Barriers: Getting denied   Strengths: Asking for help   Date to Achieve By: 4/29/2021  Patient expressed understanding of goal: yes     Action steps to achieve this goal:  1. My [employer] [facility] and I will meet with the Disability Specialists on March 26th to discuss next steps and options with my denial.  2. I will attend my first appointment at [facility] on March 30th with Johnson Novak.  3. I will work with [facility] staff to schedule neuropsychological testing.  4. I will give the CHW updates at outreach calls.    Updated: 3/29/21                    Plan/Recommendations: The patient will continue working with Care Coordination to achieve goal as above.  CHW will involve JASON PIERSON as needed or if patient is ready to move to maintenance.  JASON CC will continue to monitor progress to goals and CHW outreaches every 6 weeks.

## 2021-04-09 NOTE — LETTER
June 2, 2021      Isabela Panchal  [address]  [place] [address]        Dear MsNicoleAnsley,    At [facility], your health and wellness are our primary concern. That is why we are following up on your most recent positive high-risk Human Papillomavirus (HPV) test.    Please call [phone] and press 1 to schedule an appointment for your recommended follow-up Colposcopy (this cannot be scheduled through [facility]) at your earliest convenience.      If you have completed the appointment outside of the [facility] system, please have the records forwarded to our office. We will update your chart for your provider to review before your next annual wellness visit.     Thank you for choosing [facility]!      Sincerely,    Your [facility] Care Team

## 2021-04-13 ENCOUNTER — TELEPHONE (OUTPATIENT)
Dept: FAMILY MEDICINE | Facility: CLINIC | Age: 51
End: 2021-04-13

## 2021-04-13 NOTE — TELEPHONE ENCOUNTER
Asking for verbal OK to start home care for 1 time weekly for general health and safety and then to set up medications.  Call/orders  from  for [facility] and they are asking for OK to start home care and see 1 time weekly.  Please let us know if you agree.  Asking for up to date med list to be faxed to agency  Patient will be seen on 4/16/21 so they would need any med changes sent to them as well  Lucina Greenberg RN  [facility]

## 2021-04-15 NOTE — TELEPHONE ENCOUNTER
, Please fax med list to [phone].  I left detailed message for Home Care that orders are approved.  Lucina Greenberg RN  [facility]

## 2021-04-16 ENCOUNTER — OFFICE VISIT (OUTPATIENT)
Dept: FAMILY MEDICINE | Facility: CLINIC | Age: 51
End: 2021-04-16
Payer: COMMERCIAL

## 2021-04-16 ENCOUNTER — TELEPHONE (OUTPATIENT)
Dept: FAMILY MEDICINE | Facility: CLINIC | Age: 51
End: 2021-04-16

## 2021-04-16 VITALS
OXYGEN SATURATION: 97 % | DIASTOLIC BLOOD PRESSURE: 81 MMHG | SYSTOLIC BLOOD PRESSURE: 127 MMHG | RESPIRATION RATE: 16 BRPM | BODY MASS INDEX: 33.32 KG/M2 | HEIGHT: 65 IN | TEMPERATURE: 98 F | HEART RATE: 93 BPM | WEIGHT: 200 LBS

## 2021-04-16 DIAGNOSIS — B97.7 HIGH RISK HPV INFECTION: ICD-10-CM

## 2021-04-16 DIAGNOSIS — I10 ESSENTIAL HYPERTENSION WITH GOAL BLOOD PRESSURE LESS THAN 140/90: ICD-10-CM

## 2021-04-16 DIAGNOSIS — Z91.09 ENVIRONMENTAL ALLERGIES: ICD-10-CM

## 2021-04-16 DIAGNOSIS — Z12.31 VISIT FOR SCREENING MAMMOGRAM: ICD-10-CM

## 2021-04-16 DIAGNOSIS — E78.1 HIGH BLOOD TRIGLYCERIDES: ICD-10-CM

## 2021-04-16 DIAGNOSIS — G89.29 CHRONIC LOW BACK PAIN WITHOUT SCIATICA, UNSPECIFIED BACK PAIN LATERALITY: ICD-10-CM

## 2021-04-16 DIAGNOSIS — J45.20 MILD INTERMITTENT ASTHMA WITHOUT COMPLICATION: ICD-10-CM

## 2021-04-16 DIAGNOSIS — R73.01 IMPAIRED FASTING GLUCOSE: ICD-10-CM

## 2021-04-16 DIAGNOSIS — J30.2 OTHER SEASONAL ALLERGIC RHINITIS: ICD-10-CM

## 2021-04-16 DIAGNOSIS — Z83.49 FAMILY HISTORY OF HYPOTHYROIDISM: ICD-10-CM

## 2021-04-16 DIAGNOSIS — E78.5 HYPERLIPIDEMIA LDL GOAL <160: ICD-10-CM

## 2021-04-16 DIAGNOSIS — M54.50 CHRONIC LOW BACK PAIN WITHOUT SCIATICA, UNSPECIFIED BACK PAIN LATERALITY: ICD-10-CM

## 2021-04-16 DIAGNOSIS — F33.1 MODERATE RECURRENT MAJOR DEPRESSION (H): Primary | ICD-10-CM

## 2021-04-16 DIAGNOSIS — M51.26 LUMBAR HERNIATED DISC: ICD-10-CM

## 2021-04-16 DIAGNOSIS — F34.1 CHRONIC DEPRESSIVE PERSONALITY DISORDER: ICD-10-CM

## 2021-04-16 DIAGNOSIS — E55.9 VITAMIN D DEFICIENCY DISEASE: ICD-10-CM

## 2021-04-16 PROBLEM — F43.9 TRAUMA AND STRESSOR-RELATED DISORDER: Status: ACTIVE | Noted: 2021-01-29

## 2021-04-16 LAB
ALBUMIN SERPL-MCNC: 3.7 G/DL (ref 3.4–5)
ALP SERPL-CCNC: 80 U/L (ref 40–150)
ALT SERPL W P-5'-P-CCNC: 32 U/L (ref 0–50)
ANION GAP SERPL CALCULATED.3IONS-SCNC: 9 MMOL/L (ref 3–14)
AST SERPL W P-5'-P-CCNC: 16 U/L (ref 0–45)
BILIRUB SERPL-MCNC: 0.4 MG/DL (ref 0.2–1.3)
BUN SERPL-MCNC: 8 MG/DL (ref 7–30)
CALCIUM SERPL-MCNC: 9.2 MG/DL (ref 8.5–10.1)
CHLORIDE SERPL-SCNC: 107 MMOL/L (ref 94–109)
CHOLEST SERPL-MCNC: 152 MG/DL
CO2 SERPL-SCNC: 21 MMOL/L (ref 20–32)
CREAT SERPL-MCNC: 0.58 MG/DL (ref 0.52–1.04)
CREAT UR-MCNC: 253 MG/DL
GFR SERPL CREATININE-BSD FRML MDRD: >90 ML/MIN/{1.73_M2}
GLUCOSE SERPL-MCNC: 156 MG/DL (ref 70–99)
HBA1C MFR BLD: 5.6 % (ref 0–5.6)
HDLC SERPL-MCNC: 33 MG/DL
LDLC SERPL CALC-MCNC: ABNORMAL MG/DL
LDLC SERPL DIRECT ASSAY-MCNC: 83 MG/DL
MICROALBUMIN UR-MCNC: 18 MG/L
MICROALBUMIN/CREAT UR: 7.15 MG/G CR (ref 0–25)
NONHDLC SERPL-MCNC: 119 MG/DL
POTASSIUM SERPL-SCNC: 4 MMOL/L (ref 3.4–5.3)
PROT SERPL-MCNC: 7.3 G/DL (ref 6.8–8.8)
SODIUM SERPL-SCNC: 137 MMOL/L (ref 133–144)
TRIGL SERPL-MCNC: 411 MG/DL
TSH SERPL DL<=0.005 MIU/L-ACNC: 1.51 MU/L (ref 0.4–4)

## 2021-04-16 PROCEDURE — 96127 BRIEF EMOTIONAL/BEHAV ASSMT: CPT | Performed by: FAMILY MEDICINE

## 2021-04-16 PROCEDURE — 83721 ASSAY OF BLOOD LIPOPROTEIN: CPT | Mod: 59 | Performed by: FAMILY MEDICINE

## 2021-04-16 PROCEDURE — 83036 HEMOGLOBIN GLYCOSYLATED A1C: CPT | Performed by: FAMILY MEDICINE

## 2021-04-16 PROCEDURE — 99214 OFFICE O/P EST MOD 30 MIN: CPT | Performed by: FAMILY MEDICINE

## 2021-04-16 PROCEDURE — 82043 UR ALBUMIN QUANTITATIVE: CPT | Performed by: FAMILY MEDICINE

## 2021-04-16 PROCEDURE — 82306 VITAMIN D 25 HYDROXY: CPT | Performed by: FAMILY MEDICINE

## 2021-04-16 PROCEDURE — 80061 LIPID PANEL: CPT | Performed by: FAMILY MEDICINE

## 2021-04-16 PROCEDURE — 84443 ASSAY THYROID STIM HORMONE: CPT | Performed by: FAMILY MEDICINE

## 2021-04-16 PROCEDURE — 80053 COMPREHEN METABOLIC PANEL: CPT | Performed by: FAMILY MEDICINE

## 2021-04-16 PROCEDURE — 36415 COLL VENOUS BLD VENIPUNCTURE: CPT | Performed by: FAMILY MEDICINE

## 2021-04-16 RX ORDER — ATORVASTATIN CALCIUM 40 MG/1
40 TABLET, FILM COATED ORAL DAILY
Qty: 90 TABLET | Refills: 3 | Status: SHIPPED | OUTPATIENT
Start: 2021-04-16 | End: 2021-05-18

## 2021-04-16 RX ORDER — FLUTICASONE PROPIONATE 50 MCG
2 SPRAY, SUSPENSION (ML) NASAL DAILY
Qty: 16 G | Refills: 9 | Status: SHIPPED | OUTPATIENT
Start: 2021-04-16

## 2021-04-16 RX ORDER — MONTELUKAST SODIUM 10 MG/1
10 TABLET ORAL AT BEDTIME
Qty: 90 TABLET | Refills: 3 | Status: SHIPPED | OUTPATIENT
Start: 2021-04-16 | End: 2021-09-21

## 2021-04-16 RX ORDER — VENLAFAXINE HYDROCHLORIDE 75 MG/1
75 CAPSULE, EXTENDED RELEASE ORAL DAILY
Qty: 90 CAPSULE | Refills: 3 | Status: SHIPPED | OUTPATIENT
Start: 2021-04-16 | End: 2021-05-25

## 2021-04-16 RX ORDER — FENOFIBRATE 48 MG/1
48 TABLET, COATED ORAL DAILY
Qty: 90 TABLET | Refills: 3 | Status: SHIPPED | OUTPATIENT
Start: 2021-04-16 | End: 2021-05-18

## 2021-04-16 RX ORDER — LISINOPRIL 10 MG/1
10 TABLET ORAL DAILY
Qty: 90 TABLET | Refills: 3 | Status: ON HOLD | OUTPATIENT
Start: 2021-04-16 | End: 2021-10-14

## 2021-04-16 ASSESSMENT — ANXIETY QUESTIONNAIRES
2. NOT BEING ABLE TO STOP OR CONTROL WORRYING: SEVERAL DAYS
7. FEELING AFRAID AS IF SOMETHING AWFUL MIGHT HAPPEN: NEARLY EVERY DAY
5. BEING SO RESTLESS THAT IT IS HARD TO SIT STILL: SEVERAL DAYS
3. WORRYING TOO MUCH ABOUT DIFFERENT THINGS: NEARLY EVERY DAY
GAD7 TOTAL SCORE: 12
1. FEELING NERVOUS, ANXIOUS, OR ON EDGE: NEARLY EVERY DAY
GAD7 TOTAL SCORE: 19
1. FEELING NERVOUS, ANXIOUS, OR ON EDGE: SEVERAL DAYS
5. BEING SO RESTLESS THAT IT IS HARD TO SIT STILL: NEARLY EVERY DAY
GAD7 TOTAL SCORE: 12
GAD7 TOTAL SCORE: 12
4. TROUBLE RELAXING: SEVERAL DAYS
IF YOU CHECKED OFF ANY PROBLEMS ON THIS QUESTIONNAIRE, HOW DIFFICULT HAVE THESE PROBLEMS MADE IT FOR YOU TO DO YOUR WORK, TAKE CARE OF THINGS AT HOME, OR GET ALONG WITH OTHER PEOPLE: SOMEWHAT DIFFICULT
7. FEELING AFRAID AS IF SOMETHING AWFUL MIGHT HAPPEN: NEARLY EVERY DAY
6. BECOMING EASILY ANNOYED OR IRRITABLE: MORE THAN HALF THE DAYS
3. WORRYING TOO MUCH ABOUT DIFFERENT THINGS: NEARLY EVERY DAY
7. FEELING AFRAID AS IF SOMETHING AWFUL MIGHT HAPPEN: NEARLY EVERY DAY
2. NOT BEING ABLE TO STOP OR CONTROL WORRYING: SEVERAL DAYS
6. BECOMING EASILY ANNOYED OR IRRITABLE: NEARLY EVERY DAY

## 2021-04-16 ASSESSMENT — PATIENT HEALTH QUESTIONNAIRE - PHQ9
SUM OF ALL RESPONSES TO PHQ QUESTIONS 1-9: 16
5. POOR APPETITE OR OVEREATING: NEARLY EVERY DAY
SUM OF ALL RESPONSES TO PHQ QUESTIONS 1-9: 16

## 2021-04-16 ASSESSMENT — MIFFLIN-ST. JEOR: SCORE: 1523.07

## 2021-04-16 NOTE — RESULT ENCOUNTER NOTE
Patient was seen today in clinic.  I discussed results in clinic, please see clinic progress note.    Felisha Briggs MD 4/16/2021

## 2021-04-16 NOTE — PROGRESS NOTES
Assessment & Plan     Moderate recurrent major depression (H)  Not at goal, will increase to 225 mg daily, ongoing therapy already scheduled  - venlafaxine (EFFEXOR-XR) 75 MG 24 hr capsule; Take 1 capsule (75 mg) by mouth daily Add to 150 mg tablet for total dose of 225 mg daily    Chronic depressive personality disorder    Hyperlipidemia LDL goal <160  LDL Cholesterol Calculated   Date Value Ref Range Status   02/25/2020 76 <100 mg/dL Final     Comment:     Desirable:       <100 mg/dl    At goal  The current medical regimen is effective;  continue present plan and medications.    - Comprehensive metabolic panel  - Lipid panel reflex to direct LDL Fasting  - atorvastatin (LIPITOR) 40 MG tablet; Take 1 tablet (40 mg) by mouth daily    High blood triglycerides  Triglycerides   Date Value Ref Range Status   02/25/2020 303 (H) <150 mg/dL Final     Comment:     Borderline high:  150-199 mg/dl  High:             200-499 mg/dl  Very high:       >499 mg/dl  Non Fasting       Recheck, continue medications  - atorvastatin (LIPITOR) 40 MG tablet; Take 1 tablet (40 mg) by mouth daily  - fenofibrate (TRICOR) 48 MG tablet; Take 1 tablet (48 mg) by mouth daily    Essential hypertension with goal blood pressure less than 140/90  BP Readings from Last 3 Encounters:   04/16/21 127/81   04/02/21 119/85   03/21/21 (!) 141/99    At goal  The current medical regimen is effective;  continue present plan and medications.    - Comprehensive metabolic panel  - Albumin Random Urine Quantitative with Creat Ratio  - lisinopril (ZESTRIL) 10 MG tablet; Take 1 tablet (10 mg) by mouth daily    Impaired fasting glucose  stable  - Hemoglobin A1c    Vitamin D deficiency disease  recheck    Mild intermittent asthma without complication  At goal  The current medical regimen is effective;  continue present plan and medications.    - montelukast (SINGULAIR) 10 MG tablet; Take 1 tablet (10 mg) by mouth At Bedtime    Other seasonal allergic  "rhinitis  Aggravating asthma, renewed flonase  - fluticasone (FLONASE) 50 MCG/ACT nasal spray; Spray 2 sprays into both nostrils daily    Environmental allergies  See above  - loratadine-pseudoePHEDrine (CLARITIN-D 24-HOUR)  MG 24 hr tablet; Take 1 tablet by mouth daily    Lumbar herniated disc  Causing recurrent  Chronic low back pain without sciatica, unspecified back pain laterality  Prn muscle relaxant as below  - tiZANidine (ZANAFLEX) 4 MG tablet; Take 1-2 tablets (4-8 mg) by mouth nightly as needed for muscle spasms    Family history of hypothyroidism  TSH   Date Value Ref Range Status   02/25/2020 1.18 0.40 - 4.00 mU/L Final   08/25/2016 1.26 0.40 - 4.00 mU/L Final   08/13/2015 0.88 0.40 - 4.00 mU/L Final   08/21/2013 3.26 0.4 - 5.0 mU/L Final   03/19/2013 1.20 0.4 - 5.0 mU/L Final   06/11/2010 1.03 0.4 - 5.0 mU/L Final   ]screen for thyroid disease, Will fu as indicated.   - TSH with free T4 reflex    Visit for screening mammogram  Encouraged to schedule  - *MA Screening Digital Bilateral; Future     BMI:   Wt Readings from Last 4 Encounters:   04/16/21 90.7 kg (200 lb)   04/02/21 90.7 kg (200 lb)   02/28/20 87.5 kg (193 lb)   02/25/20 87.5 kg (193 lb)     Estimated body mass index is 33.28 kg/m  as calculated from the following:    Height as of this encounter: 1.651 m (5' 5\").    Weight as of this encounter: 90.7 kg (200 lb).   Weight management plan: Discussed healthy diet and exercise guidelines    Depression Screening Follow Up    PHQ 4/16/2021   PHQ-9 Total Score 19   Q9: Thoughts of better off dead/self-harm past 2 weeks Several days   F/U: Thoughts of suicide or self-harm -   F/U: Self harm-plan -   F/U: Self-harm action -   F/U: Safety concerns -       Follow Up    Follow Up Actions Taken  Crisis resource information provided in the After Visit Summary  Referred patient back to mental health provider  medication increased    Return in about 2 months (around 7/1/2021) for colposcopy " due.    Felisha Briggs MD  [facility] JOHN PAUL Mar is a 50 year old who presents for the following health issues     HPI      Hypertension Follow-up      Do you check your blood pressure regularly outside of the clinic? No     Are you following a low salt diet? No    Are your blood pressures ever more than 140 on the top number (systolic) OR more   than 90 on the bottom number (diastolic), for example 140/90? No    Impaired fasting glucose Follow-up      Patient is checking blood sugars: not at all    Diabetic concerns: None     Symptoms of hypoglycemia (low blood sugar): none     Paresthesias (numbness or burning in feet) or sores: No      Lab Results   Component Value Date    A1C 5.6 04/16/2021    A1C 5.2 02/25/2020    A1C 5.3 10/30/2018    A1C 5.3 09/28/2017    A1C 5.4 08/25/2016     Hyperlipidemia Follow-Up      Are you regularly taking any medication or supplement to lower your cholesterol?   Yes- atorvastatin 40 mg daily    Are you having muscle aches or other side effects that you think could be caused by your cholesterol lowering medication?  Yes- body aches, but chronic    Depression and Anxiety Follow-Up    How are you doing with your depression since your last visit? Worsened     How are you doing with your anxiety since your last visit?  Worsened     Are you having other symptoms that might be associated with depression or anxiety? Yes:  very anxious, jittery    Have you had a significant life event? Financial Concerns, Housing Concerns, Grief or Loss and Health Concerns     Do you have any concerns with your use of alcohol or other drugs? No    Social History     Tobacco Use     Smoking status: Never Smoker     Smokeless tobacco: Never Used   Substance Use Topics     Alcohol use: Not Currently     Drug use: No     PHQ 9/15/2020 4/16/2021 4/16/2021   PHQ-9 Total Score 9 16 19   Q9: Thoughts of better off dead/self-harm past 2 weeks Not at all Several days  Several days   F/U: Thoughts of suicide or self-harm - Yes -   F/U: Self harm-plan - No -   F/U: Self-harm action - No -   F/U: Safety concerns - No -     JOSEFINA-7 SCORE 9/15/2020 4/16/2021 4/16/2021   Total Score - - -   Total Score - - 12 (moderate anxiety)   Total Score 9 12 19     Last PHQ-9 4/16/2021   1.  Little interest or pleasure in doing things 0   2.  Feeling down, depressed, or hopeless 3   3.  Trouble falling or staying asleep, or sleeping too much 3   4.  Feeling tired or having little energy 3   5.  Poor appetite or overeating 2   6.  Feeling bad about yourself 3   7.  Trouble concentrating 1   8.  Moving slowly or restless 3   Q9: Thoughts of better off dead/self-harm past 2 weeks 1   PHQ-9 Total Score 19   Difficulty at work, home, or with people Extremely dIfficult   In the past two weeks have you had thoughts of suicide or self harm? -   Do you have concerns about your personal safety or the safety of others? -   In the past 2 weeks have you thought about a plan or had intention to harm yourself? -   In the past 2 weeks have you acted on these thoughts in any way? -     JOSEFINA-7  4/16/2021   1. Feeling nervous, anxious, or on edge 3   2. Not being able to stop or control worrying 1   3. Worrying too much about different things 3   4. Trouble relaxing 3   5. Being so restless that it is hard to sit still 3   6. Becoming easily annoyed or irritable 3   7. Feeling afraid, as if something awful might happen 3   JOSEFINA-7 Total Score 19   If you checked any problems, how difficult have they made it for you to do your work, take care of things at home, or get along with other people? Somewhat difficult       Suicide Assessment Five-step Evaluation and Treatment (SAFE-T)    Asthma Follow-Up    Was ACT completed today?    Yes    ACT Total Scores 4/16/2021   ACT TOTAL SCORE -   ASTHMA ER VISITS -   ASTHMA HOSPITALIZATIONS -   ACT TOTAL SCORE (Goal Greater than or Equal to 20) 23   In the past 12 months, how many  "times did you visit the emergency room for your asthma without being admitted to the hospital? 0   In the past 12 months, how many times were you hospitalized overnight because of your asthma? 0          How many days per week do you miss taking your asthma controller medication?  0    Please describe any recent triggers for your asthma: None    Have you had any Emergency Room Visits, Urgent Care Visits, or Hospital Admissions since your last office visit?  No      How many servings of fruits and vegetables do you eat daily?  2-3    On average, how many sweetened beverages do you drink each day (Examples: soda, juice, sweet tea, etc.  Do NOT count diet or artificially sweetened beverages)?   0    How many days per week do you exercise enough to make your heart beat faster? 3 or less    How many minutes a day do you exercise enough to make your heart beat faster? 10 - 19  How many days per week do you miss taking your medication? 1    What makes it hard for you to take your medications?  remembering to take    Review of Systems   Constitutional, HEENT, cardiovascular, pulmonary, GI, , musculoskeletal, neuro, skin, endocrine and psych systems are negative, except as otherwise noted.      Objective    /81 (BP Location: Left arm, Patient Position: Chair, Cuff Size: Adult Regular)   Pulse 93   Temp 98  F (36.7  C) (Tympanic)   Resp 16   Ht 1.651 m (5' 5\")   Wt 90.7 kg (200 lb)   LMP 03/17/2021 (Exact Date)   SpO2 97%   BMI 33.28 kg/m    Body mass index is 33.28 kg/m .  Physical Exam   GENERAL: healthy, alert and no distress  NECK: no adenopathy, no asymmetry, masses, or scars and thyroid normal to palpation  RESP: lungs clear to auscultation - no rales, rhonchi or wheezes  CV: regular rate and rhythm, normal S1 S2, no S3 or S4, no murmur, click or rub, no peripheral edema and peripheral pulses strong  MS: no gross musculoskeletal defects noted, no edema  NEURO: Normal strength and tone, mentation intact " and speech normal  PSYCH: mentation appears normal, affect normal/bright      Answers for HPI/ROS submitted by the patient on 4/16/2021   PHQ9 TOTAL SCORE: 16

## 2021-04-16 NOTE — PATIENT INSTRUCTIONS
Results for orders placed or performed in visit on 04/16/21   Hemoglobin A1c     Status: None   Result Value Ref Range    Hemoglobin A1C 5.6 0 - 5.6 %        1. Hi, great news, you can schedule a covid vaccine with [facility]!  I think you'll get an email, but if you'd like to go ahead and call the scheduling line at [phone], you could go ahead and schedule this vaccine. Hurray!!!!  When you call you'll get some options, I believe you press #1, and then the line may go silent but no worries, stay on and someone will answer your call.  I hope you can get this scheduled soon!     2. Please call to schedule your Mammography [facility]   Appointment line [phone]

## 2021-04-17 ASSESSMENT — ASTHMA QUESTIONNAIRES: ACT_TOTALSCORE: 23

## 2021-04-19 DIAGNOSIS — M54.50 CHRONIC LOW BACK PAIN WITHOUT SCIATICA, UNSPECIFIED BACK PAIN LATERALITY: ICD-10-CM

## 2021-04-19 DIAGNOSIS — M51.26 LUMBAR HERNIATED DISC: ICD-10-CM

## 2021-04-19 DIAGNOSIS — G89.29 CHRONIC LOW BACK PAIN WITHOUT SCIATICA, UNSPECIFIED BACK PAIN LATERALITY: ICD-10-CM

## 2021-04-19 LAB — DEPRECATED CALCIDIOL+CALCIFEROL SERPL-MC: 31 UG/L (ref 20–75)

## 2021-04-20 RX ORDER — ETODOLAC 500 MG
TABLET ORAL
Qty: 60 TABLET | Refills: 1 | OUTPATIENT
Start: 2021-04-20

## 2021-04-20 NOTE — TELEPHONE ENCOUNTER
Prior Authorization Not Needed per Insurance    Medication: venlafaxine (EFFEXOR-XR) 75 MG 24 hr capsule--NO PA NEEDED  Insurance Company: [employer] - Phone [phone] Fax [phone]  Expected CoPay:      Pharmacy Filling the Rx: [id number] [address] AT [address]  Pharmacy Notified: Yes  Patient Notified: Yes **Instructed pharmacy to notify patient when script is ready to /ship.**

## 2021-04-20 NOTE — TELEPHONE ENCOUNTER
LVM informing pt that Dr Gilbert would like an appointment before filling this medication. Provided call center number and encouraged pt to call. Can be scheduled at , [facility] or Walk in.     Ryanne Romero ATC

## 2021-04-21 DIAGNOSIS — Z53.9 DIAGNOSIS NOT YET DEFINED: Primary | ICD-10-CM

## 2021-04-21 PROCEDURE — G0180 MD CERTIFICATION HHA PATIENT: HCPCS | Performed by: FAMILY MEDICINE

## 2021-04-22 NOTE — TELEPHONE ENCOUNTER
Faxed singed Home Health Certification and Plan of Care along with medication list. (Date of request: 4/18/2021)  Faxed to 24 hour abstraction     Nikki Main MA

## 2021-04-23 NOTE — RESULT ENCOUNTER NOTE
Hello!  It was a pleasure to see you in clinic!  Thank you for getting labs done.     Your microalbumen is normal, which is great news. This means you do not have protein in the urine, and that your kidneys are currently functioning well. Keeping blood pressure and blood fats low is the best way to preserve your kidney function over your lifetime.     Your vitamin D is normal.    Your cholesterol looks pretty good except that your triglycerides are very high.To keep triglycerides in check,  watch sugar, juice, excess fruit, bread, pasta, rice and  cereal. Exercise for at least 30 min 5 times per week, and lift weights 2-3 times per week.     Your thyroid function is normal, which is good news.  This means that you do not have hyperthyroidism or hypothyroidism.     The testing of your kidney function, liver function and electrolytes was normal.     Your lab test to check for diabetes, HgA1C, also called glycosylated hemoglobin, which measures the level of sugar in your blood over the past few months, is normal which is great! Congratulations, you don't have diabetes!        If you have any questions, please contact the clinic or schedule an appointment with me, thank you!    Sincerely,    BONILLA RHOADES MD   4/23/2021

## 2021-04-28 ENCOUNTER — PATIENT OUTREACH (OUTPATIENT)
Dept: CARE COORDINATION | Facility: CLINIC | Age: 51
End: 2021-04-28

## 2021-04-28 NOTE — PROGRESS NOTES
Clinic Care Coordination Contact  [facility]/Voicemail    Clinical Data: Care Coordination Outreach  Outreach attempted x 1.  Left message on patient's voicemail with call back information and requested return call.  Plan: CHW will try to reach patient again in 5-7 business days.    Kay DUPONT  Community Health Worker  [facility] Care Coordination  [facility]  [email or web address]   Office: [phone]

## 2021-05-11 ENCOUNTER — OFFICE VISIT (OUTPATIENT)
Dept: ORTHOPEDICS | Facility: CLINIC | Age: 51
End: 2021-05-11
Payer: COMMERCIAL

## 2021-05-11 VITALS — RESPIRATION RATE: 16 BRPM | BODY MASS INDEX: 31.5 KG/M2 | WEIGHT: 196 LBS | HEIGHT: 66 IN

## 2021-05-11 DIAGNOSIS — M51.16 LUMBAR DISC HERNIATION WITH RADICULOPATHY: ICD-10-CM

## 2021-05-11 DIAGNOSIS — M50.20 CERVICAL DISC HERNIATION: ICD-10-CM

## 2021-05-11 DIAGNOSIS — M50.30 DDD (DEGENERATIVE DISC DISEASE), CERVICAL: Primary | ICD-10-CM

## 2021-05-11 DIAGNOSIS — M51.369 DDD (DEGENERATIVE DISC DISEASE), LUMBAR: ICD-10-CM

## 2021-05-11 PROCEDURE — 99214 OFFICE O/P EST MOD 30 MIN: CPT | Performed by: PREVENTIVE MEDICINE

## 2021-05-11 RX ORDER — ETODOLAC 500 MG
500 TABLET ORAL 2 TIMES DAILY
Qty: 60 TABLET | Refills: 1 | Status: ON HOLD | OUTPATIENT
Start: 2021-05-11 | End: 2021-10-13

## 2021-05-11 RX ORDER — ETODOLAC 500 MG
500 TABLET ORAL 2 TIMES DAILY
Qty: 60 TABLET | Refills: 1 | Status: SHIPPED | OUTPATIENT
Start: 2021-05-11 | End: 2021-09-21

## 2021-05-11 ASSESSMENT — MIFFLIN-ST. JEOR: SCORE: 1520.8
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
## 2021-05-11 NOTE — NURSING NOTE
"Reason For Visit:   Chief Complaint   Patient presents with     RECHECK     hand/back/knees?        Resp 16   Ht 1.676 m (5' 6\")   Wt 88.9 kg (196 lb)   BMI 31.64 kg/m      Pain Assessment  Patient Currently in Pain: Yes  0-10 Pain Scale: 8  Primary Pain Location: Back  Other Pain Locations: Low back & Hip.    Ryanne Romero ATC   "

## 2021-05-13 ENCOUNTER — PATIENT OUTREACH (OUTPATIENT)
Dept: NURSING | Facility: CLINIC | Age: 51
End: 2021-05-13
Payer: COMMERCIAL

## 2021-05-13 NOTE — PROGRESS NOTES
Clinic Care Coordination Contact  Community Health Worker Follow Up    Spoke with pt via phone this afternoon.    Goals:   Goals Addressed as of 5/13/2021 at 3:31 PM                 Today    3/29/21      Financial Wellbeing- SSDI (pt-stated)   80%  70%    Added 11/17/20 by Renetta Orellana BSW     Goal Statement: I would like help applying and getting approved for Social Security Disability in the next 6 months.   Date Goal set: 10/29/2020  Barriers: Getting denied   Strengths: Asking for help, supportive [facility]   Date to Achieve By: 4/29/2021 // extended 12/20/2021  Patient expressed understanding of goal: yes     Action steps to achieve this goal:  1. My People advisor[employer]CT [facility] and I will meet with the Disability Specialists on March 26th to discuss next steps and options with my denial. (ongoing)  5/13  Pt stated moved up to Federal level pending. [facility] provides support/updates on this.   2. I will attend my first appointment at [facility] on March 30th with Johnson Novak, counselor. (Completed)    5/13 Pt states she connects with monthly telephone visits.  3. I will work with [facility] staff to schedule neuropsychological testing. (Completed)  4. I will give the CHW updates during outreach calls.    5/13/21 Pt continues to meet with [facility] and Disability Specialists to discuss next steps and options with her denial.     Updated: 5/13/2021                Intervention and Education during outreach:    CHW introduced self to patient and explained CHW role within care coordination. Pt states she is doing really well. She has been having ongoing conversations with [facility] from [employer], Renee Lal, and Disability Specialists about disability issues related to denial of benefits. Neuropsychological testing was done at [facility] the end of March. Pt states she has had 3-4 session of therapy with Johnson out of [facility] which have been helpful to her.      When CHW asked if pt had any other questions or concerns at this time, she stated she  "would like to inquire about mortgage payment assistance. Pt stated someone called her today, she could not remember who it was, who she mentioned this to as well. That person had told her she would be looking into mortgage assistance programs, and pt remembers that she mentioned [facility] in [place]. Pt states at one time her mortgage was in foreclosure, but her father was able to work with the bank so that her mortgage is no longer in foreclosure. Pt shares that her 's child support payments have dropped from $800 to $300 during the pandemic which has made \"finances tight\". CHW communicates that she does not know of any resources at the moment, but will discuss with DANGELO GOMEZ and FRRashaad to see if they know of any options.    Pt states she is getting house cleaning assistance weekly to help with cleaning the bathroom and kitchen. When the  is finished with her work, she and the patient talk for some time afterwards. \"I just love my helper. We get along so well!\"    Pt states she has not further concerns or need for resources at this time. Pt took down CHW's phone number at the end of our visit, and told pt she could call me with any concerns or resource needs.    CHW Plan: CHW will route message to DANGELO Alvarado, and MAIKEL Magallon, regarding any resources they might know of for mortgage payment assistance. CHW will call patient back with resources for mortgage payment assistance. CHW will contact pt in one month for CHW follow up visit.    Shruti Helms  Community Health Worker  [facility]  [phone]        .             "

## 2021-05-14 ENCOUNTER — TELEPHONE (OUTPATIENT)
Dept: ORTHOPEDICS | Facility: CLINIC | Age: 51
End: 2021-05-14

## 2021-05-14 DIAGNOSIS — F40.240 CLAUSTROPHOBIA: Primary | ICD-10-CM

## 2021-05-14 RX ORDER — DIAZEPAM 5 MG
5 TABLET ORAL ONCE
Qty: 2 TABLET | Refills: 0
Start: 2021-05-14 | End: 2021-05-14

## 2021-05-14 NOTE — TELEPHONE ENCOUNTER
[facility] Call Center    Phone Message    May a detailed message be left on voicemail: yes     Reason for Call: Medication Question or concern regarding medication   Prescription Clarification  Name of Medication: Ativan  Prescribing Provider: Man Gilbert   Pharmacy: [facility] in [place]   What on the order needs clarification?   Patient states that Pharmacy has not received any prescription from  for her ativan for her MRI today. Patient is requesting for the prescription to be sent over as soon as possible.           Action Taken: Message routed to:  [facility]O    Travel Screening: Not Applicable

## 2021-05-14 NOTE — TELEPHONE ENCOUNTER
Called and spoke to pt. Pt is having MRI today at [facility]. Cancelled MRI appts at [facility]. RN is working on Rx request.     Ryanne Romero ATC

## 2021-05-14 NOTE — TELEPHONE ENCOUNTER
Isabela was called by RN and we discussed medication options and pt agreed to have valium.  Rx was phoned to her pharmacy, per standing orders.  Nikki Card RN

## 2021-05-14 NOTE — PROGRESS NOTES
"Clinic Care Coordination Contact  Community Health Worker Follow Up    Goals:   Goals Addressed as of 5/13/2021 at 7:42 PM                 Today    3/29/21       Patient Stated      Financial Wellbeing- SSDI (pt-stated)   80%  70%    Added 11/17/20 by Renetta Orellana BSW     Goal Statement: I would like help applying and getting approved for Social Security Disability in the next 6 months.   Date Goal set: 10/29/2020  Barriers: Getting denied   Strengths: Asking for help, supportive [facility]   Date to Achieve By: 4/29/2021 // extended 10/20/2021  Patient expressed understanding of goal: yes     Action steps to achieve this goal:  1. My [employer] [facility] and I will meet with the Disability Specialists on March 26th to discuss next steps and options with my denial. (Completed, ongoing)  5/13  Pt stated moved up to Federal level pending. [facility] provides support/updates on this.   2. I will attend my first appointment at [facility] on March 30th with Johnson Novak, counselor. (Completed)    5/13 Pt states she connects with monthly telephone visits.  3. I will work with [facility] staff to schedule neuropsychological testing. (Completed)  4. I will give the CHW updates during outreach calls.    Updated: 5/13/2021             Intervention and Education during outreach: Monthly     CHW Plan: Patient stated she is doing fine, continuing to work with [facility]  who keeps her up to date on disability status which she said has \"moved up to the federal level.\"  Patient stated she is scheduled for an MRI due to a degenerative disc that is causing her back pain.  Patient reported that her only concern currently is mortgage assistance options as [facility]. Patient stated her father in law was able to get it out of [facility], but she is having difficulty making consistent payments.  CHW will route encounter to  CC to update.     Kay DUPONT  Community Health Worker  [facility]  Clinic Care " Coordination  [facility] [place] [place]  [email or web address]  [email or web address]   Office: [phone]

## 2021-05-17 ENCOUNTER — TRANSFERRED RECORDS (OUTPATIENT)
Dept: HEALTH INFORMATION MANAGEMENT | Facility: CLINIC | Age: 51
End: 2021-05-17

## 2021-05-17 DIAGNOSIS — E78.1 HIGH BLOOD TRIGLYCERIDES: ICD-10-CM

## 2021-05-17 DIAGNOSIS — E78.5 HYPERLIPIDEMIA LDL GOAL <160: ICD-10-CM

## 2021-05-18 ENCOUNTER — TELEPHONE (OUTPATIENT)
Dept: ORTHOPEDICS | Facility: CLINIC | Age: 51
End: 2021-05-18

## 2021-05-18 RX ORDER — FENOFIBRATE 48 MG/1
48 TABLET, COATED ORAL DAILY
Qty: 90 TABLET | Refills: 2 | Status: SHIPPED | OUTPATIENT
Start: 2021-05-18 | End: 2022-08-23

## 2021-05-18 RX ORDER — ATORVASTATIN CALCIUM 40 MG/1
40 TABLET, FILM COATED ORAL DAILY
Qty: 90 TABLET | Refills: 2 | Status: SHIPPED | OUTPATIENT
Start: 2021-05-18 | End: 2022-08-23

## 2021-05-18 NOTE — TELEPHONE ENCOUNTER
Clarification needed from patient which pharmacy she is using.    4/16/21 orders were sent to [facility] and these refill requests originate with [facility].    Writer called patient who stated she uses [facility].    Prescriptions approved per [facility] Refill Protocol.  BRETT CastanedaN, RN  [employer]

## 2021-05-18 NOTE — TELEPHONE ENCOUNTER
[facility] Call Center    Phone Message    May a detailed message be left on voicemail: yes     Reason for Call: Other: Patient want to come in sooner than 06/24 (Next available) to go over MRI results.     Action Taken: Message routed to:  [facility] Sports Medicine    Travel Screening: Not Applicable

## 2021-05-19 NOTE — TELEPHONE ENCOUNTER
Called and spoke to pt. Moved up appt to 6/8/21. Pt appreciated call and all questions answered.     Ryanne Romero ATC

## 2021-05-20 ENCOUNTER — TELEPHONE (OUTPATIENT)
Dept: FAMILY MEDICINE | Facility: CLINIC | Age: 51
End: 2021-05-20

## 2021-05-20 DIAGNOSIS — Z53.9 DIAGNOSIS NOT YET DEFINED: Primary | ICD-10-CM

## 2021-05-20 PROCEDURE — G0180 MD CERTIFICATION HHA PATIENT: HCPCS | Performed by: FAMILY MEDICINE

## 2021-05-20 NOTE — TELEPHONE ENCOUNTER
Reason for Call:  Form, our goal is to have forms completed with 72 hours, however, some forms may require a visit or additional information.    Type of letter, form or note:  Home Health Certification 4-12-20 / 6-    Who is the form from?: Home care  [facility]  Where did the form come from: form was faxed in    What clinic location was the form placed at?: [facility]    Where the form was placed: placed in pod A providers form folder Box/Folder    What number is listed as a contact on the form?: [phone]       Additional comments:     Call taken on 5/20/2021 at 10:27 AM by Corin Hammer

## 2021-05-20 NOTE — PROGRESS NOTES
5/20/21    Spoke with Isabela regarding mortgage assistance resources following our previous phone conversation when pt requested these resources. CHW explained that mortgage companies can work with homeowners to request a forebearance, which delays mortgage payments for up to 90 days until homeowner is better able to make monthly payments. Pt wrote down the following sites to explore:   1  .https://www.[employer]/sites/Satellite?c=Page&jry=4981391292702&d=Touch&pagename=External%2FPage%2FEXTStandardLayout    2. https://[email or web address]./Office/Communications/2020/07/CovidMortgageReliefOptions.asp    Pt wrote down the bolded websites and will look into these sites to see if they will be helpful to her.    Pt states she has not received any of the 3 stimulus checks. Several of her friends have tried to look into this situation for her and they have not been able to assist pt. CHW asked what sites or resources she has tried to utilize to access information about her stimulus checks, and pt states she is not very good on the computer so her friends have helped her. CHW states she will reach out to the FRW team to see if they can assist in this area.    CHW asked if pt had any other concerns or needed any other resources at this time. Pt states no, just assistance with finding out why she has not received her stimulus checks.    CHW Plan: CHW will reach out to FRW for assistance with understanding why pt has not received her stimulus checks.    Shruti Helms  Community Health Worker  [facility]  [phone]

## 2021-05-21 NOTE — TELEPHONE ENCOUNTER
Completed, signed forms placed in MA basket today.  Sincerely,  Dr. Felisha Briggs MD  5/20/2021    7:11 PM

## 2021-05-24 ENCOUNTER — TELEPHONE (OUTPATIENT)
Dept: FAMILY MEDICINE | Facility: CLINIC | Age: 51
End: 2021-05-24

## 2021-05-24 ENCOUNTER — PATIENT OUTREACH (OUTPATIENT)
Dept: CARE COORDINATION | Facility: CLINIC | Age: 51
End: 2021-05-24

## 2021-05-24 DIAGNOSIS — F33.1 MODERATE RECURRENT MAJOR DEPRESSION (H): ICD-10-CM
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
## 2021-05-24 NOTE — PROGRESS NOTES
Clinic Care Coordination Contact    Care Coordination Clinician Chart Review  Situation: Patient chart reviewed by care coordinator.       Background: Care Coordination initial assessment and enrollment to Care Coordination was completed.   Patient centered goals were developed with participation from patient.  JASON CC handed patient off to CHW for continued outreach every 30 days.        Assessment: Per chart review, patient outreach completed by CC CHW on 5/13/21.  Patient is actively working to accomplish goals.  Patient's goals remain appropriate and relevant at this time.   Patient is not yet due for updated Complex Care Plan.  Annual assessment will be due 10/29/22.      Goals       Financial Wellbeing- SSDI (pt-stated)      Goal Statement: I would like help applying and getting approved for Social Security Disability in the next 6 months.   Date Goal set: 10/29/2020  Barriers: Getting denied   Strengths: Asking for help, supportive [facility]   Date to Achieve By: 4/29/2021 // extended 10/20/2021  Patient expressed understanding of goal: yes     Action steps to achieve this goal:  1. My [employer] [facility] and I will meet with the Disability Specialists on March 26th to discuss next steps and options with my denial. (Completed, ongoing)  5/13  Pt stated moved up to Federal level pending. [facility] provides support/updates on this.   2. I will attend my first appointment at [facility] [place] March 30th with Johnson Novak, counselor. (Completed)    5/13 Pt states she connects with monthly telephone visits.  3. I will work with [facility] staff to schedule neuropsychological testing. (Completed)  4. I will give the CHW updates during outreach calls.    Updated: 5/13/2021                    Plan/Recommendations: The patient will continue working with Care Coordination to achieve goals as above.  CHW will involve JASON PIERSON as needed or if patient is ready to move to maintenance.  JASON CC will continue to monitor progress to goals and CHW  outreaches every 6 weeks.   Care Plan updated and mailed to patient: Nicole Landaverde [facility] Care Coordination  Tel: [phone]

## 2021-05-24 NOTE — PROGRESS NOTES
5/24/21  Left message on Isabela's VM at 10:45am.  Left message for Isabela to contact IRS directly to determine why she has not gotten her stimulus checks yet. Left IRS contact information: [phone] or www.irs.gov.  Communicated that pt could contact me with any further questions; CHW left her contact phone number.    Shruti Helms  Community Health Worker  [facility]  [phone]

## 2021-05-25 ENCOUNTER — VIRTUAL VISIT (OUTPATIENT)
Dept: FAMILY MEDICINE | Facility: CLINIC | Age: 51
End: 2021-05-25
Payer: COMMERCIAL

## 2021-05-25 DIAGNOSIS — F41.1 GENERALIZED ANXIETY DISORDER: ICD-10-CM

## 2021-05-25 DIAGNOSIS — F33.1 MODERATE RECURRENT MAJOR DEPRESSION (H): ICD-10-CM

## 2021-05-25 PROCEDURE — 99213 OFFICE O/P EST LOW 20 MIN: CPT | Mod: 95 | Performed by: FAMILY MEDICINE

## 2021-05-25 RX ORDER — DIAZEPAM 5 MG
TABLET ORAL
COMMUNITY
Start: 2021-05-14 | End: 2021-09-21

## 2021-05-25 RX ORDER — VENLAFAXINE HYDROCHLORIDE 150 MG/1
CAPSULE, EXTENDED RELEASE ORAL
Qty: 90 CAPSULE | Refills: 3 | Status: SHIPPED | OUTPATIENT
Start: 2021-05-25 | End: 2022-06-27

## 2021-05-25 ASSESSMENT — ANXIETY QUESTIONNAIRES
GAD7 TOTAL SCORE: 2
7. FEELING AFRAID AS IF SOMETHING AWFUL MIGHT HAPPEN: SEVERAL DAYS
3. WORRYING TOO MUCH ABOUT DIFFERENT THINGS: NOT AT ALL
2. NOT BEING ABLE TO STOP OR CONTROL WORRYING: NOT AT ALL
6. BECOMING EASILY ANNOYED OR IRRITABLE: NOT AT ALL
1. FEELING NERVOUS, ANXIOUS, OR ON EDGE: SEVERAL DAYS
IF YOU CHECKED OFF ANY PROBLEMS ON THIS QUESTIONNAIRE, HOW DIFFICULT HAVE THESE PROBLEMS MADE IT FOR YOU TO DO YOUR WORK, TAKE CARE OF THINGS AT HOME, OR GET ALONG WITH OTHER PEOPLE: NOT DIFFICULT AT ALL
5. BEING SO RESTLESS THAT IT IS HARD TO SIT STILL: NOT AT ALL

## 2021-05-25 ASSESSMENT — PATIENT HEALTH QUESTIONNAIRE - PHQ9
SUM OF ALL RESPONSES TO PHQ QUESTIONS 1-9: 6
5. POOR APPETITE OR OVEREATING: NOT AT ALL

## 2021-05-25 NOTE — PROGRESS NOTES
Isabela is a 50 year old who is being evaluated via a billable telephone visit.      What phone number would you like to be contacted at? [phone]  How would you like to obtain your AVS? MyChart    Assessment & Plan     Generalized anxiety disorder  Moderate recurrent major depression (H)  At goal, I apologized for misunderstanding, will not increase dose, maintain at current dose of 150 mg, which I verified today (she read her pill bottle dosage to me today)  Renewed for one year.  - venlafaxine (EFFEXOR-XR) 150 MG 24 hr capsule; TAKE ONE CAPSULE BY MOUTH ONCE DAILY    Return in about 4 months (around 10/7/2021) for preventive visit (wellness/annual physical exam) with pap.    Felisha Briggs MD  [facility]   Isabela is a 50 year old who presents for the following health issues     HPI     Depression and Anxiety Follow-Up    How are you doing with your depression since your last visit? No change    How are you doing with your anxiety since your last visit?  No change    Are you having other symptoms that might be associated with depression or anxiety? No    Have you had a significant life event? No     Do you have any concerns with your use of alcohol or other drugs? No     Currently she's taking 150 mg of venlafaxine daily, she's experienced side effects on higher doses, can't remember what it did, but happy at this dose.    She's less angry on this medication.      2. Medication clarification:     Nurse also clarified on venlafaxine (EFFEXOR-XR) 150 MG 24 hr capsule she reports additional prescription of venlafaxine (EFFEXOR-XR) 75 MG 24 hr capsule - patient doesn't remember talking about a med increase - writer notes this was advised at office visit 4/16/21 - patient said she will try increase - she did not she felt she had tried this increase before and it was 'too strong'    Social History     Tobacco Use     Smoking status: Never Smoker     Smokeless  tobacco: Never Used   Substance Use Topics     Alcohol use: Not Currently     Drug use: No     PHQ 4/16/2021 4/16/2021 5/25/2021   PHQ-9 Total Score 16 19 6   Q9: Thoughts of better off dead/self-harm past 2 weeks Several days Several days Not at all   F/U: Thoughts of suicide or self-harm Yes - -   F/U: Self harm-plan No - -   F/U: Self-harm action No - -   F/U: Safety concerns No - -     JOSEFINA-7 SCORE 4/16/2021 4/16/2021 5/25/2021   Total Score - - -   Total Score - 12 (moderate anxiety) -   Total Score 12 19 2     Suicide Assessment Five-step Evaluation and Treatment (SAFE-T)      How many servings of fruits and vegetables do you eat daily?  0-1    On average, how many sweetened beverages do you drink each day (Examples: soda, juice, sweet tea, etc.  Do NOT count diet or artificially sweetened beverages)?   1    How many days per week do you exercise enough to make your heart beat faster? none    How many minutes a day do you exercise enough to make your heart beat faster? None due to back pain     How many days per week do you miss taking your medication? 0    Review of Systems   Constitutional, HEENT, cardiovascular, pulmonary, GI, , musculoskeletal, neuro, skin, endocrine and psych systems are negative, except as otherwise noted.      Objective           Vitals:  No vitals were obtained today due to virtual visit.    Physical Exam   healthy, alert and no distress  PSYCH: Alert and oriented times 3; coherent speech, normal   rate and volume, able to articulate logical thoughts, able   to abstract reason, no tangential thoughts, no hallucinations   or delusions  Her affect is normal  RESP: No cough, no audible wheezing, able to talk in full sentences  Remainder of exam unable to be completed due to telephone visits          Phone call duration: 7 minutes

## 2021-05-25 NOTE — TELEPHONE ENCOUNTER
HI, please ask her to schedule a follow up depression visit with me, virtual can be fine, to discuss her mood and medications, thank you!    Sincerely,  Dr. Felisha Briggs MD  5/25/2021

## 2021-05-26 ASSESSMENT — ANXIETY QUESTIONNAIRES: GAD7 TOTAL SCORE: 2

## 2021-06-08 ENCOUNTER — OFFICE VISIT (OUTPATIENT)
Dept: ORTHOPEDICS | Facility: CLINIC | Age: 51
End: 2021-06-08
Payer: COMMERCIAL

## 2021-06-08 VITALS — WEIGHT: 197 LBS | BODY MASS INDEX: 31.66 KG/M2 | RESPIRATION RATE: 16 BRPM | HEIGHT: 66 IN

## 2021-06-08 DIAGNOSIS — M50.20 CERVICAL DISC HERNIATION: ICD-10-CM

## 2021-06-08 DIAGNOSIS — M51.16 LUMBAR DISC HERNIATION WITH RADICULOPATHY: Primary | ICD-10-CM

## 2021-06-08 PROCEDURE — 99214 OFFICE O/P EST MOD 30 MIN: CPT | Performed by: PREVENTIVE MEDICINE

## 2021-06-08 ASSESSMENT — MIFFLIN-ST. JEOR: SCORE: 1517.4
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
## 2021-06-08 NOTE — NURSING NOTE
"Reason For Visit:   Chief Complaint   Patient presents with     RECHECK     MRI follow up        Resp 16   Ht 1.664 m (5' 5.5\")   Wt 89.4 kg (197 lb)   BMI 32.28 kg/m      Pain Assessment  Patient Currently in Pain: Yes  0-10 Pain Scale: 7  Primary Pain Location: Back    Ryanne Romero ATC   "

## 2021-06-09 NOTE — TELEPHONE ENCOUNTER
RECORDS RECEIVED FROM: Cervical disc herniation   Imaging and records in [facility]  Dr. Gilbert referring   DATE RECEIVED: Jun 21, 2021     NOTES STATUS DETAILS   OFFICE NOTE from referring provider Internal  Man Gilbert MD   OFFICE NOTE from other specialist N/A    DISCHARGE SUMMARY from hospital N/A    DISCHARGE REPORT from the ER N/A    OPERATIVE REPORT N/A    MEDICATION LIST Internal    IMPLANT RECORD/STICKER N/A    LABS     CBC/DIFF N/A    CULTURES N/A    INJECTIONS DONE IN RADIOLOGY N/A    MRI Internal    CT SCAN N/A    XRAYS (IMAGES & REPORTS) Internal    TUMOR     PATHOLOGY  Slides & report N/A    06/09/21   12:46 PM   COMPLETE  Ángela Rivera CMA

## 2021-06-13 ENCOUNTER — OFFICE VISIT (OUTPATIENT)
Dept: URGENT CARE | Facility: URGENT CARE | Age: 51
End: 2021-06-13
Payer: COMMERCIAL

## 2021-06-13 VITALS
DIASTOLIC BLOOD PRESSURE: 79 MMHG | WEIGHT: 197 LBS | SYSTOLIC BLOOD PRESSURE: 106 MMHG | BODY MASS INDEX: 32.28 KG/M2 | TEMPERATURE: 99.1 F | HEART RATE: 103 BPM | OXYGEN SATURATION: 97 %

## 2021-06-13 DIAGNOSIS — B37.2 CANDIDIASIS OF SKIN: Primary | ICD-10-CM

## 2021-06-13 PROCEDURE — 99213 OFFICE O/P EST LOW 20 MIN: CPT | Performed by: FAMILY MEDICINE

## 2021-06-14 DIAGNOSIS — M51.26 LUMBAR HERNIATED DISC: Primary | ICD-10-CM

## 2021-06-18 ENCOUNTER — TELEPHONE (OUTPATIENT)
Dept: FAMILY MEDICINE | Facility: CLINIC | Age: 51
End: 2021-06-18

## 2021-06-18 ENCOUNTER — TRANSFERRED RECORDS (OUTPATIENT)
Dept: HEALTH INFORMATION MANAGEMENT | Facility: CLINIC | Age: 51
End: 2021-06-18

## 2021-06-21 ENCOUNTER — PATIENT OUTREACH (OUTPATIENT)
Dept: CARE COORDINATION | Facility: CLINIC | Age: 51
End: 2021-06-21

## 2021-06-21 ENCOUNTER — PRE VISIT (OUTPATIENT)
Dept: ORTHOPEDICS | Facility: CLINIC | Age: 51
End: 2021-06-21

## 2021-06-21 DIAGNOSIS — Z53.9 DIAGNOSIS NOT YET DEFINED: Primary | ICD-10-CM

## 2021-06-21 PROCEDURE — G0179 MD RECERTIFICATION HHA PT: HCPCS | Performed by: FAMILY MEDICINE

## 2021-06-21 NOTE — PROGRESS NOTES
Clinic Care Coordination Contact  [facility]/Voicemail  Left Voicemail     Clinical Data: Care Coordinator Outreach  Outreach attempted x 1.  Left message on patient's voicemail with call back information and requested return call.  Plan: Care Coordinator will try to reach patient again in 10 business days.      Violet Ramirez  [facility] Care Coordination  [facility], [facility], and [facility]    Phone: [phone]

## 2021-06-21 NOTE — TELEPHONE ENCOUNTER
Faxed [facility] orders and faxed to 24 hour abstraction   Additional copy in Dr. Briggs faxed folder     Nikki Main MA

## 2021-06-28 ENCOUNTER — PATIENT OUTREACH (OUTPATIENT)
Dept: FAMILY MEDICINE | Facility: CLINIC | Age: 51
End: 2021-06-28
Payer: COMMERCIAL

## 2021-06-28 NOTE — LETTER
September 17, 2021      Isaebla Panchal  [address]  [place] [address]        Dear MsNicoleAnsley,    At [facility], your health and wellness are our primary concern. That is why we are following up on your most recent positive high-risk Human Papillomavirus (HPV) test.    Please call [facility] at [phone] and press 1 to schedule an appointment for your recommended follow-up Colposcopy (this cannot be scheduled through [facility]) at your earliest convenience. They are doing these procedures at the [facility], located within [facility] but please call [facility] to schedule it. If you have chosen not to do the recommended colposcopy, please contact your clinic to schedule an appointment for a repeat Pap smear and Human Papillomavirus (HPV) test at this time.    If you have completed the appointment outside of the [facility] system, please have the records forwarded to our office. We will update your chart for your provider to review before your next annual wellness visit.     Thank you for choosing [facility]!      Sincerely,    Your [facility] Care Team

## 2021-06-30 ENCOUNTER — PATIENT OUTREACH (OUTPATIENT)
Dept: CARE COORDINATION | Facility: CLINIC | Age: 51
End: 2021-06-30

## 2021-06-30 NOTE — PROGRESS NOTES
Clinic Care Coordination Contact  [facility]/Voicemail       Clinical Data: Care Coordinator Outreach  Outreach attempted x 2.  Left message on patient's voicemail with call back information and requested return call.      Plan:  Care Coordinator will do no further outreaches at this time. CHW will clarify moving patient to maintenance or will follow up as needed per CC.      Violet Ramirez  [facility] Care Coordination  [facility], [facility], and [facility]    Phone: [phone]

## 2021-07-07 ENCOUNTER — TELEPHONE (OUTPATIENT)
Dept: FAMILY MEDICINE | Facility: CLINIC | Age: 51
End: 2021-07-07

## 2021-07-07 NOTE — TELEPHONE ENCOUNTER
Forms were received from [facility]. Placed in [employer] Sign me folder and placed in Pod C.    Prabha Gomez MA

## 2021-07-08 ENCOUNTER — PATIENT OUTREACH (OUTPATIENT)
Dept: CARE COORDINATION | Facility: CLINIC | Age: 51
End: 2021-07-08

## 2021-07-08 NOTE — PROGRESS NOTES
Clinic Care Coordination Contact  Situation: Patient chart reviewed by care coordinator.    Background: Patient is currently enrolled in care coordination. Patient is followed by CHW and SW[facility]    Assessment: CHW still trying to reach patient for monthly outreach.     Plan/Recommendations: [facility] will remain available to patient if needs arise before successful outreach is completed or resources are needed. Outreach will be made if patient is seen in ED or admitted prior to outreach. Next anticipated chart review will be in 2 weeks.    Updated complex care plan has not yet been sent as no outreach has been made.    GAVIN Alvarado     [facility] Care Coordination  [facility]  Tel: [phone]

## 2021-07-12 ENCOUNTER — TELEPHONE (OUTPATIENT)
Dept: ORTHOPEDICS | Facility: CLINIC | Age: 51
End: 2021-07-12

## 2021-07-12 NOTE — TELEPHONE ENCOUNTER
[facility] Call Center    Phone Message    May a detailed message be left on voicemail: no     Reason for Call: Other: Layla w/HomeCare wanted Dr Gilbert to know-had injection 3-4 weeks ago, still having pain in upper back rated 8-9 out of 10, low back into legs 7-8 out of 10 and leg weakness. Also needs info for who Dr. Gilbert wanted her to see for On license of [facility] doctor. Call patient @ [phone] and [phone] 2nd     Action Taken: Message routed to:  [facility] ([facility]    Travel Screening: Not Applicable

## 2021-07-12 NOTE — TELEPHONE ENCOUNTER
Called and spoke to pt. Informed pt that Dr linton is out of the office and writer can schedule appt with Surgeon. Pt agreed to plan. Scheduled appt with surgeon. Pt confirmed appt date/time/location and mailed appt reminder to address on file.     Ryanne Romero ATC

## 2021-07-13 DIAGNOSIS — M54.2 CERVICAL PAIN: ICD-10-CM

## 2021-07-13 DIAGNOSIS — M51.26 LUMBAR HERNIATED DISC: Primary | ICD-10-CM

## 2021-07-22 ENCOUNTER — PATIENT OUTREACH (OUTPATIENT)
Dept: NURSING | Facility: CLINIC | Age: 51
End: 2021-07-22
Payer: COMMERCIAL

## 2021-07-22 NOTE — PROGRESS NOTES
Assessment & Plan     Candidiasis of skin    Recommend keeping area clean and dry-- may use OTC Tinactin powder or spray as needed to treat and help keep skin dry and prevent recurrence.  Follow-up if no change or worsening symptoms prn.    20 minutes spent on the date of the encounter doing chart review, patient visit and documentation     Ginger Pathak MD  [facility] CARE [place]    Pietro Mar is a 50 year old who presents for the following health issues     HPI     Presents with reddened rash beneath both breasts.  Comes and goes.  Worse in heat.      Review of Systems   Constitutional, HEENT, cardiovascular, pulmonary, GI, , musculoskeletal, neuro, skin, endocrine and psych systems are negative, except as otherwise noted.      Objective    /79   Pulse 103   Temp 99.1  F (37.3  C) (Tympanic)   Wt 89.4 kg (197 lb)   SpO2 97%   BMI 32.28 kg/m    Body mass index is 32.28 kg/m .  Physical Exam   GENERAL: healthy, alert and no distress  EYES: Eyes grossly normal to inspection, PERRL and conjunctivae and sclerae normal  NECK: no adenopathy, no asymmetry, masses, or scars and thyroid normal to palpation  MS: no gross musculoskeletal defects noted, no edema  SKIN: skin underneath both breasts reddened consistent with Candidal infection  NEURO: Normal strength and tone, mentation intact and speech normal  PSYCH: mentation appears normal, affect normal/bright                
normal...

## 2021-07-22 NOTE — PROGRESS NOTES
Clinic Care Coordination Contact  Community Health Worker Follow Up  Spoke with patient      Goals:   Goals Addressed as of 7/22/2021 at 11:11 AM                    Today    6/30/21       Financial Wellbeing- SSDI (pt-stated)   90%  90%    Added 11/17/20 by Renetta Orellana BSW      Goal Statement: I would like help applying and getting approved for Social Security Disability in the next 6 months.   Date Goal set: 10/29/2020  Barriers: Getting denied   Strengths: Asking for help, supportive [facility]   Date to Achieve By: 4/29/2021 // extended 10/20/2021  Patient expressed understanding of goal: yes     Action steps to achieve this goal:  1. My [facility] SW and I will meet with the Disability Specialists on March 26th to discuss next steps and options with my denial. (Completed, ongoing)  5/13  Pt stated moved up to Federal level pending. [facility] provides support/updates on this.   2. I will attend my first appointment at [facility] on March 30th with Johnson Novak, counselor. (Completed)    5/13 Pt states she connects with monthly telephone visits.  3. I will work with [facility] staff to schedule neuropsychological testing. (Completed)  4. I will give the CHW updates during outreach calls.    Updated: 5/13/2021 7/22/21 CHW    Patient states that her disability case is still pending and no update yet.     Patient will continue to work with  and will notify [facility] SW or CC if she has any questions or needs assistance.          Intervention and Education during outreach: CHW introduced self and role with CC. CHW inquired if patient needs any additional support or resources however patient declined. CHW inquired how appointments are going with counselor Earnest Novak. Patient states that it's going well, she missed a few appointments but has been following up regularly. Patient shares that she has appointment to see spine doctor next due to pain in legs. Patient understand that she can notify  CC if any support or resources needed.    CHW Plan: CHW to follow up in 1 month.    Violet Ramirez  [facility] Care Coordination  [facility], and [facility]    Phone: [phone]  ______________________________    Next Outreach:  8/22/21  Planned Outreach Frequency: 10/29/20  Preferred Phone Number: [phone]    Enrollment Date:  10/20/2020  Last Care Plan Assessment:  2/22/21

## 2021-08-03 ENCOUNTER — TELEPHONE (OUTPATIENT)
Dept: FAMILY MEDICINE | Facility: CLINIC | Age: 51
End: 2021-08-03

## 2021-08-03 NOTE — TELEPHONE ENCOUNTER
Forms were received from [facility] (Physicians Order Vitamin D3). Placed in POD C folder.    Prabha Gomez MA

## 2021-08-06 ENCOUNTER — ANCILLARY PROCEDURE (OUTPATIENT)
Dept: GENERAL RADIOLOGY | Facility: CLINIC | Age: 51
End: 2021-08-06
Attending: ORTHOPAEDIC SURGERY
Payer: COMMERCIAL

## 2021-08-06 ENCOUNTER — OFFICE VISIT (OUTPATIENT)
Dept: ORTHOPEDICS | Facility: CLINIC | Age: 51
End: 2021-08-06
Payer: COMMERCIAL

## 2021-08-06 VITALS — BODY MASS INDEX: 31.98 KG/M2 | HEIGHT: 66 IN | WEIGHT: 199 LBS

## 2021-08-06 DIAGNOSIS — G89.29 CHRONIC LOW BACK PAIN WITHOUT SCIATICA, UNSPECIFIED BACK PAIN LATERALITY: ICD-10-CM

## 2021-08-06 DIAGNOSIS — M54.2 CERVICAL PAIN: ICD-10-CM

## 2021-08-06 DIAGNOSIS — M54.50 CHRONIC LOW BACK PAIN WITHOUT SCIATICA, UNSPECIFIED BACK PAIN LATERALITY: ICD-10-CM

## 2021-08-06 DIAGNOSIS — M54.12 CERVICAL RADICULOPATHY: Primary | ICD-10-CM

## 2021-08-06 PROCEDURE — 72050 X-RAY EXAM NECK SPINE 4/5VWS: CPT | Mod: GC | Performed by: RADIOLOGY

## 2021-08-06 PROCEDURE — 99214 OFFICE O/P EST MOD 30 MIN: CPT | Performed by: ORTHOPAEDIC SURGERY

## 2021-08-06 ASSESSMENT — MIFFLIN-ST. JEOR: SCORE: 1527.28

## 2021-08-06 NOTE — PROGRESS NOTES
Teaching Flowsheet   Relevant Diagnosis: necl  Teaching Topic: preop     Person(s) involved in teaching:   Patient     Motivation Level:  Asks Questions: Yes  Eager to Learn: Yes  Cooperative: Yes  Receptive (willing/able to accept information): Yes  Any cultural factors/Evangelical beliefs that may influence understanding or compliance? No       Patient demonstrates understanding of the following:  Reason for the appointment, diagnosis and treatment plan: Yes  Knowledge of proper use of medications and conditions for which they are ordered (with special attention to potential side effects or drug interactions): Yes  Which situations necessitate calling provider and whom to contact: Yes       Teaching Concerns Addressed:        Proper use and care of meds. (medical equip, care aids, etc.): Yes  Nutritional needs and diet plan: Yes  Pain management techniques: Yes  Wound Care: Yes  How and/when to access community resources: Yes     Instructional Materials Used/Given: preop pkt     Time spent with patient: 15 minutes.

## 2021-08-06 NOTE — LETTER
8/6/2021         RE: Isabela Panchal  [address]  [place] [address]        Dear Colleague,    Thank you for referring your patient, Isabela Panchal, to the [facility] [place]. Please see a copy of my visit note below.    Spine Surgery Consultation    REFERRING PHYSICIAN: Man Gilbert   PRIMARY CARE PHYSICIAN: Felisha Briggs           Chief Complaint:   Consult (Cervical disc herniation, Dr. Gilbert )      History of Present Illness:  Symptom Profile Including: location of symptoms, onset, severity, exacerbating/alleviating factors, previous treatments:        Isabela Panchal is a 50 year old female who is referred for evaluation of both neck pain and back pain as well as lumbar radiculopathy and cervical radiculopathy    With regards to her neck, she had a severe cervical disc herniation a couple of years ago and ever since that time has been dealing with chronic neck pain a feeling of popping and cracking in the neck as well as numbness and tingling in her hands and fingers.  It is worse with any neck range of motion.  She has had a series of injections and she feels like they help but last only a very short time.  She has been in near continuous therapy including formal physical therapy as well as water-based aerobic therapy.  She is taking muscle relaxants and gabapentin.    With regards to her lumbar spine she has diffuse low back pain throughout the low back and into the buttocks.  She feels like she has some cramping in her calfs.  It really hurts to get up and try to walk and the muscles there feel like they spasm.         Past Medical History:     Past Medical History:   Diagnosis Date     Abnormal Pap smear of cervix 2019    see problem list     Allergic rhinitis due to allergen      Anemia      Breathing difficulty      Cervical high risk HPV (human papillomavirus) test positive 02/13/2018, 2019    type 16; see problem list      Chest pain      Generalised anxiety disorder 9/6/2012     Headache      Heart trouble      High blood triglycerides 2/26/2016     History of blood transfusion     unsure     Hoarseness      Hypertension      Hypoglycemia 3/10/2013     Impaired fasting glucose 8/16/2014     Insomnia 9/6/2012     Irritable bowel syndrome      Itchy eyes      MEDICAL HISTORY OF -     hx of right wrist dislocation     Migraine      Mild intermittent asthma     allergy or URI triggered      Moderate recurrent major depression (H) 9/6/2012     Nasal congestion      Numbness      Obesity, unspecified (OBESE) 8/21/2014     Paroxysmal supraventricular tachycardia (H)     4/00     PONV (postoperative nausea and vomiting)      Ringing in ears      Sleep difficulties      Sneezing      Sore throat      Vertigo      Weakness      Weight gain             Past Surgical History:     Past Surgical History:   Procedure Laterality Date     COLONOSCOPY  4/26/2013    Procedure: COLONOSCOPY;;  Surgeon: Jim Humphries MD;  Location:  GI     ENT SURGERY      deviated septum     HEAD & NECK SURGERY       LAPAROSCOPIC SALPINGO-OOPHORECTOMY  1/20/2014    Procedure: LAPAROSCOPIC SALPINGO-OOPHORECTOMY;  Laparoscopic Left Salpingo Oophorectomy ;  Surgeon: Chani Del Rosario MD;  Location: [facility] OR     LUMPECTOMY BREAST      right     ORTHOPEDIC SURGERY      knee     ORTHOPEDIC SURGERY      foot     SOFT TISSUE SURGERY      lymphoma face and armpit     SOFT TISSUE SURGERY       ZZC NONSPECIFIC PROCEDURE      Plastic repair of facial lac     ZZC NONSPECIFIC PROCEDURE      Lipoma removed from arm close to the tail of te breast     ZZC NONSPECIFIC PROCEDURE      Plastic repair of facial lac     ZZC NONSPECIFIC PROCEDURE      Knee surgery     ZZC NONSPECIFIC PROCEDURE      Miscarriage x 2            Social History:     Social History     Tobacco Use     Smoking status: Never Smoker     Smokeless tobacco: Never Used   Substance Use Topics     Alcohol use:  "Not Currently            Family History:     Family History   Problem Relation Age of Onset     Alzheimer Disease Maternal Grandfather      Arthritis Maternal Grandmother      Heart Disease Maternal Grandmother      Heart Failure Maternal Grandmother      Thyroid Disease Mother      Allergy (Severe) Father             Allergies:     Allergies   Allergen Reactions     Benzoin Rash     Adhesive Tape      blistering     Allegra [Fexofenadine]      Kidney pain     Cucumber Extract      Throat and ears itchy and throat feels \"icky\"     Fexofenadine Hydrochloride      allegra - low back pain     Ibuprofen      palpitations     Lexapro      Wt gain     No Clinical Screening - See Comments      Ramon      Itchy ears and throat, throat feel \"icky\"     Peanuts [Nuts]      Itchy ears and throat, throat feel \"icky\"     Seasonal Allergies             Medications:     Current Outpatient Medications   Medication     albuterol (PROAIR HFA/PROVENTIL HFA/VENTOLIN HFA) 108 (90 Base) MCG/ACT inhaler     atorvastatin (LIPITOR) 40 MG tablet     Calcium Carbonate-Vitamin D (CALCIUM + D PO)     cetirizine (ZYRTEC) 10 MG tablet     diazepam (VALIUM) 5 MG tablet     Divalproex Sodium (DEPAKOTE PO)     etodolac (LODINE) 500 MG tablet     etodolac (LODINE) 500 MG tablet     etodolac (LODINE) 500 MG tablet     fenofibrate (TRICOR) 48 MG tablet     fluticasone (FLONASE) 50 MCG/ACT nasal spray     lisinopril (ZESTRIL) 10 MG tablet     loratadine-pseudoePHEDrine (CLARITIN-D 24-HOUR)  MG 24 hr tablet     metroNIDAZOLE (METROCREAM) 0.75 % external cream     MIRENA 20 MCG/24HR IU IUD     montelukast (SINGULAIR) 10 MG tablet     RESTASIS 0.05 % ophthalmic emulsion     tiZANidine (ZANAFLEX) 4 MG tablet     tiZANidine (ZANAFLEX) 4 MG tablet     venlafaxine (EFFEXOR-XR) 150 MG 24 hr capsule     Current Facility-Administered Medications   Medication     levonorgestrel (MIRENA) 20 MCG/24HR IUD 20 mcg     lidocaine (PF) 0.5 % injection SOLN 8 mL " "    lidocaine (PF) 0.5 % injection SOLN 8 mL     triamcinolone (KENALOG-40) injection 40 mg     triamcinolone (KENALOG-40) injection 40 mg             Review of Systems:     A 10 point ROS was performed and reviewed. Specific responses to these questions are noted at the end of the document.         Physical Exam:   Vitals: Ht 1.665 m (5' 5.55\")   Wt 90.3 kg (199 lb)   BMI 32.56 kg/m    Constitutional: awake, alert, cooperative, no apparent distress, appears stated age.    Eyes: The sclera are white.  Ears, Nose, Throat: The trachea is midline.  Psychiatric: The patient has a normal affect.  Respiratory: breathing non-labored  Cardiovascular: The extremities are warm and perfused.  Skin: no obvious rashes or lesions.  Musculoskeletal, Neurologic, and Spine:     Cervical spine:    Appearance -no gross step-offs, kyphosis.    Motor -     C5: Deltoids R 4/5 and L 4/5 strength    C6: Biceps R 4/5 and L 4/5 strength     C7: Triceps R 4/5 and L 4/5 strength     C8:  R 4/5 and L 4/5 strength     T1: Dorsal interossei R 4/5 and L 4/5 strength,        Sensation: intact to light touch in C5-[id number] in hands       Special Tests -      Lhermitte's Test - Negative     Spurling's Test - produces neck pain    Exam limited by effort and pain      Chisholm's Test - Negative       Lumbar Spine:    Appearance - No gross stepoffs or deformities    Motor -     L2-3: Hip flexion 5/5 R and 5/5 L strength          L3/4:  Knee extension R 5/5 and L 5/5 strength         L4/5:  Foot dorsiflexion R 5/5 L 5/5 and       EHL dorsiflexion R 4/5 L 4/5 strength         S1:  Plantarflexion/Peroneal Muscles  R 5/5 and L 5/5 strength    Sensation: intact to light touch L3-S1 distribution BLE      Neurologic:      REFLEXES Left Right   Biceps 1+ 1+   Triceps 1+ 1+   Brachioradialis 1+ 1+   Patella 1+ 1+   Ankle jerk 1+ 1+   Babinski No upgoing great toe No upgoing great toe   Clonus 0 beats 0 beats     Hip Exam:  No pain with hip log roll " and no tenderness over the greater trochanters.    Alignment:  Patient stands with a neutral standing sagittal balance.           Imaging:   We ordered and independently reviewed new radiographs at this clinic visit. The results were discussed with the patient.  Findings include:    Cervical radiographs today show diffuse spondylosis worst C5-7 with large anterior and posterior osteophyte formation    I reviewed her cervical MRI from May 2021 which shows multilevel spondylosis including left foraminal stenosis C3-4 and severe spondylosis C5-7 with mild to moderate foraminal stenosis.    I reviewed her lumbar MRI from May 2021 which shows diffuse spondylosis but no severe stenosis.             Assessment and Plan:   Assessment:  50 year old female with lumbar and cervical spondylosis.    With regards to her lumbar spine given the absence of deformity or severe neurologic compression I recommended against surgery.  I think she has significant weakness in her core paraspinals and gluteal muscles and would benefit from an intensive rehabilitation program.  I offered her a referral back for this.    With regards to her cervical spine, she has been dealing with these disc issues for a number of years now and is failed conservative treatment with therapy injections and medications.  Given this, we discussed a possible anterior cervical decompression and fusion from C5-7 with iliac crest autograft.    Risks of this surgery include risk of infection, risk of dural tear resulting in CSF leak which might result in headaches, or possible need for lumbar drain, or possible revision surgery in the setting of a persistent leak. Possible nerve root injury resulting in numbness weakness or paralysis into the arms or legs. Possible radiculitis which could result in similar symptoms or could result in significant neurogenic type pain. Risk of incomplete decompression which might require revision surgery in the future.  Risk of  adjacent segment problems requiring surgery in the future. Risk of incomplete relief of symptoms possibly requiring revision surgery in the future. Furthermore, although rare, there are risks of major vessel injury such as to the vertebral artery or major organ injury such as to the esophagus or trachea from the surgery.  There are risks of dysphagia or dysphonia which can make it hard to swallow or talk. I explained that in fact most patients will have some period of swallowing difficulty following the surgery.  In some cases these things occur as a result of laryngeal nerve injury, and we discussed this possibility as well. There is a risk of hematoma formation requiring urgent return to the OR for airway compromise.  There is a risk of blood clots in the legs or the lungs.  Lastly, although rare, there are certainly risks of the anesthetic including stroke heart attack and death.    We talked about the need for collar wear and the expected outcomes and the fact that it takes 3 to 6 months to get over the surgery and up to 12 months to fully heal the bones.    We also talked about incomplete symptom relief.  I told her to expect perhaps a 50 to 60% improvement in neck pain and that I cannot guarantee the pain would go completely away.  She understands and would like to proceed.  We will get her updated imaging and have her see our anesthesia clinic and move forward with scheduling.    Respectfully,  Shaw Lal MD  Spine Surgery  [facility]        Teaching Flowsheet   Relevant Diagnosis: necl  Teaching Topic: preop     Person(s) involved in teaching:   Patient     Motivation Level:  Asks Questions: Yes  Eager to Learn: Yes  Cooperative: Yes  Receptive (willing/able to accept information): Yes  Any cultural factors/Bahai beliefs that may influence understanding or compliance? No       Patient demonstrates understanding of the following:  Reason for the appointment, diagnosis and  treatment plan: Yes  Knowledge of proper use of medications and conditions for which they are ordered (with special attention to potential side effects or drug interactions): Yes  Which situations necessitate calling provider and whom to contact: Yes       Teaching Concerns Addressed:        Proper use and care of meds. (medical equip, care aids, etc.): Yes  Nutritional needs and diet plan: Yes  Pain management techniques: Yes  Wound Care: Yes  How and/when to access community resources: Yes     Instructional Materials Used/Given: preop pkt     Time spent with patient: 15 minutes.      Shaw Lal MD

## 2021-08-06 NOTE — NURSING NOTE
"Reason For Visit:   Chief Complaint   Patient presents with     Consult     Cervical disc herniation, Dr. Gilbert        Ht 1.665 m (5' 5.55\")   Wt 90.3 kg (199 lb)   BMI 32.56 kg/m      Pain Assessment  Patient Currently in Pain: Yes  0-10 Pain Scale: 6  Primary Pain Location: Back    Jennifer Formato, LPN  "

## 2021-08-06 NOTE — PROGRESS NOTES
Spine Surgery Consultation    REFERRING PHYSICIAN: Man Gilbert   PRIMARY CARE PHYSICIAN: Felisha Briggs           Chief Complaint:   Consult (Cervical disc herniation, Dr. Gilbert )      History of Present Illness:  Symptom Profile Including: location of symptoms, onset, severity, exacerbating/alleviating factors, previous treatments:        Isabela Panchal is a 50 year old female who is referred for evaluation of both neck pain and back pain as well as lumbar radiculopathy and cervical radiculopathy    With regards to her neck, she had a severe cervical disc herniation a couple of years ago and ever since that time has been dealing with chronic neck pain a feeling of popping and cracking in the neck as well as numbness and tingling in her hands and fingers.  It is worse with any neck range of motion.  She has had a series of injections and she feels like they help but last only a very short time.  She has been in near continuous therapy including formal physical therapy as well as water-based aerobic therapy.  She is taking muscle relaxants and gabapentin.    With regards to her lumbar spine she has diffuse low back pain throughout the low back and into the buttocks.  She feels like she has some cramping in her calfs.  It really hurts to get up and try to walk and the muscles there feel like they spasm.         Past Medical History:     Past Medical History:   Diagnosis Date     Abnormal Pap smear of cervix 2019    see problem list     Allergic rhinitis due to allergen      Anemia      Breathing difficulty      Cervical high risk HPV (human papillomavirus) test positive 02/13/2018, 2019    type 16; see problem list     Chest pain      Generalised anxiety disorder 9/6/2012     Headache      Heart trouble      High blood triglycerides 2/26/2016     History of blood transfusion     unsure     Hoarseness      Hypertension      Hypoglycemia 3/10/2013     Impaired fasting glucose 8/16/2014      Insomnia 9/6/2012     Irritable bowel syndrome      Itchy eyes      MEDICAL HISTORY OF -     hx of right wrist dislocation     Migraine      Mild intermittent asthma     allergy or URI triggered      Moderate recurrent major depression (H) 9/6/2012     Nasal congestion      Numbness      Obesity, unspecified (OBESE) 8/21/2014     Paroxysmal supraventricular tachycardia (H)     4/00     PONV (postoperative nausea and vomiting)      Ringing in ears      Sleep difficulties      Sneezing      Sore throat      Vertigo      Weakness      Weight gain             Past Surgical History:     Past Surgical History:   Procedure Laterality Date     COLONOSCOPY  4/26/2013    Procedure: COLONOSCOPY;;  Surgeon: Jim Humphries MD;  Location:  [facility]     ENT SURGERY      deviated septum     HEAD & NECK SURGERY       LAPAROSCOPIC SALPINGO-OOPHORECTOMY  1/20/2014    Procedure: LAPAROSCOPIC SALPINGO-OOPHORECTOMY;  Laparoscopic Left Salpingo Oophorectomy ;  Surgeon: Chani Del Rosario MD;  Location: [facility]     LUMPECTOMY BREAST      right     ORTHOPEDIC SURGERY      knee     ORTHOPEDIC SURGERY      foot     SOFT TISSUE SURGERY      lymphoma face and armpit     SOFT TISSUE SURGERY       ZZC NONSPECIFIC PROCEDURE      Plastic repair of facial lac     ZZC NONSPECIFIC PROCEDURE      Lipoma removed from arm close to the tail of te breast     ZZC NONSPECIFIC PROCEDURE      Plastic repair of facial lac     ZZC NONSPECIFIC PROCEDURE      Knee surgery     ZZC NONSPECIFIC PROCEDURE      Miscarriage x 2            Social History:     Social History     Tobacco Use     Smoking status: Never Smoker     Smokeless tobacco: Never Used   Substance Use Topics     Alcohol use: Not Currently            Family History:     Family History   Problem Relation Age of Onset     Alzheimer Disease Maternal Grandfather      Arthritis Maternal Grandmother      Heart Disease Maternal Grandmother      Heart Failure Maternal Grandmother      Thyroid Disease  "Mother      Allergy (Severe) Father             Allergies:     Allergies   Allergen Reactions     Benzoin Rash     Adhesive Tape      blistering     Allegra [Fexofenadine]      Kidney pain     Cucumber Extract      Throat and ears itchy and throat feels \"icky\"     Fexofenadine Hydrochloride      allegra - low back pain     Ibuprofen      palpitations     Lexapro      Wt gain     No Clinical Screening - See Comments      Ramon      Itchy ears and throat, throat feel \"icky\"     Peanuts [Nuts]      Itchy ears and throat, throat feel \"icky\"     Seasonal Allergies             Medications:     Current Outpatient Medications   Medication     albuterol (PROAIR HFA/PROVENTIL HFA/VENTOLIN HFA) 108 (90 Base) MCG/ACT inhaler     atorvastatin (LIPITOR) 40 MG tablet     Calcium Carbonate-Vitamin D (CALCIUM + D PO)     cetirizine (ZYRTEC) 10 MG tablet     diazepam (VALIUM) 5 MG tablet     Divalproex Sodium (DEPAKOTE PO)     etodolac (LODINE) 500 MG tablet     etodolac (LODINE) 500 MG tablet     etodolac (LODINE) 500 MG tablet     fenofibrate (TRICOR) 48 MG tablet     fluticasone (FLONASE) 50 MCG/ACT nasal spray     lisinopril (ZESTRIL) 10 MG tablet     loratadine-pseudoePHEDrine (CLARITIN-D 24-HOUR)  MG 24 hr tablet     metroNIDAZOLE (METROCREAM) 0.75 % external cream     MIRENA 20 MCG/24HR IU IUD     montelukast (SINGULAIR) 10 MG tablet     RESTASIS 0.05 % ophthalmic emulsion     tiZANidine (ZANAFLEX) 4 MG tablet     tiZANidine (ZANAFLEX) 4 MG tablet     venlafaxine (EFFEXOR-XR) 150 MG 24 hr capsule     Current Facility-Administered Medications   Medication     levonorgestrel (MIRENA) 20 MCG/24HR IUD 20 mcg     lidocaine (PF) 0.5 % injection SOLN 8 mL     lidocaine (PF) 0.5 % injection SOLN 8 mL     triamcinolone (KENALOG-40) injection 40 mg     triamcinolone (KENALOG-40) injection 40 mg             Review of Systems:     A 10 point ROS was performed and reviewed. Specific responses to these questions are noted at the " "end of the document.         Physical Exam:   Vitals: Ht 1.665 m (5' 5.55\")   Wt 90.3 kg (199 lb)   BMI 32.56 kg/m    Constitutional: awake, alert, cooperative, no apparent distress, appears stated age.    Eyes: The sclera are white.  Ears, Nose, Throat: The trachea is midline.  Psychiatric: The patient has a normal affect.  Respiratory: breathing non-labored  Cardiovascular: The extremities are warm and perfused.  Skin: no obvious rashes or lesions.  Musculoskeletal, Neurologic, and Spine:     Cervical spine:    Appearance -no gross step-offs, kyphosis.    Motor -     C5: Deltoids R 4/5 and L 4/5 strength    C6: Biceps R 4/5 and L 4/5 strength     C7: Triceps R 4/5 and L 4/5 strength     C8:  R 4/5 and L 4/5 strength     T1: Dorsal interossei R 4/5 and L 4/5 strength,        Sensation: intact to light touch in [id number] in hands       Special Tests -      Lhermitte's Test - Negative     Spurling's Test - produces neck pain    Exam limited by effort and pain      Chisholm's Test - Negative       Lumbar Spine:    Appearance - No gross stepoffs or deformities    Motor -     L2-3: Hip flexion 5/5 R and 5/5 L strength          L3/4:  Knee extension R 5/5 and L 5/5 strength         L4/5:  Foot dorsiflexion R 5/5 L 5/5 and       EHL dorsiflexion R 4/5 L 4/5 strength         S1:  Plantarflexion/Peroneal Muscles  R 5/5 and L 5/5 strength    Sensation: intact to light touch L3-S1 distribution BLE      Neurologic:      REFLEXES Left Right   Biceps 1+ 1+   Triceps 1+ 1+   Brachioradialis 1+ 1+   Patella 1+ 1+   Ankle jerk 1+ 1+   Babinski No upgoing great toe No upgoing great toe   Clonus 0 beats 0 beats     Hip Exam:  No pain with hip log roll and no tenderness over the greater trochanters.    Alignment:  Patient stands with a neutral standing sagittal balance.           Imaging:   We ordered and independently reviewed new radiographs at this clinic visit. The results were discussed with the patient.  Findings " include:    Cervical radiographs today show diffuse spondylosis worst C5-7 with large anterior and posterior osteophyte formation    I reviewed her cervical MRI from May 2021 which shows multilevel spondylosis including left foraminal stenosis C3-4 and severe spondylosis C5-7 with mild to moderate foraminal stenosis.    I reviewed her lumbar MRI from May 2021 which shows diffuse spondylosis but no severe stenosis.             Assessment and Plan:   Assessment:  50 year old female with lumbar and cervical spondylosis.    With regards to her lumbar spine given the absence of deformity or severe neurologic compression I recommended against surgery.  I think she has significant weakness in her core paraspinals and gluteal muscles and would benefit from an intensive rehabilitation program.  I offered her a referral back for this.    With regards to her cervical spine, she has been dealing with these disc issues for a number of years now and is failed conservative treatment with therapy injections and medications.  Given this, we discussed a possible anterior cervical decompression and fusion from C5-7 with iliac crest autograft.    Risks of this surgery include risk of infection, risk of dural tear resulting in CSF leak which might result in headaches, or possible need for lumbar drain, or possible revision surgery in the setting of a persistent leak. Possible nerve root injury resulting in numbness weakness or paralysis into the arms or legs. Possible radiculitis which could result in similar symptoms or could result in significant neurogenic type pain. Risk of incomplete decompression which might require revision surgery in the future.  Risk of adjacent segment problems requiring surgery in the future. Risk of incomplete relief of symptoms possibly requiring revision surgery in the future. Furthermore, although rare, there are risks of major vessel injury such as to the vertebral artery or major organ injury such as to  the esophagus or trachea from the surgery.  There are risks of dysphagia or dysphonia which can make it hard to swallow or talk. I explained that in fact most patients will have some period of swallowing difficulty following the surgery.  In some cases these things occur as a result of laryngeal nerve injury, and we discussed this possibility as well. There is a risk of hematoma formation requiring urgent return to the OR for airway compromise.  There is a risk of blood clots in the legs or the lungs.  Lastly, although rare, there are certainly risks of the anesthetic including stroke heart attack and death.    We talked about the need for collar wear and the expected outcomes and the fact that it takes 3 to 6 months to get over the surgery and up to 12 months to fully heal the bones.    We also talked about incomplete symptom relief.  I told her to expect perhaps a 50 to 60% improvement in neck pain and that I cannot guarantee the pain would go completely away.  She understands and would like to proceed.  We will get her updated imaging and have her see our anesthesia clinic and move forward with scheduling.    Respectfully,  Shaw Lal MD  Spine Surgery  [facility]

## 2021-08-09 ENCOUNTER — TELEPHONE (OUTPATIENT)
Dept: ORTHOPEDICS | Facility: CLINIC | Age: 51
End: 2021-08-09

## 2021-08-09 ENCOUNTER — PATIENT OUTREACH (OUTPATIENT)
Dept: CARE COORDINATION | Facility: CLINIC | Age: 51
End: 2021-08-09

## 2021-08-09 NOTE — TELEPHONE ENCOUNTER
RN called and spoke. Patient has a lot of post op questions. Patient stated she has difficulty remembering things. RN spent at great length answering her questions and assured her that she can always reach out if she has any questions or concerns pertaining to the surgery. RN also suggested if RN can make a phone visit with Dr. Lal so he can go through everything with her. Patient expressed understanding and will call as needed. RN did remind her of the upcoming CT cervical appt that she needs to obtain. Patient expressed understanding.      Babak Rivera RN        [facility]    Phone Message    May a detailed message be left on voicemail: yes     Reason for Call: Other: Patient has some questions regarding her recent appt, she would like a call back as she said the nurse went really fast and she needs to get clarification on next steps. Please call her back at [phone]     Action Taken: Message routed to:  [facility] Ortho    Travel Screening: Not Applicable

## 2021-08-10 ENCOUNTER — TELEPHONE (OUTPATIENT)
Dept: ORTHOPEDICS | Facility: CLINIC | Age: 51
End: 2021-08-10

## 2021-08-10 NOTE — TELEPHONE ENCOUNTER
RN called and spoke with Renee patient's . Patient has TBI and has problems the surgery discussion. RN suggested to make an appt so Renee will be present for the appt as well patient's father who is vehemently opposed to this surgery.  RN scheduled for next Monday.    Babak Rivera RN       [facility]    Phone Message    May a detailed message be left on voicemail: yes     Reason for Call: Other:  from [facility] is asking to clarify office visit. In ref to surgery. Please contact Renee  Patient gave verbal to speak with Renee Lal about office visit and steps moving forward     Action Taken: Message routed to:  [facility] ortho    Travel Screening: Not Applicable

## 2021-08-12 ENCOUNTER — ANCILLARY PROCEDURE (OUTPATIENT)
Dept: CT IMAGING | Facility: CLINIC | Age: 51
End: 2021-08-12
Attending: ORTHOPAEDIC SURGERY
Payer: COMMERCIAL

## 2021-08-12 DIAGNOSIS — M54.2 CERVICAL PAIN: ICD-10-CM

## 2021-08-12 DIAGNOSIS — M54.12 CERVICAL RADICULOPATHY: ICD-10-CM

## 2021-08-12 PROCEDURE — 72125 CT NECK SPINE W/O DYE: CPT | Mod: GC | Performed by: RADIOLOGY

## 2021-08-13 DIAGNOSIS — Z53.9 DIAGNOSIS NOT YET DEFINED: Primary | ICD-10-CM

## 2021-08-13 PROCEDURE — G0179 MD RECERTIFICATION HHA PT: HCPCS | Performed by: FAMILY MEDICINE

## 2021-08-16 ENCOUNTER — OFFICE VISIT (OUTPATIENT)
Dept: ORTHOPEDICS | Facility: CLINIC | Age: 51
End: 2021-08-16
Payer: COMMERCIAL

## 2021-08-16 ENCOUNTER — TELEPHONE (OUTPATIENT)
Dept: FAMILY MEDICINE | Facility: CLINIC | Age: 51
End: 2021-08-16

## 2021-08-16 DIAGNOSIS — M54.12 CERVICAL RADICULOPATHY: Primary | ICD-10-CM

## 2021-08-16 PROCEDURE — 99214 OFFICE O/P EST MOD 30 MIN: CPT | Performed by: ORTHOPAEDIC SURGERY

## 2021-08-16 NOTE — TELEPHONE ENCOUNTER
Reason for Call:  Form, our goal is to have forms completed with 72 hours, however, some forms may require a visit or additional information.    Type of letter, form or note:  Home Health Certification    Who is the form from?: Home care    Where did the form come from: form was faxed in    What clinic location was the form placed at?: [facility]    Where the form was placed: placed in pod [facility] providers signature folder Box/Folder    What number is listed as a contact on the form?: [phone]       Additional comments:     Call taken on 8/16/2021 at 9:33 AM by Corin Hammer
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
## 2021-08-17 ENCOUNTER — TELEPHONE (OUTPATIENT)
Dept: ORTHOPEDICS | Facility: CLINIC | Age: 51
End: 2021-08-17

## 2021-08-17 DIAGNOSIS — M54.12 CERVICAL RADICULOPATHY: Primary | ICD-10-CM

## 2021-08-17 DIAGNOSIS — M54.2 CERVICAL PAIN: ICD-10-CM

## 2021-08-17 NOTE — TELEPHONE ENCOUNTER
Patient is scheduled for surgery with Dr. Lal    Spoke with: Patient's Renee    Date of Surgery: 10/13/21    Location: [facility]    Post op: 6 week    Pre op with Provider: Complete    H&P: Scheduled with PAC 9/22/21    Pre-procedure COVID-19 Test: [facility] 10/12/21    Additional imaging/appointments: N/A    Surgery packet: Received in clinic     Additional comments: N/A

## 2021-08-17 NOTE — TELEPHONE ENCOUNTER
RN called Renee and placed a cervical PT in the system as patient would like to do some prior to surgery.    Babak Rivera RN       Health Call Center    Phone Message:  Renee from [facility] called, requesting a PT ORDER for pt's NECK.      Pt is scheduled at [facility], on 9/10/21.     Pt's PT Neck Order can be submitted into [facility], as pt's clinic is a [place] clinic.     Please contact Renee at [phone] with any questions or follow up.      May a detailed message be left on voicemail: Yes     Reason for Call: Other: PT Order:  Neck     Action Taken: Message routed to:  [facility] ([facility]): Team to MD    Travel Screening: Not Applicable
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
## 2021-08-18 ENCOUNTER — HOSPITAL ENCOUNTER (EMERGENCY)
Facility: CLINIC | Age: 51
Discharge: HOME | End: 2021-08-19
Attending: EMERGENCY MEDICINE | Admitting: EMERGENCY MEDICINE
Payer: COMMERCIAL

## 2021-08-18 DIAGNOSIS — R55 NEAR SYNCOPE: ICD-10-CM

## 2021-08-18 LAB
ANION GAP SERPL CALCULATED.3IONS-SCNC: 7 MMOL/L (ref 3–14)
BASOPHILS # BLD AUTO: 0 10E3/UL (ref 0–0.2)
BASOPHILS NFR BLD AUTO: 1 %
BUN SERPL-MCNC: 10 MG/DL (ref 7–30)
CALCIUM SERPL-MCNC: 8.4 MG/DL (ref 8.5–10.1)
CHLORIDE BLD-SCNC: 107 MMOL/L (ref 94–109)
CO2 SERPL-SCNC: 24 MMOL/L (ref 20–32)
CREAT SERPL-MCNC: 1.11 MG/DL (ref 0.52–1.04)
EOSINOPHIL # BLD AUTO: 0.2 10E3/UL (ref 0–0.7)
EOSINOPHIL NFR BLD AUTO: 3 %
ERYTHROCYTE [DISTWIDTH] IN BLOOD BY AUTOMATED COUNT: 11.7 % (ref 10–15)
GFR SERPL CREATININE-BSD FRML MDRD: 58 ML/MIN/1.73M2
GLUCOSE BLD-MCNC: 216 MG/DL (ref 70–99)
GLUCOSE BLDC GLUCOMTR-MCNC: 175 MG/DL (ref 70–99)
HCT VFR BLD AUTO: 36.8 % (ref 35–47)
HGB BLD-MCNC: 12.2 G/DL (ref 11.7–15.7)
HOLD SPECIMEN: NORMAL
HOLD SPECIMEN: NORMAL
IMM GRANULOCYTES # BLD: 0 10E3/UL
IMM GRANULOCYTES NFR BLD: 0 %
LYMPHOCYTES # BLD AUTO: 2.3 10E3/UL (ref 0.8–5.3)
LYMPHOCYTES NFR BLD AUTO: 37 %
MCH RBC QN AUTO: 29.7 PG (ref 26.5–33)
MCHC RBC AUTO-ENTMCNC: 33.2 G/DL (ref 31.5–36.5)
MCV RBC AUTO: 90 FL (ref 78–100)
MONOCYTES # BLD AUTO: 0.3 10E3/UL (ref 0–1.3)
MONOCYTES NFR BLD AUTO: 5 %
NEUTROPHILS # BLD AUTO: 3.3 10E3/UL (ref 1.6–8.3)
NEUTROPHILS NFR BLD AUTO: 54 %
NRBC # BLD AUTO: 0 10E3/UL
NRBC BLD AUTO-RTO: 0 /100
PLATELET # BLD AUTO: 235 10E3/UL (ref 150–450)
POTASSIUM BLD-SCNC: 4.1 MMOL/L (ref 3.4–5.3)
RBC # BLD AUTO: 4.11 10E6/UL (ref 3.8–5.2)
SODIUM SERPL-SCNC: 138 MMOL/L (ref 133–144)
TROPONIN I SERPL-MCNC: <0.015 UG/L (ref 0–0.04)
WBC # BLD AUTO: 6.1 10E3/UL (ref 4–11)

## 2021-08-18 PROCEDURE — 258N000003 HC RX IP 258 OP 636: Performed by: EMERGENCY MEDICINE

## 2021-08-18 PROCEDURE — 85025 COMPLETE CBC W/AUTO DIFF WBC: CPT | Performed by: FAMILY MEDICINE

## 2021-08-18 PROCEDURE — 93005 ELECTROCARDIOGRAM TRACING: CPT | Performed by: EMERGENCY MEDICINE

## 2021-08-18 PROCEDURE — 93010 ELECTROCARDIOGRAM REPORT: CPT | Performed by: EMERGENCY MEDICINE

## 2021-08-18 PROCEDURE — 84484 ASSAY OF TROPONIN QUANT: CPT | Performed by: EMERGENCY MEDICINE

## 2021-08-18 PROCEDURE — 96360 HYDRATION IV INFUSION INIT: CPT | Performed by: EMERGENCY MEDICINE

## 2021-08-18 PROCEDURE — 85025 COMPLETE CBC W/AUTO DIFF WBC: CPT | Performed by: EMERGENCY MEDICINE

## 2021-08-18 PROCEDURE — 258N000003 HC RX IP 258 OP 636

## 2021-08-18 PROCEDURE — 80048 BASIC METABOLIC PNL TOTAL CA: CPT | Performed by: EMERGENCY MEDICINE

## 2021-08-18 PROCEDURE — 99285 EMERGENCY DEPT VISIT HI MDM: CPT | Mod: 25 | Performed by: EMERGENCY MEDICINE

## 2021-08-18 PROCEDURE — 96361 HYDRATE IV INFUSION ADD-ON: CPT | Performed by: EMERGENCY MEDICINE

## 2021-08-18 PROCEDURE — 36415 COLL VENOUS BLD VENIPUNCTURE: CPT | Performed by: FAMILY MEDICINE

## 2021-08-18 RX ORDER — SODIUM CHLORIDE 9 MG/ML
INJECTION, SOLUTION INTRAVENOUS
Status: COMPLETED
Start: 2021-08-18 | End: 2021-08-18

## 2021-08-18 RX ORDER — SODIUM CHLORIDE 9 MG/ML
INJECTION, SOLUTION INTRAVENOUS ONCE
Status: COMPLETED | OUTPATIENT
Start: 2021-08-18 | End: 2021-08-18

## 2021-08-18 RX ADMIN — SODIUM CHLORIDE: 9 INJECTION, SOLUTION INTRAVENOUS at 22:56

## 2021-08-18 RX ADMIN — SODIUM CHLORIDE 1000 ML: 9 INJECTION, SOLUTION INTRAVENOUS at 21:40

## 2021-08-18 RX ADMIN — SODIUM CHLORIDE 1000 ML: 9 INJECTION, SOLUTION INTRAVENOUS at 23:00

## 2021-08-18 RX ADMIN — Medication 1000 ML: at 21:40

## 2021-08-18 NOTE — TELEPHONE ENCOUNTER
FUTURE VISIT INFORMATION      SURGERY INFORMATION:    Date: 10/13/21    Location: [facility] OR    Surgeon:  Shaw Lal MD    Anesthesia Type:  General    Procedure: Cervical 5 to 7 anterior cervical decompression and fusion with medtronic plate and screws, interbody device, use of fluoroscopy, microscope, and left sided iliac crest autograft    Consult: ov     RECORDS REQUESTED FROM:       Primary Care Provider: Felisha Briggs MD- [facility]    Pertinent Medical History: Hypertension    Most recent EKG+ Tracin14    Most recent ECHO: 10/3/2002

## 2021-08-19 ENCOUNTER — APPOINTMENT (OUTPATIENT)
Dept: CT IMAGING | Facility: CLINIC | Age: 51
End: 2021-08-19
Attending: EMERGENCY MEDICINE
Payer: COMMERCIAL

## 2021-08-19 VITALS
SYSTOLIC BLOOD PRESSURE: 112 MMHG | OXYGEN SATURATION: 96 % | RESPIRATION RATE: 13 BRPM | DIASTOLIC BLOOD PRESSURE: 73 MMHG | TEMPERATURE: 98.7 F | HEART RATE: 81 BPM

## 2021-08-19 LAB
ATRIAL RATE - MUSE: 84 BPM
DIASTOLIC BLOOD PRESSURE - MUSE: NORMAL MMHG
INTERPRETATION ECG - MUSE: NORMAL
P AXIS - MUSE: 51 DEGREES
PR INTERVAL - MUSE: 220 MS
QRS DURATION - MUSE: 82 MS
QT - MUSE: 398 MS
QTC - MUSE: 470 MS
R AXIS - MUSE: 48 DEGREES
SYSTOLIC BLOOD PRESSURE - MUSE: NORMAL MMHG
T AXIS - MUSE: 57 DEGREES
VENTRICULAR RATE- MUSE: 84 BPM

## 2021-08-19 PROCEDURE — 250N000013 HC RX MED GY IP 250 OP 250 PS 637: Performed by: EMERGENCY MEDICINE

## 2021-08-19 PROCEDURE — 70450 CT HEAD/BRAIN W/O DYE: CPT

## 2021-08-19 RX ORDER — ACETAMINOPHEN 325 MG/1
650 TABLET ORAL ONCE
Status: COMPLETED | OUTPATIENT
Start: 2021-08-19 | End: 2021-08-19

## 2021-08-19 RX ADMIN — ACETAMINOPHEN 650 MG: 325 TABLET, FILM COATED ORAL at 01:40
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
## 2021-08-19 NOTE — ED NOTES
Bed: ED09  Expected date:   Expected time:   Means of arrival:   Comments:  Floor need to be mopped

## 2021-08-20 ENCOUNTER — PATIENT OUTREACH (OUTPATIENT)
Dept: CARE COORDINATION | Facility: CLINIC | Age: 51
End: 2021-08-20

## 2021-08-20 NOTE — PROGRESS NOTES
Clinic Care Coordination Contact    Situation: Patient chart reviewed by care coordinator.    Background: Patient was in ED on 8/19 for near Syncope. Discharged to home.    Assessment: Patient felt dizzy and faint and was instructed to hydrate. Patient is enrolled in care coordination and getting help with applying for ssdi.    Plan/Recommendations: CHW will follow up at standard monthly outreach.    GAVIN Alvarado   [facility] Care Coordination  Tel: [phone]

## 2021-08-24 ENCOUNTER — OFFICE VISIT (OUTPATIENT)
Dept: URGENT CARE | Facility: URGENT CARE | Age: 51
End: 2021-08-24
Payer: COMMERCIAL

## 2021-08-24 VITALS
HEIGHT: 66 IN | OXYGEN SATURATION: 97 % | BODY MASS INDEX: 30.53 KG/M2 | TEMPERATURE: 99 F | HEART RATE: 97 BPM | DIASTOLIC BLOOD PRESSURE: 74 MMHG | WEIGHT: 190 LBS | SYSTOLIC BLOOD PRESSURE: 123 MMHG

## 2021-08-24 DIAGNOSIS — L08.9 SOFT TISSUE INFECTION: ICD-10-CM
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
## 2021-08-24 PROCEDURE — 99213 OFFICE O/P EST LOW 20 MIN: CPT | Performed by: NURSE PRACTITIONER

## 2021-08-24 RX ORDER — DOXYCYCLINE 100 MG/1
100 CAPSULE ORAL 2 TIMES DAILY
Qty: 14 CAPSULE | Refills: 0 | Status: SHIPPED | OUTPATIENT
Start: 2021-08-24 | End: 2021-08-31

## 2021-08-24 RX ORDER — MUPIROCIN 20 MG/G
OINTMENT TOPICAL 3 TIMES DAILY
Qty: 30 G | Refills: 0 | Status: SHIPPED | OUTPATIENT
Start: 2021-08-24 | End: 2021-08-31

## 2021-08-24 ASSESSMENT — MIFFLIN-ST. JEOR: SCORE: 1485.64

## 2021-08-24 ASSESSMENT — ENCOUNTER SYMPTOMS
WOUND: 1
APPETITE CHANGE: 0
SHORTNESS OF BREATH: 0
DIAPHORESIS: 0
NUMBNESS: 0
FEVER: 0
ACTIVITY CHANGE: 0
CHILLS: 0
STRIDOR: 0
ABDOMINAL PAIN: 0
WHEEZING: 0
SLEEP DISTURBANCE: 0
NAUSEA: 0
COLOR CHANGE: 1
COUGH: 0
MYALGIAS: 1
PARESTHESIAS: 0
CHEST TIGHTNESS: 0
PALPITATIONS: 0
ARTHRALGIAS: 0
FATIGUE: 0
WEAKNESS: 0
VOMITING: 0

## 2021-08-24 NOTE — PROGRESS NOTES
Chief Complaint   Patient presents with     Urgent Care     Pt in clinic to have eval for right hip insect bite.     Trauma     SUBJECTIVE:  Isabela Panchal is a 50 year old female who presents to the clinic today with a right thigh spider bite. She was sitting on the couch about a week ago when she noted an abrupt bite/sting to the thigh. There has been increasing swelling, redness, pain, with a pinpoint scab in the center. She came in today because the pain is spreading to her right inner groin. She denies fevers, nausea, red streaking. Has had cellulitis before.    Past Medical History:   Diagnosis Date     Abnormal Pap smear of cervix 2019    see problem list     Allergic rhinitis due to allergen      Anemia      Breathing difficulty      Cervical high risk HPV (human papillomavirus) test positive 02/13/2018, 2019    type 16; see problem list     Chest pain      Generalised anxiety disorder 9/6/2012     Headache      Heart trouble      High blood triglycerides 2/26/2016     History of blood transfusion     unsure     Hoarseness      Hypertension      Hypoglycemia 3/10/2013     Impaired fasting glucose 8/16/2014     Insomnia 9/6/2012     Irritable bowel syndrome      Itchy eyes      MEDICAL HISTORY OF -     hx of right wrist dislocation     Migraine      Mild intermittent asthma     allergy or URI triggered      Moderate recurrent major depression (H) 9/6/2012     Nasal congestion      Numbness      Obesity, unspecified (OBESE) 8/21/2014     Paroxysmal supraventricular tachycardia (H)     4/00     PONV (postoperative nausea and vomiting)      Ringing in ears      Sleep difficulties      Sneezing      Sore throat      Vertigo      Weakness      Weight gain      albuterol (PROAIR HFA/PROVENTIL HFA/VENTOLIN HFA) 108 (90 Base) MCG/ACT inhaler, Inhale 2 puffs into the lungs every 6 hours as needed for shortness of breath / dyspnea or wheezing  atorvastatin (LIPITOR) 40 MG tablet, Take 1 tablet (40 mg) by mouth  "daily  Calcium Carbonate-Vitamin D (CALCIUM + D PO), Take  by mouth daily.  cetirizine (ZYRTEC) 10 MG tablet, Take 1 tablet (10 mg) by mouth daily  diazepam (VALIUM) 5 MG tablet,   Divalproex Sodium (DEPAKOTE PO),   etodolac (LODINE) 500 MG tablet, Take 1 tablet (500 mg) by mouth 2 times daily  etodolac (LODINE) 500 MG tablet, Take 1 tablet (500 mg) by mouth 2 times daily  etodolac (LODINE) 500 MG tablet, TAKE ONE TABLET BY MOUTH TWICE A DAY  fenofibrate (TRICOR) 48 MG tablet, Take 1 tablet (48 mg) by mouth daily  fluticasone (FLONASE) 50 MCG/ACT nasal spray, Spray 2 sprays into both nostrils daily  lisinopril (ZESTRIL) 10 MG tablet, Take 1 tablet (10 mg) by mouth daily  loratadine-pseudoePHEDrine (CLARITIN-D 24-HOUR)  MG 24 hr tablet, Take 1 tablet by mouth daily  metroNIDAZOLE (METROCREAM) 0.75 % external cream, APPLY TOPICALLY TWO TIMES A DAY  MIRENA 20 MCG/24HR IU IUD, USE FOR UP TO 5 YEARS THEN REMOVE  montelukast (SINGULAIR) 10 MG tablet, Take 1 tablet (10 mg) by mouth At Bedtime  RESTASIS 0.05 % ophthalmic emulsion,   tiZANidine (ZANAFLEX) 4 MG tablet, Take 1-2 tablets (4-8 mg) by mouth nightly as needed for muscle spasms  tiZANidine (ZANAFLEX) 4 MG tablet, Take 1-2 tablets (4-8 mg) by mouth nightly as needed for muscle spasms  venlafaxine (EFFEXOR-XR) 150 MG 24 hr capsule, TAKE ONE CAPSULE BY MOUTH ONCE DAILY    levonorgestrel (MIRENA) 20 MCG/24HR IUD 20 mcg  lidocaine (PF) 0.5 % injection SOLN 8 mL  lidocaine (PF) 0.5 % injection SOLN 8 mL  triamcinolone (KENALOG-40) injection 40 mg  triamcinolone (KENALOG-40) injection 40 mg      Social History     Tobacco Use     Smoking status: Never Smoker     Smokeless tobacco: Never Used   Substance Use Topics     Alcohol use: Not Currently     Allergies   Allergen Reactions     Benzoin Rash     Adhesive Tape      blistering     Allegra [Fexofenadine]      Kidney pain     Cucumber Extract      Throat and ears itchy and throat feels \"icky\"     Fexofenadine " "Hydrochloride      allegra - low back pain     Ibuprofen      palpitations     Lexapro      Wt gain     No Clinical Screening - See Comments      Ramon      Itchy ears and throat, throat feel \"icky\"     Peanuts [Nuts]      Itchy ears and throat, throat feel \"icky\"     Seasonal Allergies      Review of Systems   Constitutional: Negative for activity change, appetite change, chills, diaphoresis, fatigue and fever.   Respiratory: Negative for cough, chest tightness, shortness of breath, wheezing and stridor.    Cardiovascular: Negative for chest pain, palpitations and peripheral edema.   Gastrointestinal: Negative for abdominal pain, nausea and vomiting.   Musculoskeletal: Positive for myalgias. Negative for arthralgias.   Skin: Positive for color change, rash and wound.   Neurological: Negative for weakness, numbness and paresthesias.   Psychiatric/Behavioral: Negative for sleep disturbance.     EXAM:   /74   Pulse 97   Temp 99  F (37.2  C) (Oral)   Ht 1.664 m (5' 5.5\")   Wt 86.2 kg (190 lb)   SpO2 97%   BMI 31.14 kg/m      Physical Exam  Vitals reviewed.   Constitutional:       General: She is not in acute distress.     Appearance: Normal appearance. She is not ill-appearing, toxic-appearing or diaphoretic.   HENT:      Head: Normocephalic and atraumatic.   Cardiovascular:      Rate and Rhythm: Normal rate.   Pulmonary:      Effort: Pulmonary effort is normal.   Musculoskeletal:         General: Tenderness and signs of injury present. Normal range of motion.   Skin:     General: Skin is warm and dry.      Findings: Erythema, lesion (right lateral thigh with quarter sized area of firm erythema, pinpoint brown scab) and rash present.   Neurological:      General: No focal deficit present.      Mental Status: She is alert and oriented to person, place, and time.   Psychiatric:         Mood and Affect: Mood normal.         Behavior: Behavior normal.       ASSESSMENT:    ICD-10-CM    1. Bug bite, initial " encounter  W57.XXXA doxycycline hyclate (VIBRAMYCIN) 100 MG capsule     mupirocin (BACTROBAN) 2 % external ointment   2. Soft tissue infection  L08.9      PLAN:  Patient Instructions   Doxy and mupirocin  Epsom salt warm compress  ER if fever, vomiting, rapidly worsening    Patient Education     Nonpoisonous Spider Bite     The venom from a spider bite can cause a local skin reaction. This often causes local redness, itching, and swelling. This reaction will fade over a few hours to a few days. A spider bite can become infected, so watch for the signs listed below. Sometimes it's hard to tell the difference between a local reaction to the insect bite or sting and an early infection. So your healthcare provider may start you on antibiotics.  Home care  The following guidelines will help you care for your wound at home:    Stay away from anything that heats up your skin if itching is a problem. This includes hot showers or baths or direct sunlight. This will make the itching worse.    During the first 24 hours, you may put an ice pack on the injury. Use it for no more than 20 minutes at a time every 1 to 2 hours. This will reduce pain and swelling. It will also help with itching. You can make an ice pack by putting ice cubes in a plastic bag that seals at the top. Wrap the bag in a thin, clean towel. Don't put ice or an ice pack directly on the skin. You may also use an over-the-counter spray or cream containing benzocaine to help ease pain. Over-the-counter skin creams containing diphenhydramine or hydrocortisone may help with itching. Remember to review the medicine instructions for any allergies.    If the wound becomes red, wash the area with soap and water every day.  Put an antibiotic cream or ointment on the injury 3 times a day as directed.    If your healthcare provider has prescribed oral antibiotics, take them as directed until they are all finished.  Follow-up care  Follow up with your healthcare provider,  or as advised.  When to get medical advice  Call your healthcare provider right away if any of these occur:    Spreading areas of itching, redness, or swelling    Pain or swelling that gets worse    Fever of 100.4 F (38 C) or higher, or as directed by your healthcare provider    Drainage from the wound    You get a skin sore (skin ulcer)    A red streak in the skin leading away from the wound    You still have symptoms after 3 days    Generalized rash, fever, or joint pain starting 1 to 2 weeks after treatment  Call 911  Call 911 if any of these occur:    New or worse swelling in the face, eyelids, lips, mouth, throat, or tongue    Hard time swallowing or breathing    Dizziness, weakness, or fainting  [employer] last reviewed this educational content on 11/1/2019 2000-2021 [employer]. All rights reserved. This information is not intended as a substitute for professional medical care. Always follow your healthcare professional's instructions.             Follow up with primary care provider with any problems, questions or concerns or if symptoms worsen or fail to improve. Patient agreed to plan and verbalized understanding.    BEE [facility]cloudSt. [facility]

## 2021-08-24 NOTE — PATIENT INSTRUCTIONS
Doxy and mupirocin  Epsom salt warm compress  ER if fever, vomiting, rapidly worsening    Patient Education     Nonpoisonous Spider Bite     The venom from a spider bite can cause a local skin reaction. This often causes local redness, itching, and swelling. This reaction will fade over a few hours to a few days. A spider bite can become infected, so watch for the signs listed below. Sometimes it's hard to tell the difference between a local reaction to the insect bite or sting and an early infection. So your healthcare provider may start you on antibiotics.  Home care  The following guidelines will help you care for your wound at home:    Stay away from anything that heats up your skin if itching is a problem. This includes hot showers or baths or direct sunlight. This will make the itching worse.    During the first 24 hours, you may put an ice pack on the injury. Use it for no more than 20 minutes at a time every 1 to 2 hours. This will reduce pain and swelling. It will also help with itching. You can make an ice pack by putting ice cubes in a plastic bag that seals at the top. Wrap the bag in a thin, clean towel. Don't put ice or an ice pack directly on the skin. You may also use an over-the-counter spray or cream containing benzocaine to help ease pain. Over-the-counter skin creams containing diphenhydramine or hydrocortisone may help with itching. Remember to review the medicine instructions for any allergies.    If the wound becomes red, wash the area with soap and water every day.  Put an antibiotic cream or ointment on the injury 3 times a day as directed.    If your healthcare provider has prescribed oral antibiotics, take them as directed until they are all finished.  Follow-up care  Follow up with your healthcare provider, or as advised.  When to get medical advice  Call your healthcare provider right away if any of these occur:    Spreading areas of itching, redness, or swelling    Pain or swelling that  gets worse    Fever of 100.4 F (38 C) or higher, or as directed by your healthcare provider    Drainage from the wound    You get a skin sore (skin ulcer)    A red streak in the skin leading away from the wound    You still have symptoms after 3 days    Generalized rash, fever, or joint pain starting 1 to 2 weeks after treatment  Call 911  Call 911 if any of these occur:    New or worse swelling in the face, eyelids, lips, mouth, throat, or tongue    Hard time swallowing or breathing    Dizziness, weakness, or fainting  [employer] last reviewed this educational content on 11/1/2019 2000-2021 [employer]. All rights reserved. This information is not intended as a substitute for professional medical care. Always follow your healthcare professional's instructions.

## 2021-08-27 ENCOUNTER — PATIENT OUTREACH (OUTPATIENT)
Dept: NURSING | Facility: CLINIC | Age: 51
End: 2021-08-27
Payer: COMMERCIAL

## 2021-08-27 NOTE — PROGRESS NOTES
Clinic Care Coordination Contact    Community Health Worker Follow Up    Care Gaps:     Health Maintenance Due   Topic Date Due     ADVANCE CARE PLANNING  Never done     MAMMO SCREENING  Never done     Pneumococcal Vaccine: Pediatrics (0 to 5 Years) and At-Risk Patients (6 to 64 Years) (1 of 2 - PPSV23) Never done     PREVENTIVE CARE VISIT  03/26/2020     COLPOSCOPY  07/02/2021     ZOSTER IMMUNIZATION (2 of 2) 01/19/2021     INFLUENZA VACCINE (1) 09/01/2021     HPV TEST  10/02/2021     PAP  10/02/2021       Care Gaps Last addressed on 8/27/21. Patient plans to review helath maintenance due at her next visit on 9/3/21.    Goals:   Goals Addressed as of 8/27/2021 at 10:39 AM                    Today    7/22/21       Financial Wellbeing- SSDI (pt-stated)   90%  90%    Added 11/17/20 by Renetta Orellana BSW      Goal Statement: I would like help applying and getting approved for Social Security Disability in the next 6 months.   Date Goal set: 10/29/2020  Barriers: Getting denied   Strengths: Asking for help, supportive [facility]   Date to Achieve By: 4/29/2021 // extended 10/20/2021  Patient expressed understanding of goal: yes     Action steps to achieve this goal:  1. My [employer] [facility] and I will meet with the Disability Specialists on March 26th to discuss next steps and options with my denial. (Completed, ongoing)  5/13  Pt stated moved up to Federal level pending. [facility] provides support/updates on this.   2. I will attend my first appointment at [facility] on March 30th with Johnson Novak, counselor. (Completed)    5/13 Pt states she connects with monthly telephone visits.  3. I will work with [facility] staff to schedule neuropsychological testing. (Completed)  4. I will give the CHW updates during outreach calls.    Updated: 5/13/2021 8/27/21 CHW     Patient states that her disability case was denied, she'll continue to work with  on appeal process.     Patient shares that she's still working with  [employer]  [employer]enee. 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         Intervention and Education during outreach: CHW inquired if patient needs any additional support or resources. Patient shares that she's still working with [employer]  [employer]enee. 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   Patient requested assistance with scheduling ED follow up with Dr. Briggs. CHW reviewed care gaps due. Patient plans to discuss health maintenance due at her next visit. CHW made conference call to scheduling, appointment is scheduled Fri 9/3rd with Stella Trejo.    CHW Plan: CHW to follow up in 1 month.     [facility] Care Coordination  [facility]    Phone: [phone]  ___________________    Next Outreach:  9/27/27  Planned Outreach Frequency: Monthly  Preferred Phone Number: [phone]    Enrollment Date:  10/29/20  Last Care Plan Assessment:  2/22/21

## 2021-09-02 NOTE — PROGRESS NOTES
Assessment & Plan     Near syncope  and  History of traumatic brain injury  and  Polypharmacy    Suspect vasovagal based on description of symptoms and workup in emergency room    Also could be medication related - patient unclear what she is taking and how, and how much.  Set up by RN    Referred to MTTWIN Figueroa to review medications and necessity and try to reduce unnecessary medications    If occurs again let us know, will consider further workup    Discussed with patient, all questions answered, in agreement with this plan, will return or seek further care if not improving or worsening.    Impaired fasting glucose    One think noted in emergency room evaluation was high glucose - patient does acknowledge weight gain during Covid    Check glucose and A1C  - Hemoglobin A1C  - Comprehensive metabolic panel    Essential hypertension with goal blood pressure less than 140/90    Due for albumin check as well for hypertension; ordered  - Albumin Random Urine Quantitative with Creat Ratio  - Comprehensive metabolic panel        Return in about 1 month (around 10/3/2021).    Stella Trejo MD  [facility] MACIEL Mar is a 50 year old who presents for the following health issues     HPI     ED/UC Followup:    Facility:  [facility]  Date of visit: 08/18/2021  Reason for visit: Dizziness  Current Status: Feeling good     Went over to window, then everything went white, felt weird.  Partner drove her to hospital.   She gave him directions.  Then doesn't remember what happened, and woke up in hospital.    They thought was medication issue.  They weren't sure what it was.    Depakote - nurse sets up medications, uses a machine.        Review of Systems   Constitutional, HEENT, cardiovascular, pulmonary, gi and gu systems are negative, except as otherwise noted.      Objective    /84 (BP Location: Right arm, Patient Position: Sitting,  "Cuff Size: Adult Regular)   Pulse 86   Temp 98.1  F (36.7  C) (Temporal)   Resp 16   Ht 1.664 m (5' 5.5\")   Wt 88.9 kg (196 lb)   SpO2 99%   BMI 32.12 kg/m    Body mass index is 32.12 kg/m .  Physical Exam   GENERAL: healthy, alert and no distress  SKIN: no suspicious lesions or rashes  NEURO: Normal strength and tone, mentation intact and speech normal  PSYCH: mentation appears normal, affect normal/bright              "

## 2021-09-03 ENCOUNTER — OFFICE VISIT (OUTPATIENT)
Dept: FAMILY MEDICINE | Facility: CLINIC | Age: 51
End: 2021-09-03
Payer: COMMERCIAL

## 2021-09-03 VITALS
DIASTOLIC BLOOD PRESSURE: 84 MMHG | HEART RATE: 86 BPM | RESPIRATION RATE: 16 BRPM | TEMPERATURE: 98.1 F | OXYGEN SATURATION: 99 % | WEIGHT: 196 LBS | BODY MASS INDEX: 31.5 KG/M2 | SYSTOLIC BLOOD PRESSURE: 110 MMHG | HEIGHT: 66 IN

## 2021-09-03 DIAGNOSIS — R55 NEAR SYNCOPE: Primary | ICD-10-CM

## 2021-09-03 DIAGNOSIS — Z87.820 HISTORY OF TRAUMATIC BRAIN INJURY: ICD-10-CM

## 2021-09-03 DIAGNOSIS — I10 ESSENTIAL HYPERTENSION WITH GOAL BLOOD PRESSURE LESS THAN 140/90: ICD-10-CM

## 2021-09-03 DIAGNOSIS — R73.01 IMPAIRED FASTING GLUCOSE: ICD-10-CM

## 2021-09-03 DIAGNOSIS — Z79.899 POLYPHARMACY: ICD-10-CM

## 2021-09-03 LAB
BASOPHILS # BLD AUTO: 0 10E3/UL (ref 0–0.2)
BASOPHILS NFR BLD AUTO: 1 %
EOSINOPHIL # BLD AUTO: 0.2 10E3/UL (ref 0–0.7)
EOSINOPHIL NFR BLD AUTO: 4 %
ERYTHROCYTE [DISTWIDTH] IN BLOOD BY AUTOMATED COUNT: 11.5 % (ref 10–15)
HBA1C MFR BLD: 5.6 % (ref 0–5.6)
HCT VFR BLD AUTO: 38.7 % (ref 35–47)
HGB BLD-MCNC: 13.2 G/DL (ref 11.7–15.7)
IMM GRANULOCYTES # BLD: 0 10E3/UL
IMM GRANULOCYTES NFR BLD: 0 %
LYMPHOCYTES # BLD AUTO: 1.5 10E3/UL (ref 0.8–5.3)
LYMPHOCYTES NFR BLD AUTO: 34 %
MCH RBC QN AUTO: 29.9 PG (ref 26.5–33)
MCHC RBC AUTO-ENTMCNC: 34.1 G/DL (ref 31.5–36.5)
MCV RBC AUTO: 88 FL (ref 78–100)
MONOCYTES # BLD AUTO: 0.3 10E3/UL (ref 0–1.3)
MONOCYTES NFR BLD AUTO: 7 %
NEUTROPHILS # BLD AUTO: 2.4 10E3/UL (ref 1.6–8.3)
NEUTROPHILS NFR BLD AUTO: 54 %
PLATELET # BLD AUTO: 196 10E3/UL (ref 150–450)
RBC # BLD AUTO: 4.42 10E6/UL (ref 3.8–5.2)
WBC # BLD AUTO: 4.3 10E3/UL (ref 4–11)

## 2021-09-03 PROCEDURE — 99214 OFFICE O/P EST MOD 30 MIN: CPT | Performed by: FAMILY MEDICINE

## 2021-09-03 PROCEDURE — 83036 HEMOGLOBIN GLYCOSYLATED A1C: CPT | Performed by: FAMILY MEDICINE

## 2021-09-03 PROCEDURE — 82043 UR ALBUMIN QUANTITATIVE: CPT | Performed by: FAMILY MEDICINE

## 2021-09-03 PROCEDURE — 80053 COMPREHEN METABOLIC PANEL: CPT | Performed by: FAMILY MEDICINE

## 2021-09-03 PROCEDURE — 36415 COLL VENOUS BLD VENIPUNCTURE: CPT | Performed by: FAMILY MEDICINE

## 2021-09-03 PROCEDURE — 85025 COMPLETE CBC W/AUTO DIFF WBC: CPT | Performed by: FAMILY MEDICINE

## 2021-09-03 ASSESSMENT — MIFFLIN-ST. JEOR: SCORE: 1507.86

## 2021-09-04 LAB
ALBUMIN SERPL-MCNC: 3.9 G/DL (ref 3.4–5)
ALP SERPL-CCNC: 73 U/L (ref 40–150)
ALT SERPL W P-5'-P-CCNC: 55 U/L (ref 0–50)
ANION GAP SERPL CALCULATED.3IONS-SCNC: 3 MMOL/L (ref 3–14)
AST SERPL W P-5'-P-CCNC: 26 U/L (ref 0–45)
BILIRUB SERPL-MCNC: 0.7 MG/DL (ref 0.2–1.3)
BUN SERPL-MCNC: 11 MG/DL (ref 7–30)
CALCIUM SERPL-MCNC: 9.2 MG/DL (ref 8.5–10.1)
CHLORIDE BLD-SCNC: 109 MMOL/L (ref 94–109)
CO2 SERPL-SCNC: 28 MMOL/L (ref 20–32)
CREAT SERPL-MCNC: 0.76 MG/DL (ref 0.52–1.04)
CREAT UR-MCNC: 252 MG/DL
GFR SERPL CREATININE-BSD FRML MDRD: 90 ML/MIN/1.73M2
GLUCOSE BLD-MCNC: 115 MG/DL (ref 70–99)
MICROALBUMIN UR-MCNC: 14 MG/L
MICROALBUMIN/CREAT UR: 5.56 MG/G CR (ref 0–25)
POTASSIUM BLD-SCNC: 4.1 MMOL/L (ref 3.4–5.3)
PROT SERPL-MCNC: 7.4 G/DL (ref 6.8–8.8)
SODIUM SERPL-SCNC: 140 MMOL/L (ref 133–144)

## 2021-09-07 NOTE — RESULT ENCOUNTER NOTE
rEick Mar:  Nice to see you in clinic last week.Your glucose looks much better then when you were in the hospital.  Please let me know if you have any questions at all.  Best,  Dr. Stella Trejo MD / [facility]

## 2021-09-10 ENCOUNTER — THERAPY VISIT (OUTPATIENT)
Dept: PHYSICAL THERAPY | Facility: CLINIC | Age: 51
End: 2021-09-10
Payer: COMMERCIAL

## 2021-09-10 DIAGNOSIS — M54.12 CERVICAL RADICULOPATHY: Primary | ICD-10-CM

## 2021-09-10 DIAGNOSIS — M54.2 CERVICAL PAIN: ICD-10-CM

## 2021-09-10 PROCEDURE — 97110 THERAPEUTIC EXERCISES: CPT | Mod: GP | Performed by: PHYSICAL THERAPIST

## 2021-09-10 PROCEDURE — 97161 PT EVAL LOW COMPLEX 20 MIN: CPT | Mod: GP | Performed by: PHYSICAL THERAPIST
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
## 2021-09-13 ENCOUNTER — TELEPHONE (OUTPATIENT)
Dept: FAMILY MEDICINE | Facility: CLINIC | Age: 51
End: 2021-09-13

## 2021-09-13 DIAGNOSIS — Z11.59 ENCOUNTER FOR SCREENING FOR OTHER VIRAL DISEASES: ICD-10-CM

## 2021-09-13 NOTE — TELEPHONE ENCOUNTER
Reason for Call:  Form, our goal is to have forms completed with 72 hours, however, some forms may require a visit or additional information.    Type of letter, form or note:  Home Health Certification    Who is the form from?: Home care    Where did the form come from: form was faxed in    What clinic location was the form placed at?: [facility]    Where the form was placed: placed in pod B providers signature folder Box/Folder    What number is listed as a contact on the form?: [phone]       Additional comments:     Call taken on 9/13/2021 at 10:53 AM by Corin Hammer

## 2021-09-17 NOTE — TELEPHONE ENCOUNTER
Completed, signed forms placed in MA basket today.  Sincerely,  Dr. Felisha Briggs MD  9/17/2021    11:20 AM

## 2021-09-20 ENCOUNTER — PATIENT OUTREACH (OUTPATIENT)
Dept: CARE COORDINATION | Facility: CLINIC | Age: 51
End: 2021-09-20

## 2021-09-20 DIAGNOSIS — M51.16 LUMBAR DISC HERNIATION WITH RADICULOPATHY: ICD-10-CM

## 2021-09-20 DIAGNOSIS — M50.20 CERVICAL DISC HERNIATION: ICD-10-CM

## 2021-09-20 NOTE — PROGRESS NOTES
Care Coordination Clinician Chart Review  Situation: Patient chart reviewed by care coordinator.       Background: Care Coordination initial assessment and enrollment to Care Coordination was successful.   Patient centered goals were developed with participation from patient.  JASON CC handed patient off to CHW for continued outreach every 30 days.        Assessment: Per chart review, patient outreach completed by CC CHW on 8/27/21.  Patient is actively working to accomplish goals.  Patient's goals remain appropriate and relevant at this time.   Patient is not yet due for updated Plan of Care.  Annual assessment will be due 2/22.      Goals        Financial Wellbeing- SSDI (pt-stated)       Goal Statement: I would like help applying and getting approved for Social Security Disability in the next 6 months.   Date Goal set: 10/29/2020  Barriers: Getting denied   Strengths: Asking for help, supportive [facility]   Date to Achieve By: 4/29/2021 // extended 10/20/2021  Patient expressed understanding of goal: yes     Action steps to achieve this goal:  1. My [employer] [facility] and I will meet with the Disability Specialists on March 26th to discuss next steps and options with my denial. (Completed, ongoing)  5/13  Pt stated moved up to Federal level pending. [facility] provides support/updates on this.   2. I will attend my first appointment at [facility] on March 30th with Johnson Novak, counselor. (Completed)    5/13 Pt states she connects with monthly telephone visits.  3. I will work with [facility] staff to schedule neuropsychological testing. (Completed)  4. I will give the CHW updates during outreach calls.    Updated: 5/13/2021                    Plan/Recommendations: The patient will continue working with Care Coordination to achieve goals as above.  CHW will involve JASON PIERSON as needed or if patient is ready to move to maintenance.  JASON CC will continue to monitor progress to goals and CHW outreaches every 6 weeks.   Plan of Care  updated and mailed to patient: GAVIN Mahajan   [facility] Care Coordination  Tel: [phone]

## 2021-09-21 ENCOUNTER — OFFICE VISIT (OUTPATIENT)
Dept: PHARMACY | Facility: CLINIC | Age: 51
End: 2021-09-21
Attending: FAMILY MEDICINE
Payer: COMMERCIAL

## 2021-09-21 VITALS
WEIGHT: 196 LBS | BODY MASS INDEX: 32.12 KG/M2 | DIASTOLIC BLOOD PRESSURE: 76 MMHG | OXYGEN SATURATION: 96 % | SYSTOLIC BLOOD PRESSURE: 118 MMHG | HEART RATE: 100 BPM

## 2021-09-21 DIAGNOSIS — I10 ESSENTIAL HYPERTENSION WITH GOAL BLOOD PRESSURE LESS THAN 140/90: ICD-10-CM

## 2021-09-21 DIAGNOSIS — F32.9 MDD (MAJOR DEPRESSIVE DISORDER): ICD-10-CM

## 2021-09-21 DIAGNOSIS — E78.1 HIGH BLOOD TRIGLYCERIDES: ICD-10-CM

## 2021-09-21 DIAGNOSIS — E55.9 VITAMIN D DEFICIENCY DISEASE: ICD-10-CM

## 2021-09-21 DIAGNOSIS — Z79.899 POLYPHARMACY: ICD-10-CM

## 2021-09-21 DIAGNOSIS — E78.5 HYPERLIPIDEMIA LDL GOAL <160: Primary | ICD-10-CM

## 2021-09-21 DIAGNOSIS — R73.01 IMPAIRED FASTING GLUCOSE: ICD-10-CM

## 2021-09-21 PROCEDURE — 99605 MTMS BY PHARM NP 15 MIN: CPT | Performed by: PHARMACIST

## 2021-09-21 PROCEDURE — 99607 MTMS BY PHARM ADDL 15 MIN: CPT | Performed by: PHARMACIST

## 2021-09-21 RX ORDER — ACETAMINOPHEN 160 MG
1 TABLET,DISINTEGRATING ORAL DAILY
COMMUNITY
End: 2021-09-21

## 2021-09-21 RX ORDER — MONTELUKAST SODIUM 10 MG/1
10 TABLET ORAL AT BEDTIME
COMMUNITY
End: 2021-09-21

## 2021-09-21 RX ORDER — LORATADINE 10 MG/1
10 TABLET ORAL DAILY
COMMUNITY
End: 2021-09-21

## 2021-09-21 RX ORDER — ALBUTEROL SULFATE 90 UG/1
2 AEROSOL, METERED RESPIRATORY (INHALATION) EVERY 6 HOURS PRN
COMMUNITY
End: 2021-09-21
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
## 2021-09-21 NOTE — PATIENT INSTRUCTIONS
Recommendations from today's MTM visit:                                                    MTM (medication therapy management) is a service provided by a clinical pharmacist designed to help you get the most of out of your medicines.   Today we reviewed what your medicines are for, how to know if they are working, that your medicines are safe and how to make your medicine regimen as easy as possible.    1. Recommend that you take Coenzyme Q10 200-400mg/day to try and reduce muscle cramps. Take for one full bottle (~3 months) and if it is not resolving muscle cramps, then you can discontinue.     2. You are on fenofibrate 48mg daily and this is a low dose. Your triglycerides are at 411, so we could consider increasing to fenofibrate 160mg table once daily. Your kidney and liver function could tolerate this dose.     3. Your vitamin D on 4/16/21 was 31, and your goal should be a minimum of 40. Check what dose you take at home, and you can contact me about increasing this.     4. After reviewing your medication list today it appears you are really only taking 5-6 medications daily, and they are all important medications for you to continue to take because they are indicated!     5. Continue to watch how much soda, ice cream, fast food , etc you are having so that we can make sure you do not become pre-diabetic. Try to increase your exercise as your leg cramps get better.     Follow-up: December 16th at 9am (fast overnight for 10-12 hours water only)     It was great to speak with you today.  I value your experience and would be very thankful for your time with providing feedback on our clinic survey. You may receive a survey via email or text message in the next few days.     To schedule another MTM appointment, please call the clinic directly or you may call the MTM scheduling line at [phone] or toll-free at [phone].     My Clinical Pharmacist's contact information:                                                       Please feel free to contact me with any questions or concerns you have.      Quentin Figueroa Rp.  Medication Therapy Management Provider  [phone]  [facility]  [facility]

## 2021-09-21 NOTE — PROGRESS NOTES
Preoperative Assessment Center Medication History Note    Medication history completed on September 21, 2021 by this writer. See Epic admission navigator for prior to admission medications. Operating room staff will still need to confirm medications and last dose information on day of surgery.     Medication history interview sources  Patient interview: Yes, patient didn't have time to talk and didn't have a good memory of her meds. She asked that I get the med list from her home care nurse  Care Everywhere records: Yes  Surescripts pharmacy refill records: Yes  Other (if applicable): Everytime home care rn med list ( [phone])    Changes made to PTA medication list (reason)  Added: albuterol, ca citrate/vit d, loratadine, singulair,   Deleted: claritin D,   Changed: vit D dose,     Additional medication history information (including reliability of information, actions taken by pharmacist):    Prior to Admission medications    Medication Sig Last Dose Taking? Auth Provider   albuterol (PROAIR HFA/PROVENTIL HFA/VENTOLIN HFA) 108 (90 Base) MCG/ACT inhaler Inhale 2 puffs into the lungs every 6 hours as needed for shortness of breath / dyspnea or wheezing Taking Yes Unknown, Entered By History   atorvastatin (LIPITOR) 40 MG tablet Take 1 tablet (40 mg) by mouth daily Taking Yes Felisha Briggs MD   calcium citrate-vitamin D (CITRACAL) 315-200 MG-UNIT TABS per tablet Take 1 tablet by mouth daily Taking Yes Unknown, Entered By History   cholecalciferol (VITAMIN D3) 125 mcg (5000 units) capsule Take 125 mcg by mouth daily Taking Yes Unknown, Entered By History   etodolac (LODINE) 500 MG tablet Take 1 tablet (500 mg) by mouth 2 times daily Taking Yes Man Gilbert MD   fenofibrate (TRICOR) 48 MG tablet Take 1 tablet (48 mg) by mouth daily Taking Yes Felisha Briggs MD   fluticasone (FLONASE) 50 MCG/ACT nasal spray Spray 2 sprays into both nostrils daily Taking Yes Felisha Briggs MD    lisinopril (ZESTRIL) 10 MG tablet Take 1 tablet (10 mg) by mouth daily Taking Yes Felisha Briggs MD   loratadine (CLARITIN) 10 MG tablet Take 10 mg by mouth daily Taking Yes Unknown, Entered By History   metroNIDAZOLE (METROCREAM) 0.75 % external cream APPLY TOPICALLY TWO TIMES A DAY Taking Yes Meena Maradiaga PA-C   montelukast (SINGULAIR) 10 MG tablet Take 10 mg by mouth At Bedtime Taking Yes Unknown, Entered By History   tiZANidine (ZANAFLEX) 4 MG tablet Take 1-2 tablets (4-8 mg) by mouth nightly as needed for muscle spasms Taking Yes Man Gilbert MD   venlafaxine (EFFEXOR-XR) 150 MG 24 hr capsule TAKE ONE CAPSULE BY MOUTH ONCE DAILY Taking Yes Felisha Briggs MD   MIRENA 20 MCG/24HR IU IUD USE FOR UP TO 5 YEARS THEN REMOVE   Reported, Patient   RESTASIS 0.05 % ophthalmic emulsion Place 1 drop into both eyes daily as needed    Reported, Patient               Medication history completed by: Aly Mead [facility]

## 2021-09-21 NOTE — Clinical Note
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 [facility] and I cleaned up her epic med list so she isnt on that many meds anymore . A few minor suggestions --see plan if needed.    Thank you , Quentin Figueroa, [facility].  Medication Therapy Management Provider  [phone]  [facility]  [facility]

## 2021-09-22 ENCOUNTER — PRE VISIT (OUTPATIENT)
Dept: SURGERY | Facility: CLINIC | Age: 51
End: 2021-09-22

## 2021-09-22 ENCOUNTER — LAB (OUTPATIENT)
Dept: LAB | Facility: CLINIC | Age: 51
End: 2021-09-22
Payer: COMMERCIAL

## 2021-09-22 ENCOUNTER — ANESTHESIA EVENT (OUTPATIENT)
Dept: SURGERY | Facility: CLINIC | Age: 51
End: 2021-09-22

## 2021-09-22 ENCOUNTER — OFFICE VISIT (OUTPATIENT)
Dept: SURGERY | Facility: CLINIC | Age: 51
End: 2021-09-22
Payer: COMMERCIAL

## 2021-09-22 VITALS
OXYGEN SATURATION: 96 % | HEIGHT: 66 IN | WEIGHT: 201 LBS | RESPIRATION RATE: 16 BRPM | HEART RATE: 94 BPM | SYSTOLIC BLOOD PRESSURE: 116 MMHG | TEMPERATURE: 98.1 F | BODY MASS INDEX: 32.3 KG/M2 | DIASTOLIC BLOOD PRESSURE: 76 MMHG

## 2021-09-22 DIAGNOSIS — M54.12 CERVICAL RADICULOPATHY: ICD-10-CM

## 2021-09-22 DIAGNOSIS — Z01.818 PREOP EXAMINATION: Primary | ICD-10-CM

## 2021-09-22 DIAGNOSIS — Z01.818 PREOP EXAMINATION: ICD-10-CM

## 2021-09-22 LAB
ABO/RH(D): NORMAL
ANTIBODY SCREEN: NEGATIVE
SPECIMEN EXPIRATION DATE: NORMAL

## 2021-09-22 PROCEDURE — 86901 BLOOD TYPING SEROLOGIC RH(D): CPT | Mod: 90 | Performed by: PATHOLOGY

## 2021-09-22 PROCEDURE — 86900 BLOOD TYPING SEROLOGIC ABO: CPT | Mod: 90 | Performed by: PATHOLOGY

## 2021-09-22 PROCEDURE — 36415 COLL VENOUS BLD VENIPUNCTURE: CPT | Performed by: PATHOLOGY

## 2021-09-22 PROCEDURE — 99204 OFFICE O/P NEW MOD 45 MIN: CPT | Performed by: CLINICAL NURSE SPECIALIST

## 2021-09-22 PROCEDURE — 86850 RBC ANTIBODY SCREEN: CPT | Mod: 90 | Performed by: PATHOLOGY

## 2021-09-22 ASSESSMENT — LIFESTYLE VARIABLES: TOBACCO_USE: 0

## 2021-09-22 ASSESSMENT — MIFFLIN-ST. JEOR: SCORE: 1530.54

## 2021-09-22 ASSESSMENT — PAIN SCALES - GENERAL: PAINLEVEL: MODERATE PAIN (5)

## 2021-09-22 NOTE — PATIENT INSTRUCTIONS
Preparing for Your Surgery      Name:  Isabela Panchal   MRN:  [id number]   :  1970   Today's Date:  2021       Arriving for surgery:  Surgery date:  10/13/2021  Arrival time:  5:30 am    Restrictions due to COVID 19:       One visitor is allowed in the Pre Op area. When you go into surgery, one visitor is allowed to wait in the Surgery Waiting Room       (provided there is enough space to social distance).   After surgery- Two visitors are allowed at a time if you have a private room and one visitor is allowed for those in a semi-private room.   Every 4 days the visitor(s) can rotate. During the 4 day period, the visitor(s) must be consistent. No visitors under the age of 18 years old.   Visiting Hours: 8 am - 8:30 pm   No ill visitors.   All visitors must wear face mask.    [employer] parking is available for anyone with mobility limitations or disabilities.  ([employer]  24 hours/ 7 days a week; [facility]  7 am- 3:30 pm, Mon- Fri)    Please come to:     [facility] [address]  [facility]   [address] [place]  [address]    -Parking is available in the Green Ramp     -Proceed to the 3rd floor, check in at the Adult Surgery Waiting Lounge. [phone]    If an escort is needed stop at the Information Desk in the lobby.         What can I eat or drink?  -  You may eat and drink normally for up to 8 hours before your surgery. (Until 10/12/21 at 11 pm)  -  You may have clear liquids until 2 hours before surgery. (Until 5:30 am arrival time)    Examples of clear liquids:  Water  Clear broth  Juices (apple, white grape, white cranberry  and cider) without pulp  Noncarbonated, powder based beverages  (lemonade and Meir-Aid)  Sodas (Sprite, 7-Up, ginger ale and seltzer)  Coffee or tea (without milk or cream)  Gatorade    -  No Alcohol for at least 24 hours before surgery     Which medicines can I take?    Hold Aspirin for 7  days before surgery.   Hold Multivitamins for 7 days before surgery.  Hold Supplements for 7 days before surgery.  Hold Ibuprofen (Advil, Motrin) for 1 day before surgery.  Hold Naproxen (Aleve) for 4 days before surgery.    Hold Etodolac (Lodine) for 3 days  before surgery   Last dose on 10/8/21    -  DO NOT take these medications the day of surgery:  Vitamin D  Citrical   Lisinopril       -  PLEASE TAKE these medications the day of surgery:  Inhaler as usual and bring to hospital  Atorvastatin  Fenofibrate   Flonase   Loratadine  Venlafaxine   Eye drops per your routine and bring to hospital       How do I prepare myself?  - Please take 2 showers before surgery using Scrubcare or Hibiclens soap.    Use this soap only from the neck to your toes.     Leave the soap on your skin for one minute--then rinse thoroughly.      You may use your own shampoo and conditioner; no other hair products.   - Please remove all jewelry and body piercings.  - No lotions, deodorants or fragrance.  - No makeup or fingernail polish.   - Bring your ID and insurance card.    -If you have a Deep Brain Stimulator, Spinal Cord Stimulator or any neuro stimulator device---you must bring the remote control to the hospital     - All patients are required to have a Covid-19 test within 4 days of surgery/procedure.      -Patients will be contacted by the [facility] scheduling team within 1 week of surgery to make an appointment.      - Patients may call the Scheduling team at [phone] if they have not been scheduled within 4 days of  surgery.            Questions or Concerns:    - For any questions regarding the day of surgery or your hospital stay, please contact the Pre Admission Nursing Office at [phone].       - If you have health changes between today and your surgery please call your surgeon.       For questions after surgery please call your surgeons office.

## 2021-09-22 NOTE — ANESTHESIA PREPROCEDURE EVALUATION
Anesthesia Pre-Procedure Evaluation    Patient: Isabela Panchal   MRN: [id number] : 1970        Preoperative Diagnosis: * No surgery found *   Procedure :      Past Medical History:   Diagnosis Date     Abnormal Pap smear of cervix     see problem list     Allergic rhinitis due to allergen      Anemia      Breathing difficulty      Cervical high risk HPV (human papillomavirus) test positive 2018, 2019    type 16; see problem list     Cervical radiculopathy 2021     Chest pain      Generalised anxiety disorder 2012     Headache      Heart trouble      High blood triglycerides 2016     History of blood transfusion     unsure     Hoarseness      Hypertension      Hypoglycemia 03/10/2013     Impaired fasting glucose 2014     Insomnia 2012     Irritable bowel syndrome      Itchy eyes      MEDICAL HISTORY OF -     hx of right wrist dislocation     Migraine      Mild intermittent asthma     allergy or URI triggered      Moderate recurrent major depression (H) 2012     Nasal congestion      Numbness      Obesity, unspecified (OBESE) 2014     Paroxysmal supraventricular tachycardia (H)     4/00     PONV (postoperative nausea and vomiting)      Ringing in ears      Sleep difficulties      Sneezing      Sore throat      Vertigo      Weakness      Weight gain       Past Surgical History:   Procedure Laterality Date     COLONOSCOPY  2013    Procedure: COLONOSCOPY;;  Surgeon: Jim Humphries MD;  Location:  GI     ENT SURGERY      deviated septum     HEAD & NECK SURGERY       LAPAROSCOPIC SALPINGO-OOPHORECTOMY  2014    Procedure: LAPAROSCOPIC SALPINGO-OOPHORECTOMY;  Laparoscopic Left Salpingo Oophorectomy ;  Surgeon: Chani Del Rosario MD;  Location: [facility] OR     LUMPECTOMY BREAST      right     ORTHOPEDIC SURGERY      knee     ORTHOPEDIC SURGERY      foot     SOFT TISSUE SURGERY      lymphoma face and armpit     SOFT TISSUE SURGERY       [facility]  "NONSPECIFIC PROCEDURE      Plastic repair of facial lac     ZZC NONSPECIFIC PROCEDURE      Lipoma removed from arm close to the tail of te breast     ZZC NONSPECIFIC PROCEDURE      Plastic repair of facial lac     ZZC NONSPECIFIC PROCEDURE      Knee surgery     ZZC NONSPECIFIC PROCEDURE      Miscarriage x 2      Allergies   Allergen Reactions     Benzoin Rash     Adhesive Tape      blistering     Allegra [Fexofenadine]      Kidney pain     Cucumber Extract      Throat and ears itchy and throat feels \"icky\"     Fexofenadine Hydrochloride      allegra - low back pain     Ibuprofen      palpitations     Lexapro      Wt gain     No Clinical Screening - See Comments      Ramon      Itchy ears and throat, throat feel \"icky\"     Peanuts [Nuts]      Itchy ears and throat, throat feel \"icky\"     Seasonal Allergies       Social History     Tobacco Use     Smoking status: Never Smoker     Smokeless tobacco: Never Used   Substance Use Topics     Alcohol use: Not Currently      Wt Readings from Last 1 Encounters:   09/21/21 88.9 kg (196 lb)        Anesthesia Evaluation   Pt has had prior anesthetic. Type: General and MAC.    History of anesthetic complications  - PONV.      ROS/MED HX  ENT/Pulmonary: Comment: History of asthma but no recent issues, no use of inhalers   (-) tobacco use   Neurologic: Comment: TBI-Chronic post concussive syndrome-memory issues  Cervical radiculopathy      Cardiovascular:     (+) Dyslipidemia hypertension-----Previous cardiac testing   Echo: Date: Results:    Stress Test: Date: Results:    ECG Reviewed: Date: 8/18/21 Results:  SR 1st degree AV block  Cath: Date: Results:   (-) taking anticoagulants/antiplatelets   METS/Exercise Tolerance: 1 - Eating, dressing Comment: Able to walk 1/2 block due to pain   Hematologic: Comments: Possible history of blood transfusion.      Musculoskeletal: Comment: Chronic neck and back pain      GI/Hepatic:  - neg GI/hepatic ROS     Renal/Genitourinary:  - neg " Renal ROS     Endo:  - neg endo ROS     Psychiatric/Substance Use:     (+) psychiatric history anxiety and depression     Infectious Disease:  - neg infectious disease ROS     Malignancy:  - neg malignancy ROS     Other:  - neg other ROS          Physical Exam    Airway        Mallampati: II   TM distance: > 3 FB   Neck ROM: limited   Mouth opening: > 3 cm    Respiratory Devices and Support         Dental       (+) missing and chipped      Cardiovascular          Rhythm and rate: regular and normal     Pulmonary           breath sounds clear to auscultation           OUTSIDE LABS:  CBC:   Lab Results   Component Value Date    WBC 4.3 09/03/2021    WBC 6.1 08/18/2021    HGB 13.2 09/03/2021    HGB 12.2 08/18/2021    HCT 38.7 09/03/2021    HCT 36.8 08/18/2021     09/03/2021     08/18/2021     BMP:   Lab Results   Component Value Date     09/03/2021     08/18/2021    POTASSIUM 4.1 09/03/2021    POTASSIUM 4.1 08/18/2021    CHLORIDE 109 09/03/2021    CHLORIDE 107 08/18/2021    CO2 28 09/03/2021    CO2 24 08/18/2021    BUN 11 09/03/2021    BUN 10 08/18/2021    CR 0.76 09/03/2021    CR 1.11 (H) 08/18/2021     (H) 09/03/2021     (H) 08/18/2021     COAGS:   Lab Results   Component Value Date    INR 1.00 02/15/2005     POC:   Lab Results   Component Value Date    HCG Negative 01/15/2020     HEPATIC:   Lab Results   Component Value Date    ALBUMIN 3.9 09/03/2021    PROTTOTAL 7.4 09/03/2021    ALT 55 (H) 09/03/2021    AST 26 09/03/2021    ALKPHOS 73 09/03/2021    BILITOTAL 0.7 09/03/2021     OTHER:   Lab Results   Component Value Date    A1C 5.6 09/03/2021    JUDY 9.2 09/03/2021    PHOS 3.7 03/19/2013    MAG 1.9 03/19/2013    LIPASE 219 03/19/2013    AMYLASE 44 03/19/2013    TSH 1.51 04/16/2021    T4 1.04 03/19/2013    CRP <5.0 03/19/2013    SED 8 03/19/2013             PAC Discussion and Assessment    ASA Classification: 3  Case is suitable for: [place]  Anesthetic techniques and  relevant risks discussed: GA  Invasive monitoring and risk discussed: No    Possibility and Risk of blood transfusion discussed: No            PAC Resident/NP Anesthesia Assessment: ASSESSMENT and PLAN  Isabela Panchal is a 51 year old female who presents for pre-operative H & P in preparation for Cervical 5 to 7 anterior cervical decompression and fusion with medtronic plate and screws, interbody device, use of fluoroscopy, microscope, and left sided iliac crest autograft with Dr. Lal on 10/13/21 at [facility].   RCRI: 0.4% risk of serious cardiac event.   VTE risk: 0.26%  JER: 2/8=Low risk   PONV: 4. If 3 or > anti emetic intervention recommended with two or more meds    --Cervical radiculopathy with progressive symptoms.  Zanaflex used sparingly at . [facility] to be help for 3 days prior to surgery.   --PONV. Significant history. Final decisions regarding prophylaxis by Anesthesia on DOS.  --HLD. Will take atorvastatin and Tricor on DOS. HYPERTENSION. Will hold lisinopril on DOS. Past history of SVT in 2000. No recurrence known. EKG SR 1st degree AV block. Activity is limited.  --Nonsmoker. Past history of asthma but no recent issues or use of inhalers. Denies cough or shortness of breath.   --TBI with significant memory issues. Has  and support services.   --Cr 0.76.  --Anxiety/depression. Will take Effexor on DOS.   --Type and screen drawn today.   --Would like to avoid IV in her hands.        The patient is optimized and acceptable candidate for proposed procedure. Arrival time, NPO, shower and medication instructions provided by nursing staff today.        Reviewed and Signed by PAC Mid-Level Provider/Resident  Mid-Level Provider/Resident: SOFYA Alvarez, CNS  Date: 9/22/21  Time: 2:04pm                               SOFYA Mcmillan CNS
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
## 2021-09-30 ENCOUNTER — PATIENT OUTREACH (OUTPATIENT)
Dept: CARE COORDINATION | Facility: CLINIC | Age: 51
End: 2021-09-30

## 2021-09-30 NOTE — PROGRESS NOTES
Clinic Care Coordination Contact  [facility]/Voicemail    Clinical Data: Care Coordinator Outreach  Outreach attempted x 1.  Left message on patient's voicemail with call back information and requested return call.    Plan: Care Coordinator will try to reach patient again in 10 business days.    Violet Ramirez  [facility] Care Coordination  [facility]'s, and [facility]    Phone: [phone]  ____________________    Next Outreach:  10/7/21  Planned Outreach Frequency: Monthly  Preferred Phone Number: [phone]    Enrollment Date:  10/29/21  Last Care Plan Assessment:  2/22/21

## 2021-10-11 ENCOUNTER — APPOINTMENT (OUTPATIENT)
Dept: NURSING | Facility: CLINIC | Age: 51
End: 2021-10-11
Payer: COMMERCIAL

## 2021-10-11 ENCOUNTER — PATIENT OUTREACH (OUTPATIENT)
Dept: CARE COORDINATION | Facility: CLINIC | Age: 51
End: 2021-10-11

## 2021-10-11 NOTE — PROGRESS NOTES
Clinic Care Coordination Contact    Community Health Worker Follow Up    Care Gaps:     Health Maintenance Due   Topic Date Due     ADVANCE CARE PLANNING  Never done     MAMMO SCREENING  Never done     Pneumococcal Vaccine: Pediatrics (0 to 5 Years) and At-Risk Patients (6 to 64 Years) (1 of 2 - PPSV23) Never done     PREVENTIVE CARE VISIT  03/26/2020     COLPOSCOPY  07/02/2021     INFLUENZA VACCINE (1) 09/01/2021     ZOSTER IMMUNIZATION (2 of 2) 01/19/2021     HPV TEST  10/02/2021     PAP  10/02/2021     ASTHMA CONTROL TEST  10/16/2021       Postponed to neck surgery on 10/13/21.     Goals:   Goals Addressed as of 10/11/2021 at 3:52 PM                    Today    8/27/21       Financial Wellbeing- SSDI (pt-stated)   90%  90%    Added 11/17/20 by Renetta Orellana, BSW      Goal Statement: I would like help applying and getting approved for Social Security Disability in the next 6 months.   Date Goal set: 10/29/2020  Barriers: Getting denied   Strengths: Asking for help, supportive [facility]   Date to Achieve By: 4/29/2021 // extended 10/20/2021  Patient expressed understanding of goal: yes     Action steps to achieve this goal:  1. My [employer] [facility] and I will meet with the Disability Specialists on March 26th to discuss next steps and options with my denial. (Completed, ongoing)  5/13  Pt stated moved up to Federal level pending. [facility] provides support/updates on this.   2. I will attend my first appointment at [facility] on March 30th with Johnson Novak, counselor. (Completed)    5/13 Pt states she connects with monthly telephone visits.  3. I will work with [facility] staff to schedule neuropsychological testing. (Completed)  4. I will give the CHW updates during outreach calls.    Updated: 5/13/2021    10/11/21 CHW    Patient continues to work with Disability Specialists. Her disability case is pending at federal level.    Patient continues to work with [employer]  Renee. 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         Intervention and Education during outreach: CHW inquired if patient needs any additional support or resources however patient declined. Patient shares that she will be having neck surgery on 10/13th. CHW encouraged patient to reach out to CC if she needs any support. Patient agreed.     CHW Plan: CHW to follow up in 1 month.    [facility]  [facility], and [facility]    Phone: [phone]  ___________________    Next Outreach:  11/11/21  Planned Outreach Frequency: Monthly   Preferred Phone Number: [phone]    Enrollment Date:  10/29/20  Last Care Plan Assessment:  2/22/21

## 2021-10-12 ENCOUNTER — LAB (OUTPATIENT)
Dept: LAB | Facility: CLINIC | Age: 51
End: 2021-10-12
Attending: ORTHOPAEDIC SURGERY
Payer: COMMERCIAL

## 2021-10-12 DIAGNOSIS — Z11.59 ENCOUNTER FOR SCREENING FOR OTHER VIRAL DISEASES: ICD-10-CM

## 2021-10-12 LAB — SARS-COV-2 RNA RESP QL NAA+PROBE: NEGATIVE

## 2021-10-12 PROCEDURE — U0003 INFECTIOUS AGENT DETECTION BY NUCLEIC ACID (DNA OR RNA); SEVERE ACUTE RESPIRATORY SYNDROME CORONAVIRUS 2 (SARS-COV-2) (CORONAVIRUS DISEASE [COVID-19]), AMPLIFIED PROBE TECHNIQUE, MAKING USE OF HIGH THROUGHPUT TECHNOLOGIES AS DESCRIBED BY CMS-2020-01-R: HCPCS | Mod: 90 | Performed by: PATHOLOGY

## 2021-10-12 PROCEDURE — U0005 INFEC AGEN DETEC AMPLI PROBE: HCPCS | Mod: 90 | Performed by: PATHOLOGY

## 2021-10-13 ENCOUNTER — APPOINTMENT (OUTPATIENT)
Dept: GENERAL RADIOLOGY | Facility: CLINIC | Age: 51
End: 2021-10-13
Attending: ORTHOPAEDIC SURGERY
Payer: COMMERCIAL

## 2021-10-13 ENCOUNTER — HOSPITAL ENCOUNTER (INPATIENT)
Facility: CLINIC | Age: 51
Setting detail: SURGERY ADMIT
LOS: 1 days | Discharge: HOME | End: 2021-10-14
Attending: ORTHOPAEDIC SURGERY | Admitting: ORTHOPAEDIC SURGERY
Payer: COMMERCIAL

## 2021-10-13 ENCOUNTER — PATIENT OUTREACH (OUTPATIENT)
Dept: CARE COORDINATION | Facility: CLINIC | Age: 51
End: 2021-10-13

## 2021-10-13 ENCOUNTER — ANESTHESIA EVENT (OUTPATIENT)
Dept: SURGERY | Facility: CLINIC | Age: 51
End: 2021-10-13
Payer: COMMERCIAL

## 2021-10-13 ENCOUNTER — ANESTHESIA (OUTPATIENT)
Dept: SURGERY | Facility: CLINIC | Age: 51
End: 2021-10-13
Payer: COMMERCIAL

## 2021-10-13 DIAGNOSIS — M54.12 CERVICAL RADICULOPATHY: ICD-10-CM

## 2021-10-13 DIAGNOSIS — Z98.1 S/P CERVICAL SPINAL FUSION: Primary | ICD-10-CM

## 2021-10-13 DIAGNOSIS — I10 ESSENTIAL HYPERTENSION WITH GOAL BLOOD PRESSURE LESS THAN 140/90: ICD-10-CM

## 2021-10-13 DIAGNOSIS — M51.16 LUMBAR DISC HERNIATION WITH RADICULOPATHY: ICD-10-CM

## 2021-10-13 DIAGNOSIS — M50.20 CERVICAL DISC HERNIATION: ICD-10-CM

## 2021-10-13 DIAGNOSIS — M54.2 CERVICAL PAIN: ICD-10-CM

## 2021-10-13 LAB
HGB BLD-MCNC: 12.1 G/DL (ref 11.7–15.7)
POTASSIUM BLD-SCNC: 3.6 MMOL/L (ref 3.4–5.3)

## 2021-10-13 PROCEDURE — 20937 SP BONE AGRFT MORSEL ADD-ON: CPT | Performed by: ORTHOPAEDIC SURGERY

## 2021-10-13 PROCEDURE — C1762 CONN TISS, HUMAN(INC FASCIA): HCPCS | Performed by: ORTHOPAEDIC SURGERY

## 2021-10-13 PROCEDURE — 0RG20A0 FUSION OF 2 OR MORE CERVICAL VERTEBRAL JOINTS WITH INTERBODY FUSION DEVICE, ANTERIOR APPROACH, ANTERIOR COLUMN, OPEN APPROACH: ICD-10-PCS | Performed by: ORTHOPAEDIC SURGERY

## 2021-10-13 PROCEDURE — 272N000001 HC OR GENERAL SUPPLY STERILE: Performed by: ORTHOPAEDIC SURGERY

## 2021-10-13 PROCEDURE — 0RT30ZZ RESECTION OF CERVICAL VERTEBRAL DISC, OPEN APPROACH: ICD-10-PCS | Performed by: ORTHOPAEDIC SURGERY

## 2021-10-13 PROCEDURE — 250N000011 HC RX IP 250 OP 636: Performed by: ANESTHESIOLOGY

## 2021-10-13 PROCEDURE — C1713 ANCHOR/SCREW BN/BN,TIS/BN: HCPCS | Performed by: ORTHOPAEDIC SURGERY

## 2021-10-13 PROCEDURE — 250N000011 HC RX IP 250 OP 636: Performed by: PHYSICIAN ASSISTANT

## 2021-10-13 PROCEDURE — 01N10ZZ RELEASE CERVICAL NERVE, OPEN APPROACH: ICD-10-PCS | Performed by: ORTHOPAEDIC SURGERY

## 2021-10-13 PROCEDURE — 99207 PR CONSULT E&M CHANGED TO SUBSEQUENT LEVEL: CPT | Performed by: HOSPITALIST

## 2021-10-13 PROCEDURE — 272N000004 HC RX 272: Performed by: ORTHOPAEDIC SURGERY

## 2021-10-13 PROCEDURE — 360N000085 HC SURGERY LEVEL 5 W/ FLUORO, PER MIN: Performed by: ORTHOPAEDIC SURGERY

## 2021-10-13 PROCEDURE — 250N000011 HC RX IP 250 OP 636: Performed by: ORTHOPAEDIC SURGERY

## 2021-10-13 PROCEDURE — L0174 CERV SR 2PC THOR EXT PRE OTS: HCPCS

## 2021-10-13 PROCEDURE — 0QB30ZZ EXCISION OF LEFT PELVIC BONE, OPEN APPROACH: ICD-10-PCS | Performed by: ORTHOPAEDIC SURGERY

## 2021-10-13 PROCEDURE — 22552 ARTHRD ANT NTRBD CERVICAL EA: CPT | Performed by: ORTHOPAEDIC SURGERY

## 2021-10-13 PROCEDURE — 250N000025 HC SEVOFLURANE, PER MIN: Performed by: ORTHOPAEDIC SURGERY

## 2021-10-13 PROCEDURE — 36415 COLL VENOUS BLD VENIPUNCTURE: CPT | Performed by: ANESTHESIOLOGY

## 2021-10-13 PROCEDURE — 258N000003 HC RX IP 258 OP 636: Performed by: ANESTHESIOLOGY

## 2021-10-13 PROCEDURE — 20937 SP BONE AGRFT MORSEL ADD-ON: CPT | Mod: AS | Performed by: PHYSICIAN ASSISTANT

## 2021-10-13 PROCEDURE — 250N000009 HC RX 250: Performed by: ORTHOPAEDIC SURGERY

## 2021-10-13 PROCEDURE — 22845 INSERT SPINE FIXATION DEVICE: CPT | Mod: AS | Performed by: PHYSICIAN ASSISTANT

## 2021-10-13 PROCEDURE — P9041 ALBUMIN (HUMAN),5%, 50ML: HCPCS | Performed by: ANESTHESIOLOGY

## 2021-10-13 PROCEDURE — 22853 INSJ BIOMECHANICAL DEVICE: CPT | Performed by: ORTHOPAEDIC SURGERY

## 2021-10-13 PROCEDURE — 250N000013 HC RX MED GY IP 250 OP 250 PS 637: Performed by: PHYSICIAN ASSISTANT

## 2021-10-13 PROCEDURE — 84132 ASSAY OF SERUM POTASSIUM: CPT | Performed by: ANESTHESIOLOGY

## 2021-10-13 PROCEDURE — 22845 INSERT SPINE FIXATION DEVICE: CPT | Mod: 59 | Performed by: ORTHOPAEDIC SURGERY

## 2021-10-13 PROCEDURE — 258N000003 HC RX IP 258 OP 636

## 2021-10-13 PROCEDURE — 22551 ARTHRD ANT NTRBDY CERVICAL: CPT | Mod: AS | Performed by: PHYSICIAN ASSISTANT

## 2021-10-13 PROCEDURE — 999N000141 HC STATISTIC PRE-PROCEDURE NURSING ASSESSMENT: Performed by: ORTHOPAEDIC SURGERY

## 2021-10-13 PROCEDURE — 250N000009 HC RX 250: Performed by: ANESTHESIOLOGY

## 2021-10-13 PROCEDURE — 250N000013 HC RX MED GY IP 250 OP 250 PS 637: Performed by: ANESTHESIOLOGY

## 2021-10-13 PROCEDURE — 22853 INSJ BIOMECHANICAL DEVICE: CPT | Mod: AS | Performed by: PHYSICIAN ASSISTANT

## 2021-10-13 PROCEDURE — 710N000010 HC RECOVERY PHASE 1, LEVEL 2, PER MIN: Performed by: ORTHOPAEDIC SURGERY

## 2021-10-13 PROCEDURE — 120N000002 HC R&B MED SURG/OB UMMC

## 2021-10-13 PROCEDURE — 85018 HEMOGLOBIN: CPT | Performed by: ANESTHESIOLOGY

## 2021-10-13 PROCEDURE — 99232 SBSQ HOSP IP/OBS MODERATE 35: CPT | Performed by: HOSPITALIST

## 2021-10-13 PROCEDURE — L1499 SPINAL ORTHOSIS NOS: HCPCS

## 2021-10-13 PROCEDURE — 370N000017 HC ANESTHESIA TECHNICAL FEE, PER MIN: Performed by: ORTHOPAEDIC SURGERY

## 2021-10-13 PROCEDURE — 22551 ARTHRD ANT NTRBDY CERVICAL: CPT | Performed by: ORTHOPAEDIC SURGERY

## 2021-10-13 PROCEDURE — 278N000051 HC OR IMPLANT GENERAL: Performed by: ORTHOPAEDIC SURGERY

## 2021-10-13 PROCEDURE — 999N000180 XR SURGERY CARM FLUORO LESS THAN 5 MIN: Mod: TC

## 2021-10-13 PROCEDURE — 22552 ARTHRD ANT NTRBD CERVICAL EA: CPT | Mod: AS | Performed by: PHYSICIAN ASSISTANT

## 2021-10-13 DEVICE — IMP SCR MEDT ZEVO 3.5X15MM ST VA 7723515: Type: IMPLANTABLE DEVICE | Site: SPINE CERVICAL | Status: FUNCTIONAL

## 2021-10-13 DEVICE — IMP PLATE CERV MEDT ZEVO 33MM 2 LVL 3002033: Type: IMPLANTABLE DEVICE | Site: SPINE CERVICAL | Status: FUNCTIONAL

## 2021-10-13 DEVICE — CAGE 5030664 ANATOMIC PTC 16X14X6MM
Type: IMPLANTABLE DEVICE | Site: SPINE CERVICAL | Status: FUNCTIONAL
Brand: ANATOMIC PEEK PTC CERVICAL FUSION SYSTEM

## 2021-10-13 DEVICE — GRAFT BONE CRUSH CANC 15ML 400075: Type: IMPLANTABLE DEVICE | Site: HIP | Status: FUNCTIONAL

## 2021-10-13 RX ORDER — CEFAZOLIN SODIUM 2 G/100ML
2 INJECTION, SOLUTION INTRAVENOUS SEE ADMIN INSTRUCTIONS
Status: DISCONTINUED | OUTPATIENT
Start: 2021-10-13 | End: 2021-10-13 | Stop reason: HOSPADM

## 2021-10-13 RX ORDER — BISACODYL 10 MG
10 SUPPOSITORY, RECTAL RECTAL DAILY PRN
Status: DISCONTINUED | OUTPATIENT
Start: 2021-10-13 | End: 2021-10-14 | Stop reason: HOSPADM

## 2021-10-13 RX ORDER — ACETAMINOPHEN 325 MG/1
650 TABLET ORAL EVERY 4 HOURS PRN
Qty: 90 TABLET | Refills: 0 | Status: SHIPPED | OUTPATIENT
Start: 2021-10-13 | End: 2022-04-15

## 2021-10-13 RX ORDER — CALCIUM CARBONATE 500 MG/1
500 TABLET, CHEWABLE ORAL 4 TIMES DAILY PRN
Status: DISCONTINUED | OUTPATIENT
Start: 2021-10-13 | End: 2021-10-14 | Stop reason: HOSPADM

## 2021-10-13 RX ORDER — ONDANSETRON 2 MG/ML
4 INJECTION INTRAMUSCULAR; INTRAVENOUS EVERY 30 MIN PRN
Status: DISCONTINUED | OUTPATIENT
Start: 2021-10-13 | End: 2021-10-13 | Stop reason: HOSPADM

## 2021-10-13 RX ORDER — NALOXONE HYDROCHLORIDE 0.4 MG/ML
0.4 INJECTION, SOLUTION INTRAMUSCULAR; INTRAVENOUS; SUBCUTANEOUS
Status: DISCONTINUED | OUTPATIENT
Start: 2021-10-13 | End: 2021-10-13 | Stop reason: HOSPADM

## 2021-10-13 RX ORDER — ALBUTEROL SULFATE 0.83 MG/ML
2.5 SOLUTION RESPIRATORY (INHALATION) EVERY 4 HOURS PRN
Status: DISCONTINUED | OUTPATIENT
Start: 2021-10-13 | End: 2021-10-13 | Stop reason: HOSPADM

## 2021-10-13 RX ORDER — FENTANYL CITRATE 50 UG/ML
50 INJECTION, SOLUTION INTRAMUSCULAR; INTRAVENOUS EVERY 5 MIN PRN
Status: DISCONTINUED | OUTPATIENT
Start: 2021-10-13 | End: 2021-10-13 | Stop reason: HOSPADM

## 2021-10-13 RX ORDER — PROPOFOL 10 MG/ML
INJECTION, EMULSION INTRAVENOUS CONTINUOUS PRN
Status: DISCONTINUED | OUTPATIENT
Start: 2021-10-13 | End: 2021-10-13

## 2021-10-13 RX ORDER — AMOXICILLIN 250 MG
1 CAPSULE ORAL 2 TIMES DAILY
Status: DISCONTINUED | OUTPATIENT
Start: 2021-10-13 | End: 2021-10-14 | Stop reason: HOSPADM

## 2021-10-13 RX ORDER — METHOCARBAMOL 750 MG/1
750 TABLET, FILM COATED ORAL EVERY 6 HOURS PRN
Status: DISCONTINUED | OUTPATIENT
Start: 2021-10-13 | End: 2021-10-14 | Stop reason: HOSPADM

## 2021-10-13 RX ORDER — FENOFIBRATE 54 MG/1
54 TABLET ORAL DAILY
Status: DISCONTINUED | OUTPATIENT
Start: 2021-10-14 | End: 2021-10-14 | Stop reason: HOSPADM

## 2021-10-13 RX ORDER — ACETAMINOPHEN 325 MG/1
975 TABLET ORAL ONCE
Status: COMPLETED | OUTPATIENT
Start: 2021-10-13 | End: 2021-10-13

## 2021-10-13 RX ORDER — AMOXICILLIN 250 MG
1 CAPSULE ORAL DAILY
Qty: 30 TABLET | Refills: 0 | Status: SHIPPED | OUTPATIENT
Start: 2021-10-13 | End: 2021-11-22

## 2021-10-13 RX ORDER — LORAZEPAM 2 MG/ML
.5-1 INJECTION INTRAMUSCULAR
Status: DISCONTINUED | OUTPATIENT
Start: 2021-10-13 | End: 2021-10-13 | Stop reason: HOSPADM

## 2021-10-13 RX ORDER — ONDANSETRON 4 MG/1
4 TABLET, ORALLY DISINTEGRATING ORAL EVERY 6 HOURS PRN
Status: DISCONTINUED | OUTPATIENT
Start: 2021-10-13 | End: 2021-10-14 | Stop reason: HOSPADM

## 2021-10-13 RX ORDER — HYDROMORPHONE HYDROCHLORIDE 1 MG/ML
0.2 INJECTION, SOLUTION INTRAMUSCULAR; INTRAVENOUS; SUBCUTANEOUS EVERY 5 MIN PRN
Status: DISCONTINUED | OUTPATIENT
Start: 2021-10-13 | End: 2021-10-13 | Stop reason: HOSPADM

## 2021-10-13 RX ORDER — HYDROXYZINE HYDROCHLORIDE 25 MG/1
25 TABLET, FILM COATED ORAL EVERY 6 HOURS PRN
Qty: 30 TABLET | Refills: 0 | Status: SHIPPED | OUTPATIENT
Start: 2021-10-13 | End: 2022-07-03

## 2021-10-13 RX ORDER — ACETAMINOPHEN 325 MG/1
975 TABLET ORAL EVERY 8 HOURS
Status: DISCONTINUED | OUTPATIENT
Start: 2021-10-13 | End: 2021-10-14 | Stop reason: HOSPADM

## 2021-10-13 RX ORDER — PROPOFOL 10 MG/ML
INJECTION, EMULSION INTRAVENOUS PRN
Status: DISCONTINUED | OUTPATIENT
Start: 2021-10-13 | End: 2021-10-13

## 2021-10-13 RX ORDER — SODIUM CHLORIDE, SODIUM LACTATE, POTASSIUM CHLORIDE, CALCIUM CHLORIDE 600; 310; 30; 20 MG/100ML; MG/100ML; MG/100ML; MG/100ML
INJECTION, SOLUTION INTRAVENOUS CONTINUOUS
Status: DISCONTINUED | OUTPATIENT
Start: 2021-10-13 | End: 2021-10-13 | Stop reason: HOSPADM

## 2021-10-13 RX ORDER — ATORVASTATIN CALCIUM 40 MG/1
40 TABLET, FILM COATED ORAL EVERY EVENING
Status: DISCONTINUED | OUTPATIENT
Start: 2021-10-13 | End: 2021-10-14 | Stop reason: HOSPADM

## 2021-10-13 RX ORDER — HYDROXYZINE HYDROCHLORIDE 25 MG/1
25 TABLET, FILM COATED ORAL EVERY 6 HOURS PRN
Status: DISCONTINUED | OUTPATIENT
Start: 2021-10-13 | End: 2021-10-14 | Stop reason: HOSPADM

## 2021-10-13 RX ORDER — DEXAMETHASONE SODIUM PHOSPHATE 4 MG/ML
INJECTION, SOLUTION INTRA-ARTICULAR; INTRALESIONAL; INTRAMUSCULAR; INTRAVENOUS; SOFT TISSUE PRN
Status: DISCONTINUED | OUTPATIENT
Start: 2021-10-13 | End: 2021-10-13

## 2021-10-13 RX ORDER — ONDANSETRON 2 MG/ML
INJECTION INTRAMUSCULAR; INTRAVENOUS PRN
Status: DISCONTINUED | OUTPATIENT
Start: 2021-10-13 | End: 2021-10-13

## 2021-10-13 RX ORDER — OXYCODONE HYDROCHLORIDE 5 MG/1
5 TABLET ORAL EVERY 4 HOURS PRN
Status: DISCONTINUED | OUTPATIENT
Start: 2021-10-13 | End: 2021-10-13 | Stop reason: HOSPADM

## 2021-10-13 RX ORDER — SODIUM CHLORIDE, SODIUM LACTATE, POTASSIUM CHLORIDE, CALCIUM CHLORIDE 600; 310; 30; 20 MG/100ML; MG/100ML; MG/100ML; MG/100ML
INJECTION, SOLUTION INTRAVENOUS CONTINUOUS PRN
Status: DISCONTINUED | OUTPATIENT
Start: 2021-10-13 | End: 2021-10-13

## 2021-10-13 RX ORDER — POLYETHYLENE GLYCOL 3350 17 G/17G
17 POWDER, FOR SOLUTION ORAL DAILY
Status: DISCONTINUED | OUTPATIENT
Start: 2021-10-14 | End: 2021-10-14 | Stop reason: HOSPADM

## 2021-10-13 RX ORDER — LIDOCAINE 40 MG/G
CREAM TOPICAL
Status: DISCONTINUED | OUTPATIENT
Start: 2021-10-13 | End: 2021-10-14 | Stop reason: HOSPADM

## 2021-10-13 RX ORDER — ONDANSETRON 2 MG/ML
4 INJECTION INTRAMUSCULAR; INTRAVENOUS EVERY 6 HOURS PRN
Status: DISCONTINUED | OUTPATIENT
Start: 2021-10-13 | End: 2021-10-14 | Stop reason: HOSPADM

## 2021-10-13 RX ORDER — METHOCARBAMOL 750 MG/1
750 TABLET, FILM COATED ORAL EVERY 6 HOURS PRN
Qty: 60 TABLET | Refills: 0 | Status: SHIPPED | OUTPATIENT
Start: 2021-10-13 | End: 2022-02-24

## 2021-10-13 RX ORDER — CEFAZOLIN SODIUM 2 G/100ML
2 INJECTION, SOLUTION INTRAVENOUS
Status: COMPLETED | OUTPATIENT
Start: 2021-10-13 | End: 2021-10-13

## 2021-10-13 RX ORDER — NALOXONE HYDROCHLORIDE 0.4 MG/ML
0.2 INJECTION, SOLUTION INTRAMUSCULAR; INTRAVENOUS; SUBCUTANEOUS
Status: DISCONTINUED | OUTPATIENT
Start: 2021-10-13 | End: 2021-10-13 | Stop reason: HOSPADM

## 2021-10-13 RX ORDER — FLUTICASONE PROPIONATE 50 MCG
2 SPRAY, SUSPENSION (ML) NASAL DAILY PRN
Status: DISCONTINUED | OUTPATIENT
Start: 2021-10-13 | End: 2021-10-14 | Stop reason: HOSPADM

## 2021-10-13 RX ORDER — OXYCODONE HYDROCHLORIDE 5 MG/1
5 TABLET ORAL EVERY 4 HOURS PRN
Status: DISCONTINUED | OUTPATIENT
Start: 2021-10-13 | End: 2021-10-14 | Stop reason: HOSPADM

## 2021-10-13 RX ORDER — SODIUM CHLORIDE, SODIUM LACTATE, POTASSIUM CHLORIDE, CALCIUM CHLORIDE 600; 310; 30; 20 MG/100ML; MG/100ML; MG/100ML; MG/100ML
INJECTION, SOLUTION INTRAVENOUS
Status: COMPLETED
Start: 2021-10-13 | End: 2021-10-13

## 2021-10-13 RX ORDER — SODIUM CHLORIDE, SODIUM GLUCONATE, SODIUM ACETATE, POTASSIUM CHLORIDE AND MAGNESIUM CHLORIDE 526; 502; 368; 37; 30 MG/100ML; MG/100ML; MG/100ML; MG/100ML; MG/100ML
INJECTION, SOLUTION INTRAVENOUS CONTINUOUS PRN
Status: DISCONTINUED | OUTPATIENT
Start: 2021-10-13 | End: 2021-10-13

## 2021-10-13 RX ORDER — FENTANYL CITRATE 50 UG/ML
INJECTION, SOLUTION INTRAMUSCULAR; INTRAVENOUS PRN
Status: DISCONTINUED | OUTPATIENT
Start: 2021-10-13 | End: 2021-10-13

## 2021-10-13 RX ORDER — GABAPENTIN 300 MG/1
300 CAPSULE ORAL
Status: COMPLETED | OUTPATIENT
Start: 2021-10-13 | End: 2021-10-13

## 2021-10-13 RX ORDER — MEPERIDINE HYDROCHLORIDE 25 MG/ML
12.5 INJECTION INTRAMUSCULAR; INTRAVENOUS; SUBCUTANEOUS
Status: DISCONTINUED | OUTPATIENT
Start: 2021-10-13 | End: 2021-10-13 | Stop reason: HOSPADM

## 2021-10-13 RX ORDER — HALOPERIDOL 5 MG/ML
1 INJECTION INTRAMUSCULAR
Status: DISCONTINUED | OUTPATIENT
Start: 2021-10-13 | End: 2021-10-13 | Stop reason: HOSPADM

## 2021-10-13 RX ORDER — VENLAFAXINE HYDROCHLORIDE 150 MG/1
150 CAPSULE, EXTENDED RELEASE ORAL
Status: DISCONTINUED | OUTPATIENT
Start: 2021-10-14 | End: 2021-10-14 | Stop reason: HOSPADM

## 2021-10-13 RX ORDER — ACETAMINOPHEN 325 MG/1
650 TABLET ORAL EVERY 4 HOURS PRN
Status: DISCONTINUED | OUTPATIENT
Start: 2021-10-16 | End: 2021-10-14 | Stop reason: HOSPADM

## 2021-10-13 RX ORDER — ALBUMIN, HUMAN INJ 5% 5 %
SOLUTION INTRAVENOUS CONTINUOUS PRN
Status: DISCONTINUED | OUTPATIENT
Start: 2021-10-13 | End: 2021-10-13

## 2021-10-13 RX ORDER — FENTANYL CITRATE 50 UG/ML
25 INJECTION, SOLUTION INTRAMUSCULAR; INTRAVENOUS
Status: DISCONTINUED | OUTPATIENT
Start: 2021-10-13 | End: 2021-10-13 | Stop reason: HOSPADM

## 2021-10-13 RX ORDER — ONDANSETRON 4 MG/1
4 TABLET, ORALLY DISINTEGRATING ORAL EVERY 30 MIN PRN
Status: DISCONTINUED | OUTPATIENT
Start: 2021-10-13 | End: 2021-10-13 | Stop reason: HOSPADM

## 2021-10-13 RX ORDER — EPHEDRINE SULFATE 50 MG/ML
INJECTION, SOLUTION INTRAMUSCULAR; INTRAVENOUS; SUBCUTANEOUS PRN
Status: DISCONTINUED | OUTPATIENT
Start: 2021-10-13 | End: 2021-10-13

## 2021-10-13 RX ORDER — LIDOCAINE HYDROCHLORIDE 20 MG/ML
INJECTION, SOLUTION INFILTRATION; PERINEURAL PRN
Status: DISCONTINUED | OUTPATIENT
Start: 2021-10-13 | End: 2021-10-13

## 2021-10-13 RX ORDER — OXYCODONE HYDROCHLORIDE 5 MG/1
5-10 TABLET ORAL EVERY 4 HOURS PRN
Qty: 35 TABLET | Refills: 0 | Status: SHIPPED | OUTPATIENT
Start: 2021-10-13 | End: 2022-01-31

## 2021-10-13 RX ORDER — OXYCODONE HYDROCHLORIDE 10 MG/1
10 TABLET ORAL EVERY 4 HOURS PRN
Status: DISCONTINUED | OUTPATIENT
Start: 2021-10-13 | End: 2021-10-14 | Stop reason: HOSPADM

## 2021-10-13 RX ORDER — CALCIUM CHLORIDE 100 MG/ML
INJECTION INTRAVENOUS; INTRAVENTRICULAR PRN
Status: DISCONTINUED | OUTPATIENT
Start: 2021-10-13 | End: 2021-10-13

## 2021-10-13 RX ORDER — MAGNESIUM HYDROXIDE 1200 MG/15ML
LIQUID ORAL PRN
Status: DISCONTINUED | OUTPATIENT
Start: 2021-10-13 | End: 2021-10-13 | Stop reason: HOSPADM

## 2021-10-13 RX ADMIN — SODIUM CHLORIDE, SODIUM GLUCONATE, SODIUM ACETATE, POTASSIUM CHLORIDE AND MAGNESIUM CHLORIDE: 526; 502; 368; 37; 30 INJECTION, SOLUTION INTRAVENOUS at 07:48

## 2021-10-13 RX ADMIN — SUGAMMADEX 200 MG: 100 INJECTION, SOLUTION INTRAVENOUS at 10:18

## 2021-10-13 RX ADMIN — ACETAMINOPHEN 975 MG: 325 TABLET, FILM COATED ORAL at 06:20

## 2021-10-13 RX ADMIN — SODIUM CHLORIDE, POTASSIUM CHLORIDE, SODIUM LACTATE AND CALCIUM CHLORIDE 1000 ML: 600; 310; 30; 20 INJECTION, SOLUTION INTRAVENOUS at 22:45

## 2021-10-13 RX ADMIN — PROPOFOL 150 MCG/KG/MIN: 10 INJECTION, EMULSION INTRAVENOUS at 07:46

## 2021-10-13 RX ADMIN — FENTANYL CITRATE 25 MCG: 50 INJECTION INTRAMUSCULAR; INTRAVENOUS at 10:51

## 2021-10-13 RX ADMIN — FENTANYL CITRATE 100 MCG: 50 INJECTION, SOLUTION INTRAMUSCULAR; INTRAVENOUS at 07:37

## 2021-10-13 RX ADMIN — LIDOCAINE HYDROCHLORIDE 60 MG: 20 INJECTION, SOLUTION INFILTRATION; PERINEURAL at 07:37

## 2021-10-13 RX ADMIN — PROPOFOL 170 MG: 10 INJECTION, EMULSION INTRAVENOUS at 07:37

## 2021-10-13 RX ADMIN — OXYCODONE HYDROCHLORIDE 5 MG: 5 TABLET ORAL at 21:06

## 2021-10-13 RX ADMIN — ACETAMINOPHEN 975 MG: 325 TABLET, FILM COATED ORAL at 14:51

## 2021-10-13 RX ADMIN — MIDAZOLAM 1 MG: 1 INJECTION INTRAMUSCULAR; INTRAVENOUS at 07:26

## 2021-10-13 RX ADMIN — PHENYLEPHRINE HYDROCHLORIDE 100 MCG: 10 INJECTION INTRAVENOUS at 08:31

## 2021-10-13 RX ADMIN — DOCUSATE SODIUM AND SENNOSIDES 1 TABLET: 8.6; 5 TABLET ORAL at 14:50

## 2021-10-13 RX ADMIN — CALCIUM CHLORIDE 1 G: 100 INJECTION, SOLUTION INTRAVENOUS at 08:31

## 2021-10-13 RX ADMIN — Medication 5 MG: at 08:33

## 2021-10-13 RX ADMIN — OXYCODONE HYDROCHLORIDE 5 MG: 5 TABLET ORAL at 11:57

## 2021-10-13 RX ADMIN — SUFENTANIL CITRATE 0.2 MCG/KG/HR: 50 INJECTION EPIDURAL; INTRAVENOUS at 07:46

## 2021-10-13 RX ADMIN — PHENYLEPHRINE HYDROCHLORIDE 0.3 MCG/KG/MIN: 10 INJECTION INTRAVENOUS at 08:29

## 2021-10-13 RX ADMIN — METHOCARBAMOL 750 MG: 750 TABLET ORAL at 22:45

## 2021-10-13 RX ADMIN — PROPOFOL 30 MG: 10 INJECTION, EMULSION INTRAVENOUS at 07:40

## 2021-10-13 RX ADMIN — ATORVASTATIN CALCIUM 40 MG: 40 TABLET, FILM COATED ORAL at 21:08

## 2021-10-13 RX ADMIN — GABAPENTIN 300 MG: 300 CAPSULE ORAL at 06:20

## 2021-10-13 RX ADMIN — ACETAMINOPHEN 975 MG: 325 TABLET, FILM COATED ORAL at 21:07

## 2021-10-13 RX ADMIN — DOCUSATE SODIUM AND SENNOSIDES 1 TABLET: 8.6; 5 TABLET ORAL at 21:08

## 2021-10-13 RX ADMIN — SODIUM CHLORIDE, POTASSIUM CHLORIDE, SODIUM LACTATE AND CALCIUM CHLORIDE: 600; 310; 30; 20 INJECTION, SOLUTION INTRAVENOUS at 11:05

## 2021-10-13 RX ADMIN — DEXAMETHASONE SODIUM PHOSPHATE 6 MG: 4 INJECTION, SOLUTION INTRAMUSCULAR; INTRAVENOUS at 07:40

## 2021-10-13 RX ADMIN — PHENYLEPHRINE HYDROCHLORIDE 100 MCG: 10 INJECTION INTRAVENOUS at 08:34

## 2021-10-13 RX ADMIN — DEXMEDETOMIDINE 0.2 MCG/KG/HR: 100 INJECTION, SOLUTION, CONCENTRATE INTRAVENOUS at 07:46

## 2021-10-13 RX ADMIN — ROCURONIUM BROMIDE 50 MG: 50 INJECTION, SOLUTION INTRAVENOUS at 07:40

## 2021-10-13 RX ADMIN — SODIUM CHLORIDE, POTASSIUM CHLORIDE, SODIUM LACTATE AND CALCIUM CHLORIDE: 600; 310; 30; 20 INJECTION, SOLUTION INTRAVENOUS at 07:46

## 2021-10-13 RX ADMIN — Medication 2 G: at 07:50

## 2021-10-13 RX ADMIN — ROCURONIUM BROMIDE 10 MG: 50 INJECTION, SOLUTION INTRAVENOUS at 08:45

## 2021-10-13 RX ADMIN — Medication 5 MG: at 08:28

## 2021-10-13 RX ADMIN — ONDANSETRON 4 MG: 2 INJECTION INTRAMUSCULAR; INTRAVENOUS at 10:07

## 2021-10-13 RX ADMIN — Medication 0.5 UNITS: at 08:33

## 2021-10-13 RX ADMIN — ALBUMIN HUMAN: 0.05 INJECTION, SOLUTION INTRAVENOUS at 08:45

## 2021-10-13 RX ADMIN — ROCURONIUM BROMIDE 30 MG: 50 INJECTION, SOLUTION INTRAVENOUS at 09:10

## 2021-10-13 RX ADMIN — PHENYLEPHRINE HYDROCHLORIDE 100 MCG: 10 INJECTION INTRAVENOUS at 08:28

## 2021-10-13 RX ADMIN — OXYCODONE HYDROCHLORIDE 5 MG: 5 TABLET ORAL at 15:42

## 2021-10-13 RX ADMIN — FENTANYL CITRATE 25 MCG: 50 INJECTION INTRAMUSCULAR; INTRAVENOUS at 11:45

## 2021-10-13 ASSESSMENT — LIFESTYLE VARIABLES: TOBACCO_USE: 0

## 2021-10-13 ASSESSMENT — MIFFLIN-ST. JEOR: SCORE: 1504.88

## 2021-10-13 NOTE — ANESTHESIA CARE TRANSFER NOTE
Patient: Isabela Panchal    Procedure: Procedure(s):  Cervical 5 to 7 anterior cervical decompression and fusion with medtronic plate and screws, interbody device, use of fluoroscopy, microscope,  and left sided iliac crest autograft       Diagnosis: Cervical radiculopathy [M54.12]  Cervical pain [M54.2]  Diagnosis Additional Information: No value filed.    Anesthesia Type:   General     Note:    Oropharynx: oropharynx clear of all foreign objects and spontaneously breathing  Level of Consciousness: drowsy  Oxygen Supplementation: face mask  Level of Supplemental Oxygen (L/min / FiO2): 8  Independent Airway: airway patency satisfactory and stable  Dentition: dentition unchanged  Vital Signs Stable: post-procedure vital signs reviewed and stable  Report to RN Given: handoff report given  Patient transferred to: PACU    Handoff Report: Identifed the Patient, Identified the Reponsible Provider, Reviewed the pertinent medical history, Discussed the surgical course, Reviewed Intra-OP anesthesia mangement and issues during anesthesia, Set expectations for post-procedure period and Allowed opportunity for questions and acknowledgement of understanding      Vitals:  Vitals Value Taken Time   /91    Temp 37.3    Pulse 101    Resp 16    SpO2 99        Electronically Signed By: SOFYA Sigala CRNA  October 13, 2021  10:28 AM

## 2021-10-13 NOTE — CONSULTS
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 She has past history of SVT occurring in 2000 with no known recurrence. Patient is SP Cervical 5 to 7 anterior cervical decompression and fusion with medtronic plate and screws, interbody device, use of fluoroscopy, microscope and left sided iliac crest autograft.     # SP Cervical 5 to 7 anterior cervical decompression and fusion with medtronic plate and screws, interbody device, use of fluoroscopy, microscope and left sided iliac crest autograft.  -per primary team  -hydrate well  -good IS  -ankle pumps  -pain and DVT prophylaxis management per orthopedics  # HT: Hold PTA lisinopril 10 mg every day.   # Dyslipidemia: Ct PTA lofibra and lipitor  # Hx of SVT. No recurrence. Monitor. Keep Mg and K in good range.     DVT Prophylaxis: Per primary team  Code Status: Full Code    Disposition: Per primary team.    Miguel Olmedo MD     Reason for Consult   Reason for consult: post op medical co-management.     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 She has past history of SVT occurring in 2000 with no known recurrence. Patient is SP Cervical 5 to 7 anterior cervical decompression and fusion with medtronic plate and screws, interbody device, use of fluoroscopy, microscope and left sided iliac crest autograft. Pos top medicine consulted for co management. She is a little sleepy. Otherwise doing good.     No chest pain or sob. No  nausea or vomiting.     Past Medical History      I have reviewed this patient's medical history and updated it with pertinent information if needed.     Past Medical History:   Diagnosis Date     Abnormal Pap smear of cervix 2019    see problem list     Allergic rhinitis due to allergen      Anemia      Breathing difficulty      Cervical high risk HPV (human papillomavirus) test positive 02/13/2018, 2019    type 16; see problem list     Cervical radiculopathy 08/17/2021     Chest pain      Generalised anxiety disorder 09/06/2012     Headache      Heart trouble      High blood triglycerides 02/26/2016     History of blood transfusion     unsure     Hoarseness      Hypertension      Hypoglycemia 03/10/2013     Impaired fasting glucose 08/16/2014     Insomnia 09/06/2012     Irritable bowel syndrome      Itchy eyes      MEDICAL HISTORY OF -     hx of right wrist dislocation     Migraine      Mild intermittent asthma     allergy or URI triggered      Moderate recurrent major depression (H) 09/06/2012     Nasal congestion      Numbness      Obesity, unspecified (OBESE) 08/21/2014     Paroxysmal supraventricular tachycardia (H)     4/00     PONV (postoperative nausea and vomiting)      Ringing in ears      Sleep difficulties      Sneezing      Sore throat      Vertigo      Weakness      Weight gain        Past Surgical History   I have reviewed this patient's surgical history and updated it with pertinent information if needed.  Past Surgical History:   Procedure Laterality Date     COLONOSCOPY  4/26/2013    Procedure: COLONOSCOPY;;  Surgeon: Jim Humphries MD;  Location:  GI     ENT SURGERY      deviated septum     HEAD & NECK SURGERY       LAPAROSCOPIC SALPINGO-OOPHORECTOMY  1/20/2014    Procedure: LAPAROSCOPIC SALPINGO-OOPHORECTOMY;  Laparoscopic Left Salpingo Oophorectomy ;  Surgeon: Chani Del Rosario MD;  Location: [facility] OR     LUMPECTOMY BREAST      right     ORTHOPEDIC SURGERY      knee     ORTHOPEDIC  SURGERY      foot     SOFT TISSUE SURGERY      lymphoma face and armpit     SOFT TISSUE SURGERY       ZZC NONSPECIFIC PROCEDURE      Plastic repair of facial lac     ZZC NONSPECIFIC PROCEDURE      Lipoma removed from arm close to the tail of te breast     ZZC NONSPECIFIC PROCEDURE      Plastic repair of facial lac     ZZC NONSPECIFIC PROCEDURE      Knee surgery     ZZC NONSPECIFIC PROCEDURE      Miscarriage x 2       Prior to Admission Medications   Prior to Admission Medications   Prescriptions Last Dose Informant Patient Reported? Taking?   MIRENA 20 MCG/24HR IU IUD   Yes No   Sig: USE FOR UP TO 5 YEARS THEN REMOVE   RESTASIS 0.05 % ophthalmic emulsion More than a month at Unknown time  Yes Yes   Sig: Place 1 drop into both eyes daily as needed    atorvastatin (LIPITOR) 40 MG tablet 10/12/2021 at 0800  No Yes   Sig: Take 1 tablet (40 mg) by mouth daily   cholecalciferol (VITAMIN D3) 125 mcg (5000 units) capsule 10/12/2021 at 0800  Yes Yes   Sig: Take 125 mcg by mouth daily   etodolac (LODINE) 500 MG tablet 10/11/2021 at Unknown time  No No   Sig: Take 1 tablet (500 mg) by mouth 2 times daily   fenofibrate (TRICOR) 48 MG tablet 10/12/2021 at 0800  No Yes   Sig: Take 1 tablet (48 mg) by mouth daily   fluticasone (FLONASE) 50 MCG/ACT nasal spray More than a month at Unknown time  No Yes   Sig: Spray 2 sprays into both nostrils daily   lisinopril (ZESTRIL) 10 MG tablet 10/12/2021 at 0800  No Yes   Sig: Take 1 tablet (10 mg) by mouth daily   metroNIDAZOLE (METROCREAM) 0.75 % external cream More than a month at Unknown time  No Yes   Sig: APPLY TOPICALLY TWO TIMES A DAY   tiZANidine (ZANAFLEX) 4 MG tablet More than a month at Unknown time  No Yes   Sig: Take 1-2 tablets (4-8 mg) by mouth nightly as needed for muscle spasms   venlafaxine (EFFEXOR-XR) 150 MG 24 hr capsule 10/12/2021 at 0800  No Yes   Sig: TAKE ONE CAPSULE BY MOUTH ONCE DAILY      Facility-Administered Medications: None     Allergies   Allergies   Allergen  "Reactions     Benzoin Rash     Adhesive Tape      blistering     Allegra [Fexofenadine]      Kidney pain     Cucumber Extract      Throat and ears itchy and throat feels \"icky\"     Fexofenadine Hydrochloride      allegra - low back pain     Ibuprofen      palpitations     Lexapro      Wt gain     No Clinical Screening - See Comments      Ramon      Itchy ears and throat, throat feel \"icky\"     Peanuts [Nuts]      Itchy ears and throat, throat feel \"icky\"     Seasonal Allergies        Social History   I have reviewed this patient's social history and updated it with pertinent information if needed. Isabela Pnachal  reports that she has never smoked. She has never used smokeless tobacco. She reports previous alcohol use. She reports that she does not use drugs.    Family History   I have reviewed this patient's family history and updated it with pertinent information if needed.   Family History   Problem Relation Age of Onset     Alzheimer Disease Maternal Grandfather      Arthritis Maternal Grandmother      Heart Disease Maternal Grandmother      Heart Failure Maternal Grandmother      Thyroid Disease Mother      Allergy (Severe) Father        Review of Systems   The 10 point Review of Systems is negative other than noted in the HPI or here.     Physical Exam   Temp: 99.4  F (37.4  C) Temp src: Oral BP: (!) 141/91 Pulse: 110   Resp: 14 SpO2: 95 % O2 Device: Nasal cannula Oxygen Delivery: 2 LPM  Vital Signs with Ranges  Temp:  [98.6  F (37  C)-99.5  F (37.5  C)] 99.4  F (37.4  C)  Pulse:  [] 110  Resp:  [9-19] 14  BP: (121-155)/() 141/91  SpO2:  [93 %-98 %] 95 %  197 lbs 1.46 oz    Constitutional: Awake, alert, cooperative, no apparent distress.  Eyes: Conjunctiva and pupils examined and normal.  HEENT: SP Neck Sx  Respiratory: Clear to auscultation bilaterally, no crackles or wheezing.  Cardiovascular: Regular rate and rhythm, normal S1 and S2, and no murmur noted.  GI: Soft, non-distended, " non-tender, normal bowel sounds.  Lymph/Hematologic: No anterior cervical or supraclavicular adenopathy.  Skin: No rashes, no cyanosis, no edema.  Musculoskeletal: No joint swelling, erythema or tenderness.  Neurologic: Cranial nerves 2-12 intact, normal strength and sensation.  Psychiatric: Alert, oriented to person, place and time, no obvious anxiety or depression.    Data      -Data reviewed today: All pertinent laboratory and imaging results from this encounter were reviewed.    Recent Labs   Lab 10/13/21  0610   HGB 12.1   POTASSIUM 3.6     Recent Results (from the past 24 hour(s))   XR Surgery CL L/T 5 Min Fluoro    Narrative    This exam was marked as non-reportable because it will not be read by a   radiologist or a [facility] non-radiologist provider.

## 2021-10-13 NOTE — ANESTHESIA PROCEDURE NOTES
Airway       Patient location during procedure: OR       Procedure Start/Stop Times: 10/13/2021 7:40 AM  Staff -        CRNA: Mira Van APRN CRNA       Other Anesthesia Staff: Richardson Avilez       Performed By: SRNA  Consent for Airway        Urgency: elective  Indications and Patient Condition       Indications for airway management: valentín-procedural       Induction type:intravenous       Mask difficulty assessment: 1 - vent by mask    Final Airway Details       Final airway type: endotracheal airway       Successful airway: ETT - single  Endotracheal Airway Details        ETT size (mm): 7.0       Cuffed: yes       Successful intubation technique: video laryngoscopy       VL Blade Size: MAC 3       Grade View of Cords: 2       Adjucts: stylet       Position: Left       Measured from: gums/teeth       Secured at (cm): 21       Bite block used: None    Post intubation assessment        Placement verified by: capnometry, equal breath sounds and chest rise        Number of attempts at approach: 1       Number of other approaches attempted: 0       Secured with: silk tape       Ease of procedure: easy       Dentition: Intact and Unchanged

## 2021-10-13 NOTE — OR NURSING
"PACU to Inpatient Nursing Handoff    Patient Isabela Panchal is a 51 year old female who speaks English.   Procedure Procedure(s):  Cervical 5 to 7 anterior cervical decompression and fusion with medtronic plate and screws, interbody device, use of fluoroscopy, microscope,  and left sided iliac crest autograft   Surgeon(s) Primary: Shaw Lal MD  Assisting: Rose Cardoso PA-C     Allergies   Allergen Reactions     Benzoin Rash     Adhesive Tape      blistering     Allegra [Fexofenadine]      Kidney pain     Cucumber Extract      Throat and ears itchy and throat feels \"icky\"     Fexofenadine Hydrochloride      allegra - low back pain     Ibuprofen      palpitations     Lexapro      Wt gain     No Clinical Screening - See Comments      Ramon      Itchy ears and throat, throat feel \"icky\"     Peanuts [Nuts]      Itchy ears and throat, throat feel \"icky\"     Seasonal Allergies        Isolation  [unfilled]     Past Medical History   has a past medical history of Abnormal Pap smear of cervix (2019), Allergic rhinitis due to allergen, Anemia, Breathing difficulty, Cervical high risk HPV (human papillomavirus) test positive (02/13/2018, 2019), Cervical radiculopathy (08/17/2021), Chest pain, Generalised anxiety disorder (09/06/2012), Headache, Heart trouble, High blood triglycerides (02/26/2016), History of blood transfusion, Hoarseness, Hypertension, Hypoglycemia (03/10/2013), Impaired fasting glucose (08/16/2014), Insomnia (09/06/2012), Irritable bowel syndrome, Itchy eyes, MEDICAL HISTORY OF -, Migraine, Mild intermittent asthma, Moderate recurrent major depression (H) (09/06/2012), Nasal congestion, Numbness, Obesity, unspecified (OBESE) (08/21/2014), Paroxysmal supraventricular tachycardia (H), PONV (postoperative nausea and vomiting), Ringing in ears, Sleep difficulties, Sneezing, Sore throat, Vertigo, Weakness, and Weight gain.    Anesthesia General   Dermatome Level     Preop Meds " acetaminophen (Tylenol) - time given: 0620  gabapentin (Neurontin) - time given: 0620   Nerve block Not applicable   Intraop Meds Not applicable   Local Meds No   Antibiotics cefazolin (Ancef) - last given at 0750     Pain Patient Currently in Pain: yes   PACU meds  fentanyl (Sublimaze): 50 mcg (total dose) last given at 1145   oxycodone (Roxicodone): 5 mg (total dose) last given at 1157    PCA / epidural No   Capnography     Telemetry ECG Rhythm: Normal sinus rhythm   Inpatient Telemetry Monitor Ordered? No        Labs Glucose Lab Results   Component Value Date     09/03/2021     04/16/2021       Hgb Lab Results   Component Value Date    HGB 12.1 10/13/2021    HGB 13.9 08/13/2015       INR Lab Results   Component Value Date    INR 1.00 02/15/2005      PACU Imaging Not applicable     Wound/Incision Incision/Surgical Site 01/20/14 Anterior Abdomen (Active)   Number of days: 2823       Incision/Surgical Site 10/13/21 Anterior;Left Pelvis (Active)   Incision Assessment UTV 10/13/21 1025   Closure CAYETANO 10/13/21 1025   Incision Drainage Amount None 10/13/21 1025   Dressing Intervention Clean, dry, intact 10/13/21 1025   Number of days: 0       Incision/Surgical Site 10/13/21 Anterior Neck (Active)   Incision Assessment UTV 10/13/21 1025   Closure CAYETANO 10/13/21 1025   Incision Drainage Amount None 10/13/21 1025   Dressing Intervention Clean, dry, intact 10/13/21 1025   Number of days: 0       Incision/Surgical Site 10/13/21 Left Hip (Active)   Number of days: 0       Incision/Surgical Site 10/13/21 Neck (Active)   Number of days: 0      CMS        Equipment Not applicable   Other LDA       IV Access Peripheral IV 10/13/21 Anterior;Right Wrist (Active)   Site Assessment WDL 10/13/21 1025   Line Status Infusing 10/13/21 1025   Phlebitis Scale 0-->no symptoms 10/13/21 1025   Infiltration Scale 0 10/13/21 1025   Number of days: 0       Peripheral IV 10/13/21 Left Wrist (Active)   Site Assessment WDL 10/13/21 1025    Line Status Saline locked 10/13/21 1025   Number of days: 0      Blood Products Not applicable EBL 25 mL   Intake/Output Date 10/13/21 0700 - 10/14/21 0659   Shift 7396-8371 3685-4901 1748-2258 24 Hour Total   INTAKE   I.V. 857.5   857.5   Colloid 250   250   Shift Total(mL/kg) 1107.5(12.39)   1107.5(12.39)   OUTPUT   Blood 25   25   Shift Total(mL/kg) 25(0.28)   25(0.28)   Weight (kg) 89.4 89.4 89.4 89.4      Drains / [place] Closed/Suction Drain 1 Posterior;Right;Midline Back Accordion 10 [place] placed to right of midline incision (Active)   Site Description UTV 10/13/21 1025   Dressing Status Normal: Clean, Dry & Intact 10/13/21 1025   Drainage Appearance Bloody/Bright Red 10/13/21 1025   Status To bulb suction 10/13/21 1025   Number of days: 0      Time of void PreOp Void Prior to Procedure: 0600 (10/13/21 0618)    PostOp      Diapered? No   Bladder Scan     PO    crackers and water     Vitals    B/P: (!) 152/96  T: 99.3  F (37.4  C)    Temp src: Axillary  P:  Pulse: 109 (10/13/21 1202)          R: 11  O2:  SpO2: 95 %    O2 Device: Nasal cannula (10/13/21 1115)    Oxygen Delivery: 2 LPM (10/13/21 1115)         Family/support present mother   Patient belongings  2 patient belonging bags, glasses   Patient transported on bed   DC meds/scripts (obs/outpt) Not applicable   Inpatient Pain Meds Released? Yes       Special needs/considerations None   Tasks needing completion None       Isabela Aguila, KETTY  [facility]COM [id number]

## 2021-10-13 NOTE — PROGRESS NOTES
S: Patient was seen today at   for an eval for a cervical collar as ordered by     O/G: The objective/goal of the collar is to help reduce motion/give cervical stability.    A: Pt was fit with a [place] J cervical collar, size 400 rear and 250 front.  Starting with the anterior section, the correct size and height was chosen that supported the patient in the desired position.  Attention was given to the chin support being sure that the correct height was selected to support the chin.   The posterior section was centered on the patients head .  With the side closure straps extending equally on both sides, the Velcro was secured.  An extra padding set was also provided.  Patient instructed on donning, doffing, use and care    P: Patient has been instructed to contact our facility with any questions and/or concerns.   JACOB Smith.

## 2021-10-13 NOTE — PROGRESS NOTES
Clinic Care Coordination Contact    Situation: Patient chart reviewed by care coordinator.    Background: Patient is enrolled in care coordination with monthly outreaches from W and chart review from Social Work Care Coordinator every 6 weeks.     Assessment: Per chart review patient was admitted to hospital for a scheduled procedure, a Cervical 5 to 7 anterior cervical decompression and fusion with medtronic plate and screws, interbody device, use of fluoroscopy, microscope, and left sided iliac crest autograft.     Plan/Recommendations: Care coordination team will follow admission and reach out to patient when she discharges home.     Crissy Connor, RN Care Coordinator     [facility]  [facility]

## 2021-10-13 NOTE — ANESTHESIA POSTPROCEDURE EVALUATION
Patient: Isabela Panchal    Procedure: Procedure(s):  Cervical 5 to 7 anterior cervical decompression and fusion with medtronic plate and screws, interbody device, use of fluoroscopy, microscope,  and left sided iliac crest autograft       Diagnosis:Cervical radiculopathy [M54.12]  Cervical pain [M54.2]  Diagnosis Additional Information: No value filed.    Anesthesia Type:  General    Note:  Disposition: Admission   Postop Pain Control: Uneventful            Sign Out: Well controlled pain   PONV: No   Neuro/Psych: Uneventful            Sign Out: Acceptable/Baseline neuro status   Airway/Respiratory: Uneventful            Sign Out: AIRWAY IN SITU/Resp. Support   CV/Hemodynamics: Uneventful            Sign Out: Acceptable CV status   Other NRE: NONE   DID A NON-ROUTINE EVENT OCCUR? No           Last vitals:  Vitals Value Taken Time   /88 10/13/21 1245   Temp 37  C (98.6  F) 10/13/21 1230   Pulse 110 10/13/21 1255   Resp 11 10/13/21 1255   SpO2 97 % 10/13/21 1256   Vitals shown include unvalidated device data.    Electronically Signed By: Christopher J. Behrens, MD  October 13, 2021  2:42 PM

## 2021-10-13 NOTE — BRIEF OP NOTE
[facility]    Brief Operative Note    Pre-operative diagnosis: Cervical radiculopathy [M54.12]  Cervical pain [M54.2]  Post-operative diagnosis Same as pre-operative diagnosis    Procedure: Procedure(s):  Cervical 5 to 7 anterior cervical decompression and fusion with medtronic plate and screws, interbody device, use of fluoroscopy, microscope,  and left sided iliac crest autograft  Surgeon: Surgeon(s) and Role:  Panel 1:     * Shaw Lal MD - Primary     * Rose Cardoso PA-C - Assisting  Panel 2:     * Shaw Lal MD - Primary     * Rose Cardoso PA-C - Assisting  Anesthesia: General   Estimated Blood Loss: 25 mL from 10/13/2021  7:27 AM to 10/13/2021 10:23 AM      Drains: None  Specimens: * No specimens in log *  Findings:   None.  Complications: None.  Implants:   Implant Name Type Inv. Item Serial No.  Lot No. LRB No. Used Action   GRAFT BONE CRUSH CANC 15ML [id number] - [id number] Bone/Tissue/Biologic GRAFT BONE CRUSH CANC 15ML [id number] [employer]CULOSKELETAL [place]  Left 1 Implanted   IMP SPACER MEDT CERVICAL ANATOMIC PEEK PTC [id number] - [id number] Metallic Hardware/[place] IMP SPACER MEDT CERVICAL ANATOMIC PEEK PTC [id number]  [employer] 29LH N/A 1 Implanted   IMP SPACER MEDT CERVICAL ANATOMIC PEEK PTC [id number] - [id number] Metallic Hardware/[place] IMP SPACER MEDT CERVICAL ANATOMIC PEEK PTC [id number]  [employer] [id number] N/A 1 Implanted   IMP PLATE CERV MEDT ZEVO 33MM 2 LVL [id number] - [id number] Metallic Hardware/[place] IMP PLATE CERV MEDT ZEVO 33MM 2 LVL [id number]  [employer]  N/A 1 Implanted   IMP SCR MEDT ZEVO 3.5X15MM ST [place] [id number] - [id number] Metallic Hardware/[place] IMP SCR MEDT ZEVO 3.5X15MM ST [place] [id number]  [employer]  N/A 6 Implanted

## 2021-10-13 NOTE — ANESTHESIA PREPROCEDURE EVALUATION
Anesthesia Pre-Procedure Evaluation    Patient: Isabela Panchal   MRN: [id number] : 1970        Preoperative Diagnosis: * No surgery found *   Procedure :      Past Medical History:   Diagnosis Date     Abnormal Pap smear of cervix     see problem list     Allergic rhinitis due to allergen      Anemia      Breathing difficulty      Cervical high risk HPV (human papillomavirus) test positive 2018, 2019    type 16; see problem list     Cervical radiculopathy 2021     Chest pain      Generalised anxiety disorder 2012     Headache      Heart trouble      High blood triglycerides 2016     History of blood transfusion     unsure     Hoarseness      Hypertension      Hypoglycemia 03/10/2013     Impaired fasting glucose 2014     Insomnia 2012     Irritable bowel syndrome      Itchy eyes      MEDICAL HISTORY OF -     hx of right wrist dislocation     Migraine      Mild intermittent asthma     allergy or URI triggered      Moderate recurrent major depression (H) 2012     Nasal congestion      Numbness      Obesity, unspecified (OBESE) 2014     Paroxysmal supraventricular tachycardia (H)     4/00     PONV (postoperative nausea and vomiting)      Ringing in ears      Sleep difficulties      Sneezing      Sore throat      Vertigo      Weakness      Weight gain       Past Surgical History:   Procedure Laterality Date     COLONOSCOPY  2013    Procedure: COLONOSCOPY;;  Surgeon: Jim Humphries MD;  Location:  GI     ENT SURGERY      deviated septum     HEAD & NECK SURGERY       LAPAROSCOPIC SALPINGO-OOPHORECTOMY  2014    Procedure: LAPAROSCOPIC SALPINGO-OOPHORECTOMY;  Laparoscopic Left Salpingo Oophorectomy ;  Surgeon: Chani Del Rosario MD;  Location: [facility]     LUMPECTOMY BREAST      right     ORTHOPEDIC SURGERY      knee     ORTHOPEDIC SURGERY      foot     SOFT TISSUE SURGERY      lymphoma face and armpit     SOFT TISSUE SURGERY       [facility]  "NONSPECIFIC PROCEDURE      Plastic repair of facial lac     ZZC NONSPECIFIC PROCEDURE      Lipoma removed from arm close to the tail of te breast     ZZC NONSPECIFIC PROCEDURE      Plastic repair of facial lac     ZZC NONSPECIFIC PROCEDURE      Knee surgery     ZZC NONSPECIFIC PROCEDURE      Miscarriage x 2      Allergies   Allergen Reactions     Benzoin Rash     Adhesive Tape      blistering     Allegra [Fexofenadine]      Kidney pain     Cucumber Extract      Throat and ears itchy and throat feels \"icky\"     Fexofenadine Hydrochloride      allegra - low back pain     Ibuprofen      palpitations     Lexapro      Wt gain     No Clinical Screening - See Comments      Ramon      Itchy ears and throat, throat feel \"icky\"     Peanuts [Nuts]      Itchy ears and throat, throat feel \"icky\"     Seasonal Allergies       Social History     Tobacco Use     Smoking status: Never Smoker     Smokeless tobacco: Never Used   Substance Use Topics     Alcohol use: Not Currently      Wt Readings from Last 1 Encounters:   10/13/21 89.4 kg (197 lb 1.5 oz)        Anesthesia Evaluation   Pt has had prior anesthetic. Type: General and MAC.    History of anesthetic complications  - PONV.      ROS/MED HX  ENT/Pulmonary: Comment: History of asthma but no recent issues, no use of inhalers   (-) tobacco use   Neurologic: Comment: TBI-Chronic post concussive syndrome-memory issues  Cervical radiculopathy      Cardiovascular:     (+) Dyslipidemia hypertension-----Previous cardiac testing   Echo: Date: Results:    Stress Test: Date: Results:    ECG Reviewed: Date: 8/18/21 Results:  SR 1st degree AV block  Cath: Date: Results:   (-) taking anticoagulants/antiplatelets   METS/Exercise Tolerance: 1 - Eating, dressing Comment: Able to walk 1/2 block due to pain   Hematologic: Comments: Possible history of blood transfusion.      Musculoskeletal: Comment: Chronic neck and back pain      GI/Hepatic:  - neg GI/hepatic ROS     Renal/Genitourinary:  - " neg Renal ROS     Endo:  - neg endo ROS     Psychiatric/Substance Use:     (+) psychiatric history anxiety and depression     Infectious Disease:  - neg infectious disease ROS     Malignancy:  - neg malignancy ROS     Other:  - neg other ROS          Physical Exam    Airway        Mallampati: II   TM distance: > 3 FB   Neck ROM: limited   Mouth opening: > 3 cm    Respiratory Devices and Support         Dental       (+) missing and chipped      Cardiovascular          Rhythm and rate: regular and normal     Pulmonary           breath sounds clear to auscultation           OUTSIDE LABS:  CBC:   Lab Results   Component Value Date    WBC 4.3 09/03/2021    WBC 6.1 08/18/2021    HGB 12.1 10/13/2021    HGB 13.2 09/03/2021    HCT 38.7 09/03/2021    HCT 36.8 08/18/2021     09/03/2021     08/18/2021     BMP:   Lab Results   Component Value Date     09/03/2021     08/18/2021    POTASSIUM 3.6 10/13/2021    POTASSIUM 4.1 09/03/2021    CHLORIDE 109 09/03/2021    CHLORIDE 107 08/18/2021    CO2 28 09/03/2021    CO2 24 08/18/2021    BUN 11 09/03/2021    BUN 10 08/18/2021    CR 0.76 09/03/2021    CR 1.11 (H) 08/18/2021     (H) 09/03/2021     (H) 08/18/2021     COAGS:   Lab Results   Component Value Date    INR 1.00 02/15/2005     POC:   Lab Results   Component Value Date    HCG Negative 01/15/2020     HEPATIC:   Lab Results   Component Value Date    ALBUMIN 3.9 09/03/2021    PROTTOTAL 7.4 09/03/2021    ALT 55 (H) 09/03/2021    AST 26 09/03/2021    ALKPHOS 73 09/03/2021    BILITOTAL 0.7 09/03/2021     OTHER:   Lab Results   Component Value Date    A1C 5.6 09/03/2021    JUDY 9.2 09/03/2021    PHOS 3.7 03/19/2013    MAG 1.9 03/19/2013    LIPASE 219 03/19/2013    AMYLASE 44 03/19/2013    TSH 1.51 04/16/2021    T4 1.04 03/19/2013    CRP <5.0 03/19/2013    SED 8 03/19/2013       Anesthesia Plan    ASA Status:  3   NPO Status:  NPO Appropriate    Anesthesia Type: General.     - Airway: ETT   Induction:  Intravenous.   Maintenance: Inhalation.   Techniques and Equipment:     - Airway: Video-Laryngoscope     - Lines/Monitors: 2nd IV     - Drips/Meds: Dexmed. infusion     Consents    Anesthesia Plan(s) and associated risks, benefits, and realistic alternatives discussed. Questions answered and patient/representative(s) expressed understanding.     - Discussed with:  Patient      - Extended Intubation/Ventilatory Support Discussed: Yes.      - Patient is DNR/DNI Status: No    Use of blood products discussed: Yes.     - Discussed with: Patient.     Postoperative Care    Pain management: IV analgesics.   PONV prophylaxis: Ondansetron (or other 5HT-3), Dexamethasone or Solumedrol     Comments:                PAC Discussion and Assessment    ASA Classification: 3  Case is suitable for: SportsBoard  Anesthetic techniques and relevant risks discussed: GA  Invasive monitoring and risk discussed: No    Possibility and Risk of blood transfusion discussed: No            PAC Resident/NP Anesthesia Assessment: ASSESSMENT and PLAN  Isabela Panchal is a 51 year old female who presents for pre-operative H & P in preparation for Cervical 5 to 7 anterior cervical decompression and fusion with medtronic plate and screws, interbody device, use of fluoroscopy, microscope, and left sided iliac crest autograft with Dr. Lal on 10/13/21 at [facility].   RCRI: 0.4% risk of serious cardiac event.   VTE risk: 0.26%  JER: 2/8=Low risk   PONV: 4. If 3 or > anti emetic intervention recommended with two or more meds    --Cervical radiculopathy with progressive symptoms.  Zanaflex used sparingly at HS. [facility] to be help for 3 days prior to surgery.   --PONV. Significant history. Final decisions regarding prophylaxis by Anesthesia on DOS.  --HLD. Will take atorvastatin and Tricor on DOS. HYPERTENSION. Will hold lisinopril on DOS. Past history of SVT in 2000. No recurrence known. EKG SR 1st degree AV  block. Activity is limited.  --Nonsmoker. Past history of asthma but no recent issues or use of inhalers. Denies cough or shortness of breath.   --TBI with significant memory issues. Has  and support services.   --Cr 0.76.  --Anxiety/depression. Will take Effexor on DOS.   --Type and screen drawn today.   --Would like to avoid IV in her hands.        The patient is optimized and acceptable candidate for proposed procedure. Arrival time, NPO, shower and medication instructions provided by nursing staff today.        Reviewed and Signed by PAC Mid-Level Provider/Resident  Mid-Level Provider/Resident: SOFYA Alvarez, CNS  Date: 9/22/21  Time: 2:04pm                               Christopher J. Behrens, MD

## 2021-10-13 NOTE — PLAN OF CARE
Nursng   post op ant cervical spine surgery   Hx TBI      Outpatient in a Bed discharge goals PRIOR TO DISCHARGE     Comments: Discharge Goals that MUST be met PRIOR to discharge IF PATIENT IS REGISTERED AS OUTPATIENT:     - Vital signs and mental status at baseline.     - Oral intake tolerated (at least 100 mLs fluid PO, at least 25% of a snack or meal, and oral intake on at least two separate occasions).     - Able to ambulate at least 30 feet.     - Must void prior to discharge unless Mata inserted per Neurosurgery Bladder Management Algorithm.     - Adequate pain control using oral analgesics.     - Hypercapnia, hypoventilation or hypoxia resolved for at least 2 hours without supplemental oxygen.     - Deficits in sensation, mobility or coordination have resolved if spinal or regional anesthesia was used.     - Notify Provider IF patient FAILS to meet Discharge Goal criteria.     - If Provider has already entered all discharge goals and/or cleared patient for discharge, RN does NOT need to notify Provider PRIOR to patient discharge.         S-  Received from PACU 1330 per bed. Awake, alert, cooperative  L lower abd/hip area causing most discomfort/tender and sore.   ICE applied, aromatherapy pillow support.  Log rolled  States and neck incsion minimal [place] J collar on  Skin intact.  No numbness or tingling  HV draining dk red  No mata- no void yet  commode at bedside  Vss.  Took sips water,  Ordered jello  L arm and hand swollen r/t IV infiltration- raised on 2 pillows. Heat on  Answers questions appropriately.  Oriented to place. Hs TBI  May need extra time to respond.  States learns best by listening and watching.  Some trouble w reading  A- stable post op  R- enc IBD, enc chair for meals.  Plan out of bed/walk if tolerates.  Keep [place] J collar on.  Oxy q4hr prn + tylenol     1450- unable to state correct date without prompts.  Also thought it was day oafter surgery. Knew it ws afternoon, hard for her to  tell time.  Hard for her to use pain number system- states mod LLQ /hip pain  A r/t TBI  P- reoreinted date tand time.  Bed alarm on.

## 2021-10-13 NOTE — OR NURSING
This writer assumed care of patient at 12:10pm.  Per report Left arm swollen and firm to touch.  18g PIV in place.  Was used during operative case.  Right arm in comparison softer to palpation.  This writer removed the PIV and noted fluid leaking from insertion site.  Dr. Behren's at the bedside.  Left arm continued to be elevated and warm packs applied.  Will continue to monitor.

## 2021-10-14 ENCOUNTER — APPOINTMENT (OUTPATIENT)
Dept: PHYSICAL THERAPY | Facility: CLINIC | Age: 51
End: 2021-10-14
Attending: ORTHOPAEDIC SURGERY
Payer: COMMERCIAL

## 2021-10-14 VITALS
RESPIRATION RATE: 15 BRPM | TEMPERATURE: 98 F | BODY MASS INDEX: 32.84 KG/M2 | WEIGHT: 197.09 LBS | HEIGHT: 65 IN | DIASTOLIC BLOOD PRESSURE: 109 MMHG | HEART RATE: 98 BPM | SYSTOLIC BLOOD PRESSURE: 133 MMHG | OXYGEN SATURATION: 95 %

## 2021-10-14 LAB
ANION GAP SERPL CALCULATED.3IONS-SCNC: 7 MMOL/L (ref 3–14)
BUN SERPL-MCNC: 9 MG/DL (ref 7–30)
CALCIUM SERPL-MCNC: 8.9 MG/DL (ref 8.5–10.1)
CHLORIDE BLD-SCNC: 110 MMOL/L (ref 94–109)
CO2 SERPL-SCNC: 25 MMOL/L (ref 20–32)
CREAT SERPL-MCNC: 0.58 MG/DL (ref 0.52–1.04)
ERYTHROCYTE [DISTWIDTH] IN BLOOD BY AUTOMATED COUNT: 11.8 % (ref 10–15)
GFR SERPL CREATININE-BSD FRML MDRD: >90 ML/MIN/1.73M2
GLUCOSE BLD-MCNC: 123 MG/DL (ref 70–99)
GLUCOSE BLDC GLUCOMTR-MCNC: 154 MG/DL (ref 70–99)
HCT VFR BLD AUTO: 32.6 % (ref 35–47)
HGB BLD-MCNC: 11.2 G/DL (ref 11.7–15.7)
MCH RBC QN AUTO: 30.2 PG (ref 26.5–33)
MCHC RBC AUTO-ENTMCNC: 34.4 G/DL (ref 31.5–36.5)
MCV RBC AUTO: 88 FL (ref 78–100)
PLATELET # BLD AUTO: 201 10E3/UL (ref 150–450)
POTASSIUM BLD-SCNC: 4.1 MMOL/L (ref 3.4–5.3)
RBC # BLD AUTO: 3.71 10E6/UL (ref 3.8–5.2)
SODIUM SERPL-SCNC: 142 MMOL/L (ref 133–144)
WBC # BLD AUTO: 9.7 10E3/UL (ref 4–11)

## 2021-10-14 PROCEDURE — 97530 THERAPEUTIC ACTIVITIES: CPT | Mod: GP | Performed by: PHYSICAL THERAPIST

## 2021-10-14 PROCEDURE — 250N000013 HC RX MED GY IP 250 OP 250 PS 637: Performed by: PHYSICIAN ASSISTANT

## 2021-10-14 PROCEDURE — 97116 GAIT TRAINING THERAPY: CPT | Mod: GP | Performed by: PHYSICAL THERAPIST

## 2021-10-14 PROCEDURE — 97161 PT EVAL LOW COMPLEX 20 MIN: CPT | Mod: GP | Performed by: PHYSICAL THERAPIST

## 2021-10-14 PROCEDURE — 36416 COLLJ CAPILLARY BLOOD SPEC: CPT | Performed by: HOSPITALIST

## 2021-10-14 PROCEDURE — 85027 COMPLETE CBC AUTOMATED: CPT | Performed by: HOSPITALIST

## 2021-10-14 PROCEDURE — 99231 SBSQ HOSP IP/OBS SF/LOW 25: CPT | Performed by: HOSPITALIST

## 2021-10-14 PROCEDURE — 80048 BASIC METABOLIC PNL TOTAL CA: CPT | Performed by: HOSPITALIST

## 2021-10-14 RX ORDER — LISINOPRIL 10 MG/1
10 TABLET ORAL DAILY
Qty: 90 TABLET | Refills: 3 | Status: SHIPPED | OUTPATIENT
Start: 2021-10-16 | End: 2022-08-01

## 2021-10-14 RX ADMIN — FENOFIBRATE 54 MG: 54 TABLET ORAL at 08:36

## 2021-10-14 RX ADMIN — OXYCODONE HYDROCHLORIDE 5 MG: 5 TABLET ORAL at 10:45

## 2021-10-14 RX ADMIN — POLYETHYLENE GLYCOL 3350 17 G: 17 POWDER, FOR SOLUTION ORAL at 08:35

## 2021-10-14 RX ADMIN — OXYCODONE HYDROCHLORIDE 5 MG: 5 TABLET ORAL at 14:45

## 2021-10-14 RX ADMIN — Medication 125 MCG: at 08:36

## 2021-10-14 RX ADMIN — OXYCODONE HYDROCHLORIDE 5 MG: 5 TABLET ORAL at 05:57

## 2021-10-14 RX ADMIN — OXYCODONE HYDROCHLORIDE 5 MG: 5 TABLET ORAL at 01:04

## 2021-10-14 RX ADMIN — ACETAMINOPHEN 975 MG: 325 TABLET, FILM COATED ORAL at 13:47

## 2021-10-14 RX ADMIN — METHOCARBAMOL 750 MG: 750 TABLET ORAL at 08:50

## 2021-10-14 RX ADMIN — DOCUSATE SODIUM AND SENNOSIDES 1 TABLET: 8.6; 5 TABLET ORAL at 08:35

## 2021-10-14 RX ADMIN — ACETAMINOPHEN 975 MG: 325 TABLET, FILM COATED ORAL at 05:56

## 2021-10-14 RX ADMIN — VENLAFAXINE HYDROCHLORIDE 150 MG: 150 CAPSULE, EXTENDED RELEASE ORAL at 08:36

## 2021-10-14 NOTE — PLAN OF CARE
"      VS:   BP (!) 131/95 (BP Location: Right arm)   Pulse 95   Temp 97.8  F (36.6  C) (Oral)   Resp 18   Ht 1.651 m (5' 5\")   Wt 89.4 kg (197 lb 1.5 oz)   SpO2 94%   BMI 32.80 kg/m       O2:   RA, CAPNO in place     Output:   Pt voids bladder appropriately. LBM before surgery   Activity:   SBA, walker, gait belt   Skin, Dressing(s): Anterior cervical dressing & drain insertion site - CDI  L hip dressing - CDI   Pain:   Pt reports moderate pain in the neck and L hip.   Neuro/CMS:   A/Ox4, no n/t, strengths intact   Diet:   Regular   LDA:   R PIV - infusing   Equipment:   IV pole, IV pump, walker, gait belt   Plan:   Follow POC.   Additional Info:          "

## 2021-10-14 NOTE — PLAN OF CARE
Pt up to commode with assist of 1 and voided.   C/o generalized weakness but gait steady.   C/o left hip pain at donor site and neck discomfort. Pt rates pain 6/10.  Pt slow in verbal response and needs time to respond to questions directed toward her. Pt forgetful on time of day and date but aware of situation. Pt pleasant and cooperative in her cares.   Neck incision intact with [place] J collar on. Hemovac patent. Medicated with oxycodone 5 mg.   Taking in sips of liquids without nausea. Had jello at bedside. Abd soft. BS hypo.   LS clear. Left arm swollen due to previous IV infiltration. Elevated on pillow.  IV infusing in right. Pt sleeping between nursing checks and forgot to order dinner. Pt stated she is not hungry at present and wanted to sleep. Pt reassured that staff can bring her fluids and food when she would like to eat. Pt states she understands and will call. Call light within reach.

## 2021-10-14 NOTE — PLAN OF CARE
Physical Therapy Discharge Summary    Reason for therapy discharge:    Discharged to home.    Progress towards therapy goal(s). See goals on Care Plan in [facility] electronic health record for goal details.  Goals met    Therapy recommendation(s):    No further therapy is recommended.

## 2021-10-14 NOTE — PLAN OF CARE
VS:   Temp: 97  F (36.1  C) Temp src: Axillary BP: 135/84 Pulse: 80   Resp: 16 SpO2: 96 % O2 Device: None (Room air)   Patient denies chest pain and SOB.    Output:   Patient voids spontaneously without difficulty. Patient passing gas, no BM since surgery.    Activity:   Patient independent in the room. Steady gait. Ambulated in halls with PT and was cleared to go home.    Skin: Incisions on Anterior cervical and L iliac crest. All other skin intact.    Pain:   Pain managed with PRN oxycodone, robaxin, and ice applied intermittently.    Neuro/CMS:   Patient A&O X4. Some forgetfulness at baseline , easily distracted. Patient denies N&T in all extremities.    Dressing(s): Dressings on anterior spine and L hip C/D/I.    Diet:   Advanced to regular diet. Patient ate breakfast this AM and did not have any nausea. Swallowing thin liquids well.    LDA: 1 PIV removed due to afternoon discharge.   Equipment: IV pole. [place] J collar. Walker and gait belt.    Plan:   Patient to discharge today to home. Patient has home care at baseline.     Additional Info:   Patient has call light within reach and is able to make needs known.       Patient to be picked up at 1530 by family. Writer went over all discharge instruction and packet given to patient to be reviewed at home with family and/or home care. Patient verbalized understanding of all instruction. Extra dressing supplies given to patient. All discharge medications and medication instruction given to patient as well as last doses of each medication written and highlighted on discharge instruction. Patient verbalizes understanding of all instruction as well as s/s of infection and how to call for any questions. Transport to be put in as soon as family arrives to bring patient home.

## 2021-10-14 NOTE — PROGRESS NOTES
[facility], [place], [place]  Internal Medicine Progress Note    Assessment and Plans:     Isabela Panchal is a 51 year old female with PMH of dyslipidemia, HT, Anxiety, Depression, and Traumatic brain injury with significant memory issues. She has past history of SVT occurring in 2000 with no known recurrence. Patient is SP Cervical 5 to 7 anterior cervical decompression and fusion with medtronic plate and screws, interbody device, use of fluoroscopy, microscope and left sided iliac crest autograft.      # SP Cervical 5 to 7 anterior cervical decompression and fusion with medtronic plate and screws, interbody device, use of fluoroscopy, microscope and left sided iliac crest autograft.  -per primary team  -hydrate well  -good IS  -ankle pumps  -pain and DVT prophylaxis management per orthopedics  # HT: Hold PTA lisinopril 10 mg every day. BP is good. Resume lisinopril two days later.   # Dyslipidemia: Ct PTA lofibra and lipitor.  # Hx of SVT. No recurrence. Monitor.      DVT Prophylaxis: Per primary team  Code Status: Full Code     Disposition: Per primary team.     MD Miguel Lui MD, MPH.  Internal Medicine Attending  [place], [place]    Click on the link below to page me.   Text Page  (7am - 5pm, M-F)    Subjective:      She is doing good. No new issues noted.     -Data reviewed today: I reviewed all new labs and imaging results over the last 24 hours.     Exam:     Temp: 97  F (36.1  C) Temp src: Axillary BP: 135/84 Pulse: 80   Resp: 16 SpO2: 96 % O2 Device: None (Room air) Oxygen Delivery: 2 LPM  Vitals:    10/13/21 0547   Weight: 89.4 kg (197 lb 1.5 oz)     Vital Signs with Ranges  Temp:  [97  F (36.1  C)-97.8  F (36.6  C)] 97  F (36.1  C)  Pulse:  [] 80  Resp:  [11-19] 16  BP: (129-144)/(81-95) 135/84  SpO2:  [92 %-96 %] 96 %  I/O last 3 completed shifts:  In: 1564.17  [P.O.:140; I.V.:1174.17]  Out: 515 [Urine:450; Drains:40; Blood:25]    Constitutional: No distress noted, Alert, Answering questions appropriately  Respiratory: AE is good on both sides, no wheezing onr crackles  Cardiovascular: S1S2 normal, no new murmur  GI: Soft, non tender  Skin/Integumen: No rash    Medications       acetaminophen  975 mg Oral Q8H     atorvastatin  40 mg Oral QPM     cholecalciferol  125 mcg Oral Daily     fenofibrate  54 mg Oral Daily     polyethylene glycol  17 g Oral Daily     senna-docusate  1 tablet Oral BID     sodium chloride (PF)  3 mL Intracatheter Q8H     venlafaxine  150 mg Oral Daily with breakfast     Data      Recent Labs   Lab 10/14/21  0643 10/14/21  0108 10/13/21  0610   WBC 9.7  --   --    HGB 11.2*  --  12.1   MCV 88  --   --      --   --      --   --    POTASSIUM 4.1  --  3.6   CHLORIDE 110*  --   --    CO2 25  --   --    BUN 9  --   --    CR 0.58  --   --    ANIONGAP 7  --   --    JUDY 8.9  --   --    * 154*  --        No results found for this or any previous visit (from the past 24 hour(s)).

## 2021-10-14 NOTE — PLAN OF CARE
"    VS: /84 (BP Location: Left arm)   Pulse 80   Temp 97  F (36.1  C) (Axillary)   Resp 16   Ht 1.651 m (5' 5\")   Wt 89.4 kg (197 lb 1.5 oz)   SpO2 96%   BMI 32.80 kg/m    RA   Output: Voiding w/o difficulty   GI: Pt has not passed gas yet   Activity: Ax1 gb & walker   Skin: incisions   Pain: Significant pain in L hip, R & L shoulders, and neck, PRN oxy & robaxin administered, ice packs also applied to all sites   Neuro/CMS: A&Ox4, denied N/T    Hx of TBI, pt sometimes has inappropriate responses and takes some time to respond   Dressing(s): Anterior neck dressing CDI  Posterior neck dressing CDI  L hip dressing CDI   Diet: Full liquid  Pt drank water and ate 2 vanilla ice creams   LDA: R PIV infusing  Hemovac drain output of 40 mL   Equipment: IV pole   Plan: Continue POC, possible discharge 10/14 (pt may or may not go home w/ drain)   Additional Info:        "

## 2021-10-14 NOTE — PROGRESS NOTES
Orthopaedic Surgery Progress Note 10/14/2021    S: No acute events overnight.  Pain well controlled on Oral meds. Denies numbness or tingling in the affected extremity. chest pain (-), SOB(-), nausea/vomiting(-). Tolerating oral diet. BM(-) flatus(+). Voiding spontaneously.  Ambulating, working with PT.    O:  Temp: 97  F (36.1  C) Temp src: Axillary BP: 135/84 Pulse: 80   Resp: 16 SpO2: 96 % O2 Device: None (Room air) Oxygen Delivery: 2 LPM    Exam:  Gen: No acute distress, resting comfortably in bed.  Resp: Non-labored breathing  MSK:  Spine:  - Dressings c/d/i  - Drain patent with serosanguinous output  - DP pulses 2+ bilaterally, feet wwp bilaterally  Cervical spine:    Appearance -no gross step-offs, kyphosis.    Motor -     C5: Deltoids R 5/5 and L 5/5 strength    C6: Biceps R 5/5 and L 5/5 strength     C7: Triceps R 5/5 and L 5/5 strength     C8:  R 5/5 and L 5/5 strength     T1: Dorsal interossei R 4/5 and L 4/5 strength        Sensation: intact to light touch in C5-T1    Drain output: 40 mL since OR.    Recent Labs   Lab 10/13/21  0610   HGB 12.1     Sed Rate   Date Value Ref Range Status   03/19/2013 8 0 - 20 mm/h Final     Assessment and Plan: Robinson Booker is a 51 year old  now s/p C5-7 w/ iliac bone crest graft  on 10/13/21 with Dr. Lal.      Lukasz Primary  Activity:   - Up with assist until independent. No excessive bending or twisting. No lifting >10 lbs x 6 weeks. No lifting overhead  Weight bearing status: WBAT.  Pain management: PO narcotics as tolerated.   Antibiotics: Ancef intra-op  Diet: Begin with clear fluids and progress diet as tolerated.   DVT prophylaxis: SCDs only. No chemical DVT ppx needed.  Imaging: none  Labs: none  Bracing/Splinting: [place] J x 6 weeks.  Dressings: Keep dressing c/d/i x 2 days.  Drains: none  [place] catheter: not used.  Physical Therapy/Occupational Therapy: Eval and treat.  Cultures: none.    Follow-up: Clinic with Dr. Lal in 6 weeks with repeat x-rays.    Disposition: Pending progress with therapies, pain control on orals, and medical stability, anticipate discharge to home on POD #1.       Future Appointments   Date Time Provider Department Center   10/14/2021  9:15 AM Flor Alexander Pt, PT [facility] [place]   10/14/2021  1:45 PM Yamilex Dhillon, OT [facility] [place]   10/14/2021  3:30 PM Flor Alexander Pt, PT [facility] [place]   12/16/2021  9:00 AM Quentin Figueroa, [facility]     Patient discussed with Dr. Lal.    Heath Up MD  Orthopaedic Surgery Resident, PGY4  Pager: [phone]    Please page me directly with any questions/concerns during regular weekday hours before 5pm. If there is no response, if it is a weekend, or if it is during evening hours then please page the orthopaedic surgery resident on call.

## 2021-10-14 NOTE — OP NOTE
DATE OF SURGERY: 10/14/2021    PREOPERATIVE DIAGNOSIS: Cervical radiculopathy   Cervical pain           POSTOPERATIVE DIAGNOSIS: Same    PROCEDURES:  1. Anterior cervical discectomy with removal of disc material and osteophytes at C5-6, C6-7 for decompression of the spinal cord.  2. Bilateral Foraminotomies at C5-6, C6-7 for decompression of the nerve roots.  3. Placement of intervertebral prosthetic device at C5-6, C6-7, Medtronic PEEK Cages.  3. Use of Iliac Crest Autograft for fusion, C5-6, C6-7.   Harvested through a separate skin and fascial incision.  4. Placement of anterior Medtronic plate and screw instrumentation, C5-7. The plate was placed separately from the interbody and was placed to aid in the fusion rate.    Primary Surgeon: Shaw Lal MD    FIRST ASSISTANT: DIANE Gamino, there was no qualified resident available.  The assistant was necessary for retraction, visualization, and wound closure.    ANESTHESIA: General Endotracheal    COMPLICATIONS:  None.    SPECIMENS: None.    ESTIMATED BLOOD LOSS: 25 mL    INDICATIONS:                          Isabela Panchal is a 51 year old female with debilitating symptoms from Cervical radiculopathy.  The patient elected surgical treatment, and understood the indications for this surgery, as well as its risks, benefits, and alternatives. We reviewed the risks and benefits of the surgery in detail. The risks include, but are not limited to, the general risks associated with anesthesia, including death, pulmonary embolism, DVT, stroke, myocardial infarction, pneumonia, and urinary tract infection. Additional risks specific to the surgery include the risk of infection, failure to heal (non-union), dural tear with resultant CSF leak which might necessitate placement of a drain or revision surgery or could result in headaches, nerve injury resulting in weakness or paralysis, risk of adjacent segment disease, the risks of vascular injury resulting in  severe or possibly uncontrollable bleeding, need for revision surgery in the future due to one of the above issues, or risk of incomplete symptom relief.  Isabela Panchal understands the risks of the surgery and wishes to proceed.  No guarantees were given.    DESCRIPTION OF PROCEDURE:           Isabela Panchal was taken to the operating room, where the Anesthesiology Service induced satisfactory general anesthesia. Ancef was given IV.  Venous thromboembolic prophylaxis was performed with sequential devices placed on the calves bilaterally.  Canada catheter was placed under standard sterile techniques. A bump was placed under the shoulders the arms were secured.  All pressure points were well padded.  The anterior neck was prepped and draped in its entirety in the usual sterile fashion. We then held a multidisciplinary time out in which we verified the patient, procedure, antibiotics, and operative plan.  All team members were in agreement.    I then performed a standard hurt-renae approach to the anterior spine from the patient's Left side.  I used surface landmarks to identify the incision location.  The skin was sharply incised and I then switched to electrocautery to go through the platysma.  Sub-platysmal dissection was bluntly performed 3cm above and below the incision to relax the soft tissues.   I then used a combination of scissor dissection and blunt dissection to carefully dissect the plane between the SCM and the soft tissues medially, down to the spine. Crossing vessels were tied off as we encountered them.  A spinal needle was placed into the most accessible vertebral body, and a fluoroscopic image was obtained to verify the level.  The surgical disc space was marked with a pen.      I began by harvesting iliac crest autograft from the left side.  I made a 3cm incision over the left crest, dissected down and elevated the fascia, and breached the cortex, then used a curette to harvest 15cc of  cancellous autograft.  We irrigated, filled the defect with allograft, and then closed the wound in layers.    I then used electrocautery to elevate the longus coli over the levels of the planned fusion from C5-C7. We then turned our attention to the first level of decompression.      I used cautery to define the disc C6-7 space.  A 15 blade was then used to incise the annulus caudal and cephalad. A micro-curette was then used to remove the bulk of the disc material.  I next placed caspar pins above and below and the distractor was used to open up the disc space while a bustamante was used to gently provide distraction.  With this increased visualization I then removed the remainder of the posterior disc material down to the level of the PLL.  With the PLL still intact, I next used a bur to remove the posterior osteophytes above the level of the PLL and to thin the uncinates bilaterally.  The bur was then used to remove the anterior lip of the cephalad vertebral body and to produce a square shaped perpendicular surface for graft placement with a good bed for fusion.  I then sized the graft for a planned [id number]  Medtronic PEEK cage.  While this was prepared on the back table, I then used a microcurette to split the PLL and create space between the PLL and dura.  A 2mm kerrison was then used to resect the PLL out to the level of the uncinates and also to remove any remaining posterior osteophytes.  This dissection was carried out into the foramen until the uptake of the root was clearly visible and until a nerve hook could be freely passed out into the foramen bilaterally, thus completing the foraminotomies.  The cage was filled with crest graft and then impacted under fluoroscopic guidance.    I then moved up to the C5-6 disc space.  Trimlines and caspar pins were moved up a level.  A 15 blade was then used to incise the annulus caudal and cephalad. A micro-curette was then used to remove the bulk of the disc material.   I next placed caspar pins above and below and the distractor was used to open up the disc space while a bustamante was used to gently provide distraction.  With this increased visualization I then removed the remainder of the posterior disc material down to the level of the PLL.  With the PLL still intact, I next used a bur to remove the posterior osteophytes above the level of the PLL and to thin the uncinates bilaterally.  The bur was then used to remove the anterior lip of the cephalad vertebral body and to produce a square shaped perpendicular surface for graft placement with a good bed for fusion.  I then sized the graft for a planned [id number]  Medtronic PEEK cage.  While this was prepared on the back table, I then used a microcurette to split the PLL and create space between the PLL and dura.  A 2mm kerrison was then used to resect the PLL out to the level of the uncinates and also to remove any remaining posterior osteophytes.  This dissection was carried out into the foramen until the uptake of the root was clearly visible and until a nerve hook could be freely passed out into the foramen bilaterally, thus completing the foraminotomies.  The cage was filled with crest graft and then impacted under fluoroscopic guidance.    I measured an appropriate length plate and the plate was aligned and screw holes were drilled.  The screws were placed under manual power.  The placement of the anterior plate with screw fixation was not placed to anchor the interbody device but rather the anterior plate was placed to aid in a successful fusion.   We took our final fluoroscopic radiographs and I was satisfied with the positioning.      I then thoroughly irrigated.  A 1/8 inch hemovac drain was placed and the wound was closed in layers with 3-0 vicryl for the platysma, 3-0 vicryl for the dermis, and 4-0 monocryl and dermabond for the skin.  The wound was dressed with 4x4 and tegaderm.      Sensory monitoring was stable  throughout.    The patient was extubated and taken to the PACU in stable condition.  There were no immediately evident complications.    IShaw MD, was personally present and scrubbed for the entire procedure.

## 2021-10-14 NOTE — DISCHARGE SUMMARY
ORTHOPEDIC SURGERY DISCHARGE SUMMARY    Isabela Panchal [id number] 10/13/2021  5:28 AM  51 year old female  10/14/2021    Date of Admission: 10/13/2021  Date of Discharge: 10/14  Disposition: home  Primary care physician: Felisha Briggs  Orthopaedic physician provider(s): Dr. Lukasz MD, Orthopedic Surgery    ADMISSION DIAGNOSIS:  Cervical radiculopathy   Cervical pain             HPI:  Isabela Panchal is a 51 year old female with debilitating symptoms from Cervical radiculopathy.  The patient elected surgical treatment, and understood the indications for this surgery, as well as its risks, benefits, and alternatives. We reviewed the risks and benefits of the surgery in detail. The risks include, but are not limited to, the general risks associated with anesthesia, including death, pulmonary embolism, DVT, stroke, myocardial infarction, pneumonia, and urinary tract infection. Additional risks specific to the surgery include the risk of infection, failure to heal (non-union), dural tear with resultant CSF leak which might necessitate placement of a drain or revision surgery or could result in headaches, nerve injury resulting in weakness or paralysis, risk of adjacent segment disease, the risks of vascular injury resulting in severe or possibly uncontrollable bleeding, need for revision surgery in the future due to one of the above issues, or risk of incomplete symptom relief.  Isabela Panchal understands the risks of the surgery and wishes to proceed.  No guarantees were given.    DISCHARGE DIAGNOSIS:  Cervical radiculopathy   Cervical pain             SURGERY:  Cervical 5 to 7 anterior cervical decompression and fusion with medtronic plate and screws, interbody device, use of fluoroscopy, microscope,  and left sided iliac crest autograft on 10/13    HOSPITAL COURSE:  Surgery was uncomplicated. The patient has done well post-operatively. Medicine was consult was requested for co-management. During the  hospital stay the patient has received routine nursing cares and is medically stable. Vital signs are stable. Dietary progression was made and she is tolerating a regular diet without GI distress/nausea or vomiting; and voiding spontaneously. Post-operatively she has met PT/OT goals for safe mobility. Pain is controlled on oral medications which will be available at time of discharge.  Since she will be prescribed narcotic pain medications she will be given stool softeners to go home with as well. After meeting all medical, therapy and post-operative surgical care goals, a safe discharge plan was developed and the patient was discharged to home.     DVT prophylaxis:  No chemical ppx   Antibiotics:  Ancef given periop, minimum of 24 hours postop and was continued until drain removal.   Activity:   Weight bearing as tolerated. No excessive or repetitive bending or twisting. No lifting >10 lbs x 6 weeks.    Follow up:   Follow up with your surgeon in 6 weeks with repeat xrays.    Other discharge orders and instructions as below.    ORTHOPEDIC EXAM:  Vitals:    B/P: 135/84, T: 97, P: 80, R: 16  Physical Exam:  Please see the progress note from the day of discharge.   Pertinent exam findings followed during admission:   Cervical spine:                          Appearance -no gross step-offs, kyphosis.                          Motor -                           C5: Deltoids R 5/5 and L 5/5 strength                          C6: Biceps R 5/5 and L 5/5 strength                           C7: Triceps R 5/5 and L 5/5 strength                           C8:  R 5/5 and L 5/5 strength                           T1: Dorsal interossei R 4/5 and L 4/5 strength                               Sensation: intact to light touch in C5-T1    LABS:  Recent Labs   Lab 10/13/21  0610   HGB 12.1     Recent Labs   Lab 10/14/21  0108 10/13/21  0610   POTASSIUM  --  3.6   *  --      No lab results found in last 7 days.    ALLERGIES:    "  Allergies   Allergen Reactions     Benzoin Rash     Adhesive Tape      blistering     Allegra [Fexofenadine]      Kidney pain     Cucumber Extract      Throat and ears itchy and throat feels \"icky\"     Fexofenadine Hydrochloride      allegra - low back pain     Ibuprofen      palpitations     Lexapro      Wt gain     No Clinical Screening - See Comments      Ramon      Itchy ears and throat, throat feel \"icky\"     Peanuts [Nuts]      Itchy ears and throat, throat feel \"icky\"     Seasonal Allergies        PENDING TESTS RESULTS:  none    PLANNED DISCHARGE ORDERS, RECOMMENDATIONS, AND FOLLOWUP:  Discharge Procedure Orders   Discharge Instructions   Order Comments: Review outpatient procedure discharge instructions as directed by Provider.     Notify Provider   Order Comments: Signs and symptoms of infection: Fever greater than 101, redness, swelling, heat at site, drainage, or pus     Discharge Instructions - Neck brace   Order Comments: Wear neck brace at all times.     Discharge Instructions - Contact surgery team   Order Comments: Contact surgical team with any new swelling or tightness to the throat/incision site if it is causing discomfort.     Call 911 if it becomes difficult to breath as this is a medical emergency.     Discharge Instructions - Collar Care   Order Comments: Inspect the skin under your collar TWICE each day for any skin irritation or redness. Switch the collar pads ONCE each day at bedtime.    To remove collar for skin inspection or collar pad changes:  - Remove the collar while lying flat.   - Release front portion first, then the back.    Collar pad care:  - Wash the pads with warm water and air-dry overnight.     Discharge Instructions - Shower with incision NOT covered   Order Comments: You may shower 2 days after surgery.  You do not need to cover your surgical incision in the shower and may allow water and soap to run over top of the incision. Do not soak or submerge the incision " underwater. There is surgical glue over the incisions, this will come off on its own with time.     Discharge Instructions - No tub bathing   Order Comments: Tub bathing, swimming, or any other activities that will cause your incision to be submerged in water should be avoided until allowed by your Provider.     Discharge Instructions - Change dressing   Order Comments: You may remove dressings 2 days after surgery. Wash hands prior to changing dressing daily. If no drainage on dressing, may leave open to air.     Discharge Instructions - Diet   Order Comments: Diet as tolerated. Return to diet before surgery.     Discharge Instructions - Lifting Limit (specify)   Order Comments: Lifting limit of  10 pounds until seen at Post-op follow up appointment. NO lifting anything over head.     Avoid bending and twisting neck.     Return to Clinic   Order Comments: Return to clinic in 6 weeks     Discharge Instructions   Order Comments: Check with Provider for instructions about when to start anticoagulant medication.     No Aspirin or NSAID products   Order Comments: No aspirin or non-steroidal anti-inflammatory drugs (NSAIDs) such as ibuprofen or naproxen until cleared at post-operative appointment     No driving or operating machinery while in a cervical collar   Order Comments: While on narcotic medications.     No Alcohol   Order Comments: No Alcohol for 24 hours after procedure     Transfer patient        Review of your medicines      START taking      Dose / Directions   acetaminophen 325 MG tablet  Commonly known as: TYLENOL  Used for: S/P cervical spinal fusion      Dose: 650 mg  Take 2 tablets (650 mg) by mouth every 4 hours as needed for mild pain  Quantity: 90 tablet  Refills: 0     hydrOXYzine 25 MG tablet  Commonly known as: ATARAX  Used for: S/P cervical spinal fusion      Dose: 25 mg  Take 1 tablet (25 mg) by mouth every 6 hours as needed for itching (and nausea)  Quantity: 30 tablet  Refills: 0      methocarbamol 750 MG tablet  Commonly known as: ROBAXIN  Used for: S/P cervical spinal fusion      Dose: 750 mg  Take 1 tablet (750 mg) by mouth every 6 hours as needed for muscle spasms (muscle spasm)  Quantity: 60 tablet  Refills: 0     oxyCODONE 5 MG tablet  Commonly known as: ROXICODONE  Used for: S/P cervical spinal fusion      Dose: 5-10 mg  Take 1-2 tablets (5-10 mg) by mouth every 4 hours as needed for severe pain (decrease number of tablets as pain improves)  Quantity: 35 tablet  Refills: 0     senna-docusate 8.6-50 MG tablet  Commonly known as: SENOKOT-S/PERICOLACE  Used for: S/P cervical spinal fusion      Dose: 1 tablet  Take 1 tablet by mouth daily  Quantity: 30 tablet  Refills: 0        CONTINUE these medicines which have NOT CHANGED      Dose / Directions   atorvastatin 40 MG tablet  Commonly known as: LIPITOR  Used for: High blood triglycerides, Hyperlipidemia LDL goal <160      Dose: 40 mg  Take 1 tablet (40 mg) by mouth daily  Quantity: 90 tablet  Refills: 2     cholecalciferol 125 mcg (5000 units) capsule  Commonly known as: VITAMIN D3      Dose: 125 mcg  Take 125 mcg by mouth daily  Refills: 0     fenofibrate 48 MG tablet  Commonly known as: TRICOR  Used for: High blood triglycerides      Dose: 48 mg  Take 1 tablet (48 mg) by mouth daily  Quantity: 90 tablet  Refills: 2     fluticasone 50 MCG/ACT nasal spray  Commonly known as: FLONASE  Used for: Other seasonal allergic rhinitis      Dose: 2 spray  Spray 2 sprays into both nostrils daily  Quantity: 16 g  Refills: 9     lisinopril 10 MG tablet  Commonly known as: ZESTRIL  Used for: Essential hypertension with goal blood pressure less than 140/90      Dose: 10 mg  Take 1 tablet (10 mg) by mouth daily  Quantity: 90 tablet  Refills: 3     metroNIDAZOLE 0.75 % external cream  Commonly known as: METROCREAM  Used for: Rosacea      APPLY TOPICALLY TWO TIMES A DAY  Quantity: 45 g  Refills: 0     Mirena (52 MG) 20 MCG/24HR IUD  Generic drug:  levonorgestrel      USE FOR UP TO 5 YEARS THEN REMOVE  Refills: 0     Restasis 0.05 % ophthalmic emulsion  Generic drug: cycloSPORINE      Dose: 1 drop  Place 1 drop into both eyes daily as needed  Refills: 0     tiZANidine 4 MG tablet  Commonly known as: ZANAFLEX  Used for: Lumbar disc herniation with radiculopathy, Cervical disc herniation      Dose: 4-8 mg  Take 1-2 tablets (4-8 mg) by mouth nightly as needed for muscle spasms  Quantity: 45 tablet  Refills: 1     venlafaxine 150 MG 24 hr capsule  Commonly known as: EFFEXOR-XR  Used for: Generalized anxiety disorder, Moderate recurrent major depression (H)      TAKE ONE CAPSULE BY MOUTH ONCE DAILY  Quantity: 90 capsule  Refills: 3        STOP taking    etodolac 500 MG tablet  Commonly known as: LODINE              Where to get your medicines      These medications were sent to [facility] [place], [place] - [address]  [address] [address]    Phone: [phone]     acetaminophen 325 MG tablet    hydrOXYzine 25 MG tablet    methocarbamol 750 MG tablet    oxyCODONE 5 MG tablet    senna-docusate 8.6-50 MG tablet         Discussed with staff surgeon Dr. Lukasz Up MD   PGY-4 Orthopaedic Surgery   [phone]

## 2021-10-14 NOTE — PROGRESS NOTES
10/14/21 0978   Quick Adds   Type of Visit Initial PT Evaluation       Present no   Language English   Living Environment   People in home significant other;child(shannan), adult;child(shannan), dependent  (20y/o and 14 y/o daughters)   Current Living Arrangements house   Home Accessibility stairs to enter home;stairs within home   Number of Stairs, Main Entrance 4   Stair Railings, Main Entrance railings not in good condition, need repair for safe use;railings on both sides of stairs;railings safe and in good condition   Number of Stairs, Within Home, Primary 8   Stair Railings, Within Home, Primary railings safe and in good condition;railings on both sides of stairs   Transportation Anticipated family or friend will provide   Living Environment Comments 4 steps into the house from the front and back, back stair railings are not stable.   Self-Care   Usual Activity Tolerance moderate   Current Activity Tolerance fair   Regular Exercise No   Equipment Currently Used at Home cane, straight;crutches;grab bar, tub/shower   Activity/Exercise/Self-Care Comment Likes to go for walks, but hasn't been able to for a while. [place]    Disability/Function   Hearing Difficulty or Deaf no   Wear Glasses or Blind yes   Vision Management glasses   Concentrating, Remembering or Making Decisions Difficulty yes   Concentration Management TBI, daughter helps with reminders   Difficulty Communicating yes   Communication difficulty speaking;difficulty understanding   Communication Management ask clarifying questions, daughter helps correct   Difficulty Eating/Swallowing no   Walking or Climbing Stairs Difficulty no   Dressing/Bathing Difficulty no   Toileting issues no   Doing Errands Independently Difficulty (such as shopping) no   Fall history within last six months no   Change in Functional Status Since Onset of Current Illness/Injury yes   General Information   Onset of Illness/Injury or Date of Surgery 10/13/21    Referring Physician Shaw Lal MD   Patient/Family Therapy Goals Statement (PT) Would like to get back to taking walks   Pertinent History of Current Problem (include personal factors and/or comorbidities that impact the POC) 51 year old  now s/p C5-7 w/ iliac bone crest graft   Existing Precautions/Restrictions fall;spinal   Weight-Bearing Status - LUE partial weight-bearing (% in comments)  (5lbs)   Weight-Bearing Status - RUE partial weight-bearing (% in comments)  (5lbs)   Weight-Bearing Status - LLE full weight-bearing   Weight-Bearing Status - RLE full weight-bearing   General Observations Pt returning to bed with nursing after using the BR upon arrival of PT. Pt has IV in R hand, ta zavala, NAD.   Cognition   Orientation Status (Cognition) oriented x 4   Affect/Mental Status (Cognition) WFL   Follows Commands (Cognition) follows multi-step commands;delayed response/completion;increased processing time needed   Pain Assessment   Patient Currently in Pain Yes, see Vital Sign flowsheet   Integumentary/Edema   Integumentary/Edema Comments Incisions not inspected d/t post-op dressings   Posture    Posture Forward head position;Protracted shoulders   Range of Motion (ROM)   ROM Comment Not formally assessed, WFL   Strength   Strength Comments Not formally assessed, WFL   Bed Mobility   Comment (Bed Mobility) IND with bed mobility to and from a flat bed   Transfers   Transfer Safety Comments Tracie STS to and from bed with FWW   Gait/Stairs (Locomotion)   Distance in Feet (Required for LE Total Joints) 250'   Comment (Gait/Stairs) Pt ambulated 250' in hallway with FWW, initially with SBA,  but progressed to Tracie. Pt was cued to stand up tall and keep head up/neck in neutral. Pt completed 4x 3 steps up and down with bilateral handrails and initially CGA, but progressed to close SBA. Pt was cued to use a step to pattern and to feel for the step with foot in order to follow post-op precautions. Pt  progressed to a reciprocal pattern and was safe throughout.   Balance   Balance Comments Pt demonstrated good sitting and standing balance with FWW.   Sensory Examination   Sensory Perception Comments no numbness/tingling reported   Clinical Impression   Criteria for Skilled Therapeutic Intervention yes, treatment indicated   PT Diagnosis (PT) decreased functional endurance   Influenced by the following impairments pain d/t post-op spinal surgery   Functional limitations due to impairments Impaired functional mobility   Clinical Presentation Stable/Uncomplicated   Clinical Presentation Rationale Pt presents as anticipated per diagnosis   Clinical Decision Making (Complexity) low complexity   Therapy Frequency (PT) One time eval and treatment only   Predicted Duration of Therapy Intervention (days/wks) 1 day   Planned Therapy Interventions (PT) bed mobility training;gait training;patient/family education;stair training;transfer training   Anticipated Equipment Needs at Discharge (PT) walker, rolling   Risk & Benefits of therapy have been explained evaluation/treatment results reviewed;risks/benefits reviewed;participants voiced agreement with care plan;patient   PT Discharge Planning    PT Discharge Recommendation (DC Rec) home with assist   PT Rationale for DC Rec Pt is safe in all home mobility, has a good support system at home to assist with higher level ADLs.   PT Brief overview of current status  IND bed mobility, Tracie with FWW transfers and gait, SBA with bilateral handrails for stairs   Total Evaluation Time   Total Evaluation Time (Minutes) 13

## 2021-10-16 ENCOUNTER — MEDICAL CORRESPONDENCE (OUTPATIENT)
Dept: HEALTH INFORMATION MANAGEMENT | Facility: CLINIC | Age: 51
End: 2021-10-16
Payer: COMMERCIAL

## 2021-10-20 ENCOUNTER — PATIENT OUTREACH (OUTPATIENT)
Dept: CARE COORDINATION | Facility: CLINIC | Age: 51
End: 2021-10-20

## 2021-10-20 NOTE — PROGRESS NOTES
Care Coordination Clinician Chart Review  Situation: Patient chart reviewed by care coordinator.       Background: Care Coordination initial assessment and enrollment to Care Coordination was successful.   Patient centered goals were developed with participation from patient.  JASON CC handed patient off to CHW for continued outreach every 30 days.        Assessment: Per chart review, patient outreach completed by CC CHW on 10/9/21.  Patient is actively working to accomplish goals.  Patient's goals remain appropriate and relevant at this time.   Patient is not yet due for updated Plan of Care.  Annual assessment will be due 2/22.      Goals        Financial Wellbeing- SSDI (pt-stated)       Goal Statement: I would like help applying and getting approved for Social Security Disability in the next 6 months.   Date Goal set: 10/29/2020  Barriers: Getting denied   Strengths: Asking for help, supportive [facility]   Date to Achieve By: 4/29/2021 // extended 10/20/2021  Patient expressed understanding of goal: yes     Action steps to achieve this goal:  1. [employer] [facility] and I will meet with the Disability Specialists on March 26th to discuss next steps and options with my denial. (Completed, ongoing)  5/13  Pt stated moved up to Federal level pending. [facility] provides support/updates on this.   2. I will attend my first appointment at [facility] on March 30th with Johnson Novak, counselor. (Completed)    5/13 Pt states she connects with monthly telephone visits.  3. I will work with [facility] staff to schedule neuropsychological testing. (Completed)  4. I will give the CHW updates during outreach calls.    Updated: 5/13/2021                    Plan/Recommendations: The patient will continue working with Care Coordination to achieve goals as above.  CHW will involve JASON PIERSON as needed or if patient is ready to move to maintenance.  JASON CC will continue to monitor progress to goals and CHW outreaches every 6 weeks.   Plan of Care  updated and mailed to patient: yes sent via GAVIN Morales   [facility] Care Coordination  Tel: [phone]
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
## 2021-10-24 ENCOUNTER — HEALTH MAINTENANCE LETTER (OUTPATIENT)
Age: 51
End: 2021-10-24

## 2021-10-25 DIAGNOSIS — Z53.9 DIAGNOSIS NOT YET DEFINED: Primary | ICD-10-CM

## 2021-10-25 PROCEDURE — G0179 MD RECERTIFICATION HHA PT: HCPCS | Performed by: FAMILY MEDICINE

## 2021-10-26 ENCOUNTER — TELEPHONE (OUTPATIENT)
Dept: FAMILY MEDICINE | Facility: CLINIC | Age: 51
End: 2021-10-26

## 2021-10-26 NOTE — TELEPHONE ENCOUNTER
Reason for Call:  Form, our goal is to have forms completed with 72 hours, however, some forms may require a visit or additional information.    Type of letter, form or note:  Home Health Certification    Who is the form from?: Home care    Where did the form come from: form was faxed in    What clinic location was the form placed at?: [facility]    Where the form was placed: placed in pod C Vilynx signature folder Box/Folder    What number is listed as a contact on the form?: [phone]       Additional comments:     Call taken on 10/26/2021 at 12:14 PM by Corin Hammer
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
## 2021-10-30 ENCOUNTER — NURSE TRIAGE (OUTPATIENT)
Dept: NURSING | Facility: CLINIC | Age: 51
End: 2021-10-30

## 2021-10-30 NOTE — TELEPHONE ENCOUNTER
"Triage Call:    Pt calling to report new onset worsening of shoulder pain that started last night.     Recent hx: Oct 13th pt had surgery for neck her and hip. She reports that the pain is in her left shoulder and that at rest the pain feels better than when she moved her arm. Patient massaged area and states that it \"could be muscle pain\". Pt has not taken any pain medications for this pain yet and is calling for advisement.     Pain: 9/10    Disposition: See PCP within 24 hours. Pt is going to the [facility] [place] today at 9am when they open. Pt given care advice.    Rhonda Gamble RN  [facility] Nurse Advisor 7:50 AM 10/30/2021    Reason for Disposition    [1] Unable to use arm at all AND [2] because of shoulder pain or stiffness    Additional Information    Negative: Passed out (i.e., lost consciousness, collapsed and was not responding)    Negative: Shock suspected (e.g., cold/pale/clammy skin, too weak to stand, low BP, rapid pulse)    Negative: [1] Similar pain previously AND [2] it was from \"heart attack\"    Negative: [1] Similar pain previously AND [2] it was from \"angina\" AND [3] not relieved by nitroglycerin    Negative: Sounds like a life-threatening emergency to the triager    Negative: Followed a shoulder injury    Negative: Chest pain    Negative: Difficulty breathing or unusual sweating (e.g., sweating without exertion)    Negative: [1] Pain lasting > 5 minutes AND [2] pain also present in chest  (Exception: pain is clearly made worse by movement)    Negative: [1] Age > 40 AND [2] no obvious cause AND [3] pain even when not moving the arm    (Exception: pain is clearly made worse by moving arm or bending neck)    Negative: [1] SEVERE pain AND [2] not improved 2 hours after pain medicine    Negative: [1] Red area or streak AND [2] fever    Negative: [1] Swollen joint AND [2] fever    Negative: Patient sounds very sick or weak to the triager    Negative: Entire arm is swollen    Negative: " "Weakness (i.e., loss of strength) in hand or fingers    (Exception: not truly weak; hand feels weak because of pain)    Negative: [1] Shoulder pains with exertion (e.g., walking) AND [2] pain goes away on resting AND [3] not present now    Negative: [1] Painful rash AND [2] multiple small blisters grouped together (i.e., dermatomal distribution or \"band\" or \"stripe\")    Negative: Looks like a boil, infected sore, deep ulcer or other infected rash (spreading redness, pus)    Negative: [1] Localized rash is very painful AND [2] no fever    Negative: Numbness (i.e., loss of sensation) in hand or fingers    Protocols used: SHOULDER PAIN-A-AH      "

## 2021-11-01 ENCOUNTER — TELEPHONE (OUTPATIENT)
Dept: ORTHOPEDICS | Facility: CLINIC | Age: 51
End: 2021-11-01

## 2021-11-01 ENCOUNTER — TELEPHONE (OUTPATIENT)
Dept: FAMILY MEDICINE | Facility: CLINIC | Age: 51
End: 2021-11-01

## 2021-11-01 NOTE — TELEPHONE ENCOUNTER
Dr. Briggs-Please review and advise:  1. What ointment do you recommend be applied to wound to prevent infection, or would you defer to patient's surgeon?  2. Triage RN can give verbal authorization for dressing change every 3 days    Thank you!  JOANIE Booker, BSN, RN  [facility]

## 2021-11-01 NOTE — TELEPHONE ENCOUNTER
Reason for Call: Request for an order or referral:    Order or referral being requested: Dressing change    Date needed: as soon as possible    Has the patient been seen by the PCP for this problem? YES    Additional comments: Patient had a surgery a couple weeks ago and needs an order for wound dressing change to be placed for every 3 days. Homecare Nurse Pennie is stating wound might be getting infected and they need a ointment to also place on the wound.     Phone number Pennie [phone]    Best Time:  any    Can we leave a detailed message on this number?  YES    Call taken on 11/1/2021 at 4:44 PM by Samira Chauhan

## 2021-11-01 NOTE — TELEPHONE ENCOUNTER
RN called and left Deanna a detailed VM.  If Deanna calls back, please assist her with an appt for patient to see Dr. Lal on Nov 23 at any post op spots or return spots. Thank you    Babak Rivera RN         Health Call Center    Phone Message    May a detailed message be left on voicemail: yes     Reason for Call: Other: Deanna, Nurse at  [facility] is calling to set up Post op appts for Isabela. Please call Deanna back,  was unsure of how soon appts would be needed, per the cancellations, etc.      Action Taken: Message routed to:  [facility] Ortho    Travel Screening: Not Applicable

## 2021-11-02 NOTE — TELEPHONE ENCOUNTER
Hi, please have her contact the surgeon's office, they  need to know about this and can make wound dressing recommendations, thank you!     Sincerely,   Dr. Felisha Briggs MD   11/2/2021

## 2021-11-02 NOTE — TELEPHONE ENCOUNTER
Writer called KETTY Casper, and relayed message per Dr. Briggs.    Pennie verbalized understanding and in agreement with planBENJAMIN Naik, RN  [facility]

## 2021-11-04 ENCOUNTER — MYC MEDICAL ADVICE (OUTPATIENT)
Dept: ORTHOPEDICS | Facility: CLINIC | Age: 51
End: 2021-11-04

## 2021-11-04 DIAGNOSIS — T81.30XA WOUND DEHISCENCE: ICD-10-CM

## 2021-11-04 DIAGNOSIS — T81.49XA SURGICAL SITE INFECTION: Primary | ICD-10-CM

## 2021-11-05 ENCOUNTER — OFFICE VISIT (OUTPATIENT)
Dept: ORTHOPEDICS | Facility: CLINIC | Age: 51
End: 2021-11-05
Payer: COMMERCIAL

## 2021-11-05 DIAGNOSIS — T81.30XA WOUND DEHISCENCE: Primary | ICD-10-CM

## 2021-11-05 DIAGNOSIS — T81.49XA SURGICAL SITE INFECTION: ICD-10-CM

## 2021-11-05 PROCEDURE — 99024 POSTOP FOLLOW-UP VISIT: CPT | Performed by: PHYSICIAN ASSISTANT

## 2021-11-05 RX ORDER — CEPHALEXIN 500 MG/1
500 CAPSULE ORAL 4 TIMES DAILY
Qty: 56 CAPSULE | Refills: 0 | Status: SHIPPED | OUTPATIENT
Start: 2021-11-05 | End: 2021-11-16

## 2021-11-05 NOTE — PROGRESS NOTES
"I have reviewed and updated the patient's Past Medical History, Social History, Family History and Medication List.    ALLERGIES  Benzoin, Adhesive tape, Allegra [fexofenadine], Cucumber extract, Fexofenadine hydrochloride, Ibuprofen, Lexapro, No clinical screening - see comments, Peanuts [nuts], and Seasonal allergies    Spine Surgery Follow Up    REFERRING PHYSICIAN: No ref. provider found   PRIMARY CARE PHYSICIAN: Felisha Briggs           Chief Complaint:   No chief complaint on file.      History of Present Illness:  Symptom Profile Including: location of symptoms, onset, severity, exacerbating/alleviating factors, previous treatments:        Isabela Panchal is a 51 year old female who presents today for wound check. She is about 3 weeks s/p C5-7 ACDF with left ICAG (DOS 10/13/21) with Dr. Lal. Her cervical spine incision has healed without issue. However, her left ICAG site has been painful since surgery. She says it has had a little bit of drainage since surgery, but about 3-4 days ago started having increased yellow/bloody discharge. Then, 1-2 days ago the wound \"opened up\" and started to look like what it does today. She denies recent fever/ chills/ fatigue/ ill feeling or any other systemic symptoms. She has been trying to keep the wound clean and dry. Has not been applying ointments. She has home care nurse that comes a few times a week to help with dressing changes. No other complaints or concerns today.          Neck Disability Index (NDI) Questionnaire    Neck Disability Index (NDI) 8/6/2021   Neck Disability Index: Count 10   NDI: Total Score = SUM (points for all 10 findings) 36   Neck Disability in Percent = (Total Score) / 50 * 100 72 (%)   Some recent data might be hidden                   Physical Exam:   PHYSICAL EXAM:   Constitutional - Patient is healthy, well-nourished and appears stated age   Respiratory - Patient is breathing normally and in no respiratory distress.   Skin - No " suspicious rashes or lesions.   Psychiatric - Normal mood and affect.   Cardiovascular - Extremities warm and well perfused.   Eyes - Visual acuity is normal to the written word.   ENT - Hearing intact to the spoken word.   GI - No abdominal distention.   Musculoskeletal - antalgic gait without use of assistive devices (pain from left ICAG site). ACDF anterior incision is fully healed without evidence of infection.   Left ICAG incision: monocryl and loose subcutaneous vicryl sutures present. Mild-moderate amount of purulent discharge in wound and on gauze dressing. Wound does not track below fascia when probed. Some surrounding erythema.             Imaging:   No imaging obtained at today's visit           Assessment and Plan:   Assessment:  51 year old female s/p C5-7 ACDF with left ICAG (DOS 10/13/21) with Dr. Lal with superficial surgical site infection of left ICAG site, no systemic symptoms     Plan:  Cleansed and probed left iliac crest site wound in clinic today. Wound does not probe deep past fascia. Subcutaneous vicryl and monocryl sutures removed from wound. Wound was irrigated then packed with sterile iodoform guaze packing tape and covered with sterile gauze dressing.     Instructed patient on local wound cares. Instructed to remove packing gauze in 2 days. Twice daily dressing changes. Ok for showering & washing area with warm soapy water, but avoid tubs/ soaking in water. If she develops worsening local symptoms or systemic symptoms (fever, chills, fatigue, ill feeling, etc) she should go to [place] this weekend.    - Ordered Keflex x 14 days  .   - Follow up next Tuesday with Dr. Lal    Respectfully,  Rose Cardoso (lisa Marshall PA-C  Orthopaedic Spine Surgery  Dept Orthopaedic Surgery, [facility]  [facility] Phone: [phone]    Dictation Disclaimer: Some of this Note has been completed with voice-recognition dictation software. Although errors are generally corrected real-time,  there is the potential for a rare error to be present in the completed chart.

## 2021-11-05 NOTE — LETTER
"    11/5/2021         RE: Isabela Panchal  [address]  [facility] [address]        Dear Colleague,    Thank you for referring your patient, Isabela Panchal, to the [facility] [place]. Please see a copy of my visit note below.    I have reviewed and updated the patient's Past Medical History, Social History, Family History and Medication List.    ALLERGIES  Benzoin, Adhesive tape, Allegra [fexofenadine], Cucumber extract, Fexofenadine hydrochloride, Ibuprofen, Lexapro, No clinical screening - see comments, Peanuts [nuts], and Seasonal allergies    Spine Surgery Follow Up    REFERRING PHYSICIAN: No ref. provider found   PRIMARY CARE PHYSICIAN: Felisha Briggs           Chief Complaint:   No chief complaint on file.      History of Present Illness:  Symptom Profile Including: location of symptoms, onset, severity, exacerbating/alleviating factors, previous treatments:        Isabela Panchal is a 51 year old female who presents today for wound check. She is about 3 weeks s/p C5-7 ACDF with left ICAG (DOS 10/13/21) with Dr. Lal. Her cervical spine incision has healed without issue. However, her left ICAG site has been painful since surgery. She says it has had a little bit of drainage since surgery, but about 3-4 days ago started having increased yellow/bloody discharge. Then, 1-2 days ago the wound \"opened up\" and started to look like what it does today. She denies recent fever/ chills/ fatigue/ ill feeling or any other systemic symptoms. She has been trying to keep the wound clean and dry. Has not been applying ointments. She has home care nurse that comes a few times a week to help with dressing changes. No other complaints or concerns today.          Neck Disability Index (NDI) Questionnaire    Neck Disability Index (NDI) 8/6/2021   Neck Disability Index: Count 10   NDI: Total Score = SUM (points for all 10 findings) 36   Neck Disability in Percent = (Total " Score) / 50 * 100 72 (%)   Some recent data might be hidden                   Physical Exam:   PHYSICAL EXAM:   Constitutional - Patient is healthy, well-nourished and appears stated age   Respiratory - Patient is breathing normally and in no respiratory distress.   Skin - No suspicious rashes or lesions.   Psychiatric - Normal mood and affect.   Cardiovascular - Extremities warm and well perfused.   Eyes - Visual acuity is normal to the written word.   ENT - Hearing intact to the spoken word.   GI - No abdominal distention.   Musculoskeletal - antalgic gait without use of assistive devices (pain from left ICAG site). ACDF anterior incision is fully healed without evidence of infection.   Left ICAG incision: monocryl and loose subcutaneous vicryl sutures present. Mild-moderate amount of purulent discharge in wound and on gauze dressing. Wound does not track below fascia when probed. Some surrounding erythema.             Imaging:   No imaging obtained at today's visit           Assessment and Plan:   Assessment:  51 year old female s/p C5-7 ACDF with left ICAG (DOS 10/13/21) with Dr. Lal with superficial surgical site infection of left ICAG site, no systemic symptoms     Plan:  Cleansed and probed left iliac crest site wound in clinic today. Wound does not probe deep past fascia. Subcutaneous vicryl and monocryl sutures removed from wound. Wound was irrigated then packed with sterile iodoform guaze packing tape and covered with sterile gauze dressing.     Instructed patient on local wound cares. Instructed to remove packing gauze in 2 days. Twice daily dressing changes. Ok for showering & washing area with warm soapy water, but avoid tubs/ soaking in water. If she develops worsening local symptoms or systemic symptoms (fever, chills, fatigue, ill feeling, etc) she should go to [place] [facility] this weekend.    - Ordered Keflex x 14 days  .   - Follow up next Tuesday with Dr. Lal    Respectfully,  Rose Cardoso  (lisa Pitts), LINA  Orthopaedic Spine Surgery  Dept Orthopaedic Surgery, [facility] Phone: [phone]    Dictation Disclaimer: Some of this Note has been completed with voice-recognition dictation software. Although errors are generally corrected real-time, there is the potential for a rare error to be present in the completed chart.

## 2021-11-05 NOTE — TELEPHONE ENCOUNTER
Patient seen in clinic today. Superficial surgical site infection; did not probe deep. Instructed on local wound cares and sent home with PO keflex  See clinic note for full details.    Rose Cardoso PA-C

## 2021-11-09 ENCOUNTER — OFFICE VISIT (OUTPATIENT)
Dept: ORTHOPEDICS | Facility: CLINIC | Age: 51
End: 2021-11-09
Payer: COMMERCIAL

## 2021-11-09 ENCOUNTER — LAB (OUTPATIENT)
Dept: LAB | Facility: CLINIC | Age: 51
End: 2021-11-09
Payer: COMMERCIAL

## 2021-11-09 VITALS — WEIGHT: 197 LBS | HEIGHT: 65 IN | BODY MASS INDEX: 32.82 KG/M2

## 2021-11-09 DIAGNOSIS — T81.49XA SURGICAL SITE INFECTION: ICD-10-CM

## 2021-11-09 DIAGNOSIS — T81.49XA SURGICAL SITE INFECTION: Primary | ICD-10-CM

## 2021-11-09 DIAGNOSIS — M54.12 CERVICAL RADICULOPATHY: Primary | ICD-10-CM

## 2021-11-09 LAB
CRP SERPL-MCNC: 3.5 MG/L (ref 0–8)
ERYTHROCYTE [SEDIMENTATION RATE] IN BLOOD BY WESTERGREN METHOD: 39 MM/HR (ref 0–30)

## 2021-11-09 PROCEDURE — 85652 RBC SED RATE AUTOMATED: CPT | Performed by: PATHOLOGY

## 2021-11-09 PROCEDURE — 86140 C-REACTIVE PROTEIN: CPT | Performed by: PATHOLOGY

## 2021-11-09 PROCEDURE — 36415 COLL VENOUS BLD VENIPUNCTURE: CPT | Performed by: PATHOLOGY

## 2021-11-09 PROCEDURE — 99024 POSTOP FOLLOW-UP VISIT: CPT | Mod: GC | Performed by: ORTHOPAEDIC SURGERY

## 2021-11-09 ASSESSMENT — MIFFLIN-ST. JEOR: SCORE: 1504.47

## 2021-11-09 NOTE — NURSING NOTE
"Reason For Visit:   Chief Complaint   Patient presents with     RECHECK     1 week wound check follow up.        Ht 1.651 m (5' 5\")   Wt 89.4 kg (197 lb)   BMI 32.78 kg/m           Devin Davila ATC  "

## 2021-11-09 NOTE — PROGRESS NOTES
Spine Surgery Return Clinic Visit      Chief Complaint:   RECHECK (1 week wound check follow up. )      Interval HPI:  Symptom Profile Including: location of symptoms, onset, severity, exacerbating/alleviating factors, previous treatments:        Isabela Panchal is a 51 year old female who presents today for repeat wound check. She is about 4 weeks s/p C5-7 ACDF with left ICAG (DOS 10/13/21) with Dr. Lal. She has been performing dressing changes, but has not been taking antibiotic. She denies fever, chills, or night sweats. She notes pain with ambulating and has been using her cane. She thinks the drainage has remained persistent and has not improved.             Past Medical History:     Past Medical History:   Diagnosis Date     Abnormal Pap smear of cervix 2019    see problem list     Allergic rhinitis due to allergen      Anemia      Breathing difficulty      Cervical high risk HPV (human papillomavirus) test positive 02/13/2018, 2019    type 16; see problem list     Cervical radiculopathy 08/17/2021     Chest pain      Generalised anxiety disorder 09/06/2012     Headache      Heart trouble      High blood triglycerides 02/26/2016     History of blood transfusion     unsure     Hoarseness      Hypertension      Hypoglycemia 03/10/2013     Impaired fasting glucose 08/16/2014     Insomnia 09/06/2012     Irritable bowel syndrome      Itchy eyes      MEDICAL HISTORY OF -     hx of right wrist dislocation     Migraine      Mild intermittent asthma     allergy or URI triggered      Moderate recurrent major depression (H) 09/06/2012     Nasal congestion      Numbness      Obesity, unspecified (OBESE) 08/21/2014     Paroxysmal supraventricular tachycardia (H)     4/00     PONV (postoperative nausea and vomiting)      Ringing in ears      Sleep difficulties      Sneezing      Sore throat      Vertigo      Weakness      Weight gain             Past Surgical History:     Past Surgical History:   Procedure Laterality  Date     COLONOSCOPY  4/26/2013    Procedure: COLONOSCOPY;;  Surgeon: Jim Humphries MD;  Location: [facility]     DECOMPRESSION, FUSION CERVICAL ANTERIOR TWO LEVELS, COMBINED N/A 10/13/2021    Procedure: Cervical 5 to 7 anterior cervical decompression and fusion with medtronic plate and screws, interbody device, use of fluoroscopy, microscope,;  Surgeon: Shaw Lal MD;  Location: [facility] OR     ENT SURGERY      deviated septum     GRAFT BONE FROM ILIAC CREST Left 10/13/2021    Procedure: and left sided iliac crest autograft;  Surgeon: Shaw Lal MD;  Location: [facility] OR     HEAD & NECK SURGERY       LAPAROSCOPIC SALPINGO-OOPHORECTOMY  1/20/2014    Procedure: LAPAROSCOPIC SALPINGO-OOPHORECTOMY;  Laparoscopic Left Salpingo Oophorectomy ;  Surgeon: Chani Del Rosario MD;  [facility]: [facility]     LUMPECTOMY BREAST      right     ORTHOPEDIC SURGERY      knee     ORTHOPEDIC SURGERY      foot     SOFT TISSUE SURGERY      lymphoma face and armpit     SOFT TISSUE SURGERY       ZZC NONSPECIFIC PROCEDURE      Plastic repair of facial lac     ZZC NONSPECIFIC PROCEDURE      Lipoma removed from arm close to the tail of te breast     ZZC NONSPECIFIC PROCEDURE      Plastic repair of facial lac     ZZC NONSPECIFIC PROCEDURE      Knee surgery     ZZC NONSPECIFIC PROCEDURE      Miscarriage x 2            Social History:     Social History     Tobacco Use     Smoking status: Never Smoker     Smokeless tobacco: Never Used   Substance Use Topics     Alcohol use: Not Currently            Family History:     Family History   Problem Relation Age of Onset     Alzheimer Disease Maternal Grandfather      Arthritis Maternal Grandmother      Heart Disease Maternal Grandmother      Heart Failure Maternal Grandmother      Thyroid Disease Mother      Allergy (Severe) Father             Allergies:     Allergies   Allergen Reactions     Benzoin Rash     Adhesive Tape      blistering     Allegra [Fexofenadine]       "Kidney pain     Cucumber Extract      Throat and ears itchy and throat feels \"icky\"     Fexofenadine Hydrochloride      allegra - low back pain     Ibuprofen      palpitations     Lexapro      Wt gain     No Clinical Screening - See Comments      Ramon      Itchy ears and throat, throat feel \"icky\"     Peanuts [Nuts]      Itchy ears and throat, throat feel \"icky\"     Seasonal Allergies             Medications:     Current Outpatient Medications   Medication     acetaminophen (TYLENOL) 325 MG tablet     atorvastatin (LIPITOR) 40 MG tablet     cephALEXin (KEFLEX) 500 MG capsule     cholecalciferol (VITAMIN D3) 125 mcg (5000 units) capsule     fenofibrate (TRICOR) 48 MG tablet     fluticasone (FLONASE) 50 MCG/ACT nasal spray     hydrOXYzine (ATARAX) 25 MG tablet     lisinopril (ZESTRIL) 10 MG tablet     methocarbamol (ROBAXIN) 750 MG tablet     metroNIDAZOLE (METROCREAM) 0.75 % external cream     MIRENA 20 MCG/24HR IU IUD     oxyCODONE (ROXICODONE) 5 MG tablet     RESTASIS 0.05 % ophthalmic emulsion     senna-docusate (SENOKOT-S/PERICOLACE) 8.6-50 MG tablet     tiZANidine (ZANAFLEX) 4 MG tablet     venlafaxine (EFFEXOR-XR) 150 MG 24 hr capsule     No current facility-administered medications for this visit.             Review of Systems:   A focused musculoskeletal and neurologic ROS was performed with pertinent positives and negatives noted in the HPI.  Additional systems were also reviewed and are documented at the bottom of the note.         Physical Exam:   Vitals: Ht 1.651 m (5' 5\")   Wt 89.4 kg (197 lb)   BMI 32.78 kg/m    Musculoskeletal:   Well healed anterior cervical incision. Gauze and tape placed far medial to iliac crest wound. Left iliac graft site is open, and enveloped in soft tissue. There is purulent drainage around the incision. No significant surrounding erythema. TTP over iliac crest. Wound probes to deep fascia.         Imaging:   No imaging.       Assessment and Plan:     51 year old female " s/p C5-7 ACDF with left ICAG (DOS 10/13/21) with superficial wound infection at the iliac crest graft site. Patient has been changing dressing, however based on exam today, she has not been applying dressing properly due to difficulty visualizing wound. I anticipate this wound to heal by means of secondary intention with proper wet-to-dry dressing changes and antibiotic course. We will re-prescribed antibiotic for patient to begin today. Due to her difficulty performing her own dressing changes we will also order at home wound care to assist her with care.    - Begin antibiotic  - Perform dressing changes twice a day  - Return to clinic in 1 week for reassessment     Attending MD (Dr. Shaw Lla) :  I reviewed and verified the history and physical exam of the patient and discussed the patient's management with the other clinical providers involved in this patient's care including any involved residents or physicians assistants. I reviewed the above note and agree with the documented findings and plan of care, which were communicated to the patient.      Shaw Lal MD    --  Lester Ortiz MD  Orthopedic Surgery PGY-1    Patient seen and discussed with Dr. Lal.

## 2021-11-09 NOTE — LETTER
11/9/2021         RE: Isabela Panchal  [address]  [facility] [address]        Dear Colleague,    Thank you for referring your patient, Isabela Panchal, to the [facility] [place]. Please see a copy of my visit note below.    Spine Surgery Return Clinic Visit      Chief Complaint:   RECHECK (1 week wound check follow up. )      Interval HPI:  Symptom Profile Including: location of symptoms, onset, severity, exacerbating/alleviating factors, previous treatments:        Isabela Panchal is a 51 year old female who presents today for repeat wound check. She is about 4 weeks s/p C5-7 ACDF with left ICAG (DOS 10/13/21) with Dr. Lal. She has been performing dressing changes, but has not been taking antibiotic. She denies fever, chills, or night sweats. She notes pain with ambulating and has been using her cane. She thinks the drainage has remained persistent and has not improved.             Past Medical History:     Past Medical History:   Diagnosis Date     Abnormal Pap smear of cervix 2019    see problem list     Allergic rhinitis due to allergen      Anemia      Breathing difficulty      Cervical high risk HPV (human papillomavirus) test positive 02/13/2018, 2019    type 16; see problem list     Cervical radiculopathy 08/17/2021     Chest pain      Generalised anxiety disorder 09/06/2012     Headache      Heart trouble      High blood triglycerides 02/26/2016     History of blood transfusion     unsure     Hoarseness      Hypertension      Hypoglycemia 03/10/2013     Impaired fasting glucose 08/16/2014     Insomnia 09/06/2012     Irritable bowel syndrome      Itchy eyes      MEDICAL HISTORY OF -     hx of right wrist dislocation     Migraine      Mild intermittent asthma     allergy or URI triggered      Moderate recurrent major depression (H) 09/06/2012     Nasal congestion      Numbness      Obesity, unspecified (OBESE) 08/21/2014     Paroxysmal supraventricular  tachycardia (H)     4/00     PONV (postoperative nausea and vomiting)      Ringing in ears      Sleep difficulties      Sneezing      Sore throat      Vertigo      Weakness      Weight gain             Past Surgical History:     Past Surgical History:   Procedure Laterality Date     COLONOSCOPY  4/26/2013    Procedure: COLONOSCOPY;;  Surgeon: Jim Humphries MD;  Location: [facility]     DECOMPRESSION, FUSION CERVICAL ANTERIOR TWO LEVELS, COMBINED N/A 10/13/2021    Procedure: Cervical 5 to 7 anterior cervical decompression and fusion with medtronic plate and screws, interbody device, use of fluoroscopy, microscope,;  Surgeon: Shaw Lal MD;  [facility]     ENT SURGERY      deviated septum     GRAFT BONE FROM ILIAC CREST Left 10/13/2021    Procedure: and left sided iliac crest autograft;  Surgeon: Shaw Lal MD;  [facility]     HEAD & NECK SURGERY       LAPAROSCOPIC SALPINGO-OOPHORECTOMY  1/20/2014    Procedure: LAPAROSCOPIC SALPINGO-OOPHORECTOMY;  Laparoscopic Left Salpingo Oophorectomy ;  Surgeon: Chani Del Rosario MD;  [facility]     LUMPECTOMY BREAST      right     ORTHOPEDIC SURGERY      knee     ORTHOPEDIC SURGERY      foot     SOFT TISSUE SURGERY      lymphoma face and armpit     SOFT TISSUE SURGERY       ZZC NONSPECIFIC PROCEDURE      Plastic repair of facial lac     ZZC NONSPECIFIC PROCEDURE      Lipoma removed from arm close to the tail of te breast     ZZC NONSPECIFIC PROCEDURE      Plastic repair of facial lac     ZZC NONSPECIFIC PROCEDURE      Knee surgery     ZZC NONSPECIFIC PROCEDURE      Miscarriage x 2            Social History:     Social History     Tobacco Use     Smoking status: Never Smoker     Smokeless tobacco: Never Used   Substance Use Topics     Alcohol use: Not Currently            Family History:     Family History   Problem Relation Age of Onset     Alzheimer Disease Maternal Grandfather      Arthritis Maternal Grandmother       "Heart Disease Maternal Grandmother      Heart Failure Maternal Grandmother      Thyroid Disease Mother      Allergy (Severe) Father             Allergies:     Allergies   Allergen Reactions     Benzoin Rash     Adhesive Tape      blistering     Allegra [Fexofenadine]      Kidney pain     Cucumber Extract      Throat and ears itchy and throat feels \"icky\"     Fexofenadine Hydrochloride      allegra - low back pain     Ibuprofen      palpitations     Lexapro      Wt gain     No Clinical Screening - See Comments      Ramon      Itchy ears and throat, throat feel \"icky\"     Peanuts [Nuts]      Itchy ears and throat, throat feel \"icky\"     Seasonal Allergies             Medications:     Current Outpatient Medications   Medication     acetaminophen (TYLENOL) 325 MG tablet     atorvastatin (LIPITOR) 40 MG tablet     cephALEXin (KEFLEX) 500 MG capsule     cholecalciferol (VITAMIN D3) 125 mcg (5000 units) capsule     fenofibrate (TRICOR) 48 MG tablet     fluticasone (FLONASE) 50 MCG/ACT nasal spray     hydrOXYzine (ATARAX) 25 MG tablet     lisinopril (ZESTRIL) 10 MG tablet     methocarbamol (ROBAXIN) 750 MG tablet     metroNIDAZOLE (METROCREAM) 0.75 % external cream     MIRENA 20 MCG/24HR IU IUD     oxyCODONE (ROXICODONE) 5 MG tablet     RESTASIS 0.05 % ophthalmic emulsion     senna-docusate (SENOKOT-S/PERICOLACE) 8.6-50 MG tablet     tiZANidine (ZANAFLEX) 4 MG tablet     venlafaxine (EFFEXOR-XR) 150 MG 24 hr capsule     No current facility-administered medications for this visit.             Review of Systems:   A focused musculoskeletal and neurologic ROS was performed with pertinent positives and negatives noted in the HPI.  Additional systems were also reviewed and are documented at the bottom of the note.         Physical Exam:   Vitals: Ht 1.651 m (5' 5\")   Wt 89.4 kg (197 lb)   BMI 32.78 kg/m    Musculoskeletal:   Well healed anterior cervical incision. Gauze and tape placed far medial to iliac crest wound. Left " iliac graft site is open, and enveloped in soft tissue. There is purulent drainage around the incision. No significant surrounding erythema. TTP over iliac crest. Wound probes to deep fascia.         Imaging:   No imaging.       Assessment and Plan:     51 year old female s/p C5-7 ACDF with left ICAG (DOS 10/13/21) with superficial wound infection at the iliac crest graft site. Patient has been changing dressing, however based on exam today, she has not been applying dressing properly due to difficulty visualizing wound. I anticipate this wound to heal by means of secondary intention with proper wet-to-dry dressing changes and antibiotic course. We will re-prescribed antibiotic for patient to begin today. Due to her difficulty performing her own dressing changes we will also order at home wound care to assist her with care.    - Begin antibiotic  - Perform dressing changes twice a day  - Return to clinic in 1 week for reassessment     Attending MD (Dr. Shaw Lal) :  I reviewed and verified the history and physical exam of the patient and discussed the patient's management with the other clinical providers involved in this patient's care including any involved residents or physicians assistants. I reviewed the above note and agree with the documented findings and plan of care, which were communicated to the patient.      Shaw Lal MD    --  Lester Ortiz MD  Orthopedic Surgery PGY-1    Patient seen and discussed with Dr. Lal.

## 2021-11-10 NOTE — TELEPHONE ENCOUNTER
Erick Naranjo,  Patient is lost to pap tracking follow-up. Attempts to contact pt have been made per reminder process and there has been no reply and/or no appt scheduled.        Pap Hx:  12/16/10 ASCUS pap, neg HR HPV.  3/5/14 NIL pap, neg HR HPV.  2/13/18 NIL pap, + HR HPV type 16. Plan colp due by 5/13/18 04/05/18 Certified result letter: [id number]  05/09/18 Certified letter returned as unclaimed. [facility] into pt's chart.   05/09/18: [place] not done. Tracking updated for 6 mo colp/pap.   12/05/18: [place] Bx A small amount of squamous epithelium with atypia of undetermined significance, favor reactive change,and chronic inflammation. Plan cotest in 1 year.   7/5/19 ASCUS Pap, + HR HPV 16 (Neg 18 & other). Plan colp  08/14/19 [place] appt--after discussion, we will defer today's colposcopy, instead planning a repeat Pap and HPV test in December 2019.  If colposcopy is indicated at that time we will proceed.   01/15/20: [place] Bx benign, chronic inflammation present. ECC benign,chronic inflammation present, small amount of atypical squamous epithelium. NIL Pap, Neg HR HPV result. Plan cotest in 1 year.   01/15/21 appt no-showed  02/10/21 mychart reminder and letter sent  03/10/21 Reminder call-lm  04/2/21 NIL, +HPV 16. Plan [place] bef 7/2/21.  04/12/21 Pt was advised.  06/2/21 Reminder Mychart, not viewed, Letter.   06/28/21 [place] not done. Tracking updated for 6 mo colp/pap due bef 10/2/21.   09/17/21 Reminder Mychart not viewed, Letter.  10/12/21 Reminder call-spoke to the pt she has the phone number to call and make an appt.   11/10/21 Lost to follow-up for pap tracking, [place] routed to provider

## 2021-11-11 ENCOUNTER — APPOINTMENT (OUTPATIENT)
Dept: NURSING | Facility: CLINIC | Age: 51
End: 2021-11-11
Payer: COMMERCIAL

## 2021-11-11 ENCOUNTER — PATIENT OUTREACH (OUTPATIENT)
Dept: CARE COORDINATION | Facility: CLINIC | Age: 51
End: 2021-11-11

## 2021-11-11 NOTE — PROGRESS NOTES
Clinic Care Coordination Contact    Community Health Worker Follow Up    Care Gaps:     Health Maintenance Due   Topic Date Due     URINE DRUG SCREEN  Never done     ADVANCE CARE PLANNING  Never done     MAMMO SCREENING  Never done     Pneumococcal Vaccine: Pediatrics (0 to 5 Years) and At-Risk Patients (6 to 64 Years) (1 of 2 - PPSV23) Never done     PREVENTIVE CARE VISIT  03/26/2020     COLPOSCOPY  07/02/2021     INFLUENZA VACCINE (1) 09/01/2021     ASTHMA CONTROL TEST  10/16/2021     ZOSTER IMMUNIZATION (2 of 2) 01/19/2021     HPV TEST  10/02/2021     PAP  10/02/2021     PHQ-9  11/25/2021     Postponed due to neck surgery on 10/13/21.     Goals:   Goals Addressed as of 11/11/2021 at 2:50 PM                    Today    10/11/21       Financial Wellbeing- SSDI (pt-stated)   90%  90%    Added 11/17/20 by Renetta Orellana BSW      Goal Statement: I would like help applying and getting approved for Social Security Disability in the next 6 months.   Date Goal set: 10/29/2020  Barriers: Getting denied   Strengths: Asking for help, supportive [facility]   Date to Achieve By: 4/29/2021 // extended 10/20/2021  Patient expressed understanding of goal: yes     Action steps to achieve this goal:  1. [employer] [facility] and I will meet with the Disability Specialists on March 26th to discuss next steps and options with my denial. (Completed, ongoing)  5/13  Pt stated moved up to Federal level pending. [facility] provides support/updates on this.   2. I will attend my first appointment at [facility] on March 30th with Johnson Novak, counselor. (Completed)    5/13 Pt states she connects with monthly telephone visits.  3. I will work with [facility] staff to schedule neuropsychological testing. (Completed)  4. I will give the CHW updates during outreach calls.    Updated: 5/13/2021 11/11/21 CHW    Patient continues to work with Disability Specialists and disability case is still pending at federal level.    Patient continues to work with [employer]  [employer]. She also has an [employer] that checks in.        Intervention and Education during outreach: CHW inquired about how patient is doing and if she needs any additional support or resources. Patient states, neck surgery went well, she's trying to find the number to call for home care wound check. They have not showed up yet and she's not sure who to call. Patient gave verbal permission for CHW to speak with ILS-Worker Belen. CHW informed Belen of contact for [facility] [phone] and KETTY Casper [phone]. Belen will assist patient with follow up call to [facility]. No further assistance is needed. CHW encouraged patient to reach out to CC for support as needed.     CHW Plan: CHW to follow up in 1 month    [facility] Care Coordination  [facility], and [facility]    Phone: [phone]  ___________________    Next Outreach: 12/11/21  Planned Outreach Frequency: Monthly   Preferred Phone Number: [phone]    Enrollment Date:  10/29/21  Last Care Plan Assessment:  10/2021

## 2021-11-12 ENCOUNTER — TELEPHONE (OUTPATIENT)
Dept: ORTHOPEDICS | Facility: CLINIC | Age: 51
End: 2021-11-12
Payer: COMMERCIAL

## 2021-11-12 NOTE — TELEPHONE ENCOUNTER
RN received message from Jayleen, asking RN to call Deanna from [employer]. [phone]   RN then called and left detailed VM regarding patient's dressing change.   RN also provided call back number.    Babak Rivera RN

## 2021-11-16 ENCOUNTER — OFFICE VISIT (OUTPATIENT)
Dept: ORTHOPEDICS | Facility: CLINIC | Age: 51
End: 2021-11-16
Payer: COMMERCIAL

## 2021-11-16 ENCOUNTER — TELEPHONE (OUTPATIENT)
Dept: ENDOCRINOLOGY | Facility: CLINIC | Age: 51
End: 2021-11-16

## 2021-11-16 VITALS — HEIGHT: 65 IN | WEIGHT: 197 LBS | BODY MASS INDEX: 32.82 KG/M2

## 2021-11-16 DIAGNOSIS — G89.29 CHRONIC PAIN OF BOTH SHOULDERS: Primary | ICD-10-CM

## 2021-11-16 DIAGNOSIS — M25.511 CHRONIC PAIN OF BOTH SHOULDERS: Primary | ICD-10-CM

## 2021-11-16 DIAGNOSIS — T81.30XA WOUND DEHISCENCE: ICD-10-CM

## 2021-11-16 DIAGNOSIS — M25.512 CHRONIC PAIN OF BOTH SHOULDERS: Primary | ICD-10-CM

## 2021-11-16 DIAGNOSIS — T81.49XA SURGICAL SITE INFECTION: ICD-10-CM

## 2021-11-16 PROCEDURE — 99024 POSTOP FOLLOW-UP VISIT: CPT | Performed by: ORTHOPAEDIC SURGERY

## 2021-11-16 RX ORDER — CEPHALEXIN 500 MG/1
500 CAPSULE ORAL 4 TIMES DAILY
Qty: 56 CAPSULE | Refills: 0 | Status: SHIPPED | OUTPATIENT
Start: 2021-11-16 | End: 2021-12-16

## 2021-11-16 ASSESSMENT — MIFFLIN-ST. JEOR: SCORE: 1504.47

## 2021-11-16 NOTE — NURSING NOTE
"Reason For Visit:   Chief Complaint   Patient presents with     RECHECK     1 week follow up Illiac Crest wound        Ht 1.651 m (5' 5\")   Wt 89.4 kg (197 lb)   BMI 32.78 kg/m           Devin Davila ATC  "

## 2021-11-16 NOTE — PROGRESS NOTES
Spine Surgery Return Clinic Visit      Chief Complaint:   RECHECK (1 week follow up Illiac Crest wound )      Interval HPI:  Symptom Profile Including: location of symptoms, onset, severity, exacerbating/alleviating factors, previous treatments:        Isabela Panchal is a 51 year old female who returns today for wound check.  She started the antibiotics.  She has been having nursing come to the home once a day for dressing changes and her mother helped her over the weekend.            Past Medical History:     Past Medical History:   Diagnosis Date     Abnormal Pap smear of cervix 2019    see problem list     Allergic rhinitis due to allergen      Anemia      Breathing difficulty      Cervical high risk HPV (human papillomavirus) test positive 02/13/2018, 2019    type 16; see problem list     Cervical radiculopathy 08/17/2021     Chest pain      Generalised anxiety disorder 09/06/2012     Headache      Heart trouble      High blood triglycerides 02/26/2016     History of blood transfusion     unsure     Hoarseness      Hypertension      Hypoglycemia 03/10/2013     Impaired fasting glucose 08/16/2014     Insomnia 09/06/2012     Irritable bowel syndrome      Itchy eyes      MEDICAL HISTORY OF -     hx of right wrist dislocation     Migraine      Mild intermittent asthma     allergy or URI triggered      Moderate recurrent major depression (H) 09/06/2012     Nasal congestion      Numbness      Obesity, unspecified (OBESE) 08/21/2014     Paroxysmal supraventricular tachycardia (H)     4/00     PONV (postoperative nausea and vomiting)      Ringing in ears      Sleep difficulties      Sneezing      Sore throat      Vertigo      Weakness      Weight gain             Past Surgical History:     Past Surgical History:   Procedure Laterality Date     COLONOSCOPY  4/26/2013    Procedure: COLONOSCOPY;;  Surgeon: Jim Humphries MD;  Location: [facility]     DECOMPRESSION, FUSION CERVICAL ANTERIOR TWO LEVELS, COMBINED  "N/A 10/13/2021    Procedure: Cervical 5 to 7 anterior cervical decompression and fusion with medtronic plate and screws, interbody device, use of fluoroscopy, microscope,;  Surgeon: Shaw Lal MD;  Location: [facility] OR     ENT SURGERY      deviated septum     GRAFT BONE FROM ILIAC CREST Left 10/13/2021    Procedure: and left sided iliac crest autograft;  Surgeon: Shaw Lal MD;  [facility] OR     HEAD & NECK SURGERY       LAPAROSCOPIC SALPINGO-OOPHORECTOMY  1/20/2014    Procedure: LAPAROSCOPIC SALPINGO-OOPHORECTOMY;  Laparoscopic Left Salpingo Oophorectomy ;  Surgeon: Chani Del Rosario MD;  [facility]     LUMPECTOMY BREAST      right     ORTHOPEDIC SURGERY      knee     ORTHOPEDIC SURGERY      foot     SOFT TISSUE SURGERY      lymphoma face and armpit     SOFT TISSUE SURGERY       ZZC NONSPECIFIC PROCEDURE      Plastic repair of facial lac     ZZC NONSPECIFIC PROCEDURE      Lipoma removed from arm close to the tail of te breast     ZZC NONSPECIFIC PROCEDURE      Plastic repair of facial lac     ZZC NONSPECIFIC PROCEDURE      Knee surgery     ZZC NONSPECIFIC PROCEDURE      Miscarriage x 2            Social History:     Social History     Tobacco Use     Smoking status: Never Smoker     Smokeless tobacco: Never Used   Substance Use Topics     Alcohol use: Not Currently            Family History:     Family History   Problem Relation Age of Onset     Alzheimer Disease Maternal Grandfather      Arthritis Maternal Grandmother      Heart Disease Maternal Grandmother      Heart Failure Maternal Grandmother      Thyroid Disease Mother      Allergy (Severe) Father             Allergies:     Allergies   Allergen Reactions     Benzoin Rash     Adhesive Tape      blistering     Allegra [Fexofenadine]      Kidney pain     Cucumber Extract      Throat and ears itchy and throat feels \"icky\"     Fexofenadine Hydrochloride      allegra - low back pain     Ibuprofen      palpitations     Lexapro     " " Wt gain     No Clinical Screening - See Comments      Ramon      Itchy ears and throat, throat feel \"icky\"     Peanuts [Nuts]      Itchy ears and throat, throat feel \"icky\"     Seasonal Allergies             Medications:     Current Outpatient Medications   Medication     cephALEXin (KEFLEX) 500 MG capsule     acetaminophen (TYLENOL) 325 MG tablet     atorvastatin (LIPITOR) 40 MG tablet     cholecalciferol (VITAMIN D3) 125 mcg (5000 units) capsule     fenofibrate (TRICOR) 48 MG tablet     fluticasone (FLONASE) 50 MCG/ACT nasal spray     hydrOXYzine (ATARAX) 25 MG tablet     lisinopril (ZESTRIL) 10 MG tablet     methocarbamol (ROBAXIN) 750 MG tablet     metroNIDAZOLE (METROCREAM) 0.75 % external cream     MIRENA 20 MCG/24HR IU IUD     oxyCODONE (ROXICODONE) 5 MG tablet     RESTASIS 0.05 % ophthalmic emulsion     senna-docusate (SENOKOT-S/PERICOLACE) 8.6-50 MG tablet     tiZANidine (ZANAFLEX) 4 MG tablet     venlafaxine (EFFEXOR-XR) 150 MG 24 hr capsule     No current facility-administered medications for this visit.             Review of Systems:   A focused musculoskeletal and neurologic ROS was performed with pertinent positives and negatives noted in the HPI.  Additional systems were also reviewed and are documented at the bottom of the note.         Physical Exam:   Vitals: Ht 1.651 m (5' 5\")   Wt 89.4 kg (197 lb)   BMI 32.78 kg/m    Musculoskeletal, Neurologic, and Spine:       Her incision looks quite a bit healthier today on the left side.  Good granulation tissue at the base.  No active purulence.         Imaging:     None today       Assessment and Plan:     51 year old female with superficial wound dehiscence of her left iliac crest harvest site.  I recommend continued daily dressing changes.  Ideally this would be twice a day but because of the location of the incision in her pannus she really cannot see it very well.  She is having ongoing wound cares at home and I recommended she continue this.  " Farhad going to see if we can get her in to see the wound nurses here in clinic to see if they would have any other recommendations for us.  Otherwise follow-up in 2 weeks with cervical radiographs.           Respectfully,  Shaw Lal MD  Spine Surgery  [facility]

## 2021-11-16 NOTE — LETTER
11/16/2021         RE: Isabela Panchal  [address]  [facility] [address]        Dear Colleague,    Thank you for referring your patient, Isabela Panchal, to the [facility] [place]. Please see a copy of my visit note below.    Spine Surgery Return Clinic Visit      Chief Complaint:   RECHECK (1 week follow up Illiac Crest wound )      Interval HPI:  Symptom Profile Including: location of symptoms, onset, severity, exacerbating/alleviating factors, previous treatments:        Isabela Panchal is a 51 year old female who returns today for wound check.  She started the antibiotics.  She has been having nursing come to the home once a day for dressing changes and her mother helped her over the weekend.            Past Medical History:     Past Medical History:   Diagnosis Date     Abnormal Pap smear of cervix 2019    see problem list     Allergic rhinitis due to allergen      Anemia      Breathing difficulty      Cervical high risk HPV (human papillomavirus) test positive 02/13/2018, 2019    type 16; see problem list     Cervical radiculopathy 08/17/2021     Chest pain      Generalised anxiety disorder 09/06/2012     Headache      Heart trouble      High blood triglycerides 02/26/2016     History of blood transfusion     unsure     Hoarseness      Hypertension      Hypoglycemia 03/10/2013     Impaired fasting glucose 08/16/2014     Insomnia 09/06/2012     Irritable bowel syndrome      Itchy eyes      MEDICAL HISTORY OF -     hx of right wrist dislocation     Migraine      Mild intermittent asthma     allergy or URI triggered      Moderate recurrent major depression (H) 09/06/2012     Nasal congestion      Numbness      Obesity, unspecified (OBESE) 08/21/2014     Paroxysmal supraventricular tachycardia (H)     4/00     PONV (postoperative nausea and vomiting)      Ringing in ears      Sleep difficulties      Sneezing      Sore throat      Vertigo      Weakness      Weight  gain             Past Surgical History:     Past Surgical History:   Procedure Laterality Date     COLONOSCOPY  4/26/2013    Procedure: COLONOSCOPY;;  Surgeon: Jim Humphries MD;  [facility]: [facility]     DECOMPRESSION, FUSION CERVICAL ANTERIOR TWO LEVELS, COMBINED N/A 10/13/2021    Procedure: Cervical 5 to 7 anterior cervical decompression and fusion with medtronic plate and screws, interbody device, use of fluoroscopy, microscope,;  Surgeon: Shaw Lal MD;  [facility]     ENT SURGERY      deviated septum     GRAFT BONE FROM ILIAC CREST Left 10/13/2021    Procedure: and left sided iliac crest autograft;  Surgeon: Shaw Lal MD;  [facility]     HEAD & NECK SURGERY       LAPAROSCOPIC SALPINGO-OOPHORECTOMY  1/20/2014    Procedure: LAPAROSCOPIC SALPINGO-OOPHORECTOMY;  Laparoscopic Left Salpingo Oophorectomy ;  Surgeon: Chani Del Rosario MD;  [facility]     LUMPECTOMY BREAST      right     ORTHOPEDIC SURGERY      knee     ORTHOPEDIC SURGERY      foot     SOFT TISSUE SURGERY      lymphoma face and armpit     SOFT TISSUE SURGERY       ZZC NONSPECIFIC PROCEDURE      Plastic repair of facial lac     ZZC NONSPECIFIC PROCEDURE      Lipoma removed from arm close to the tail of te breast     ZZC NONSPECIFIC PROCEDURE      Plastic repair of facial lac     ZZC NONSPECIFIC PROCEDURE      Knee surgery     ZZC NONSPECIFIC PROCEDURE      Miscarriage x 2            Social History:     Social History     Tobacco Use     Smoking status: Never Smoker     Smokeless tobacco: Never Used   Substance Use Topics     Alcohol use: Not Currently            Family History:     Family History   Problem Relation Age of Onset     Alzheimer Disease Maternal Grandfather      Arthritis Maternal Grandmother      Heart Disease Maternal Grandmother      Heart Failure Maternal Grandmother      Thyroid Disease Mother      Allergy (Severe) Father             Allergies:     Allergies   Allergen  "Reactions     Benzoin Rash     Adhesive Tape      blistering     Allegra [Fexofenadine]      Kidney pain     Cucumber Extract      Throat and ears itchy and throat feels \"icky\"     Fexofenadine Hydrochloride      allegra - low back pain     Ibuprofen      palpitations     Lexapro      Wt gain     No Clinical Screening - See Comments      Ramon      Itchy ears and throat, throat feel \"icky\"     Peanuts [Nuts]      Itchy ears and throat, throat feel \"icky\"     Seasonal Allergies             Medications:     Current Outpatient Medications   Medication     cephALEXin (KEFLEX) 500 MG capsule     acetaminophen (TYLENOL) 325 MG tablet     atorvastatin (LIPITOR) 40 MG tablet     cholecalciferol (VITAMIN D3) 125 mcg (5000 units) capsule     fenofibrate (TRICOR) 48 MG tablet     fluticasone (FLONASE) 50 MCG/ACT nasal spray     hydrOXYzine (ATARAX) 25 MG tablet     lisinopril (ZESTRIL) 10 MG tablet     methocarbamol (ROBAXIN) 750 MG tablet     metroNIDAZOLE (METROCREAM) 0.75 % external cream     MIRENA 20 MCG/24HR IU IUD     oxyCODONE (ROXICODONE) 5 MG tablet     RESTASIS 0.05 % ophthalmic emulsion     senna-docusate (SENOKOT-S/PERICOLACE) 8.6-50 MG tablet     tiZANidine (ZANAFLEX) 4 MG tablet     venlafaxine (EFFEXOR-XR) 150 MG 24 hr capsule     No current facility-administered medications for this visit.             Review of Systems:   A focused musculoskeletal and neurologic ROS was performed with pertinent positives and negatives noted in the HPI.  Additional systems were also reviewed and are documented at the bottom of the note.         Physical Exam:   Vitals: Ht 1.651 m (5' 5\")   Wt 89.4 kg (197 lb)   BMI 32.78 kg/m    Musculoskeletal, Neurologic, and Spine:       Her incision looks quite a bit healthier today on the left side.  Good granulation tissue at the base.  No active purulence.         Imaging:     None today       Assessment and Plan:     51 year old female with superficial wound dehiscence of her left " iliac crest harvest site.  I recommend continued daily dressing changes.  Ideally this would be twice a day but because of the location of the incision in her pannus she really cannot see it very well.  She is having ongoing wound cares at home and I recommended she continue this.  Farhad going to see if we can get her in to see the wound nurses here in clinic to see if they would have any other recommendations for us.  Otherwise follow-up in 2 weeks with cervical radiographs.           Respectfully,  Shaw Lal MD  Spine Surgery  [facility]

## 2021-11-16 NOTE — TELEPHONE ENCOUNTER
Called to r/s 11/18 [facility] appt. LVM with call center #. Will also send Mychart and scan/send letter.    Next available: JASBIR, Dr Blackburn, Dr Ortiz, Dr Burton, Dr Ayala, Dr Martines, Dr Manzanares.

## 2021-11-19 PROBLEM — M54.12 CERVICAL RADICULOPATHY: Status: RESOLVED | Noted: 2021-08-17 | Resolved: 2021-11-19

## 2021-11-19 PROBLEM — M54.2 CERVICALGIA: Status: RESOLVED | Noted: 2017-11-02 | Resolved: 2021-11-19

## 2021-11-19 PROBLEM — M25.561 CHRONIC PAIN OF RIGHT KNEE: Status: RESOLVED | Noted: 2017-11-14 | Resolved: 2021-11-19

## 2021-11-19 PROBLEM — M25.561 RIGHT KNEE PAIN: Status: RESOLVED | Noted: 2019-12-09 | Resolved: 2021-11-19

## 2021-11-19 PROBLEM — G89.29 CHRONIC PAIN OF RIGHT KNEE: Status: RESOLVED | Noted: 2017-11-14 | Resolved: 2021-11-19

## 2021-11-22 ENCOUNTER — PATIENT OUTREACH (OUTPATIENT)
Dept: CARE COORDINATION | Facility: CLINIC | Age: 51
End: 2021-11-22
Payer: COMMERCIAL

## 2021-11-22 DIAGNOSIS — M54.2 CERVICAL PAIN: Primary | ICD-10-CM

## 2021-11-22 DIAGNOSIS — Z98.1 S/P CERVICAL SPINAL FUSION: ICD-10-CM

## 2021-11-22 RX ORDER — AMOXICILLIN 250 MG
1 CAPSULE ORAL DAILY PRN
Qty: 30 TABLET | Refills: 0 | Status: SHIPPED | OUTPATIENT
Start: 2021-11-22 | End: 2023-05-11

## 2021-11-22 NOTE — PROGRESS NOTES
Care Coordination Clinician Chart Review  Situation: Patient chart reviewed by care coordinator.       Background: Care Coordination initial assessment and enrollment to Care Coordination was successful.   Patient centered goals were developed with participation from patient.  JASON CC handed patient off to CHW for continued outreach every 30 days.        Assessment: Per chart review, patient outreach completed by CC CHW on 11/11/21.  Patient is actively working to accomplish goals.  Patient's goals remain appropriate and relevant at this time.   Patient is not yet due for updated Plan of Care.  Annual assessment will be due 2/22.      Goals        Financial Wellbeing- SSDI (pt-stated)       Goal Statement: I would like help applying and getting approved for Social Security Disability in the next 6 months.   Date Goal set: 10/29/2020  Barriers: Getting denied   Strengths: Asking for help, supportive [facility]   Date to Achieve By: 4/29/2021 // extended 10/20/2021  Patient expressed understanding of goal: yes     Action steps to achieve this goal:  1. [employer] [facility] and I will meet with the Disability Specialists on March 26th to discuss next steps and options with my denial. (Completed, ongoing)  5/13  Pt stated moved up to Federal level pending. [facility] provides support/updates on this.   2. I will attend my first appointment at [facility] on March 30th with Johnson Novak, counselor. (Completed)    5/13 Pt states she connects with monthly telephone visits.  3. I will work with [facility] staff to schedule neuropsychological testing. (Completed)  4. I will give the CHW updates during outreach calls.    Updated: 5/13/2021                    Plan/Recommendations: The patient will continue working with Care Coordination to achieve goals as above.  CHW will involve JASON PIERSON as needed or if patient is ready to move to maintenance.  JASON CC will continue to monitor progress to goals and CHW outreaches every 6 weeks.   Plan of Care  updated and mailed to patient: GAVIN Mahajan   [facility] Care Coordination  Tel: [phone]

## 2021-11-23 ENCOUNTER — ANCILLARY PROCEDURE (OUTPATIENT)
Dept: GENERAL RADIOLOGY | Facility: CLINIC | Age: 51
End: 2021-11-23
Attending: ORTHOPAEDIC SURGERY
Payer: COMMERCIAL

## 2021-11-23 ENCOUNTER — OFFICE VISIT (OUTPATIENT)
Dept: ORTHOPEDICS | Facility: CLINIC | Age: 51
End: 2021-11-23
Payer: COMMERCIAL

## 2021-11-23 DIAGNOSIS — Z98.1 S/P CERVICAL SPINAL FUSION: Primary | ICD-10-CM

## 2021-11-23 PROCEDURE — 72040 X-RAY EXAM NECK SPINE 2-3 VW: CPT | Mod: GC | Performed by: RADIOLOGY

## 2021-11-23 PROCEDURE — 99024 POSTOP FOLLOW-UP VISIT: CPT | Mod: GC | Performed by: ORTHOPAEDIC SURGERY

## 2021-11-23 NOTE — LETTER
11/23/2021         RE: Isabela Panchal  [address]  [facility] [id number]        Dear Colleague,    Thank you for referring your patient, Isabela Panchal, to the [facility] [place]. Please see a copy of my visit note below.    Spine Surgery Return Clinic Visit      Chief Complaint:   RECHECK (1 week follow up with Xrays DOS 10/13/21 cervical, )      Interval HPI:  Symptom Profile Including: location of symptoms, onset, severity, exacerbating/alleviating factors, previous treatments:        Isabela Panchal is a 51 year old female who is 6 weeks s/p C5-7 ACDF w/ ICBG on 10/13/21 c/b superficial wound dehiscence of the graft site.     Overall she is doing quite well.  She feels that her neck pain and radicular symptoms are significantly improved.  She has been getting twice weekly dressing changes with the wound nursing clinic.  Overall she is pleased with her progress and surgery.            Past Medical History:     Past Medical History:   Diagnosis Date     Abnormal Pap smear of cervix 2019    see problem list     Allergic rhinitis due to allergen      Anemia      Breathing difficulty      Cervical high risk HPV (human papillomavirus) test positive 02/13/2018, 2019    type 16; see problem list     Cervical radiculopathy 08/17/2021     Chest pain      Generalised anxiety disorder 09/06/2012     Headache      Heart trouble      High blood triglycerides 02/26/2016     History of blood transfusion     unsure     Hoarseness      Hypertension      Hypoglycemia 03/10/2013     Impaired fasting glucose 08/16/2014     Insomnia 09/06/2012     Irritable bowel syndrome      Itchy eyes      MEDICAL HISTORY OF -     hx of right wrist dislocation     Migraine      Mild intermittent asthma     allergy or URI triggered      Moderate recurrent major depression (H) 09/06/2012     Nasal congestion      Numbness      Obesity, unspecified (OBESE) 08/21/2014     Paroxysmal supraventricular  tachycardia (H)     4/00     PONV (postoperative nausea and vomiting)      Ringing in ears      Sleep difficulties      Sneezing      Sore throat      Vertigo      Weakness      Weight gain             Past Surgical History:     Past Surgical History:   Procedure Laterality Date     COLONOSCOPY  4/26/2013    Procedure: COLONOSCOPY;;  Surgeon: Jim Humphries MD;  Location: [facility]     DECOMPRESSION, FUSION CERVICAL ANTERIOR TWO LEVELS, COMBINED N/A 10/13/2021    Procedure: Cervical 5 to 7 anterior cervical decompression and fusion with medtronic plate and screws, interbody device, use of fluoroscopy, microscope,;  Surgeon: Shaw Lal MD;  Location: [facility]     ENT SURGERY      deviated septum     GRAFT BONE FROM ILIAC CREST Left 10/13/2021    Procedure: and left sided iliac crest autograft;  Surgeon: Shaw Lal MD;  Location: [facility]     HEAD & NECK SURGERY       LAPAROSCOPIC SALPINGO-OOPHORECTOMY  1/20/2014    Procedure: LAPAROSCOPIC SALPINGO-OOPHORECTOMY;  Laparoscopic Left Salpingo Oophorectomy ;  Surgeon: Cahni Del Rosario MD;  [facility]     LUMPECTOMY BREAST      right     ORTHOPEDIC SURGERY      knee     ORTHOPEDIC SURGERY      foot     SOFT TISSUE SURGERY      lymphoma face and armpit     SOFT TISSUE SURGERY       ZZC NONSPECIFIC PROCEDURE      Plastic repair of facial lac     ZZC NONSPECIFIC PROCEDURE      Lipoma removed from arm close to the tail of te breast     ZZC NONSPECIFIC PROCEDURE      Plastic repair of facial lac     ZZC NONSPECIFIC PROCEDURE      Knee surgery     ZZC NONSPECIFIC PROCEDURE      Miscarriage x 2            Social History:     Social History     Tobacco Use     Smoking status: Never Smoker     Smokeless tobacco: Never Used   Substance Use Topics     Alcohol use: Not Currently            Family History:     Family History   Problem Relation Age of Onset     Alzheimer Disease Maternal Grandfather      Arthritis Maternal Grandmother       "Heart Disease Maternal Grandmother      Heart Failure Maternal Grandmother      Thyroid Disease Mother      Allergy (Severe) Father             Allergies:     Allergies   Allergen Reactions     Benzoin Rash     Adhesive Tape      blistering     Allegra [Fexofenadine]      Kidney pain     Cucumber Extract      Throat and ears itchy and throat feels \"icky\"     Fexofenadine Hydrochloride      allegra - low back pain     Ibuprofen      palpitations     Lexapro      Wt gain     No Clinical Screening - See Comments      Ramon      Itchy ears and throat, throat feel \"icky\"     Peanuts [Nuts]      Itchy ears and throat, throat feel \"icky\"     Seasonal Allergies             Medications:     Current Outpatient Medications   Medication     acetaminophen (TYLENOL) 325 MG tablet     atorvastatin (LIPITOR) 40 MG tablet     cephALEXin (KEFLEX) 500 MG capsule     cholecalciferol (VITAMIN D3) 125 mcg (5000 units) capsule     fenofibrate (TRICOR) 48 MG tablet     fluticasone (FLONASE) 50 MCG/ACT nasal spray     hydrOXYzine (ATARAX) 25 MG tablet     lisinopril (ZESTRIL) 10 MG tablet     methocarbamol (ROBAXIN) 750 MG tablet     metroNIDAZOLE (METROCREAM) 0.75 % external cream     MIRENA 20 MCG/24HR IU IUD     oxyCODONE (ROXICODONE) 5 MG tablet     RESTASIS 0.05 % ophthalmic emulsion     senna-docusate (SENEXON-S) 8.6-50 MG tablet     tiZANidine (ZANAFLEX) 4 MG tablet     venlafaxine (EFFEXOR-XR) 150 MG 24 hr capsule     No current facility-administered medications for this visit.             Review of Systems:   A focused musculoskeletal and neurologic ROS was performed with pertinent positives and negatives noted in the HPI.  Additional systems were also reviewed and are documented at the bottom of the note.         Physical Exam:   Vitals: There were no vitals taken for this visit.  Musculoskeletal, Neurologic, and Spine:     Cervical spine:    Appearance -no gross step-offs, kyphosis.    Motor -     C5: Deltoids R 5/5 and L 5/5 " strength    C6: Biceps R 5/5 and L 5/5 strength     C7: Triceps R 5/5 and L 5/5 strength     C8:  R 5/5 and L 5/5 strength     T1: Dorsal interossei R 5/5 and L 5/5 strength        Sensation: intact to light touch in C5-T1       The left iliac crest autograft site shows some granulation tissue at the base.  It is very superficial at this time.  No active infection.            Imaging:   We ordered and independently reviewed new radiographs at this clinic visit. The results were discussed with the patient. Findings include:     Lateral x-rays today show well-positioned implants       Assessment and Plan:     51 year old female with good clinical outcome status post ACDF.  Ongoing healing via secondary intention of left iliac crest autograft site with no active infection.    Recommend to continue daily dressing changes to the left hip graft site.  Avoid lifting over 25 pounds avoid straining overhead, avoid any repetitive neck range of motion.  Follow-up in 3 months with repeat cervical weight radiographs with one of our physician assistant team members.     Attending MD (Dr. Shaw Lal) :  I reviewed and verified the history and physical exam of the patient and discussed the patient's management with the other clinical providers involved in this patient's care including any involved residents or physicians assistants. I reviewed the above note and agree with the documented findings and plan of care, which were communicated to the patient.      Shaw Lal MD    --  Lester Ortiz MD  Orthopedic Surgery PGY-1    Patient seen and discussed with Dr. Lal.      Attending MD (Dr. Shaw Lal) :  I reviewed and verified the history and physical exam of the patient and discussed the patient's management with the other clinical providers involved in this patient's care including any involved residents or physicians assistants. I reviewed the above note and agree with the documented  findings and plan of care, which were communicated to the patient.      Shaw Lal MD

## 2021-11-23 NOTE — PROGRESS NOTES
Spine Surgery Return Clinic Visit      Chief Complaint:   RECHECK (1 week follow up with Xrays DOS 10/13/21 cervical, )      Interval HPI:  Symptom Profile Including: location of symptoms, onset, severity, exacerbating/alleviating factors, previous treatments:        Isabela Panchal is a 51 year old female who is 6 weeks s/p C5-7 ACDF w/ ICBG on 10/13/21 c/b superficial wound dehiscence of the graft site.     Overall she is doing quite well.  She feels that her neck pain and radicular symptoms are significantly improved.  She has been getting twice weekly dressing changes with the wound nursing clinic.  Overall she is pleased with her progress and surgery.            Past Medical History:     Past Medical History:   Diagnosis Date     Abnormal Pap smear of cervix 2019    see problem list     Allergic rhinitis due to allergen      Anemia      Breathing difficulty      Cervical high risk HPV (human papillomavirus) test positive 02/13/2018, 2019    type 16; see problem list     Cervical radiculopathy 08/17/2021     Chest pain      Generalised anxiety disorder 09/06/2012     Headache      Heart trouble      High blood triglycerides 02/26/2016     History of blood transfusion     unsure     Hoarseness      Hypertension      Hypoglycemia 03/10/2013     Impaired fasting glucose 08/16/2014     Insomnia 09/06/2012     Irritable bowel syndrome      Itchy eyes      MEDICAL HISTORY OF -     hx of right wrist dislocation     Migraine      Mild intermittent asthma     allergy or URI triggered      Moderate recurrent major depression (H) 09/06/2012     Nasal congestion      Numbness      Obesity, unspecified (OBESE) 08/21/2014     Paroxysmal supraventricular tachycardia (H)     4/00     PONV (postoperative nausea and vomiting)      Ringing in ears      Sleep difficulties      Sneezing      Sore throat      Vertigo      Weakness      Weight gain             Past Surgical History:     Past Surgical History:   Procedure Laterality  Date     COLONOSCOPY  4/26/2013    Procedure: COLONOSCOPY;;  Surgeon: Jim Humphries MD;  Location: [facility]     DECOMPRESSION, FUSION CERVICAL ANTERIOR TWO LEVELS, COMBINED N/A 10/13/2021    Procedure: Cervical 5 to 7 anterior cervical decompression and fusion with medtronic plate and screws, interbody device, use of fluoroscopy, microscope,;  Surgeon: Shaw Lal MD;  [facility]     ENT SURGERY      deviated septum     GRAFT BONE FROM ILIAC CREST Left 10/13/2021    Procedure: and left sided iliac crest autograft;  Surgeon: Shaw Lal MD;  [facility]     HEAD & NECK SURGERY       LAPAROSCOPIC SALPINGO-OOPHORECTOMY  1/20/2014    Procedure: LAPAROSCOPIC SALPINGO-OOPHORECTOMY;  Laparoscopic Left Salpingo Oophorectomy ;  Surgeon: Chani Del Rosario MD;  [facility]     LUMPECTOMY BREAST      right     ORTHOPEDIC SURGERY      knee     ORTHOPEDIC SURGERY      foot     SOFT TISSUE SURGERY      lymphoma face and armpit     SOFT TISSUE SURGERY       ZZC NONSPECIFIC PROCEDURE      Plastic repair of facial lac     ZZC NONSPECIFIC PROCEDURE      Lipoma removed from arm close to the tail of te breast     ZZC NONSPECIFIC PROCEDURE      Plastic repair of facial lac     ZZC NONSPECIFIC PROCEDURE      Knee surgery     ZZC NONSPECIFIC PROCEDURE      Miscarriage x 2            Social History:     Social History     Tobacco Use     Smoking status: Never Smoker     Smokeless tobacco: Never Used   Substance Use Topics     Alcohol use: Not Currently            Family History:     Family History   Problem Relation Age of Onset     Alzheimer Disease Maternal Grandfather      Arthritis Maternal Grandmother      Heart Disease Maternal Grandmother      Heart Failure Maternal Grandmother      Thyroid Disease Mother      Allergy (Severe) Father             Allergies:     Allergies   Allergen Reactions     Benzoin Rash     Adhesive Tape      blistering     Allegra [Fexofenadine]       "Kidney pain     Cucumber Extract      Throat and ears itchy and throat feels \"icky\"     Fexofenadine Hydrochloride      allegra - low back pain     Ibuprofen      palpitations     Lexapro      Wt gain     No Clinical Screening - See Comments      Ramon      Itchy ears and throat, throat feel \"icky\"     Peanuts [Nuts]      Itchy ears and throat, throat feel \"icky\"     Seasonal Allergies             Medications:     Current Outpatient Medications   Medication     acetaminophen (TYLENOL) 325 MG tablet     atorvastatin (LIPITOR) 40 MG tablet     cephALEXin (KEFLEX) 500 MG capsule     cholecalciferol (VITAMIN D3) 125 mcg (5000 units) capsule     fenofibrate (TRICOR) 48 MG tablet     fluticasone (FLONASE) 50 MCG/ACT nasal spray     hydrOXYzine (ATARAX) 25 MG tablet     lisinopril (ZESTRIL) 10 MG tablet     methocarbamol (ROBAXIN) 750 MG tablet     metroNIDAZOLE (METROCREAM) 0.75 % external cream     MIRENA 20 MCG/24HR IU IUD     oxyCODONE (ROXICODONE) 5 MG tablet     RESTASIS 0.05 % ophthalmic emulsion     senna-docusate (SENEXON-S) 8.6-50 MG tablet     tiZANidine (ZANAFLEX) 4 MG tablet     venlafaxine (EFFEXOR-XR) 150 MG 24 hr capsule     No current facility-administered medications for this visit.             Review of Systems:   A focused musculoskeletal and neurologic ROS was performed with pertinent positives and negatives noted in the HPI.  Additional systems were also reviewed and are documented at the bottom of the note.         Physical Exam:   Vitals: There were no vitals taken for this visit.  Musculoskeletal, Neurologic, and Spine:     Cervical spine:    Appearance -no gross step-offs, kyphosis.    Motor -     C5: Deltoids R 5/5 and L 5/5 strength    C6: Biceps R 5/5 and L 5/5 strength     C7: Triceps R 5/5 and L 5/5 strength     C8:  R 5/5 and L 5/5 strength     T1: Dorsal interossei R 5/5 and L 5/5 strength        Sensation: intact to light touch in C5-T1       The left iliac crest autograft site " shows some granulation tissue at the base.  It is very superficial at this time.  No active infection.            Imaging:   We ordered and independently reviewed new radiographs at this clinic visit. The results were discussed with the patient. Findings include:     Lateral x-rays today show well-positioned implants       Assessment and Plan:     51 year old female with good clinical outcome status post ACDF.  Ongoing healing via secondary intention of left iliac crest autograft site with no active infection.    Recommend to continue daily dressing changes to the left hip graft site.  Avoid lifting over 25 pounds avoid straining overhead, avoid any repetitive neck range of motion.  Follow-up in 3 months with repeat cervical weight radiographs with one of our physician assistant team members.     Attending MD (Dr. Shaw Lal) :  I reviewed and verified the history and physical exam of the patient and discussed the patient's management with the other clinical providers involved in this patient's care including any involved residents or physicians assistants. I reviewed the above note and agree with the documented findings and plan of care, which were communicated to the patient.      Shaw Lal MD    --  Lester Ortiz MD  Orthopedic Surgery PGY-1    Patient seen and discussed with Dr. Lal.      Attending MD (Dr. Shaw Lal) :  I reviewed and verified the history and physical exam of the patient and discussed the patient's management with the other clinical providers involved in this patient's care including any involved residents or physicians assistants. I reviewed the above note and agree with the documented findings and plan of care, which were communicated to the patient.      Shaw Lal MD

## 2021-12-10 ENCOUNTER — THERAPY VISIT (OUTPATIENT)
Dept: PHYSICAL THERAPY | Facility: CLINIC | Age: 51
End: 2021-12-10
Payer: COMMERCIAL

## 2021-12-10 DIAGNOSIS — G89.29 CHRONIC PAIN OF BOTH SHOULDERS: ICD-10-CM

## 2021-12-10 DIAGNOSIS — M25.512 CHRONIC PAIN OF BOTH SHOULDERS: ICD-10-CM

## 2021-12-10 DIAGNOSIS — M25.511 CHRONIC PAIN OF BOTH SHOULDERS: ICD-10-CM

## 2021-12-10 PROCEDURE — 97110 THERAPEUTIC EXERCISES: CPT | Mod: GP

## 2021-12-10 PROCEDURE — 97161 PT EVAL LOW COMPLEX 20 MIN: CPT | Mod: GP

## 2021-12-10 PROCEDURE — 97530 THERAPEUTIC ACTIVITIES: CPT | Mod: GP

## 2021-12-10 ASSESSMENT — ACTIVITIES OF DAILY LIVING (ADL)
ACTIVITIES_OF_DAILY_LIVING_MEASURE_SCORE(%):: 91.67
HIGHEST_POTENTIAL_ADL_SCORE:: 84
TOTAL_ADL_ITEM_SCORE:: 77

## 2021-12-10 NOTE — PROGRESS NOTES
[facility]    OUTPATIENT Physical Therapy ORTHOPEDIC EVALUATION  PLAN OF TREATMENT FOR OUTPATIENT REHABILITATION  (COMPLETE FOR INITIAL CLAIMS ONLY)  Patient's Last Name, First Name, M.I.  YOB: 1970  Isabela Panchal    Provider s Name:  [facility]   Medical Record No.  [id number]   Start of Care Date:  12/10/21   Onset Date:  10/13/21    Type:     _X__PT   ___OT Medical Diagnosis:    Encounter Diagnosis   Name Primary?     Chronic pain of both shoulders         Treatment Diagnosis:  MALU shoulder pain        Goals:     12/10/21 0500   Body Part   Goals listed below are for MALU shoulders   Goal #1   Goal #1 sitting   Previous Functional Level Patient reports good posture and proper body mechanics   Current Functional Level Poor posture/body mechanics   Performance level Pain 6/10   STG Target Performance Patient able to demonstrate good posture and body mechanics when prompted   Performance Level Pain 3/10   Rationale   (To have relief from standing)   Due Date 01/09/22   LTG Target Performance Patient able to demonstrate good posture and body mechanics without prompting   Performance Level Pain-free   Rationale   (For relief from standing and prevent other injuries/pain)   Due Date 02/08/22       Therapy Frequency:  1x/week  Predicted Duration of Therapy Intervention:  8 week    Jennifer Hendrix, LYNDSEY                 I CERTIFY THE NEED FOR THESE SERVICES FURNISHED UNDER        THIS PLAN OF TREATMENT AND WHILE UNDER MY CARE     (Physician attestation of this document indicates review and certification of the therapy plan).                       Certification Date From:  12/10/21   Certification Date To:  03/10/22    Referring Provider:  Shaw Carballo*    Initial Assessment        See Epic Evaluation SOC Date: 12/10/21
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
## 2021-12-15 ENCOUNTER — OFFICE VISIT (OUTPATIENT)
Dept: WOUND CARE | Facility: CLINIC | Age: 51
End: 2021-12-15
Payer: COMMERCIAL

## 2021-12-15 DIAGNOSIS — S31.104A NON-HEALING LEFT GROIN OPEN WOUND: Primary | ICD-10-CM

## 2021-12-15 PROCEDURE — 99024 POSTOP FOLLOW-UP VISIT: CPT
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
## 2021-12-15 NOTE — PATIENT INSTRUCTIONS
Remove bandage before showering    Wash like you normally do then clean the wound in the shower with soap and rinse with water    Dry off and use a q-tip to get a small dollop of Medihoney  and press this into the wound    Look in front of a mirror to get it in the right place    Cover with gauze or bandaid    Do every day

## 2021-12-15 NOTE — PROGRESS NOTES
Patient comes to wound clinic for dressing change  per request of Dr. epck  She has history of a Open surgical wound   Use of Iliac Crest Autograft for fusion, C5-6, C6-7.   Harvested through a separate skin and fascial incision.    Pt assessed for discomfort which is none    Dressing removed, wound washed with Microklenz, and measured.     Wound evaluation:  Size:  0.3 cm length x 0.3 cm width x 0.5 cm depth.    There is undermining:  Yes  0.5 cm  There is tunneling No    Dressing change: Apply medihoney to the wound daily      Remove bandage before showering    Wash like you normally do then clean the wound in the shower with soap and rinse with water    Dry off and use a q-tip to get a small dollop of Medihoney  and press this into the wound    Look in front of a mirror to get it in the right place    Cover with gauze or bandaid    Do every day      PLAN: Dressing changes daily at home    Pt has our number should other issues arise. All questions answered for now.   Patient needs to be seen 2 weeks.   Treated and follow-up appointment made Dr. Peck was available for supervision of care if needed or if questions should arise and regarding plan of care.

## 2021-12-16 ENCOUNTER — OFFICE VISIT (OUTPATIENT)
Dept: PHARMACY | Facility: CLINIC | Age: 51
End: 2021-12-16
Payer: COMMERCIAL

## 2021-12-16 ENCOUNTER — LAB (OUTPATIENT)
Dept: LAB | Facility: CLINIC | Age: 51
End: 2021-12-16
Payer: COMMERCIAL

## 2021-12-16 VITALS
HEART RATE: 88 BPM | BODY MASS INDEX: 29.99 KG/M2 | OXYGEN SATURATION: 97 % | WEIGHT: 180.2 LBS | SYSTOLIC BLOOD PRESSURE: 110 MMHG | DIASTOLIC BLOOD PRESSURE: 80 MMHG

## 2021-12-16 DIAGNOSIS — E78.5 HYPERLIPIDEMIA LDL GOAL <160: Primary | ICD-10-CM

## 2021-12-16 DIAGNOSIS — E78.5 HYPERLIPIDEMIA LDL GOAL <160: ICD-10-CM

## 2021-12-16 DIAGNOSIS — E55.9 VITAMIN D DEFICIENCY DISEASE: ICD-10-CM

## 2021-12-16 DIAGNOSIS — I10 ESSENTIAL HYPERTENSION WITH GOAL BLOOD PRESSURE LESS THAN 140/90: ICD-10-CM

## 2021-12-16 DIAGNOSIS — E78.1 HIGH BLOOD TRIGLYCERIDES: ICD-10-CM

## 2021-12-16 DIAGNOSIS — Z79.899 POLYPHARMACY: ICD-10-CM

## 2021-12-16 DIAGNOSIS — F32.9 MDD (MAJOR DEPRESSIVE DISORDER): ICD-10-CM

## 2021-12-16 LAB — DEPRECATED CALCIDIOL+CALCIFEROL SERPL-MC: 51 UG/L (ref 20–75)

## 2021-12-16 PROCEDURE — 99606 MTMS BY PHARM EST 15 MIN: CPT | Performed by: PHARMACIST

## 2021-12-16 PROCEDURE — 36415 COLL VENOUS BLD VENIPUNCTURE: CPT

## 2021-12-16 PROCEDURE — 80061 LIPID PANEL: CPT

## 2021-12-16 PROCEDURE — 82306 VITAMIN D 25 HYDROXY: CPT

## 2021-12-16 NOTE — PATIENT INSTRUCTIONS
Recommendations from today's MTM visit:                                                       1. fyi--weight today is 180lbs.--down 17 lbs. since 1 month ago post surgeries --wow ! --keep it going.  To keep weight off --remember -eat low-carb as best you can , eat just 2 main meals/day , intermittent fasting rest of the day/night --water, coffee, tea, and 1 protein shake if needed.  NO SNACKING!  Be careful eating these foods : bread,cereal, pasta, rice, potatos, corn , fruits , desserts.  (limit these foods). Must have carbohydrate awareness.   What you eat and how often you eat are the tierney to weight loss.   NO juices at all!  No soda pop!    2. Watch [employer] for lab results/plan.       Follow-up: Return in about 7 weeks (around 1/31/2022), or 1 PM, for Medication Therapy Management Visit, Weight loss, BP Recheck.    It was great to speak with you today.  I value your experience and would be very thankful for your time with providing feedback on our clinic survey. You may receive a survey via email or text message in the next few days.     To schedule another MTM appointment, please call the clinic directly or you may call the MTM scheduling line at [phone] or toll-free at [phone].     My Clinical Pharmacist's contact information:                                                      Please feel free to contact me with any questions or concerns you have.      Quentin Figueroa Rp.  Medication Therapy Management Provider  [phone]

## 2021-12-16 NOTE — Clinical Note
Felisha--quincy-- fasting lipids /vit-d labs today pending --her recent surgeries went well --lost 17 lbs. --im encouraging her to keep that going and not gain wt. Back.    Rashad

## 2021-12-16 NOTE — LETTER
December 20, 2021      Isabela Panchal  [address]  [facility] [address]        Dear ,    We are writing to inform you of your test results.    Your test results look great --cholesterol much improved --check out the trigs =179 --down from 411--keep up great work and vitamin -d excellent at 51.    Quentin Figueroa [facility].  Medication Therapy Management Provider  [phone]      Resulted Orders   Lipid panel reflex to direct LDL Fasting   Result Value Ref Range    Cholesterol 121 <200 mg/dL    Triglycerides 179 (H) <150 mg/dL    Direct Measure HDL 33 (L) >=50 mg/dL    LDL Cholesterol Calculated 52 <=100 mg/dL    Non HDL Cholesterol 88 <130 mg/dL    Patient Fasting > 8hrs? Yes     Narrative    Cholesterol  Desirable:  <200 mg/dL    Triglycerides  Normal:  Less than 150 mg/dL  Borderline High:  150-199 mg/dL  High:  200-499 mg/dL  Very High:  Greater than or equal to 500 mg/dL    Direct Measure HDL  Female:  Greater than or equal to 50 mg/dL   Male:  Greater than or equal to 40 mg/dL    LDL Cholesterol  Desirable:  <100mg/dL  Above Desirable:  100-129 mg/dL   Borderline High:  130-159 mg/dL   High:  160-189 mg/dL   Very High:  >= 190 mg/dL    Non HDL Cholesterol  Desirable:  130 mg/dL  Above Desirable:  130-159 mg/dL  Borderline High:  160-189 mg/dL  High:  190-219 mg/dL  Very High:  Greater than or equal to 220 mg/dL   Vitamin D Deficiency   Result Value Ref Range    Vitamin D, Total (25-Hydroxy) 51 20 - 75 ug/L    Narrative    Season, race, dietary intake, and treatment affect the concentration of 25-hydroxy-Vitamin D. Values may decrease during winter months and increase during summer months. Values 20-29 ug/L may indicate Vitamin D insufficiency and values <20 ug/L may indicate Vitamin D deficiency.    Vitamin D determination is routinely performed by an immunoassay specific for 25 hydroxyvitamin D3.  If an individual is on vitamin D2(ergocalciferol) supplementation, please specify 25 OH vitamin  D2 and D3 level determination by LCMSMS test VITD23.         If you have any questions or concerns, please call the clinic at the number listed above.       Sincerely,      Felisha Briggs MD

## 2021-12-16 NOTE — LETTER
December 20, 2021      Isabela Panchal  [address]  [facility] [address]        Dear ,    We are writing to inform you of your test results.    Your test results look great --cholesterol much improved --check out the trigs =179 --down from 411--keep up great work and vitamin -d excellent at 51.    Quentin Figueroa [facility].  Medication Therapy Management Provider  [phone]      Resulted Orders   Lipid panel reflex to direct LDL Fasting   Result Value Ref Range    Cholesterol 121 <200 mg/dL    Triglycerides 179 (H) <150 mg/dL    Direct Measure HDL 33 (L) >=50 mg/dL    LDL Cholesterol Calculated 52 <=100 mg/dL    Non HDL Cholesterol 88 <130 mg/dL    Patient Fasting > 8hrs? Yes     Narrative    Cholesterol  Desirable:  <200 mg/dL    Triglycerides  Normal:  Less than 150 mg/dL  Borderline High:  150-199 mg/dL  High:  200-499 mg/dL  Very High:  Greater than or equal to 500 mg/dL    Direct Measure HDL  Female:  Greater than or equal to 50 mg/dL   Male:  Greater than or equal to 40 mg/dL    LDL Cholesterol  Desirable:  <100mg/dL  Above Desirable:  100-129 mg/dL   Borderline High:  130-159 mg/dL   High:  160-189 mg/dL   Very High:  >= 190 mg/dL    Non HDL Cholesterol  Desirable:  130 mg/dL  Above Desirable:  130-159 mg/dL  Borderline High:  160-189 mg/dL  High:  190-219 mg/dL  Very High:  Greater than or equal to 220 mg/dL   Vitamin D Deficiency   Result Value Ref Range    Vitamin D, Total (25-Hydroxy) 51 20 - 75 ug/L    Narrative    Season, race, dietary intake, and treatment affect the concentration of 25-hydroxy-Vitamin D. Values may decrease during winter months and increase during summer months. Values 20-29 ug/L may indicate Vitamin D insufficiency and values <20 ug/L may indicate Vitamin D deficiency.    Vitamin D determination is routinely performed by an immunoassay specific for 25 hydroxyvitamin D3.  If an individual is on vitamin D2(ergocalciferol) supplementation, please specify 25 OH vitamin  D2 and D3 level determination by LCMSMS test VITD23.         If you have any questions or concerns, please call the clinic at the number listed above.       Sincerely,      Felisha Briggs MD

## 2021-12-18 LAB
CHOLEST SERPL-MCNC: 121 MG/DL
FASTING STATUS PATIENT QL REPORTED: YES
HDLC SERPL-MCNC: 33 MG/DL
LDLC SERPL CALC-MCNC: 52 MG/DL
NONHDLC SERPL-MCNC: 88 MG/DL
TRIGL SERPL-MCNC: 179 MG/DL

## 2021-12-20 NOTE — PROGRESS NOTES
December 20, 2021      Isabela Panchal  [address]  [facility] [address]        Dear ,    We are writing to inform you of your test results.    Your test results look great --cholesterol much improved --check out the trigs =179 --down from 411--keep up great work and vitamin -d excellent at 51.    Quentin Figueroa [facility].  Medication Therapy Management Provider  [phone]      Resulted Orders   Lipid panel reflex to direct LDL Fasting   Result Value Ref Range    Cholesterol 121 <200 mg/dL    Triglycerides 179 (H) <150 mg/dL    Direct Measure HDL 33 (L) >=50 mg/dL    LDL Cholesterol Calculated 52 <=100 mg/dL    Non HDL Cholesterol 88 <130 mg/dL    Patient Fasting > 8hrs? Yes     Narrative    Cholesterol  Desirable:  <200 mg/dL    Triglycerides  Normal:  Less than 150 mg/dL  Borderline High:  150-199 mg/dL  High:  200-499 mg/dL  Very High:  Greater than or equal to 500 mg/dL    Direct Measure HDL  Female:  Greater than or equal to 50 mg/dL   Male:  Greater than or equal to 40 mg/dL    LDL Cholesterol  Desirable:  <100mg/dL  Above Desirable:  100-129 mg/dL   Borderline High:  130-159 mg/dL   High:  160-189 mg/dL   Very High:  >= 190 mg/dL    Non HDL Cholesterol  Desirable:  130 mg/dL  Above Desirable:  130-159 mg/dL  Borderline High:  160-189 mg/dL  High:  190-219 mg/dL  Very High:  Greater than or equal to 220 mg/dL   Vitamin D Deficiency   Result Value Ref Range    Vitamin D, Total (25-Hydroxy) 51 20 - 75 ug/L    Narrative    Season, race, dietary intake, and treatment affect the concentration of 25-hydroxy-Vitamin D. Values may decrease during winter months and increase during summer months. Values 20-29 ug/L may indicate Vitamin D insufficiency and values <20 ug/L may indicate Vitamin D deficiency.    Vitamin D determination is routinely performed by an immunoassay specific for 25 hydroxyvitamin D3.  If an individual is on vitamin D2(ergocalciferol) supplementation, please specify 25 OH vitamin  D2 and D3 level determination by LCMSMS test VITD23.         If you have any questions or concerns, please call the clinic at the number listed above.       Sincerely,      Felisha Briggs MD

## 2021-12-21 ENCOUNTER — APPOINTMENT (OUTPATIENT)
Dept: NURSING | Facility: CLINIC | Age: 51
End: 2021-12-21
Payer: COMMERCIAL

## 2021-12-21 ENCOUNTER — PATIENT OUTREACH (OUTPATIENT)
Dept: CARE COORDINATION | Facility: CLINIC | Age: 51
End: 2021-12-21

## 2021-12-21 NOTE — PROGRESS NOTES
Clinic Care Coordination Contact    Community Health Worker Follow Up    Care Gaps:     Health Maintenance Due   Topic Date Due     URINE DRUG SCREEN  Never done     ADVANCE CARE PLANNING  Never done     MAMMO SCREENING  Never done     Pneumococcal Vaccine: Pediatrics (0 to 5 Years) and At-Risk Patients (6 to 64 Years) (1 of 2 - PPSV23) Never done     PREVENTIVE CARE VISIT  03/26/2020     COLPOSCOPY  07/02/2021     INFLUENZA VACCINE (1) 09/01/2021     HPV TEST  10/02/2021     PAP  10/02/2021     ASTHMA CONTROL TEST  10/16/2021     PHQ-9  11/25/2021     COVID-19 Vaccine (3 - Booster for Moderna series) 12/05/2021     ZOSTER IMMUNIZATION (2 of 2) 01/19/2021     CHW reviewed care gaps, offered to assist with appointment. Patient agreed, appointment scheduled Tue 2/1 at 2:40PM with Dr. Bagley.  Patient transferred to [facility] for assistance with booster appointment.     Goals:   Goals Addressed as of 12/21/2021 at 1:18 PM                    Today    11/11/21       Financial Wellbeing- SSDI (pt-stated)   90%  90%    Added 11/17/20 by Renetta Orellana, BSW      Goal Statement: I would like help applying and getting approved for Social Security Disability in the next 6 months.   Date Goal set: 10/29/2020  Barriers: Getting denied   Strengths: Asking for help, supportive [facility] JASON   Date to Achieve By: 4/29/2021 // extended 10/20/2021  Patient expressed understanding of goal: yes     Action steps to achieve this goal:  1. My [employer] [facility] and I will meet with the Disability Specialists on March 26th to discuss next steps and options with my denial. (Completed, ongoing)  5/13  Pt stated moved up to Federal level pending. [facility] provides support/updates on this.   2. I will attend my first appointment [place] [facility] [place] 30th with Johnson Novak, counselor. (Completed)    5/13 Pt states she connects with monthly telephone visits.  3. I will work with [facility] staff to schedule neuropsychological  testing. (Completed)  4. I will give the CHW updates during outreach calls.    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 CHW introduce self and role with CC to Belen. CHW inquired about disability status.    Patient and Belen shares that [employer]  Renee is helping with disability case, no updates, and case is still pending.         Intervention and Education during outreach: CHW inquired if patient needs any additional support or resources however patient declined. CHW reviewed care gaps and assisted with appointments for annual physical and booster shot.     CHW Plan: CHW to follow up in 1 month.    Violet Ramirez  [facility] Care Coordination  [facility], and [facility]    Phone: [phone]

## 2021-12-23 ENCOUNTER — THERAPY VISIT (OUTPATIENT)
Dept: PHYSICAL THERAPY | Facility: CLINIC | Age: 51
End: 2021-12-23
Payer: COMMERCIAL

## 2021-12-23 ENCOUNTER — PATIENT OUTREACH (OUTPATIENT)
Dept: CARE COORDINATION | Facility: CLINIC | Age: 51
End: 2021-12-23

## 2021-12-23 DIAGNOSIS — G89.29 CHRONIC PAIN OF BOTH SHOULDERS: Primary | ICD-10-CM

## 2021-12-23 DIAGNOSIS — M25.512 CHRONIC PAIN OF BOTH SHOULDERS: Primary | ICD-10-CM

## 2021-12-23 DIAGNOSIS — M25.511 CHRONIC PAIN OF BOTH SHOULDERS: Primary | ICD-10-CM

## 2021-12-23 PROCEDURE — 97110 THERAPEUTIC EXERCISES: CPT | Mod: GP

## 2021-12-23 NOTE — PROGRESS NOTES
Care Coordination Clinician Chart Review  Situation: Patient chart reviewed by care coordinator.       Background: Care Coordination initial assessment and enrollment to Care Coordination was successful.   Patient centered goals were developed with participation from patient.  JASON CC handed patient off to CHW for continued outreach every 30 days.        Assessment: Per chart review, patient outreach completed by CC CHW on 12/21.  Patient is actively working to accomplish goals.  Patient's goals remain appropriate and relevant at this time.   Patient is not yet due for updated Plan of Care.  Annual assessment will be due 02/2022.      Goals        Financial Wellbeing- SSDI (pt-stated)       Goal Statement: I would like help applying and getting approved for Social Security Disability in the next 6 months.   Date Goal set: 10/29/2020  Barriers: Getting denied   Strengths: Asking for help, supportive [facility]   Date to Achieve By: 4/29/2021 // extended 10/20/2021  Patient expressed understanding of goal: yes     Action steps to achieve this goal:  1. My [employer] [facility] and I will meet with the Disability Specialists on March 26th to discuss next steps and options with my denial. (Completed, ongoing)  5/13  Pt stated moved up to Federal level pending. [facility] provides support/updates on this.   2. I will attend my first appointment at [facility] on March 30th with Johnson Novak, counselor. (Completed)    5/13 Pt states she connects with monthly telephone visits.  3. I will work with [facility] staff to schedule neuropsychological testing. (Completed)  4. I will give the CHW updates during outreach calls.    Updated: 5/13/2021                    Plan/Recommendations: The patient will continue working with Care Coordination to achieve goals as above.  CHW will involve JASON PIERSON as needed or if patient is ready to move to maintenance.  JASON CC will continue to monitor progress to goals and CHW outreaches every 6 weeks.   Plan of Care  updated and mailed to patient: GAVIN Mahajan   [facility] Care Coordination  Tel: [phone]

## 2021-12-29 ENCOUNTER — OFFICE VISIT (OUTPATIENT)
Dept: WOUND CARE | Facility: CLINIC | Age: 51
End: 2021-12-29
Payer: COMMERCIAL

## 2021-12-29 DIAGNOSIS — S31.104A NON-HEALING LEFT GROIN OPEN WOUND: Primary | ICD-10-CM

## 2021-12-29 PROCEDURE — 99024 POSTOP FOLLOW-UP VISIT: CPT

## 2021-12-29 NOTE — PROGRESS NOTES
Patient comes to wound clinic for dressing change  per request of Dr. peck  She has history of a Open surgical wound   Use of Iliac Crest Autograft for fusion, C5-6, C6-7.   Harvested through a separate skin and fascial incision.    Pt assessed for discomfort which is none        Wound evaluation: Healed     PLAN: Dressing changes daily at home    Pt has our number should other issues arise. All questions answered for now.   RTC PRN Dr. Bravo  was available for supervision of care if needed or if questions should arise and regarding plan of care.

## 2022-01-03 ENCOUNTER — TELEPHONE (OUTPATIENT)
Dept: FAMILY MEDICINE | Facility: CLINIC | Age: 52
End: 2022-01-03
Payer: COMMERCIAL

## 2022-01-04 ENCOUNTER — TELEPHONE (OUTPATIENT)
Dept: FAMILY MEDICINE | Facility: CLINIC | Age: 52
End: 2022-01-04
Payer: COMMERCIAL

## 2022-01-04 NOTE — TELEPHONE ENCOUNTER
Reason for Call:  Other     Detailed comments: If any changes are made or any appts need to be made please call Shirin [facility] at [phone]. Could we put her number somewhere in the chart?        Phone Number Patient can be reached at: Other phone number:  [phone]    Best Time: anytime    Can we leave a detailed message on this number? YES    Call taken on 1/4/2022 at 4:39 PM by Brynn Portillo

## 2022-01-18 NOTE — TELEPHONE ENCOUNTER
I added info below to chart sticky note and MyStory.    If any changes are made or any appts need to be made please call Shirin [facility] at [phone].     Sincerely,  Dr. Felisha Briggs MD  1/18/2022

## 2022-01-27 ENCOUNTER — PATIENT OUTREACH (OUTPATIENT)
Dept: CARE COORDINATION | Facility: CLINIC | Age: 52
End: 2022-01-27
Payer: COMMERCIAL

## 2022-01-27 NOTE — PROGRESS NOTES
Clinic Care Coordination Contact  [facility]/Voicemail       Clinical Data: Care Coordinator Outreach  Outreach attempted x 1.  Left message on patient's voicemail with call back information and requested return call.  Plan: Care Coordinator will try to reach patient again in 10 business days.    Jaqui Landaverde [facility]3] Care Coordination  Tel: [phone]
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
## 2022-01-31 ENCOUNTER — OFFICE VISIT (OUTPATIENT)
Dept: PHARMACY | Facility: CLINIC | Age: 52
End: 2022-01-31
Payer: COMMERCIAL

## 2022-01-31 VITALS
OXYGEN SATURATION: 98 % | DIASTOLIC BLOOD PRESSURE: 70 MMHG | HEART RATE: 88 BPM | SYSTOLIC BLOOD PRESSURE: 110 MMHG | BODY MASS INDEX: 29.2 KG/M2 | WEIGHT: 175.5 LBS

## 2022-01-31 DIAGNOSIS — I10 ESSENTIAL HYPERTENSION WITH GOAL BLOOD PRESSURE LESS THAN 140/90: ICD-10-CM

## 2022-01-31 DIAGNOSIS — E78.5 HYPERLIPIDEMIA LDL GOAL <160: ICD-10-CM

## 2022-01-31 DIAGNOSIS — F32.9 MDD (MAJOR DEPRESSIVE DISORDER): Primary | ICD-10-CM

## 2022-01-31 DIAGNOSIS — E55.9 VITAMIN D DEFICIENCY DISEASE: ICD-10-CM

## 2022-01-31 DIAGNOSIS — Z79.899 POLYPHARMACY: ICD-10-CM

## 2022-01-31 PROCEDURE — 99606 MTMS BY PHARM EST 15 MIN: CPT | Performed by: PHARMACIST

## 2022-01-31 NOTE — PATIENT INSTRUCTIONS
Recommendations from today's MTM visit:                                                       1. FYI--Wt. Today is 175 lbs. -- down another 5 lbs. --fantastic job thru the holidays --keep eating those salads! Eat just enough carbs/day to stay mentally happy.     Continue all same medications as is --working very well for you.  Start daily walking for 30 minutes , bike riding in the summer.     See Dr. Briggs 3-17-22. (1;30 pm).           Follow-up: Return in about 7 months (around 9/8/2022), or 1 PM., for Medication Therapy Management Visit, Weight loss, Lab Work.    It was great to speak with you today.  I value your experience and would be very thankful for your time with providing feedback on our clinic survey. You may receive a survey via email or text message in the next few days.     To schedule another MTM appointment, please call the clinic directly or you may call the MTM scheduling line at [phone] or toll-free at [phone].     My Clinical Pharmacist's contact information:                                                      Please feel free to contact me with any questions or concerns you have.      Quentin Figueroa [facility].  Medication Therapy Management Provider  [phone]

## 2022-02-07 NOTE — PROGRESS NOTES
I have reviewed and updated the patient's Past Medical History, Social History, Family History and Medication List.    ALLERGIES  Benzoin, Adhesive tape, Allegra [fexofenadine], Cucumber extract, Fexofenadine hydrochloride, Ibuprofen, Lexapro, No clinical screening - see comments, Peanuts [nuts], and Seasonal allergies    Spine Surgery Follow Up    REFERRING PHYSICIAN: No ref. provider found   PRIMARY CARE PHYSICIAN: Felisha Briggs           Chief Complaint:   No chief complaint on file.      History of Present Illness:  Symptom Profile Including: location of symptoms, onset, severity, exacerbating/alleviating factors, previous treatments:        Isabela Panchal is a 51 year old female who is 3 months s/p C5-7 ACDF with left ICAG (DOS 10/13/21) with Dr. Lal. Complicated post-op by superfacial dehiscence of L ICAG site incision. Patient previously seen at routine 6 week visit and was doing well in terms of her neck. Recommended continued follow up with wound care nursing team & daily dressing changes for left ICAG incision wound.     Today, patient reports that she has overall been doing well.  She says that her left iliac crest autograft wound site has finally healed.  She does continue to have left sided neck and upper trapezius pain.  She says she has had this pain since after surgery.  She went to one physical therapy session, but has not been able to return.  Also has some chest pain with arm range of motion.  No other new complaints or concerns today.        Neck Disability Index (NDI) Questionnaire    Neck Disability Index (NDI) 8/6/2021   Neck Disability Index: Count 10   NDI: Total Score = SUM (points for all 10 findings) 36   Neck Disability in Percent = (Total Score) / 50 * 100 72 (%)   Some recent data might be hidden             Physical Exam:   PHYSICAL EXAM:   Constitutional - Patient is healthy, well-nourished and appears stated age   Respiratory - Patient is breathing normally and in no  respiratory distress.   Skin - No suspicious rashes or lesions.   Psychiatric - Normal mood and affect.   Cardiovascular - Extremities warm and well perfused.   Eyes - Visual acuity is normal to the written word.   ENT - Hearing intact to the spoken word.   GI - No abdominal distention.   Musculoskeletal - Non-antalgic gait without use of assistive devices.  Well-healed left iliac crest autograft site.  Mildly tender to palpation surrounding incision.  No evidence of infection.        Cervical spine:    Appearance - Normal.  Well-healed anterior cervical spine incision.    Palpation - Non-tender to palpation midline.  Tenderness to palpation left paraspinal musculature.       ROM - Full , painless.  Full, painless shoulder ROM.  Forward flexion of shoulders produces anterior chest pain.    Motor -     UPPER EXTREMITY Left Right    strength 5/5 5/5   Finger ab/adduction 4/5 4/5   Wrist flexion 5/5 5/5   Wrist extension 5/5 5/5   Elbow flexion 5/5 5/5   Elbow extension 5/5 5/5   Shoulder flexion 5/5 5/5                 Imaging:   I ordered and independently reviewed new radiographs at this clinic visit. The results were discussed with the patient.  Findings include:    2/15/2022 XR cervical spine AP/lateral view: C5-7 ACDF instrumentation is intact without evidence of loosening, fracture or migration. Stable appearance of adjacent segment degenerative changes.             Assessment and Plan:   Assessment:  51 year old female 3 months s/p C5-7 ACDF with left ICAG (DOS 10/13/21) with Dr. Lal. Complicated post-op by superficial wound dehiscence of L ICAG site; progressing as expected.  Fully healed left iliac crest site wound.     Plan:  Reviewed updated x-rays with patient today which shows stable fusion hardware without evidence of fracture or hardware failure.  She does have some paraspinal muscle tenderness, and was unable to participate in postoperative PT rehabilitation.  Would recommend that she attend  formal physical therapy for upper extremity strengthening, postoperative rehab, stretching.  Follow-up for routine 6-month postop with repeat AP/lateral view XR.    - Physical Therapy Referral  - Follow up in 3 months with repeat AP/lateral view XR cervical spine.    Respectfully,  Rose Cardoso (lisa Pitts), PAHareshC  Orthopaedic Spine Surgery  Dept Orthopaedic Surgery, [facility]  [facility] [place] [place] Phone: [phone]    Dictation Disclaimer: Some of this Note has been completed with voice-recognition dictation software. Although errors are generally corrected real-time, there is the potential for a rare error to be present in the completed chart.

## 2022-02-10 DIAGNOSIS — Z98.1 S/P CERVICAL SPINAL FUSION: Primary | ICD-10-CM

## 2022-02-13 ENCOUNTER — HEALTH MAINTENANCE LETTER (OUTPATIENT)
Age: 52
End: 2022-02-13

## 2022-02-14 ENCOUNTER — PATIENT OUTREACH (OUTPATIENT)
Dept: CARE COORDINATION | Facility: CLINIC | Age: 52
End: 2022-02-14
Payer: COMMERCIAL

## 2022-02-14 NOTE — PROGRESS NOTES
Clinic Care Coordination Contact  [facility]/Voicemail       Clinical Data: Care Coordinator Outreach  Outreach attempted x 2.  Left message on patient's voicemail with call back information and requested return call.  Plan: Care Coordinator will try to reach patient again in 10 business days.    Jaqui Landaverde [facility] Care Coordination  Tel: [phone]

## 2022-02-15 ENCOUNTER — OFFICE VISIT (OUTPATIENT)
Dept: ORTHOPEDICS | Facility: CLINIC | Age: 52
End: 2022-02-15
Payer: COMMERCIAL

## 2022-02-15 ENCOUNTER — ANCILLARY PROCEDURE (OUTPATIENT)
Dept: GENERAL RADIOLOGY | Facility: CLINIC | Age: 52
End: 2022-02-15
Attending: PHYSICIAN ASSISTANT
Payer: COMMERCIAL

## 2022-02-15 DIAGNOSIS — Z98.1 S/P CERVICAL SPINAL FUSION: ICD-10-CM

## 2022-02-15 DIAGNOSIS — Z98.1 S/P SPINAL FUSION: Primary | ICD-10-CM

## 2022-02-15 DIAGNOSIS — M54.2 CERVICAL PAIN: ICD-10-CM

## 2022-02-15 PROCEDURE — 72040 X-RAY EXAM NECK SPINE 2-3 VW: CPT | Performed by: RADIOLOGY

## 2022-02-15 PROCEDURE — 99213 OFFICE O/P EST LOW 20 MIN: CPT | Performed by: PHYSICIAN ASSISTANT

## 2022-02-15 NOTE — LETTER
2/15/2022         RE: Isabela Panchal  [address]  [facility] [address]        Dear Colleague,    Thank you for referring your patient, Isabela Panchal, to the [facility] [place]. Please see a copy of my visit note below.    I have reviewed and updated the patient's Past Medical History, Social History, Family History and Medication List.    ALLERGIES  Benzoin, Adhesive tape, Allegra [fexofenadine], Cucumber extract, Fexofenadine hydrochloride, Ibuprofen, Lexapro, No clinical screening - see comments, Peanuts [nuts], and Seasonal allergies    Spine Surgery Follow Up    REFERRING PHYSICIAN: No ref. provider found   PRIMARY CARE PHYSICIAN: Felisha Briggs           Chief Complaint:   No chief complaint on file.      History of Present Illness:  Symptom Profile Including: location of symptoms, onset, severity, exacerbating/alleviating factors, previous treatments:        Isabela Panchal is a 51 year old female who is 3 months s/p C5-7 ACDF with left ICAG (DOS 10/13/21) with Dr. Lal. Complicated post-op by superfacial dehiscence of L ICAG site incision. Patient previously seen at routine 6 week visit and was doing well in terms of her neck. Recommended continued follow up with wound care nursing team & daily dressing changes for left ICAG incision wound.     Today, patient reports that she has overall been doing well.  She says that her left iliac crest autograft wound site has finally healed.  She does continue to have left sided neck and upper trapezius pain.  She says she has had this pain since after surgery.  She went to one physical therapy session, but has not been able to return.  Also has some chest pain with arm range of motion.  No other new complaints or concerns today.        Neck Disability Index (NDI) Questionnaire    Neck Disability Index (NDI) 8/6/2021   Neck Disability Index: Count 10   NDI: Total Score = SUM (points for all 10 findings) 36    Neck Disability in Percent = (Total Score) / 50 * 100 72 (%)   Some recent data might be hidden             Physical Exam:   PHYSICAL EXAM:   Constitutional - Patient is healthy, well-nourished and appears stated age   Respiratory - Patient is breathing normally and in no respiratory distress.   Skin - No suspicious rashes or lesions.   Psychiatric - Normal mood and affect.   Cardiovascular - Extremities warm and well perfused.   Eyes - Visual acuity is normal to the written word.   ENT - Hearing intact to the spoken word.   GI - No abdominal distention.   Musculoskeletal - Non-antalgic gait without use of assistive devices.  Well-healed left iliac crest autograft site.  Mildly tender to palpation surrounding incision.  No evidence of infection.        Cervical spine:    Appearance - Normal.  Well-healed anterior cervical spine incision.    Palpation - Non-tender to palpation midline.  Tenderness to palpation left paraspinal musculature.       ROM - Full , painless.  Full, painless shoulder ROM.  Forward flexion of shoulders produces anterior chest pain.    Motor -     UPPER EXTREMITY Left Right    strength 5/5 5/5   Finger ab/adduction 4/5 4/5   Wrist flexion 5/5 5/5   Wrist extension 5/5 5/5   Elbow flexion 5/5 5/5   Elbow extension 5/5 5/5   Shoulder flexion 5/5 5/5                 Imaging:   I ordered and independently reviewed new radiographs at this clinic visit. The results were discussed with the patient.  Findings include:    2/15/2022 XR cervical spine AP/lateral view: C5-7 ACDF instrumentation is intact without evidence of loosening, fracture or migration. Stable appearance of adjacent segment degenerative changes.             Assessment and Plan:   Assessment:  51 year old female 3 months s/p C5-7 ACDF with left ICAG (DOS 10/13/21) with Dr. Lal. Complicated post-op by superficial wound dehiscence of L ICAG site; progressing as expected.  Fully healed left iliac crest site wound.      Plan:  Reviewed updated x-rays with patient today which shows stable fusion hardware without evidence of fracture or hardware failure.  She does have some paraspinal muscle tenderness, and was unable to participate in postoperative PT rehabilitation.  Would recommend that she attend formal physical therapy for upper extremity strengthening, postoperative rehab, stretching.  Follow-up for routine 6-month postop with repeat AP/lateral view XR.    - Physical Therapy Referral  - Follow up in 3 months with repeat AP/lateral view XR cervical spine.    Respectfully,  Rose Cardoso (lisa Pitts), PAMICHELLE  Orthopaedic Spine Surgery  Dept Orthopaedic Surgery, [facility]  [facility] Phone: [phone]    Dictation Disclaimer: Some of this Note has been completed with voice-recognition dictation software. Although errors are generally corrected real-time, there is the potential for a rare error to be present in the completed chart.

## 2022-02-16 ENCOUNTER — TELEPHONE (OUTPATIENT)
Dept: FAMILY MEDICINE | Facility: CLINIC | Age: 52
End: 2022-02-16
Payer: COMMERCIAL

## 2022-02-16 NOTE — TELEPHONE ENCOUNTER
Reason for Call:  Form, our goal is to have forms completed with 72 hours, however, some forms may require a visit or additional information.    Type of letter, form or note:  Home Health Certification    Who is the form from?: Home care    Where did the form come from: form was faxed in    What clinic location was the form placed at?: [facility]    Where the form was placed: Dr. Briggs form folder POD C    What number is listed as a contact on the form?: [phone]       Additional comments: N/A    Call taken on 2/16/2022 at 9:37 AM by Nellie Gonzalez

## 2022-02-17 NOTE — TELEPHONE ENCOUNTER
Completed, signed forms placed in MA basket today.  Sincerely,  Dr. Felisha Briggs MD  2/17/2022    11:50 AM

## 2022-02-21 ENCOUNTER — TELEPHONE (OUTPATIENT)
Dept: FAMILY MEDICINE | Facility: CLINIC | Age: 52
End: 2022-02-21
Payer: COMMERCIAL

## 2022-02-21 NOTE — TELEPHONE ENCOUNTER
Reason for Call:  Form, our goal is to have forms completed with 72 hours, however, some forms may require a visit or additional information.    Type of letter, form or note:  Home Health Certification    Who is the form from?: Home care    Where did the form come from: form was faxed in    What clinic location was the form placed at?: [facility]    Where the form was placed: Dr. Briggs form folder POD C    What number is listed as a contact on the form?: [phone]       Additional comments: N/A    Call taken on 2/21/2022 at 10:44 AM by Nellie Gonzalez

## 2022-02-24 DIAGNOSIS — Z98.1 S/P CERVICAL SPINAL FUSION: ICD-10-CM

## 2022-02-24 RX ORDER — METHOCARBAMOL 750 MG/1
750 TABLET, FILM COATED ORAL EVERY 6 HOURS PRN
Qty: 30 TABLET | Refills: 0 | Status: SHIPPED | OUTPATIENT
Start: 2022-02-24 | End: 2022-09-08

## 2022-03-07 ENCOUNTER — PATIENT OUTREACH (OUTPATIENT)
Dept: NURSING | Facility: CLINIC | Age: 52
End: 2022-03-07
Payer: COMMERCIAL

## 2022-03-07 NOTE — PROGRESS NOTES
Clinic Care Coordination Contact  [facility]/Voicemail       Clinical Data: Care Coordinator Outreach  Outreach attempted x 1.  Spoke briefly with hannah and she mentioned she has an assessment in 5 minutes and couldn't talk. Asked for call back later in the week  Plan:  Care Coordinator will try to reach patient again in 3-5 business days.    Jaqui Landaverde, [facility] Care Coordination  Tel: [phone]

## 2022-03-22 ENCOUNTER — PATIENT OUTREACH (OUTPATIENT)
Dept: NURSING | Facility: CLINIC | Age: 52
End: 2022-03-22
Payer: COMMERCIAL

## 2022-03-22 NOTE — PROGRESS NOTES
Clinic Care Coordination Contact  Community Health Worker Follow Up  Also spoke with patient's  Renee with patient's verbal permission.     Care Gaps:     Health Maintenance Due   Topic Date Due     URINE DRUG SCREEN  Never done     ADVANCE CARE PLANNING  Never done     MAMMO SCREENING  Never done     Pneumococcal Vaccine: Pediatrics (0 to 5 Years) and At-Risk Patients (6 to 64 Years) (1 of 2 - PPSV23) Never done     PREVENTIVE CARE VISIT  03/26/2020     ZOSTER IMMUNIZATION (2 of 2) 01/19/2021     COLPOSCOPY  07/02/2021     INFLUENZA VACCINE (1) 09/01/2021     HPV TEST  10/02/2021     PAP  10/02/2021     ASTHMA CONTROL TEST  10/16/2021     COVID-19 Vaccine (3 - Booster for Moderna series) 11/05/2021     PHQ-9  11/25/2021     ASTHMA ACTION PLAN  04/16/2022       Scheduled 4/15/2022      Goals:   Goals Addressed as of 3/22/2022 at 4:24 PM                    Today    12/21/21       Financial Wellbeing- SSDI (pt-stated)   90%  90%    Added 11/17/20 by Renetta Orellana, BSW      Goal Statement: I would like help applying and getting approved for Social Security Disability in the next 6 months.   Date Goal set: 10/29/2020  Barriers: Getting denied   Strengths: Asking for help, supportive [facility]   Date to Achieve By: 4/29/2021 // extended 10/20/2021  Patient expressed understanding of goal: yes     Action steps to achieve this goal:  1. [employer] [facility] and I will meet with the Disability Specialists on March 26th to discuss next steps and options with my denial. (Completed, ongoing)  5/13  Pt stated moved up to Federal level pending. [facility] provides support/updates on this.   2. I will attend my first appointment at [facility] on March 30th with Johnson Novak, counselor. (Completed)    5/13 Pt states she connects with monthly telephone visits.  3. I will work with [facility] staff to schedule neuropsychological testing. (Completed)  4. I will give the CHW updates during outreach calls.    Updated: 5/13/2021    3-22,  "CHW:    Writer reached out to the patient's , Renee, on the patient's behalf to inquire about SSDI application status.     Renee reports that the application is \"almost at the approval level.\" There have been quite a few set backs (misdiagnosis at their first psych evaluation and miscommunication on patient's job status with an [employer] worker).     Since this, the patient had a psych evaluation redone.     Renee, reports that they are currently working with a .              Intervention and Education during outreach: Renee, the , voiced concerns surrounding the patient's daughter, Chapis. Since the patient has been dealing with memory difficulties which makes it difficult to not only manage the patient, but her daughter. Renee is wondering if there can be someone of assistance to Chapis with managing appointments, transportation, etc. Renee notes that Chapis does have a [facility] SW ([employer] [phone]) but is not sure exactly their level of involvement.     CHW Plan: Will route patient's chart to the lead  SW regarding updates. Care Coordinator will reach out to the patient in 1 month to monitor the progression of the patient's goals.       RADHA Vega. Public Health  Community Health Worker  [facility]  Clinic Care Coordination  [phone]  "

## 2022-03-25 ENCOUNTER — PATIENT OUTREACH (OUTPATIENT)
Dept: NURSING | Facility: CLINIC | Age: 52
End: 2022-03-25
Payer: COMMERCIAL

## 2022-03-25 ENCOUNTER — NURSE TRIAGE (OUTPATIENT)
Dept: NURSING | Facility: CLINIC | Age: 52
End: 2022-03-25
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
## 2022-03-25 NOTE — TELEPHONE ENCOUNTER
"-- DO NOT REPLY / DO NOT REPLY ALL --  -- Message is from the [employer]--    Referral Request  Name of Specialist: [address] specialty: Other: Stress Test     Medical condition for referral:  Unknown     Is this a NEW request?: yes      Referral ordered by: Gordy Huitron       Insurance type: BLUE CROSS HMO        Payor: [employer] / Plan: [id number] / Product Type: [employer] Risk      Preferred Delivery Method   Call when ready for pickup - phone number to notify: [phone]    Caller Information       Type Contact Phone    02/10/2020 03:46 PM Phone (Incoming) Gely Rob (Self) [phone] (H)          Alternative phone number: [phone]    Turnaround time given to caller: ""This message will be sent to [facility] Provider's full name]. The clinical team will return your call as soon as they review your message. Typically, it takes 3 business days to process referral requests. \""  " Called patient regarding this.     She said she is experiencing pain in her chest and L shoulder when she does physical work with her dad. She says she thinks it is just due to soreness from the surgery she has in Oct 2021.     No SOB, no palpitations.     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     She will contact us with any persisting problems.     Sending to PCP as FYI.     Also faxed med list to home care as directed below.     Dinora Curtis, BRETTN RN  [facility]

## 2022-03-25 NOTE — TELEPHONE ENCOUNTER
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    When she has the chest pain she will sit down and drink some water and that seems to relieve the chest pain.    Also when she is stress this increases her memory issues. Or her memory decreases    This is an update for the provider. Regina was speaking with the  recently.     Any question they can call the nurse at [phone]    Also nurse is requesting an updated medication list to be faxed to the home care office at [phone].    Alexandria Tam RN  [place] Nurse Advisor  7:57 AM  3/25/2022

## 2022-04-06 ENCOUNTER — PATIENT OUTREACH (OUTPATIENT)
Dept: NURSING | Facility: CLINIC | Age: 52
End: 2022-04-06
Payer: COMMERCIAL

## 2022-04-06 NOTE — PROGRESS NOTES
"    Clinic Care Coordination Contact    Follow Up Progress Note      Assessment: [facility] called patient for pro-active outreach to goals and a general well being check. Mom shares the following:      Patient has ILS worker coming in and helping. Shares that everything else has been pretty good.      Patient shares new worker is doing a good job helping her.       She shares her daughter, Chapis, needs a few appts.       She shares her daughter really needs a . She shares that Chapis is not her 's patient. She shares she needs somebody that can be her  and work solely on Neymar's behalf.      Patient shares (several times) she \"got some new workers:. These are from a new company. Patient seemed excited about this as they are helping her organize her home and her affairs.    Care Gaps:    Health Maintenance Due   Topic Date Due     URINE DRUG SCREEN  Never done     ADVANCE CARE PLANNING  Never done     MAMMO SCREENING  Never done     Pneumococcal Vaccine: Pediatrics (0 to 5 Years) and At-Risk Patients (6 to 64 Years) (1 of 2 - PPSV23) Never done     PREVENTIVE CARE VISIT  03/26/2020     ZOSTER IMMUNIZATION (2 of 2) 01/19/2021     COLPOSCOPY  07/02/2021     INFLUENZA VACCINE (1) 09/01/2021     HPV TEST  10/02/2021     PAP  10/02/2021     ASTHMA CONTROL TEST  10/16/2021     COVID-19 Vaccine (3 - Booster for Moderna series) 11/05/2021     PHQ-9  11/25/2021     ASTHMA ACTION PLAN  04/16/2022       Did not discuss at this visit.    Goals addressed this encounter:   Goals Addressed                    This Visit's Progress       Financial Wellbeing- SSDI (pt-stated)   90%      Goal Statement: I would like help applying and getting approved for Social Security Disability in the next 6 months.   Date Goal set: 10/29/2020  Barriers: Getting denied   Strengths: Asking for help, supportive [facility]   Date to Achieve By: 4/29/2021 // extended 10/20/2021  Patient expressed understanding of goal: yes "     Action steps to achieve this goal:  1. [employer] [facility] and I will meet with the Disability Specialists on March 26th to discuss next steps and options with my denial. (Completed, ongoing)  5/13  Pt stated moved up to Federal level pending. [facility] provides support/updates on this.   2. I will attend my first appointment at [facility] on March 30th with Johnson Novak, counselor. (Completed)    5/13 Pt states she connects with monthly telephone visits.  3. I will work with [facility] staff to schedule neuropsychological testing. (Completed)  4. I will give the CHW updates during outreach calls.    Updated: 5/13/2021                Intervention/Education provided during outreach: [facility] provided active listening.     Outreach Frequency: monthly      Plan: Patient to follow up with [facility] on behalf of her daughter.  Care Coordinator will follow up in 1 month.

## 2022-04-15 ENCOUNTER — OFFICE VISIT (OUTPATIENT)
Dept: FAMILY MEDICINE | Facility: CLINIC | Age: 52
End: 2022-04-15
Payer: COMMERCIAL

## 2022-04-15 VITALS
WEIGHT: 177 LBS | HEART RATE: 83 BPM | DIASTOLIC BLOOD PRESSURE: 80 MMHG | RESPIRATION RATE: 17 BRPM | HEIGHT: 65 IN | SYSTOLIC BLOOD PRESSURE: 138 MMHG | BODY MASS INDEX: 29.49 KG/M2 | TEMPERATURE: 97.9 F

## 2022-04-15 DIAGNOSIS — Z00.00 ROUTINE GENERAL MEDICAL EXAMINATION AT A HEALTH CARE FACILITY: Primary | ICD-10-CM

## 2022-04-15 DIAGNOSIS — Z12.31 VISIT FOR SCREENING MAMMOGRAM: ICD-10-CM

## 2022-04-15 DIAGNOSIS — Z23 ENCOUNTER FOR IMMUNIZATION: ICD-10-CM

## 2022-04-15 DIAGNOSIS — R79.89 ELEVATED LFTS: ICD-10-CM

## 2022-04-15 DIAGNOSIS — Z12.4 CERVICAL CANCER SCREENING: ICD-10-CM

## 2022-04-15 LAB
ALBUMIN SERPL-MCNC: 4.2 G/DL (ref 3.4–5)
ALP SERPL-CCNC: 94 U/L (ref 40–150)
ALT SERPL W P-5'-P-CCNC: 54 U/L (ref 0–50)
ANION GAP SERPL CALCULATED.3IONS-SCNC: 5 MMOL/L (ref 3–14)
AST SERPL W P-5'-P-CCNC: 24 U/L (ref 0–45)
BILIRUB SERPL-MCNC: 0.4 MG/DL (ref 0.2–1.3)
BUN SERPL-MCNC: 15 MG/DL (ref 7–30)
CALCIUM SERPL-MCNC: 9.5 MG/DL (ref 8.5–10.1)
CHLORIDE BLD-SCNC: 108 MMOL/L (ref 94–109)
CHOLEST SERPL-MCNC: 96 MG/DL
CO2 SERPL-SCNC: 26 MMOL/L (ref 20–32)
CREAT SERPL-MCNC: 0.69 MG/DL (ref 0.52–1.04)
ERYTHROCYTE [DISTWIDTH] IN BLOOD BY AUTOMATED COUNT: 12.2 % (ref 10–15)
FASTING STATUS PATIENT QL REPORTED: YES
GFR SERPL CREATININE-BSD FRML MDRD: >90 ML/MIN/1.73M2
GLUCOSE BLD-MCNC: 120 MG/DL (ref 70–99)
HBA1C MFR BLD: 5.3 % (ref 0–5.6)
HCT VFR BLD AUTO: 40.2 % (ref 35–47)
HDLC SERPL-MCNC: 34 MG/DL
HGB BLD-MCNC: 13.3 G/DL (ref 11.7–15.7)
LDLC SERPL CALC-MCNC: 38 MG/DL
MCH RBC QN AUTO: 28.9 PG (ref 26.5–33)
MCHC RBC AUTO-ENTMCNC: 33.1 G/DL (ref 31.5–36.5)
MCV RBC AUTO: 87 FL (ref 78–100)
NONHDLC SERPL-MCNC: 62 MG/DL
PLATELET # BLD AUTO: 175 10E3/UL (ref 150–450)
POTASSIUM BLD-SCNC: 3.9 MMOL/L (ref 3.4–5.3)
PROT SERPL-MCNC: 7.2 G/DL (ref 6.8–8.8)
RBC # BLD AUTO: 4.6 10E6/UL (ref 3.8–5.2)
SODIUM SERPL-SCNC: 139 MMOL/L (ref 133–144)
TRIGL SERPL-MCNC: 118 MG/DL
TSH SERPL DL<=0.005 MIU/L-ACNC: 1.1 MU/L (ref 0.4–4)
WBC # BLD AUTO: 4.9 10E3/UL (ref 4–11)

## 2022-04-15 PROCEDURE — 84443 ASSAY THYROID STIM HORMONE: CPT | Performed by: NURSE PRACTITIONER

## 2022-04-15 PROCEDURE — 99396 PREV VISIT EST AGE 40-64: CPT | Mod: 25 | Performed by: NURSE PRACTITIONER

## 2022-04-15 PROCEDURE — 90732 PPSV23 VACC 2 YRS+ SUBQ/IM: CPT | Performed by: NURSE PRACTITIONER

## 2022-04-15 PROCEDURE — 36415 COLL VENOUS BLD VENIPUNCTURE: CPT | Performed by: NURSE PRACTITIONER

## 2022-04-15 PROCEDURE — 80053 COMPREHEN METABOLIC PANEL: CPT | Performed by: NURSE PRACTITIONER

## 2022-04-15 PROCEDURE — G0145 SCR C/V CYTO,THINLAYER,RESCR: HCPCS | Performed by: NURSE PRACTITIONER

## 2022-04-15 PROCEDURE — 90750 HZV VACC RECOMBINANT IM: CPT | Performed by: NURSE PRACTITIONER

## 2022-04-15 PROCEDURE — 80061 LIPID PANEL: CPT | Performed by: NURSE PRACTITIONER

## 2022-04-15 PROCEDURE — 90471 IMMUNIZATION ADMIN: CPT | Performed by: NURSE PRACTITIONER

## 2022-04-15 PROCEDURE — 87624 HPV HI-RISK TYP POOLED RSLT: CPT | Performed by: NURSE PRACTITIONER

## 2022-04-15 PROCEDURE — 83036 HEMOGLOBIN GLYCOSYLATED A1C: CPT | Performed by: NURSE PRACTITIONER

## 2022-04-15 PROCEDURE — 90472 IMMUNIZATION ADMIN EACH ADD: CPT | Performed by: NURSE PRACTITIONER

## 2022-04-15 PROCEDURE — 85027 COMPLETE CBC AUTOMATED: CPT | Performed by: NURSE PRACTITIONER

## 2022-04-15 ASSESSMENT — PATIENT HEALTH QUESTIONNAIRE - PHQ9
10. IF YOU CHECKED OFF ANY PROBLEMS, HOW DIFFICULT HAVE THESE PROBLEMS MADE IT FOR YOU TO DO YOUR WORK, TAKE CARE OF THINGS AT HOME, OR GET ALONG WITH OTHER PEOPLE: NOT DIFFICULT AT ALL
SUM OF ALL RESPONSES TO PHQ QUESTIONS 1-9: 5
SUM OF ALL RESPONSES TO PHQ QUESTIONS 1-9: 5

## 2022-04-15 ASSESSMENT — ANXIETY QUESTIONNAIRES
5. BEING SO RESTLESS THAT IT IS HARD TO SIT STILL: SEVERAL DAYS
4. TROUBLE RELAXING: NOT AT ALL
7. FEELING AFRAID AS IF SOMETHING AWFUL MIGHT HAPPEN: NOT AT ALL
GAD7 TOTAL SCORE: 3
7. FEELING AFRAID AS IF SOMETHING AWFUL MIGHT HAPPEN: NOT AT ALL
GAD7 TOTAL SCORE: 3
3. WORRYING TOO MUCH ABOUT DIFFERENT THINGS: SEVERAL DAYS
1. FEELING NERVOUS, ANXIOUS, OR ON EDGE: NOT AT ALL
GAD7 TOTAL SCORE: 3
2. NOT BEING ABLE TO STOP OR CONTROL WORRYING: NOT AT ALL
6. BECOMING EASILY ANNOYED OR IRRITABLE: SEVERAL DAYS

## 2022-04-15 ASSESSMENT — ENCOUNTER SYMPTOMS
ABDOMINAL PAIN: 0
WEAKNESS: 0
HEADACHES: 0
JOINT SWELLING: 0
PALPITATIONS: 0
HEARTBURN: 0
PARESTHESIAS: 0
DYSURIA: 0
BREAST MASS: 0
ARTHRALGIAS: 1
EYE PAIN: 0
FEVER: 0
FREQUENCY: 0
SHORTNESS OF BREATH: 0
DIARRHEA: 0
COUGH: 0
CHILLS: 0
CONSTIPATION: 0
HEMATOCHEZIA: 0
HEMATURIA: 0
DIZZINESS: 0
SORE THROAT: 0
NERVOUS/ANXIOUS: 1
MYALGIAS: 0
NAUSEA: 0

## 2022-04-15 NOTE — PATIENT INSTRUCTIONS

## 2022-04-15 NOTE — NURSING NOTE
Prior to immunization administration, verified patients identity using patient s name and date of birth. Please see Immunization Activity for additional information.     Screening Questionnaire for Adult Immunization    Are you sick today?   No   Do you have allergies to medications, food, a vaccine component or latex?   No   Have you ever had a serious reaction after receiving a vaccination?   No   Do you have a long-term health problem with heart, lung, kidney, or metabolic disease (e.g., diabetes), asthma, a blood disorder, no spleen, complement component deficiency, a cochlear implant, or a spinal fluid leak?  Are you on long-term aspirin therapy?   No   Do you have cancer, leukemia, HIV/AIDS, or any other immune system problem?   No   Do you have a parent, brother, or sister with an immune system problem?   No   In the past 3 months, have you taken medications that affect  your immune system, such as prednisone, other steroids, or anticancer drugs; drugs for the treatment of rheumatoid arthritis, Crohn s disease, or psoriasis; or have you had radiation treatments?   No   Have you had a seizure, or a brain or other nervous system problem?   No   During the past year, have you received a transfusion of blood or blood    products, or been given immune (gamma) globulin or antiviral drug?   No   For women: Are you pregnant or is there a chance you could become       pregnant during the next month?   No   Have you received any vaccinations in the past 4 weeks?   No     Immunization questionnaire answers were all negative.        Per orders of Dr. Chip Sadler, injection of Shingrix/[id number] given by Zahra Woods MA. Patient instructed to remain in clinic for 15 minutes afterwards, and to report any adverse reaction to me immediately.       Screening performed by Zahra Woods MA on 4/15/2022 at 12:05 PM.

## 2022-04-15 NOTE — PROGRESS NOTES
SUBJECTIVE:   CC: Isabela Panchal is an 51 year old woman who presents for preventive health visit.     Patient has been advised of split billing requirements and indicates understanding: Yes     Healthy Habits:     Getting at least 3 servings of Calcium per day:  Yes    Bi-annual eye exam:  Yes    Dental care twice a year:  NO    Sleep apnea or symptoms of sleep apnea:  None    Diet:  Diabetic    Frequency of exercise:  None    Taking medications regularly:  Yes    Medication side effects:  None    PHQ-2 Total Score: 1    Additional concerns today:  No    Today's PHQ-2 Score:   PHQ-2 ( 1999 Pfizer) 4/15/2022   Q1: Little interest or pleasure in doing things 0   Q2: Feeling down, depressed or hopeless 1   PHQ-2 Score 1   PHQ-2 Total Score (12-17 Years)- Positive if 3 or more points; Administer PHQ-A if positive -   Q1: Little interest or pleasure in doing things Not at all   Q2: Feeling down, depressed or hopeless Several days   PHQ-2 Score 1       Abuse: Current or Past (Physical, Sexual or Emotional) - No  Do you feel safe in your environment? Yes    Have you ever done Advance Care Planning? (For example, a Health Directive, POLST, or a discussion with a medical provider or your loved ones about your wishes): No, advance care planning information given to patient to review.  Patient declined advance care planning discussion at this time.    Social History     Tobacco Use     Smoking status: Never Smoker     Smokeless tobacco: Never Used   Substance Use Topics     Alcohol use: Not Currently     If you drink alcohol do you typically have >3 drinks per day or >7 drinks per week? No    Alcohol Use 4/15/2022   Prescreen: >3 drinks/day or >7 drinks/week? No   Prescreen: >3 drinks/day or >7 drinks/week? -   No flowsheet data found.    Reviewed orders with patient.  Reviewed health maintenance and updated orders accordingly - Yes  Labs reviewed in EPIC  BP Readings from Last 3 Encounters:   04/15/22 138/80   01/31/22  110/70   12/16/21 110/80    Wt Readings from Last 3 Encounters:   04/15/22 80.3 kg (177 lb)   01/31/22 79.6 kg (175 lb 8 oz)   12/16/21 81.7 kg (180 lb 3.2 oz)              Breast Cancer Screening:  Any new diagnosis of family breast, ovarian, or bowel cancer? No    FHS-7: No flowsheet data found.    Mammogram Screening: Recommended annual mammography  Pertinent mammograms are reviewed under the imaging tab.    History of abnormal Pap smear: YES - updated in Problem List and Health Maintenance accordingly  Note  04/2/21 NIL, +HPV 16. Plan [facility] bef 7/2/21 (provider review)          PAP / HPV Latest Ref Rng & Units 4/15/2022 4/2/2021 1/15/2020   PAP   Negative for Intraepithelial Lesion or Malignancy (NILM) - -   PAP (Historical) - - NIL NIL   HPV16 Negative Negative Positive(A) Negative   HPV18 Negative Negative Negative Negative   HRHPV Negative Negative Negative Negative     Reviewed and updated as needed this visit by clinical staff   Tobacco  Allergies  Meds  Problems  Med Hx  Surg Hx  Fam Hx            Reviewed and updated as needed this visit by Provider   Tobacco  Allergies  Meds  Problems  Med Hx  Surg Hx  Fam Hx           Past Medical History:   Diagnosis Date     Abnormal Pap smear of cervix 2019    see problem list     Allergic rhinitis due to allergen      Anemia      Breathing difficulty      Cervical high risk HPV (human papillomavirus) test positive 02/13/2018, 2019    type 16; see problem list     Cervical radiculopathy 08/17/2021     Chest pain      Generalised anxiety disorder 09/06/2012     Headache      Heart trouble      High blood triglycerides 02/26/2016     History of blood transfusion     unsure     Hoarseness      Hypertension      Hypoglycemia 03/10/2013     Impaired fasting glucose 08/16/2014     Insomnia 09/06/2012     Irritable bowel syndrome      Itchy eyes      MEDICAL HISTORY OF -     hx of right wrist dislocation     Migraine      Mild intermittent asthma     allergy or  "URI triggered      Moderate recurrent major depression (H) 09/06/2012     Nasal congestion      Numbness      Obesity, unspecified (OBESE) 08/21/2014     Paroxysmal supraventricular tachycardia (H)     4/00     PONV (postoperative nausea and vomiting)      Ringing in ears      Sleep difficulties      Sneezing      Sore throat      Vertigo      Weakness      Weight gain         Review of Systems   Constitutional: Negative for chills and fever.   HENT: Negative for congestion, ear pain, hearing loss and sore throat.    Eyes: Negative for pain and visual disturbance.   Respiratory: Negative for cough and shortness of breath.    Cardiovascular: Positive for chest pain. Negative for palpitations and peripheral edema.   Gastrointestinal: Negative for abdominal pain, constipation, diarrhea, heartburn, hematochezia and nausea.   Breasts:  Negative for tenderness, breast mass and discharge.   Genitourinary: Negative for dysuria, frequency, genital sores, hematuria, urgency, vaginal bleeding and vaginal discharge.   Musculoskeletal: Positive for arthralgias. Negative for joint swelling and myalgias.   Skin: Negative for rash.   Neurological: Negative for dizziness, weakness, headaches and paresthesias.   Psychiatric/Behavioral: Negative for mood changes. The patient is nervous/anxious.           OBJECTIVE:   /80 (BP Location: Left arm, Patient Position: Sitting, Cuff Size: Adult Regular)   Pulse 83   Temp 97.9  F (36.6  C) (Temporal)   Resp 17   Ht 1.651 m (5' 5\")   Wt 80.3 kg (177 lb)   BMI 29.45 kg/m    Physical Exam  Constitutional:       General: She is not in acute distress.     Appearance: She is well-developed.   HENT:      Right Ear: Tympanic membrane and external ear normal.      Left Ear: Tympanic membrane and external ear normal.      Nose: Nose normal.      Mouth/Throat:      Pharynx: No oropharyngeal exudate.   Eyes:      General:         Right eye: No discharge.         Left eye: No discharge.      " Conjunctiva/sclera: Conjunctivae normal.      Pupils: Pupils are equal, round, and reactive to light.   Neck:      Thyroid: No thyromegaly.      Trachea: No tracheal deviation.   Cardiovascular:      Rate and Rhythm: Normal rate and regular rhythm.      Pulses: Normal pulses.      Heart sounds: Normal heart sounds, S1 normal and S2 normal. No murmur heard.    No friction rub. No S3 or S4 sounds.   Pulmonary:      Effort: Pulmonary effort is normal. No respiratory distress.      Breath sounds: Normal breath sounds. No wheezing or rales.   Chest:   Breasts:      Right: No mass, nipple discharge or tenderness.      Left: No mass, nipple discharge or tenderness.       Abdominal:      General: Bowel sounds are normal.      Palpations: Abdomen is soft. There is no mass.      Tenderness: There is no abdominal tenderness.   Genitourinary:     Vagina: Normal.      Cervix: Normal.      Uterus: Normal.       Comments: mirena strings visualized and intact  Musculoskeletal:         General: Normal range of motion.      Cervical back: Neck supple.   Lymphadenopathy:      Cervical: No cervical adenopathy.   Skin:     General: Skin is warm and dry.      Findings: No rash.   Neurological:      Mental Status: She is alert and oriented to person, place, and time.      Motor: No abnormal muscle tone.      Deep Tendon Reflexes: Reflexes are normal and symmetric.   Psychiatric:         Thought Content: Thought content normal.         Judgment: Judgment normal.           Diagnostic Test Results:  Labs reviewed in Epic  Results for orders placed or performed in visit on 04/15/22   CBC with platelets     Status: Normal   Result Value Ref Range    WBC Count 4.9 4.0 - 11.0 10e3/uL    RBC Count 4.60 3.80 - 5.20 10e6/uL    Hemoglobin 13.3 11.7 - 15.7 g/dL    Hematocrit 40.2 35.0 - 47.0 %    MCV 87 78 - 100 fL    MCH 28.9 26.5 - 33.0 pg    MCHC 33.1 31.5 - 36.5 g/dL    RDW 12.2 10.0 - 15.0 %    Platelet Count 175 150 - 450 10e3/uL    Comprehensive metabolic panel     Status: Abnormal   Result Value Ref Range    Sodium 139 133 - 144 mmol/L    Potassium 3.9 3.4 - 5.3 mmol/L    Chloride 108 94 - 109 mmol/L    Carbon Dioxide (CO2) 26 20 - 32 mmol/L    Anion Gap 5 3 - 14 mmol/L    Urea Nitrogen 15 7 - 30 mg/dL    Creatinine 0.69 0.52 - 1.04 mg/dL    Calcium 9.5 8.5 - 10.1 mg/dL    Glucose 120 (H) 70 - 99 mg/dL    Alkaline Phosphatase 94 40 - 150 U/L    AST 24 0 - 45 U/L    ALT 54 (H) 0 - 50 U/L    Protein Total 7.2 6.8 - 8.8 g/dL    Albumin 4.2 3.4 - 5.0 g/dL    Bilirubin Total 0.4 0.2 - 1.3 mg/dL    GFR Estimate >90 >60 mL/min/1.73m2   Hemoglobin A1c     Status: Normal   Result Value Ref Range    Hemoglobin A1C 5.3 0.0 - 5.6 %   TSH with free T4 reflex     Status: Normal   Result Value Ref Range    TSH 1.10 0.40 - 4.00 mU/L   Lipid panel reflex to direct LDL Fasting     Status: Abnormal   Result Value Ref Range    Cholesterol 96 <200 mg/dL    Triglycerides 118 <150 mg/dL    Direct Measure HDL 34 (L) >=50 mg/dL    LDL Cholesterol Calculated 38 <=100 mg/dL    Non HDL Cholesterol 62 <130 mg/dL    Patient Fasting > 8hrs? Yes     Narrative    Cholesterol  Desirable:  <200 mg/dL    Triglycerides  Normal:  Less than 150 mg/dL  Borderline High:  150-199 mg/dL  High:  200-499 mg/dL  Very High:  Greater than or equal to 500 mg/dL    Direct Measure HDL  Female:  Greater than or equal to 50 mg/dL   Male:  Greater than or equal to 40 mg/dL    LDL Cholesterol  Desirable:  <100mg/dL  Above Desirable:  100-129 mg/dL   Borderline High:  130-159 mg/dL   High:  160-189 mg/dL   Very High:  >= 190 mg/dL    Non HDL Cholesterol  Desirable:  130 mg/dL  Above Desirable:  130-159 mg/dL  Borderline High:  160-189 mg/dL  High:  190-219 mg/dL  Very High:  Greater than or equal to 220 mg/dL   HPV High Risk Types DNA Cervical     Status: None   Result Value Ref Range    Other HR HPV Negative Negative    HPV16 DNA Negative Negative    HPV18 DNA Negative Negative    FINAL  DIAGNOSIS       This patient's sample is negative for HPV DNA.        This test was developed and its performance characteristics determined by the [facility]. It has not been cleared or approved by the FDA. The laboratory is regulated under CLIA as qualified to perform high-complexity testing. This test is used for clinical purposes. It should not be regarded as investigational or for research.    METHODOLOGY: The Roche Jose Martin 4800 system uses automated extraction, simultaneous amplification of HPV (L1 region) and beta-globin, followed by real time detection of fluorescent labeled HPV and beta globin using specific oligonucleotide probes. The test specifically identified types HPV 16 DNA and HPV 18 DNA while concurrently detecting the rest of the high risk types (31, 33, 35, 39, 45, 51, 52, 56, 58, 59, 66 or 68).    COMMENTS: This test is not intended for use as a screening device for woman under age 30 with normal cervical cytology. Results should be correlated with cytologic and histologic findings. Close clinical followup is recommended.       Pap Screen with HPV - recommended age 30 - 65 years     Status: None   Result Value Ref Range    Interpretation        Negative for Intraepithelial Lesion or Malignancy (NILM)    Comment         Papanicolaou Test Limitations:  Cervical cytology is a screening test with limited sensitivity, and regular screening is critical for cancer prevention.  Pap tests are primarily effective for the diagnosis/prevention of squamous cell carcinoma, not adenocarcinoma or other cancers.        Specimen Adequacy       Satisfactory for evaluation, endocervical/transformation zone component present    Clinical Information       IUD      LMP/Menopause Date       4/6/2022      Reflex Testing Yes regardless of result     Previous Abnormal?       Yes      Previous Abnormal Diagnosis       colp      Performing Labs       The technical  component of this testing was completed at [facility]         ASSESSMENT/PLAN:   Isabela was seen today for physical.    Diagnoses and all orders for this visit:    Routine general medical examination at a health care facility  Preventative exam w/no abnormalities and/or concerns listed in diagnoses; discussed health maintenance screenings including prostate, breast, cervical and colorectal ca screenings related to gender;  reviewed and reconciled medication, medical history and patient related health concerns  Plan: obtain metabolic labs  -     Cancel: Lipid panel reflex to direct LDL Fasting; Future  -     CBC with platelets; Future  -     Comprehensive metabolic panel; Future  -     Hemoglobin A1c; Future  -     TSH with free T4 reflex; Future  -     Lipid panel reflex to direct LDL Fasting; Future  -     CBC with platelets  -     Comprehensive metabolic panel  -     Hemoglobin A1c  -     TSH with free T4 reflex  -     Lipid panel reflex to direct LDL Fasting    Cervical cancer screening  Previous pap +HPV recommended colp patient did not complete; cervical screening completed today pending results will repeat per guidelines   -     Pap Screen with HPV - recommended age 30 - 65 years  -     HPV Hold (Lab Only)  -     HPV High Risk Types DNA Cervical    Encounter for immunization  -     PNEUMOCOCCAL VACCINE,ADULT,SQ OR IM ([id number])    Elevated LFTs  follow up   -     Hepatic panel (Albumin, ALT, AST, Bili, Alk Phos, TP); Future    Visit for screening mammogram  -     MA Screening Digital Bilateral; Future    Other orders  -     ZOSTER VACCINE RECOMBINANT (Shingrix)    Patient has been advised of split billing requirements and indicates understanding: Yes    COUNSELING:  Reviewed preventive health counseling, as reflected in patient instructions    Estimated body mass index is 29.45 kg/m  as calculated from the following:    Height as of this encounter:  "1.651 m (5' 5\").    Weight as of this encounter: 80.3 kg (177 lb).        She reports that she has never smoked. She has never used smokeless tobacco.      Counseling Resources:  ATP IV Guidelines  Pooled Cohorts Equation Calculator  Breast Cancer Risk Calculator  BRCA-Related Cancer Risk Assessment: FHS-7 Tool  FRAX Risk Assessment  ICSI Preventive Guidelines  Dietary Guidelines for Americans, 2010  [employer]'s MyPlate  ASA Prophylaxis  Lung CA Screening    SOFYA Cedillo [facility]  Answers for HPI/ROS submitted by the patient on 4/15/2022  If you checked off any problems, how difficult have these problems made it for you to do your work, take care of things at home, or get along with other people?: Not difficult at all  PHQ9 TOTAL SCORE: 5  JOSEFINA 7 TOTAL SCORE: 3      "

## 2022-04-16 ASSESSMENT — PATIENT HEALTH QUESTIONNAIRE - PHQ9: SUM OF ALL RESPONSES TO PHQ QUESTIONS 1-9: 5

## 2022-04-16 ASSESSMENT — ANXIETY QUESTIONNAIRES: GAD7 TOTAL SCORE: 3

## 2022-04-21 DIAGNOSIS — J45.20 INTERMITTENT ASTHMA, UNCOMPLICATED: ICD-10-CM

## 2022-04-21 DIAGNOSIS — L71.9 ROSACEA: ICD-10-CM

## 2022-04-21 DIAGNOSIS — J45.20 MILD INTERMITTENT ASTHMA WITHOUT COMPLICATION: ICD-10-CM

## 2022-04-21 LAB
BKR LAB AP GYN ADEQUACY: NORMAL
BKR LAB AP GYN INTERPRETATION: NORMAL
BKR LAB AP HPV REFLEX: NORMAL
BKR LAB AP LMP: NORMAL
BKR LAB AP PREVIOUS ABNL DX: NORMAL
BKR LAB AP PREVIOUS ABNORMAL: NORMAL
PATH REPORT.COMMENTS IMP SPEC: NORMAL
PATH REPORT.COMMENTS IMP SPEC: NORMAL
PATH REPORT.RELEVANT HX SPEC: NORMAL

## 2022-04-21 NOTE — TELEPHONE ENCOUNTER
Health Maintenance Due   Topic Date Due     URINE DRUG SCREEN  Never done     MAMMO SCREENING  Never done     PREVENTIVE CARE VISIT  03/26/2020     COLPOSCOPY  07/02/2021     INFLUENZA VACCINE (1) 09/01/2021     HPV TEST  10/02/2021     ASTHMA CONTROL TEST  10/16/2021     COVID-19 Vaccine (3 - Booster for Moderna series) 11/05/2021     ASTHMA ACTION PLAN  04/16/2022     PAP  10/15/2022      ACT    Was ACT completed today?    Yes    ACT Total Scores 4/16/2021   ACT TOTAL SCORE -   ASTHMA ER VISITS -   ASTHMA HOSPITALIZATIONS -   ACT TOTAL SCORE (Goal Greater than or Equal to 20) 23   In the past 12 months, how many times did you visit the emergency room for your asthma without being admitted to the hospital? 0   In the past 12 months, how many times were you hospitalized overnight because of your asthma? 0       Please call her to see if she wants the montelukast refilled, and also please complete an ACT with her if you can, thank you! Let me know if she does want the refill.    Sincerely,  Dr. Felisha Briggs MD  4/21/2022

## 2022-04-21 NOTE — LETTER
My Asthma Action Plan    Name: Isabela Panchal   YOB: 1970  Date: 4/21/2022   My doctor: Felisha Briggs MD   My clinic: [facility]        My Rescue Medicine:   Albuterol inhaler (Proair/Ventolin/Proventil HFA)  2-4 puffs EVERY 4 HOURS as needed. Use a spacer if recommended by your provider.   My Asthma Severity:   Intermittent / Exercise Induced  Know your asthma triggers:   None          GREEN ZONE   Good Control    I feel good    No cough or wheeze    Can work, sleep and play without asthma symptoms       Take your asthma control medicine every day.     1. If exercise triggers your asthma, take your rescue medication    15 minutes before exercise or sports, and    During exercise if you have asthma symptoms  2. Spacer to use with inhaler: If you have a spacer, make sure to use it with your inhaler             YELLOW ZONE Getting Worse  I have ANY of these:    I do not feel good    Cough or wheeze    Chest feels tight    Wake up at night   1. Keep taking your Green Zone medications  2. Start taking your rescue medicine:    every 20 minutes for up to 1 hour. Then every 4 hours for 24-48 hours.  3. If you stay in the Yellow Zone for more than 12-24 hours, contact your doctor.  4. If you do not return to the Green Zone in 12-24 hours or you get worse, start taking your oral steroid medicine if prescribed by your provider.           RED ZONE Medical Alert - Get Help  I have ANY of these:    I feel awful    Medicine is not helping    Breathing getting harder    Trouble walking or talking    Nose opens wide to breathe       1. Take your rescue medicine NOW  2. If your provider has prescribed an oral steroid medicine, start taking it NOW  3. Call your doctor NOW  4. If you are still in the Red Zone after 20 minutes and you have not reached your doctor:    Take your rescue medicine again and    Call 911 or go to the emergency room right away    See your regular doctor  within 2 weeks of an Emergency Room or Urgent Care visit for follow-up treatment.          Annual Reminders:  Meet with Asthma Educator,  Flu Shot in the Fall, consider Pneumonia Vaccination for patients with asthma (aged 19 and older).    Pharmacy:    [place], [place] - [address]  [facility] [address]  [facility] - [place], [place] - [address]  [id number] [place], [place] - [address] AT [facility] & [address] [place] [address] - [address]    Electronically signed by Felisha Briggs MD   Date: 04/21/22                    Asthma Triggers  How To Control Things That Make Your Asthma Worse    Triggers are things that make your asthma worse.  Look at the list below to help you find your triggers and   what you can do about them. You can help prevent asthma flare-ups by staying away from your triggers.      Trigger                                                          What you can do   Cigarette Smoke  Tobacco smoke can make asthma worse. Do not allow smoking in your home, car or around you.  Be sure no one smokes at a child s day care or school.  If you smoke, ask your health care provider for ways to help you quit.  Ask family members to quit too.  Ask your health care provider for a referral to Quit Plan to help you quit smoking, or call 2-830-195-PLAN.     Colds, Flu, Bronchitis  These are common triggers of asthma. Wash your hands often.  Don t touch your eyes, nose or mouth.  Get a flu shot every year.     Dust Mites  These are tiny bugs that live in cloth or carpet. They are too small to see. Wash sheets and blankets in hot water every week.   Encase pillows and mattress in dust mite proof covers.  Avoid having carpet if you can. If you have carpet, vacuum weekly.   Use a dust mask and HEPA vacuum.   Pollen and Outdoor Mold  Some people are allergic to trees, grass, or  weed pollen, or molds. Try to keep your windows closed.  Limit time out doors when pollen count is high.   Ask you health care provider about taking medicine during allergy season.     Animal Dander  Some people are allergic to skin flakes, urine or saliva from pets with fur or feathers. Keep pets with fur or feathers out of your home.    If you can t keep the pet outdoors, then keep the pet out of your bedroom.  Keep the bedroom door closed.  Keep pets off cloth furniture and away from stuffed toys.     Mice, Rats, and Cockroaches  Some people are allergic to the waste from these pests.   Cover food and garbage.  Clean up spills and food crumbs.  Store grease in the refrigerator.   Keep food out of the bedroom.   Indoor Mold  This can be a trigger if your home has high moisture. Fix leaking faucets, pipes, or other sources of water.   Clean moldy surfaces.  Dehumidify basement if it is damp and smelly.   Smoke, Strong Odors, and Sprays  These can reduce air quality. Stay away from strong odors and sprays, such as perfume, powder, hair spray, paints, smoke incense, paint, cleaning products, candles and new carpet.   Exercise or Sports  Some people with asthma have this trigger. Be active!  Ask your doctor about taking medicine before sports or exercise to prevent symptoms.    Warm up for 5-10 minutes before and after sports or exercise.     Other Triggers of Asthma  Cold air:  Cover your nose and mouth with a scarf.  Sometimes laughing or crying can be a trigger.  Some medicines and food can trigger asthma.

## 2022-04-21 NOTE — TELEPHONE ENCOUNTER
Left message on patient's voicemail to call back and speak with a triage nurse.     BRETT IqbalN RN  [facility]

## 2022-04-21 NOTE — TELEPHONE ENCOUNTER
Routing refill request to provider for review/approval because:  Drug not active on patient's medication list  The original prescription was discontinued on 9/21/2021 by Quentin Figueroa [facility] for the following reason: Therapy completed. Renewing this prescription may not be appropriate.    BRETT IqbalN RN  [facility]

## 2022-04-22 LAB
HUMAN PAPILLOMA VIRUS 16 DNA: NEGATIVE
HUMAN PAPILLOMA VIRUS 18 DNA: NEGATIVE
HUMAN PAPILLOMA VIRUS FINAL DIAGNOSIS: NORMAL
HUMAN PAPILLOMA VIRUS OTHER HR: NEGATIVE

## 2022-04-28 DIAGNOSIS — Z98.1 S/P CERVICAL SPINAL FUSION: Primary | ICD-10-CM

## 2022-04-28 RX ORDER — MONTELUKAST SODIUM 10 MG/1
TABLET ORAL
Qty: 90 TABLET | OUTPATIENT
Start: 2022-04-28

## 2022-04-28 RX ORDER — ALBUTEROL SULFATE 90 UG/1
2 AEROSOL, METERED RESPIRATORY (INHALATION) EVERY 6 HOURS PRN
Qty: 8.5 G | Refills: 0 | Status: SHIPPED | OUTPATIENT
Start: 2022-04-28 | End: 2022-06-02

## 2022-04-28 ASSESSMENT — ASTHMA QUESTIONNAIRES
QUESTION_2 LAST FOUR WEEKS HOW OFTEN HAVE YOU HAD SHORTNESS OF BREATH: ONCE OR TWICE A WEEK
QUESTION_4 LAST FOUR WEEKS HOW OFTEN HAVE YOU USED YOUR RESCUE INHALER OR NEBULIZER MEDICATION (SUCH AS ALBUTEROL): ONCE A WEEK OR LESS
ACT_TOTALSCORE: 20
QUESTION_5 LAST FOUR WEEKS HOW WOULD YOU RATE YOUR ASTHMA CONTROL: SOMEWHAT CONTROLLED
QUESTION_1 LAST FOUR WEEKS HOW MUCH OF THE TIME DID YOUR ASTHMA KEEP YOU FROM GETTING AS MUCH DONE AT WORK, SCHOOL OR AT HOME: A LITTLE OF THE TIME
ACT_TOTALSCORE: 20
ACUTE_EXACERBATION_TODAY: NO
QUESTION_3 LAST FOUR WEEKS HOW OFTEN DID YOUR ASTHMA SYMPTOMS (WHEEZING, COUGHING, SHORTNESS OF BREATH, CHEST TIGHTNESS OR PAIN) WAKE YOU UP AT NIGHT OR EARLIER THAN USUAL IN THE MORNING: NOT AT ALL

## 2022-04-28 NOTE — TELEPHONE ENCOUNTER
"Called and spoke with patient.      Apparent shortly into conversation that pt has some memory issues, confirmed via problem list.  With prompting patient recalls Sept appt with Quentin and confirms that she was taken off \"the bedtime med for breathing.\"  Does not feel she needs it at this time.    However, pt mentions some flare of asthma symptoms over the past month which she attributes to spring's arrival.  ACT done over the phone is 20; she used a friend's albuterol inhaler on a bad day and states she immediately felt much better.  Wondering if she can have one inhaler on hand if her symptoms recur.      Pt states she forgot to address her rosacea at her 4/15/22 appt with Chip.  Has been having some redness and has an old tube of her metronidazole 0.75%  - it is  and she would like a new tube if possible.    AAP signed.  Montelukast refused.  Albuterol and metronidazole cream pended below.  Return pt call with provider response.    JOANIE Whitehead RN  [facility]      "

## 2022-05-05 DIAGNOSIS — J45.20 MILD INTERMITTENT ASTHMA WITHOUT COMPLICATION: ICD-10-CM

## 2022-05-06 ENCOUNTER — PATIENT OUTREACH (OUTPATIENT)
Dept: FAMILY MEDICINE | Facility: CLINIC | Age: 52
End: 2022-05-06
Payer: COMMERCIAL

## 2022-05-06 RX ORDER — MONTELUKAST SODIUM 10 MG/1
TABLET ORAL
Qty: 90 TABLET | Refills: 3 | Status: SHIPPED | OUTPATIENT
Start: 2022-05-06 | End: 2023-06-20

## 2022-05-09 ENCOUNTER — PATIENT OUTREACH (OUTPATIENT)
Dept: NURSING | Facility: CLINIC | Age: 52
End: 2022-05-09
Payer: COMMERCIAL

## 2022-05-09 NOTE — PROGRESS NOTES
Clinic Care Coordination Contact    Follow Up Progress Note      Assessment: [facility] called patient to complete outreach and review goals. [facility] woke patient up and offered to call back. Patient asked why [facility] was calling. [facility] asked patient how things were going with her new ILS worker and she is happy with them. Patient shares she is still working on obtaining SSDI and has not heard anything recently. Patient shares she is not sure if her  Renee has reapplied. Patient gave [facility] permission to speak to Renee if needed.     [facility] and patient discussed her daughter for the remainder of the conversation.     Care Gaps:    Health Maintenance Due   Topic Date Due     MAMMO SCREENING  Never done     COVID-19 Vaccine (3 - Booster for Moderna series) 11/05/2021       Goals addressed this encounter:    Goals Addressed                    This Visit's Progress       Financial Wellbeing- SSDI (pt-stated)   90%      Goal Statement: I would like help applying and getting approved for Social Security Disability in the next 6 months.   Date Goal set: 10/29/2020  Barriers: Getting denied   Strengths: Asking for help, supportive [facility]   Date to Achieve By: 4/29/2021 // extended 10/20/2021  Patient expressed understanding of goal: yes     Action steps to achieve this goal:  1. [employer] [facility] and I will meet with the Disability Specialists on March 26th to discuss next steps and options with my denial. (Completed, ongoing)  5/13  Pt stated moved up to Federal level pending. [facility] provides support/updates on this.   2. I will attend my first appointment at [facility] on March 30th with Johnson Novak, counselor. (Completed)    5/13 Pt states she connects with monthly telephone visits.  3. I will work with [facility] staff to schedule neuropsychological testing. (Completed)  4. I will give the CHW updates during outreach calls.    Updated: 5/13/2021                  Intervention/Education provided during outreach: [facility] provided  active listening during this call.      Outreach Frequency: monthly    Plan:   Care Coordinator will follow up in one month.    GAVIN Celaya   [facility] - Clinic Care Coordination  [phone]
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
## 2022-05-10 ENCOUNTER — ANCILLARY PROCEDURE (OUTPATIENT)
Dept: MAMMOGRAPHY | Facility: CLINIC | Age: 52
End: 2022-05-10
Attending: NURSE PRACTITIONER
Payer: COMMERCIAL

## 2022-05-10 DIAGNOSIS — Z12.31 VISIT FOR SCREENING MAMMOGRAM: ICD-10-CM

## 2022-05-10 PROCEDURE — 77067 SCR MAMMO BI INCL CAD: CPT | Mod: 26 | Performed by: RADIOLOGY

## 2022-05-10 PROCEDURE — 77067 SCR MAMMO BI INCL CAD: CPT

## 2022-05-12 NOTE — PROGRESS NOTES
I have reviewed and updated the patient's Past Medical History, Social History, Family History and Medication List.    ALLERGIES  Benzoin, Adhesive tape, Allegra [fexofenadine], Cucumber extract, Fexofenadine hydrochloride, Ibuprofen, Lexapro, No clinical screening - see comments, Peanuts [nuts], and Seasonal allergies    Spine Surgery Follow Up    REFERRING PHYSICIAN: Referred Self   PRIMARY CARE PHYSICIAN: Felisha Briggs           Chief Complaint:   No chief complaint on file.      History of Present Illness:  Symptom Profile Including: location of symptoms, onset, severity, exacerbating/alleviating factors, previous treatments:        Isabela Panchal is a 51 year old female who presents today for routine 7 month follow up s/p C5-7 ACDF with left ICAG (DOS 10/13/21) with Dr. Lal. Initial post op course complicated post-op by superficial wound dehiscence of L ICAG site; now fully healed left iliac crest site wound. Patient previously seen for routine 3-month follow-up on 2/15/2022 at which time she was doing well.  Recommended formal physical therapy for upper extremity strengthening and postoperative rehab and stretching.   Today, patient says she has been doing well overall in regards to her neck.  She occasionally will hear a snap/crack when she moves her head from side to side.  This occurred a few days ago when she is turning her head and now she has some left posterior neck/upper trapezius soreness.  Also admits to occasional swallowing issues but overall is improved compared to post-op.  Patient says that she does not think she did physical therapy despite having spoken to her about this previously.  Patient admittedly reports that she struggles with memory loss issues.  She does want to try physical therapy for her chronic neck pain.    Prior to leaving clinic today, patient brought up that she continues to struggle with low back pain.  Pain is localized over right PSIS.  Pain does not radiate  into the legs.  Pain is worse with certain movements.  She says if she does gaenslen-like maneuver while laying on a bed, this can provide relief of right low back pain.  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   She thinks she might have had SI joint injections but cannot recall if they were efficacious or not.  Per chart review it looks like she has had a few lumbar FATUMA's in 2020 and 2021, but patient cannot recall if these were helpful or not.     Injections (per chart review):  1/29/2020 right interlaminar L5-S1 FATUMA: Per report pain from 9/10 pre-procedure to 4-5/10 postprocedure    6/18/2021 right interlaminar L4-5 FATUMA: Per report, initial therapeutic response to injection is rated no initial relief of pain symptoms.      GRAEME Scores:  Oswestry (GRAEME) Questionnaire    OSWESTRY DISABILITY INDEX 3/1/2019   Count 10   Sum 29   Oswestry Score (%) 58   Some recent data might be hidden            Neck Disability Index (NDI) Questionnaire    Neck Disability Index (NDI) 8/6/2021   Neck Disability Index: Count 10   NDI: Total Score = SUM (points for all 10 findings) 36   Neck Disability in Percent = (Total Score) / 50 * 100 72 (%)   Some recent data might be hidden                 Physical Exam:   PHYSICAL EXAM:   Constitutional - Patient is healthy, well-nourished and appears stated age   Respiratory - Patient is breathing normally and in no respiratory distress.   Skin - No suspicious rashes or lesions.   Psychiatric - Normal mood and affect.   Cardiovascular - Extremities warm and well perfused.   Eyes - Visual acuity is normal to the written word.   ENT - Hearing intact to the spoken word.   GI - No abdominal distention.   Musculoskeletal - Non-antalgic gait without use of assistive devices.        Cervical spine:    Appearance - Normal . Well healed anterior surgical incision    Palpation - Non-tender to palpation  midline. Tender to palpation of left upper border of trapezius    ROM - Full     Motor -     UPPER EXTREMITY Left Right    strength 5/5 5/5   Finger ab/adduction 4/5 4/5   Wrist flexion 5/5 5/5   Wrist extension 5/5 5/5   Elbow flexion 5/5 5/5   Elbow extension 5/5 5/5   Shoulder abduction 5/5 5/5               Thoracic Spine:    Appearance - Normal     Palpation - Non-tender to palpation     Strength/ROM - deferred            Lumbar Spine:    Appearance - Normal . Localizes pain over right PSIS    Palpation - tender to palpation  quadratus lumborum,  piriformis,  greater trochanters,  Midline. Tender to palpation of right PSIS, non-tender to palpation of left PSIS    ROM - Full     Motor -        LOWER EXTREMITY Left Right   Hip flexion 5/5 5/5   Knee flexion 5/5 5/5   Knee extension 5/5 5/5   Ankle dorsiflexion 5/5 5/5   Ankle plantarflexion 5/5 5/5   Great toe extension 4/5 4/5        Special tests -     Straight leg raise - negative     CLIVE - positive right for PSIS pain     Pain with hip ROM - negative     Neurologic - Sensation intact to light touch bilaterally.    SI provocation tests:    Left right   Jg Finger Test  - +   CLIVE  - +   Thigh Thrust: - + groin pain   Pelvic Compression Test  - +   Palpation  - +   Pelvic Gapping  - -   Gaenslen s Test  - +   Sacral Thrust (SI) - -            Imaging:   I ordered and independently reviewed new radiographs at this clinic visit. The results were discussed with the patient.  Findings include:    5/17/22 XR cervical spine AP/lateral views: C5-7 instrumentation is intact without evidence of loosening, fracture or migration.  Stable appearance of arthritic changes elsewhere in cervical spine.     5/17/2021 MRI lumbar spine without contrast: Multilevel degenerative changes throughout lumbar spine.  L3-4 mild right foraminal and lateral recess stenosis.  L4-5 mild right foraminal stenosis.  L5-S1 mild right foraminal stenosis. no significant spinal canal  stenosis.           Assessment and Plan:   Assessment:  51 year old female :   1.) 7 month  s/p C5-7 ACDF with left ICAG (DOS 10/13/21) with Dr. Lal; progressing as expected.   2.) right low back pain, differential SI joint vs Lumbar spine pathology     Plan:  Reviewed cervical spine XR with patient today which demonstrates stable fusion hardware with stable appearance of known arthritis at other levels in cervical spine.  Expect that the snapping sensation she hears is due to arthritis at these other levels.  We again discussed the benefits of attending physical therapy for her upper extremity and neck strengthening/stretching exercises.  Patient agreeable with this plan.      At the end of the visit, patient mentioned that her low back continues to be very bothersome to her.  Pain is localized to her right PSIS and does not radiate.  SI provocative maneuvers were positive on exam today.  Previous imaging of MRI shows multilevel degenerative changes, but more on the right than the left.  Unfortunately, patient has poor memory of how previous lumbar FATUMA's have helped her.  Patient's reportedly has been told she has Lesley-Danlos so could have SI joint hypermobility. Multilevel mild arthritic findings on MRI without significant stenosis. Discussed we can try starting again with nonoperative management of her low back pain. Start physical therapy for both SI and lumbar/glute/core strengthening.  Additionally, I can order a SI joint injection to see if this relieves her usual pain.  Highly advised patient to keep a pain diary due to her difficulties with memory.  - lumbar MRI w/o contrast  - Ordered CT-guided SI joint injection left. If not helpful, consider lumbar FATUMA after updated MRI   - Physical therapy for Cervical spine, lumbar spine, and SI joint strengthening & therapies.   - Follow up after imaging, injection, and physical therapy with Dr. Lal to discuss treatment options of right low back  pain.    Addendum: wrote patient work note with restrictions. Patient says she is physically unable to lift more than 10lbs. 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 Provided note saying she can do desk/ low lifting duties.    Respectfully,  Rose Cardoso (lisa Pitts), LINA  Orthopaedic Spine Surgery  Dept Orthopaedic Surgery, [facility]  [facility] Phone: [phone]    30 minutes spent on the date of the encounter doing chart review, review of outside records, review of test results, my own interpretation of tests, documentation, patient history, physical exam & discussion of plan with patient and family.    Dictation Disclaimer: Some of this Note has been completed with voice-recognition dictation software. Although errors are generally corrected real-time, there is the potential for a rare error to be present in the completed chart.

## 2022-05-17 ENCOUNTER — OFFICE VISIT (OUTPATIENT)
Dept: ORTHOPEDICS | Facility: CLINIC | Age: 52
End: 2022-05-17
Payer: COMMERCIAL

## 2022-05-17 ENCOUNTER — ANCILLARY PROCEDURE (OUTPATIENT)
Dept: GENERAL RADIOLOGY | Facility: CLINIC | Age: 52
End: 2022-05-17
Attending: ORTHOPAEDIC SURGERY
Payer: COMMERCIAL

## 2022-05-17 DIAGNOSIS — M46.1 SACROILIITIS (H): ICD-10-CM

## 2022-05-17 DIAGNOSIS — M54.41 CHRONIC MIDLINE LOW BACK PAIN WITH RIGHT-SIDED SCIATICA: ICD-10-CM

## 2022-05-17 DIAGNOSIS — G89.29 CHRONIC LEFT-SIDED LOW BACK PAIN WITHOUT SCIATICA: ICD-10-CM

## 2022-05-17 DIAGNOSIS — G89.29 CHRONIC MIDLINE LOW BACK PAIN WITH RIGHT-SIDED SCIATICA: ICD-10-CM

## 2022-05-17 DIAGNOSIS — Z98.1 S/P CERVICAL SPINAL FUSION: Primary | ICD-10-CM

## 2022-05-17 DIAGNOSIS — M54.50 CHRONIC LEFT-SIDED LOW BACK PAIN WITHOUT SCIATICA: ICD-10-CM

## 2022-05-17 PROCEDURE — 72040 X-RAY EXAM NECK SPINE 2-3 VW: CPT | Mod: GC | Performed by: RADIOLOGY

## 2022-05-17 PROCEDURE — 99214 OFFICE O/P EST MOD 30 MIN: CPT | Performed by: PHYSICIAN ASSISTANT

## 2022-05-17 NOTE — LETTER
Return to Work  May 17, 2022     Seen today: yes    Patient:  Isabela Panchal  :   1970  MRN:     [id number]  Physician: WILIAM CARDOSO GOGO Panchal may return to work on Date: 22.      The next clinic appointment is scheduled for (date/time) 2022.    Patient limitations:  Patient able to work in seated / desk job capacity. Lifting limit of 20 pounds. Will reassess at next visit ability to return to work.      Electronically signed by Wiliam Cardoso PA-C

## 2022-05-17 NOTE — LETTER
Return to Work  May 20, 2022     Seen today: yes    Patient:  Isabela Panchal  :   1970  MRN:     [id number]  Physician: WILIAM CARDOSO GOGO Panchal may return to work on Date: 22.      The next clinic appointment is scheduled for (date/time) 22.    Patient limitations:  Patient able to work in seated / desk job capacity. Lifting limit of 10 pounds. Will reassess at next visit ability to return to work.         Electronically signed by Wiliam Cardoso PA-C

## 2022-05-17 NOTE — LETTER
5/17/2022         RE: Isabela Panchal  [address]  [facility] [address]        Dear Colleague,    Thank you for referring your patient, Isabela Panchal, to the [facility] [place]. Please see a copy of my visit note below.    I have reviewed and updated the patient's Past Medical History, Social History, Family History and Medication List.    ALLERGIES  Benzoin, Adhesive tape, Allegra [fexofenadine], Cucumber extract, Fexofenadine hydrochloride, Ibuprofen, Lexapro, No clinical screening - see comments, Peanuts [nuts], and Seasonal allergies    Spine Surgery Follow Up    REFERRING PHYSICIAN: Referred Self   PRIMARY CARE PHYSICIAN: Felisha Briggs           Chief Complaint:   No chief complaint on file.      History of Present Illness:  Symptom Profile Including: location of symptoms, onset, severity, exacerbating/alleviating factors, previous treatments:        Isabela Panchal is a 51 year old female who presents today for routine 7 month follow up s/p C5-7 ACDF with left ICAG (DOS 10/13/21) with Dr. Lal. Initial post op course complicated post-op by superficial wound dehiscence of L ICAG site; now fully healed left iliac crest site wound. Patient previously seen for routine 3-month follow-up on 2/15/2022 at which time she was doing well.  Recommended formal physical therapy for upper extremity strengthening and postoperative rehab and stretching.   Today, patient says she has been doing well overall in regards to her neck.  She occasionally will hear a snap/crack when she moves her head from side to side.  This occurred a few days ago when she is turning her head and now she has some left posterior neck/upper trapezius soreness.  Also admits to occasional swallowing issues but overall is improved compared to post-op.  Patient says that she does not think she did physical therapy despite having spoken to her about this previously.  Patient admittedly reports  that she struggles with memory loss issues.  She does want to try physical therapy for her chronic neck pain.    Prior to leaving clinic today, patient brought up that she continues to struggle with low back pain.  Pain is localized over right PSIS.  Pain does not radiate into the legs.  Pain is worse with certain movements.  She says if she does gaenslen-like maneuver while laying on a bed, this can provide relief of right low back pain.  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   She thinks she might have had SI joint injections but cannot recall if they were efficacious or not.  Per chart review it looks like she has had a few lumbar FATUMA's in 2020 and 2021, but patient cannot recall if these were helpful or not.     Injections (per chart review):  1/29/2020 right interlaminar L5-S1 FATUMA: Per report pain from 9/10 pre-procedure to 4-5/10 postprocedure    6/18/2021 right interlaminar L4-5 FATUMA: Per report, initial therapeutic response to injection is rated no initial relief of pain symptoms.      GRAEME Scores:  Oswestry (GRAEME) Questionnaire    OSWESTRY DISABILITY INDEX 3/1/2019   Count 10   Sum 29   Oswestry Score (%) 58   Some recent data might be hidden            Neck Disability Index (NDI) Questionnaire    Neck Disability Index (NDI) 8/6/2021   Neck Disability Index: Count 10   NDI: Total Score = SUM (points for all 10 findings) 36   Neck Disability in Percent = (Total Score) / 50 * 100 72 (%)   Some recent data might be hidden                 Physical Exam:   PHYSICAL EXAM:   Constitutional - Patient is healthy, well-nourished and appears stated age   Respiratory - Patient is breathing normally and in no respiratory distress.   Skin - No suspicious rashes or lesions.   Psychiatric - Normal mood and affect.   Cardiovascular - Extremities warm and well perfused.   Eyes - Visual acuity is normal to the written  word.   ENT - Hearing intact to the spoken word.   GI - No abdominal distention.   Musculoskeletal - Non-antalgic gait without use of assistive devices.        Cervical spine:    Appearance - Normal . Well healed anterior surgical incision    Palpation - Non-tender to palpation midline. Tender to palpation of left upper border of trapezius    ROM - Full     Motor -     UPPER EXTREMITY Left Right    strength 5/5 5/5   Finger ab/adduction 4/5 4/5   Wrist flexion 5/5 5/5   Wrist extension 5/5 5/5   Elbow flexion 5/5 5/5   Elbow extension 5/5 5/5   Shoulder abduction 5/5 5/5               Thoracic Spine:    Appearance - Normal     Palpation - Non-tender to palpation     Strength/ROM - deferred            Lumbar Spine:    Appearance - Normal . Localizes pain over right PSIS    Palpation - tender to palpation  quadratus lumborum,  piriformis,  greater trochanters,  Midline. Tender to palpation of right PSIS, non-tender to palpation of left PSIS    ROM - Full     Motor -        LOWER EXTREMITY Left Right   Hip flexion 5/5 5/5   Knee flexion 5/5 5/5   Knee extension 5/5 5/5   Ankle dorsiflexion 5/5 5/5   Ankle plantarflexion 5/5 5/5   Great toe extension 4/5 4/5        Special tests -     Straight leg raise - negative     CLIVE - positive right for PSIS pain     Pain with hip ROM - negative     Neurologic - Sensation intact to light touch bilaterally.    SI provocation tests:    Left right   Jg Finger Test  - +   CLIVE  - +   Thigh Thrust: - + groin pain   Pelvic Compression Test  - +   Palpation  - +   Pelvic Gapping  - -   Gaenslen s Test  - +   Sacral Thrust (SI) - -            Imaging:   I ordered and independently reviewed new radiographs at this clinic visit. The results were discussed with the patient.  Findings include:    5/17/22 XR cervical spine AP/lateral views: C5-7 instrumentation is intact without evidence of loosening, fracture or migration.  Stable appearance of arthritic changes elsewhere in  cervical spine.     5/17/2021 MRI lumbar spine without contrast: Multilevel degenerative changes throughout lumbar spine.  L3-4 mild right foraminal and lateral recess stenosis.  L4-5 mild right foraminal stenosis.  L5-S1 mild right foraminal stenosis. no significant spinal canal stenosis.           Assessment and Plan:   Assessment:  51 year old female :   1.) 7 month  s/p C5-7 ACDF with left ICAG (DOS 10/13/21) with Dr. Lal; progressing as expected.   2.) right low back pain, differential SI joint vs Lumbar spine pathology     Plan:  Reviewed cervical spine XR with patient today which demonstrates stable fusion hardware with stable appearance of known arthritis at other levels in cervical spine.  Expect that the snapping sensation she hears is due to arthritis at these other levels.  We again discussed the benefits of attending physical therapy for her upper extremity and neck strengthening/stretching exercises.  Patient agreeable with this plan.      At the end of the visit, patient mentioned that her low back continues to be very bothersome to her.  Pain is localized to her right PSIS and does not radiate.  SI provocative maneuvers were positive on exam today.  Previous imaging of MRI shows multilevel degenerative changes, but more on the right than the left.  Unfortunately, patient has poor memory of how previous lumbar FATUMA's have helped her.  Patient's reportedly has been told she has Lesley-Danlos so could have SI joint hypermobility. Multilevel mild arthritic findings on MRI without significant stenosis. Discussed we can try starting again with nonoperative management of her low back pain. Start physical therapy for both SI and lumbar/glute/core strengthening.  Additionally, I can order a SI joint injection to see if this relieves her usual pain.  Highly advised patient to keep a pain diary due to her difficulties with memory.  - lumbar MRI w/o contrast  - Ordered CT-guided SI joint injection left. If not  helpful, consider lumbar FATUMA after updated MRI   - Physical therapy for Cervical spine, lumbar spine, and SI joint strengthening & therapies.   - Follow up after imaging, injection, and physical therapy with Dr. Lal to discuss treatment options of right low back pain.    Addendum: wrote patient work note with restrictions. Patient says she is physically unable to lift more than 10lbs. 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 Provided note saying she can do desk/ low lifting duties.    Respectfully,  Rose Cardoso (lisa Pitts), PAMICHELLE  Orthopaedic Spine Surgery  Dept Orthopaedic Surgery, [facility]  [facility] Phone: [phone]    30 minutes spent on the date of the encounter doing chart review, review of outside records, review of test results, my own interpretation of tests, documentation, patient history, physical exam & discussion of plan with patient and family.    Dictation Disclaimer: Some of this Note has been completed with voice-recognition dictation software. Although errors are generally corrected real-time, there is the potential for a rare error to be present in the completed chart.

## 2022-05-26 ENCOUNTER — HOSPITAL ENCOUNTER (OUTPATIENT)
Dept: CT IMAGING | Facility: CLINIC | Age: 52
Discharge: HOME | End: 2022-05-26
Attending: PHYSICIAN ASSISTANT | Admitting: PHYSICIAN ASSISTANT
Payer: COMMERCIAL

## 2022-05-26 VITALS — HEART RATE: 93 BPM | DIASTOLIC BLOOD PRESSURE: 64 MMHG | SYSTOLIC BLOOD PRESSURE: 115 MMHG | RESPIRATION RATE: 95 BRPM

## 2022-05-26 DIAGNOSIS — M46.1 SACROILIITIS (H): ICD-10-CM

## 2022-05-26 PROCEDURE — 250N000009 HC RX 250: Performed by: PHYSICIAN ASSISTANT

## 2022-05-26 PROCEDURE — 250N000011 HC RX IP 250 OP 636: Performed by: PHYSICIAN ASSISTANT

## 2022-05-26 PROCEDURE — 272N000431 CT SACROILIAC THERAPEUTIC JOINT INJECTION

## 2022-05-26 RX ORDER — LIDOCAINE HYDROCHLORIDE 10 MG/ML
30 INJECTION, SOLUTION EPIDURAL; INFILTRATION; INTRACAUDAL; PERINEURAL ONCE
Status: COMPLETED | OUTPATIENT
Start: 2022-05-26 | End: 2022-05-26

## 2022-05-26 RX ORDER — BUPIVACAINE HYDROCHLORIDE 5 MG/ML
2 INJECTION, SOLUTION EPIDURAL; INTRACAUDAL ONCE
Status: COMPLETED | OUTPATIENT
Start: 2022-05-26 | End: 2022-05-26

## 2022-05-26 RX ORDER — TRIAMCINOLONE ACETONIDE 40 MG/ML
40 INJECTION, SUSPENSION INTRA-ARTICULAR; INTRAMUSCULAR ONCE
Status: COMPLETED | OUTPATIENT
Start: 2022-05-26 | End: 2022-05-26

## 2022-05-26 RX ADMIN — TRIAMCINOLONE ACETONIDE 40 MG: 40 INJECTION, SUSPENSION INTRA-ARTICULAR; INTRAMUSCULAR at 13:18

## 2022-05-26 RX ADMIN — LIDOCAINE HYDROCHLORIDE 8 ML: 10 INJECTION, SOLUTION EPIDURAL; INFILTRATION; INTRACAUDAL; PERINEURAL at 13:25

## 2022-05-26 RX ADMIN — BUPIVACAINE HYDROCHLORIDE 2 ML: 5 INJECTION, SOLUTION EPIDURAL; INTRACAUDAL; PERINEURAL at 13:18

## 2022-05-26 NOTE — DISCHARGE INSTRUCTIONS
Orthopedic Discharge Instructions:   After Your Injection or Aspiration  ________________________________________    Patient Name:  Isabela Panchal  Today's Date:  May 26, 2022  The doctor who performed your Joint Injection was Haider Casas at [facility]) in the  Other CT Department  Care of needle site  If you have new numbness down your leg, this may last up to 6 hours, but it should go away. You may need help with walking until your leg feels normal.   Over the next 24 to 48 hours, pain at the needle site may increase before it gets better.   For the next 48 hours, use ice packs for 15 minutes, three to four times a day for pain.  If you have a bandage, you may remove it the next morning.   No tub baths, hot tubs or swimming for 48 hours. You may shower the next day.   Activity  Do not drive until morning.   Limit your activity based on your pain level. Follow your doctor s orders for activity.   You may eat a normal diet.   If you had sedation,   - You may feel sleepy, forgetful or unsteady.   - Do not drink alcohol for 24 hours.  Medicines  If you take aspirin or platelet inhibitors, you can restart them tomorrow.   Restart all other medicines today at your regular dose, including Coumadin (warfarin).  If you are restarting Coumadin, talk to your doctor about having your INR checked.   If you had a steroid shot   The medicine should help reduce swelling and pain. This may take from 7 to 10 days.   Side effects from the shot will be mild and go away in 2 to 3 days. Common side effects may include:  -  Insomnia (trouble sleeping).  -  Heartburn.  -  Flushed face.  -  Water retention (bloating or fluid build-up).  -  Increased appetite (feeling more hungry than usual).  -  Increased blood sugar.  If you have diabetes, watch your blood sugar closely. If needed, call your doctor to help you control your blood sugar.  Some patients will get lasting relief from a single shot. Others may require up to three  shots to get results. If you have more than one steroid shot, they should be given two weeks apart.  Some patients do not have relief of symptoms.    Follow-up:  No follow-up needed     Call your doctor or go to the Emergency Room if you have severe pain, fever or problems with bowel or bladder control.

## 2022-05-31 DIAGNOSIS — J45.20 INTERMITTENT ASTHMA, UNCOMPLICATED: ICD-10-CM

## 2022-06-02 RX ORDER — ALBUTEROL SULFATE 90 UG/1
AEROSOL, METERED RESPIRATORY (INHALATION)
Qty: 8.5 G | Refills: 0 | Status: SHIPPED | OUTPATIENT
Start: 2022-06-02

## 2022-06-13 ENCOUNTER — PATIENT OUTREACH (OUTPATIENT)
Dept: CARE COORDINATION | Facility: CLINIC | Age: 52
End: 2022-06-13
Payer: COMMERCIAL

## 2022-06-13 NOTE — PROGRESS NOTES
Clinic Care Coordination Contact  [facility]/Voicemail       Clinical Data: Care Coordinator Outreach  Outreach attempted x 1.  Left message on patient's voicemail with call back information and requested return call.  Plan: Care Coordinator will try to reach patient again in 10 business days.    GAVIN Celaya   [facility] - Clinic Care Coordination  [phone]

## 2022-06-24 DIAGNOSIS — F33.1 MODERATE RECURRENT MAJOR DEPRESSION (H): ICD-10-CM

## 2022-06-24 DIAGNOSIS — F41.1 GENERALIZED ANXIETY DISORDER: ICD-10-CM

## 2022-06-27 ENCOUNTER — OFFICE VISIT (OUTPATIENT)
Dept: ORTHOPEDICS | Facility: CLINIC | Age: 52
End: 2022-06-27
Payer: COMMERCIAL

## 2022-06-27 VITALS — BODY MASS INDEX: 29.49 KG/M2 | HEIGHT: 65 IN | WEIGHT: 177 LBS

## 2022-06-27 DIAGNOSIS — G89.29 CHRONIC SI JOINT PAIN: Primary | ICD-10-CM

## 2022-06-27 DIAGNOSIS — M53.3 CHRONIC SI JOINT PAIN: Primary | ICD-10-CM

## 2022-06-27 PROCEDURE — 99214 OFFICE O/P EST MOD 30 MIN: CPT | Mod: GC | Performed by: PHYSICIAN ASSISTANT

## 2022-06-27 RX ORDER — VENLAFAXINE HYDROCHLORIDE 150 MG/1
CAPSULE, EXTENDED RELEASE ORAL
Qty: 90 CAPSULE | Refills: 1 | Status: SHIPPED | OUTPATIENT
Start: 2022-06-27 | End: 2022-11-11

## 2022-06-27 NOTE — LETTER
6/27/2022         RE: Isabela Panchal  [address]  [facility] [address]        Dear Colleague,    Thank you for referring your patient, Isabela Panchal, to the [facility] [place]. Please see a copy of my visit note below.    Spine Surgery Return Clinic Visit      Chief Complaint:   RECHECK (Follow up on injection CT guided SI joint injection on 5/26/22)      Interval HPI:     Isabela Panchal is a 51 year old female who presents today for pain relief after CT-guided SI injection    She notes that she had 100% relief of symptoms after her injection.  Notes that she has bilateral SI joint pain.  Notes that she has occasional radicular symptoms that goes down the posterior aspect of her left thigh.  She notes that symptoms do not go below the knee.  Denies any recent physical therapy.  She notes that she has had injections in the past, fluoroscopy guided, that have not given this much relief in comparison to her CT-guided injection.  She notes that her pain at this time is tolerable, 1 at the 10.  She denies any difficulty sitting.     She notes that she has occasional radicular numbness to her left deltoid.  She notes that overall she is pleased with her neck fusion.  Has no concerns regarding her neck at this time.           Past Medical History:     Past Medical History:   Diagnosis Date     Abnormal Pap smear of cervix 2019    see problem list     Allergic rhinitis due to allergen      Anemia      Breathing difficulty      Cervical high risk HPV (human papillomavirus) test positive 02/13/2018, 2019    type 16; see problem list     Cervical radiculopathy 08/17/2021     Chest pain      Generalised anxiety disorder 09/06/2012     Headache      Heart trouble      High blood triglycerides 02/26/2016     History of blood transfusion     unsure     Hoarseness      Hypertension      Hypoglycemia 03/10/2013     Impaired fasting glucose 08/16/2014     Insomnia 09/06/2012      Irritable bowel syndrome      Itchy eyes      MEDICAL HISTORY OF -     hx of right wrist dislocation     Migraine      Mild intermittent asthma     allergy or URI triggered      Moderate recurrent major depression (H) 09/06/2012     Nasal congestion      Numbness      Obesity, unspecified (OBESE) 08/21/2014     Paroxysmal supraventricular tachycardia (H)     4/00     PONV (postoperative nausea and vomiting)      Ringing in ears      Sleep difficulties      Sneezing      Sore throat      Vertigo      Weakness      Weight gain             Past Surgical History:     Past Surgical History:   Procedure Laterality Date     COLONOSCOPY  4/26/2013    Procedure: COLONOSCOPY;;  Surgeon: Jim Humphries MD;  Location: [facility]     DECOMPRESSION, FUSION CERVICAL ANTERIOR TWO LEVELS, COMBINED N/A 10/13/2021    Procedure: Cervical 5 to 7 anterior cervical decompression and fusion with medtronic plate and screws, interbody device, use of fluoroscopy, microscope,;  Surgeon: Shaw Lal MD;  Location: [facility] OR     ENT SURGERY      deviated septum     GRAFT BONE FROM ILIAC CREST Left 10/13/2021    Procedure: and left sided iliac crest autograft;  Surgeon: Shaw Lal MD;  Location: [facility] OR     HEAD & NECK SURGERY       LAPAROSCOPIC SALPINGO-OOPHORECTOMY  1/20/2014    Procedure: LAPAROSCOPIC SALPINGO-OOPHORECTOMY;  Laparoscopic Left Salpingo Oophorectomy ;  Surgeon: Chani Del Rosario MD;  Location: [facility] OR     LUMPECTOMY BREAST      right     ORTHOPEDIC SURGERY      knee     ORTHOPEDIC SURGERY      foot     SOFT TISSUE SURGERY      lymphoma face and armpit     SOFT TISSUE SURGERY       ZZC NONSPECIFIC PROCEDURE      Plastic repair of facial lac     ZZC NONSPECIFIC PROCEDURE      Lipoma removed from arm close to the tail of te breast     ZZC NONSPECIFIC PROCEDURE      Plastic repair of facial lac     ZZC NONSPECIFIC PROCEDURE      Knee surgery     ZZC NONSPECIFIC PROCEDURE      Miscarriage x 2          "   Social History:     Social History     Tobacco Use     Smoking status: Never Smoker     Smokeless tobacco: Never Used   Substance Use Topics     Alcohol use: Not Currently            Family History:     Family History   Problem Relation Age of Onset     Alzheimer Disease Maternal Grandfather      Arthritis Maternal Grandmother      Heart Disease Maternal Grandmother      Heart Failure Maternal Grandmother      Thyroid Disease Mother      Allergy (Severe) Father             Allergies:     Allergies   Allergen Reactions     Benzoin Rash     Adhesive Tape      blistering     Allegra [Fexofenadine]      Kidney pain     Cucumber Extract      Throat and ears itchy and throat feels \"icky\"     Fexofenadine Hydrochloride      allegra - low back pain     Ibuprofen      palpitations     Lexapro      Wt gain     No Clinical Screening - See Comments      Ramon      Itchy ears and throat, throat feel \"icky\"     Peanuts [Nuts]      Itchy ears and throat, throat feel \"icky\"     Seasonal Allergies             Medications:     Current Outpatient Medications   Medication     atorvastatin (LIPITOR) 40 MG tablet     cholecalciferol (VITAMIN D3) 125 mcg (5000 units) capsule     fenofibrate (TRICOR) 48 MG tablet     fluticasone (FLONASE) 50 MCG/ACT nasal spray     hydrOXYzine (ATARAX) 25 MG tablet     lisinopril (ZESTRIL) 10 MG tablet     methocarbamol (ROBAXIN) 750 MG tablet     metroNIDAZOLE (METROCREAM) 0.75 % external cream     MIRENA 20 MCG/24HR IU IUD     montelukast (SINGULAIR) 10 MG tablet     PROAIR  (90 Base) MCG/ACT inhaler     RESTASIS 0.05 % ophthalmic emulsion     senna-docusate (SENEXON-S) 8.6-50 MG tablet     tiZANidine (ZANAFLEX) 4 MG tablet     venlafaxine (EFFEXOR XR) 150 MG 24 hr capsule     No current facility-administered medications for this visit.             Physical Exam:     Vitals: Ht 1.651 m (5' 5\")   Wt 80.3 kg (177 lb)   BMI 29.45 kg/m      Constitutional: Patient is healthy, well-nourished " and appears stated age.    Respiratory: Patient is breathing normally and in no respiratory distress.    Skin: No suspicious rashes or lesions. Incision well-healed    Gait: Non-antalgic gait without use of assistive devices. Able to tandem gait without difficulty.     Neurologic - Sensation intact to light touch bilaterally. Romberg negative.     Musculoskeletal: Strength: Spontaneously moving bilateral upper extremity on proximal and distal aspect, 5 out of 5 bilateral hip flexion knee extension dorsiflexion plantarflexion    Spine: overall good sagittal balance   o Lumbosacral Spine:  - Normal lordosis  - Tender to palpation of bilateral SI joint.  Tenderness to palpation of right piriformis muscle with positive right piriformis stretch,   - ROM - Full, no pain        SI joint exam:       Right   Left     Jg Finger Test    +   -     CLIVE    +   -     Thigh Thrust:   -   -     Pelvic Compression Test    -   -     Palpation    -   -     Pelvic Gapping    -   -     Gaenslen s Test    -   -     Sacral Thrust (SI)   -   -              Imaging:   We independently reviewed and interpreted the following imaging at this clinic visit which were also reviewed with patient    CT-guided right SI joint injection.    Needle noted to be in right SI joint without any evidence of extravasation     Assessment:     51 year old female with status post C5-7 ACDF, bilateral sacroiliitis, right piriformis syndrome        Plan:     Brian is experiencing right greater than left sacroiliitis, and right piriformis syndrome.  She was educated on piriformis stretch.  She notes that her injection has improved her symptoms.  She notes that injection had 100% pain relief.  We will plan on sending her for bilateral SI joint physical therapy and piriformis exercises.  She was educated that we will plan on having her follow-up as needed with symptoms worsening or changing.  She wants to avoid surgery at this time.  Was educated that surgery  would be the last option and that we will plan on exhausting all of her conservative therapies.    -SI joint physical therapy  -Physical therapy for right piriformis syndrome  -Follow-up as needed     Plan formulated with Dr. Lal who also saw the patient and reviewed imaging. We used the patient's imaging and models to explain their pathophysiology and treatment options. All the patient's questions were answered to their satisfaction.     Thank you for allowing me to be a part of this patient's care.     DIANE Foley-C   Orthopedic Spine Surgery    I, Shaw Lal MD, saw and evaluated Isabela Panchal  1970, as a part of a shared APRN/PA visit.  I have reviewed and discussed with the advanced practice provider their history, physical and plan.    I have personally reviewed the imaging, history, and physical exam.    I personally provided substantive care for this patient, including personally interpreting the imaging.  In addition, I formulated the entire plan noted above and dictated this to the PA/APRN so they could document it in the note. The JASBIR is acting as a Scribe for this note. The plan represents my own Medical Decision making, which I determined in its entirety.      Shaw Lal MD

## 2022-06-27 NOTE — TELEPHONE ENCOUNTER
Routing refill request to provider for review/approval because:  --Last PHQ-9 >4.    --Last visit:  4/15/2022 Doroteo CASEY.    --Future Visit:   Next 5 appointments (look out 90 days)    Sep 08, 2022  1:00 PM  Pharmacist Visit with Quentin Figueroa RPH  [facility] ([facility] ) [address] [place] [address] [phone]        PHQ 4/16/2021 5/25/2021 4/15/2022   PHQ-9 Total Score 19 6 5   Q9: Thoughts of better off dead/self-harm past 2 weeks Several days Not at all Not at all   F/U: Thoughts of suicide or self-harm - - -   F/U: Self harm-plan - - -   F/U: Self-harm action - - -   F/U: Safety concerns - - -

## 2022-06-27 NOTE — PROGRESS NOTES
Spine Surgery Return Clinic Visit      Chief Complaint:   RECHECK (Follow up on injection CT guided SI joint injection on 5/26/22)      Interval HPI:     Isabela Panchal is a 51 year old female who presents today for pain relief after CT-guided SI injection    She notes that she had 100% relief of symptoms after her injection.  Notes that she has bilateral SI joint pain.  Notes that she has occasional radicular symptoms that goes down the posterior aspect of her left thigh.  She notes that symptoms do not go below the knee.  Denies any recent physical therapy.  She notes that she has had injections in the past, fluoroscopy guided, that have not given this much relief in comparison to her CT-guided injection.  She notes that her pain at this time is tolerable, 1 at the 10.  She denies any difficulty sitting.     She notes that she has occasional radicular numbness to her left deltoid.  She notes that overall she is pleased with her neck fusion.  Has no concerns regarding her neck at this time.           Past Medical History:     Past Medical History:   Diagnosis Date     Abnormal Pap smear of cervix 2019    see problem list     Allergic rhinitis due to allergen      Anemia      Breathing difficulty      Cervical high risk HPV (human papillomavirus) test positive 02/13/2018, 2019    type 16; see problem list     Cervical radiculopathy 08/17/2021     Chest pain      Generalised anxiety disorder 09/06/2012     Headache      Heart trouble      High blood triglycerides 02/26/2016     History of blood transfusion     unsure     Hoarseness      Hypertension      Hypoglycemia 03/10/2013     Impaired fasting glucose 08/16/2014     Insomnia 09/06/2012     Irritable bowel syndrome      Itchy eyes      MEDICAL HISTORY OF -     hx of right wrist dislocation     Migraine      Mild intermittent asthma     allergy or URI triggered      Moderate recurrent major depression (H) 09/06/2012     Nasal congestion      Numbness       Obesity, unspecified (OBESE) 08/21/2014     Paroxysmal supraventricular tachycardia (H)     4/00     PONV (postoperative nausea and vomiting)      Ringing in ears      Sleep difficulties      Sneezing      Sore throat      Vertigo      Weakness      Weight gain             Past Surgical History:     Past Surgical History:   Procedure Laterality Date     COLONOSCOPY  4/26/2013    Procedure: COLONOSCOPY;;  Surgeon: Jim Humphries MD;  Location: [facility]     DECOMPRESSION, FUSION CERVICAL ANTERIOR TWO LEVELS, COMBINED N/A 10/13/2021    Procedure: Cervical 5 to 7 anterior cervical decompression and fusion with medtronic plate and screws, interbody device, use of fluoroscopy, microscope,;  Surgeon: Shaw Lal MD;  Location: [facility]     ENT SURGERY      deviated septum     GRAFT BONE FROM ILIAC CREST Left 10/13/2021    Procedure: and left sided iliac crest autograft;  Surgeon: Shaw Lal MD;  Location: [facility]     HEAD & NECK SURGERY       LAPAROSCOPIC SALPINGO-OOPHORECTOMY  1/20/2014    Procedure: LAPAROSCOPIC SALPINGO-OOPHORECTOMY;  Laparoscopic Left Salpingo Oophorectomy ;  Surgeon: Chani Del Rosario MD;  Location: [facility]     LUMPECTOMY BREAST      right     ORTHOPEDIC SURGERY      knee     ORTHOPEDIC SURGERY      foot     SOFT TISSUE SURGERY      lymphoma face and armpit     SOFT TISSUE SURGERY       ZZC NONSPECIFIC PROCEDURE      Plastic repair of facial lac     ZZC NONSPECIFIC PROCEDURE      Lipoma removed from arm close to the tail of te breast     ZZC NONSPECIFIC PROCEDURE      Plastic repair of facial lac     ZZC NONSPECIFIC PROCEDURE      Knee surgery     ZZC NONSPECIFIC PROCEDURE      Miscarriage x 2            Social History:     Social History     Tobacco Use     Smoking status: Never Smoker     Smokeless tobacco: Never Used   Substance Use Topics     Alcohol use: Not Currently            Family History:     Family History   Problem Relation Age of Onset     Alzheimer  "Disease Maternal Grandfather      Arthritis Maternal Grandmother      Heart Disease Maternal Grandmother      Heart Failure Maternal Grandmother      Thyroid Disease Mother      Allergy (Severe) Father             Allergies:     Allergies   Allergen Reactions     Benzoin Rash     Adhesive Tape      blistering     Allegra [Fexofenadine]      Kidney pain     Cucumber Extract      Throat and ears itchy and throat feels \"icky\"     Fexofenadine Hydrochloride      allegra - low back pain     Ibuprofen      palpitations     Lexapro      Wt gain     No Clinical Screening - See Comments      Ramon      Itchy ears and throat, throat feel \"icky\"     Peanuts [Nuts]      Itchy ears and throat, throat feel \"icky\"     Seasonal Allergies             Medications:     Current Outpatient Medications   Medication     atorvastatin (LIPITOR) 40 MG tablet     cholecalciferol (VITAMIN D3) 125 mcg (5000 units) capsule     fenofibrate (TRICOR) 48 MG tablet     fluticasone (FLONASE) 50 MCG/ACT nasal spray     hydrOXYzine (ATARAX) 25 MG tablet     lisinopril (ZESTRIL) 10 MG tablet     methocarbamol (ROBAXIN) 750 MG tablet     metroNIDAZOLE (METROCREAM) 0.75 % external cream     MIRENA 20 MCG/24HR IU IUD     montelukast (SINGULAIR) 10 MG tablet     PROAIR  (90 Base) MCG/ACT inhaler     RESTASIS 0.05 % ophthalmic emulsion     senna-docusate (SENEXON-S) 8.6-50 MG tablet     tiZANidine (ZANAFLEX) 4 MG tablet     venlafaxine (EFFEXOR XR) 150 MG 24 hr capsule     No current facility-administered medications for this visit.             Physical Exam:     Vitals: Ht 1.651 m (5' 5\")   Wt 80.3 kg (177 lb)   BMI 29.45 kg/m      Constitutional: Patient is healthy, well-nourished and appears stated age.    Respiratory: Patient is breathing normally and in no respiratory distress.    Skin: No suspicious rashes or lesions. Incision well-healed    Gait: Non-antalgic gait without use of assistive devices. Able to tandem gait without difficulty. "     Neurologic - Sensation intact to light touch bilaterally. Romberg negative.     Musculoskeletal: Strength: Spontaneously moving bilateral upper extremity on proximal and distal aspect, 5 out of 5 bilateral hip flexion knee extension dorsiflexion plantarflexion    Spine: overall good sagittal balance   o Lumbosacral Spine:  - Normal lordosis  - Tender to palpation of bilateral SI joint.  Tenderness to palpation of right piriformis muscle with positive right piriformis stretch,   - ROM - Full, no pain        SI joint exam:       Right   Left     Jg Finger Test    +   -     CLIVE    +   -     Thigh Thrust:   -   -     Pelvic Compression Test    -   -     Palpation    -   -     Pelvic Gapping    -   -     Gaenslen s Test    -   -     Sacral Thrust (SI)   -   -              Imaging:   We independently reviewed and interpreted the following imaging at this clinic visit which were also reviewed with patient    CT-guided right SI joint injection.    Needle noted to be in right SI joint without any evidence of extravasation     Assessment:     51 year old female with status post C5-7 ACDF, bilateral sacroiliitis, right piriformis syndrome        Plan:     Brian is experiencing right greater than left sacroiliitis, and right piriformis syndrome.  She was educated on piriformis stretch.  She notes that her injection has improved her symptoms.  She notes that injection had 100% pain relief.  We will plan on sending her for bilateral SI joint physical therapy and piriformis exercises.  She was educated that we will plan on having her follow-up as needed with symptoms worsening or changing.  She wants to avoid surgery at this time.  Was educated that surgery would be the last option and that we will plan on exhausting all of her conservative therapies.    -SI joint physical therapy  -Physical therapy for right piriformis syndrome  -Follow-up as needed     Plan formulated with Dr. Lal who also saw the patient and  reviewed imaging. We used the patient's imaging and models to explain their pathophysiology and treatment options. All the patient's questions were answered to their satisfaction.     Thank you for allowing me to be a part of this patient's care.     DIANE Foley-C   Orthopedic Spine Surgery    I, Shaw Lal MD, saw and evaluated Isabela Panchal  1970, as a part of a shared APRN/PA visit.  I have reviewed and discussed with the advanced practice provider their history, physical and plan.    I have personally reviewed the imaging, history, and physical exam.    I personally provided substantive care for this patient, including personally interpreting the imaging.  In addition, I formulated the entire plan noted above and dictated this to the PA/APRN so they could document it in the note. The JASBIR is acting as a Scribe for this note. The plan represents my own Medical Decision making, which I determined in its entirety.      Shaw Lal MD

## 2022-07-01 ENCOUNTER — PATIENT OUTREACH (OUTPATIENT)
Dept: NURSING | Facility: CLINIC | Age: 52
End: 2022-07-01

## 2022-07-01 DIAGNOSIS — Z98.1 S/P CERVICAL SPINAL FUSION: ICD-10-CM

## 2022-07-01 NOTE — PROGRESS NOTES
Clinic Care Coordination Contact    Follow Up Progress Note      Assessment: [facility] called out to patient to complete outreach. Patient shares she is doing really well. She shares that there has been positive movement in her disability case but did not remember what it was. [facility] assured her this was okay. Patient recommended [facility] reach out to , Renee for more information. Patient shares that she just does what people tell her to. Patient gave verbal permission to [facility] to call out to CM.     Discussed patient's daughter for the remainder of the call.     Care Gaps:    Health Maintenance Due   Topic Date Due     COVID-19 Vaccine (3 - Booster for Moderna series) 11/05/2021       Goals addressed this encounter:    Goals Addressed                    This Visit's Progress       Financial Wellbeing- SSDI (pt-stated)   90%      Goal Statement: I would like help applying and getting approved for Social Security Disability in the next 6 months.   Date Goal set: 10/29/2020  Barriers: Getting denied   Strengths: Asking for help, supportive [facility] JASON   Date to Achieve By: 4/29/2021 // extended 10/20/2021  Patient expressed understanding of goal: yes     Action steps to achieve this goal:  1. [employer] [facility] and I will meet with the Disability Specialists on March 26th to discuss next steps and options with my denial. (Completed, ongoing)  5/13  Pt stated moved up to Federal level pending. [facility] provides support/updates on this.   2. I will attend my first appointment at [facility] on March 30th with Johnson Novak, counselor. (Completed)    5/13 Pt states she connects with monthly telephone visits.  3. I will work with [facility] staff to schedule neuropsychological testing. (Completed)  4. I will give the CHW updates during outreach calls.    Updated: 5/13/2021                  Intervention/Education provided during outreach: [facility] provided active listening during this outreach.     Outreach Frequency: monthly    Plan:   Care  Coordinator will follow up with Renee KOLB in 10 business days.    GAVIN Celaya   [facility] Primary [facility] - Clinic Care Coordination  [phone]

## 2022-07-01 NOTE — TELEPHONE ENCOUNTER
Reason for Call:  Medication or medication refill:    Do you use a [facility]?  Name of the pharmacy and phone number for the current request:  [id number] [place] [address] AT [place] [phone]    Name of the medication requested: hydrOXYzine (ATARAX) 25 MG tablet    Other request: Patient requesting refill on this medication. Please assist. Thanks!    Can we leave a detailed message on this number? YES    Phone number patient can be reached at: Cell number on file:    Telephone Information:   Mobile [phone]     Best Time: Any    Call taken on 7/1/2022 at 12:10 PM by Luci Jaquez

## 2022-07-03 RX ORDER — HYDROXYZINE HYDROCHLORIDE 25 MG/1
25 TABLET, FILM COATED ORAL EVERY 6 HOURS PRN
Qty: 30 TABLET | Refills: 0 | Status: SHIPPED | OUTPATIENT
Start: 2022-07-03 | End: 2022-09-08

## 2022-07-03 NOTE — TELEPHONE ENCOUNTER
Antihistamines Protocol Passed 07/01/2022 12:11 PM   Protocol Details  Recent (12 mo) or future (30 days) visit within the authorizing provider's specialty    Patient is age 3 or older    Medication is active on med list

## 2022-07-15 ENCOUNTER — PATIENT OUTREACH (OUTPATIENT)
Dept: CARE COORDINATION | Facility: CLINIC | Age: 52
End: 2022-07-15

## 2022-07-15 ENCOUNTER — TELEPHONE (OUTPATIENT)
Dept: FAMILY MEDICINE | Facility: CLINIC | Age: 52
End: 2022-07-15

## 2022-07-15 NOTE — PROGRESS NOTES
Clinic Care Coordination Contact  [facility]/Tonya       Clinical Data: Care Coordinator Outreach  Outreach attempted x 1.  Left message on patient's , Renee bright with call back information and requested return call.  Plan: Care Coordinator will try to reach patient again in 10 business days.    GAVIN Celaya   [facility] Primary Care - Clinic Care Coordination  [phone]

## 2022-07-15 NOTE — TELEPHONE ENCOUNTER
Reason for Call:  Form, our goal is to have forms completed with 72 hours, however, some forms may require a visit or additional information.    Type of letter, form or note:  Home Health Certification    Who is the form from?: Home care    Where did the form come from: form was faxed in    What clinic location was the form placed at?: [facility]    Where the form was placed: Dr. Trejo's form folder POD B    What number is listed as a contact on the form?: N/A       Additional comments: Routing to [employer] in [facility]'s absence    Call taken on 7/15/2022 at 10:57 AM by Nellie Gonzalez

## 2022-07-18 ENCOUNTER — OFFICE VISIT (OUTPATIENT)
Dept: URGENT CARE | Facility: URGENT CARE | Age: 52
End: 2022-07-18
Payer: COMMERCIAL

## 2022-07-18 VITALS
HEIGHT: 65 IN | WEIGHT: 161 LBS | SYSTOLIC BLOOD PRESSURE: 106 MMHG | TEMPERATURE: 99.2 F | HEART RATE: 102 BPM | BODY MASS INDEX: 26.82 KG/M2 | OXYGEN SATURATION: 100 % | DIASTOLIC BLOOD PRESSURE: 67 MMHG

## 2022-07-18 DIAGNOSIS — L03.115 CELLULITIS OF RIGHT LOWER EXTREMITY: Primary | ICD-10-CM

## 2022-07-18 PROCEDURE — 99213 OFFICE O/P EST LOW 20 MIN: CPT | Performed by: PHYSICIAN ASSISTANT

## 2022-07-18 ASSESSMENT — ENCOUNTER SYMPTOMS
WOUND: 1
COLOR CHANGE: 1

## 2022-07-18 NOTE — PROGRESS NOTES
SUBJECTIVE:   Isabela Panchal is a 51 year old female presenting with a chief complaint of   Chief Complaint   Patient presents with     Urgent Care     Cellulitis     Pt in clinic to have eval for possible cellulitis of the right leg         She is an established patient of [facility].  Patient presents with complaints of erythematous skin after mosquito bites to right upper posterior leg.  Painful today.  No fevers.  No hx of MRSA.        Review of Systems   Skin: Positive for color change and wound.   All other systems reviewed and are negative.      Past Medical History:   Diagnosis Date     Abnormal Pap smear of cervix 2019    see problem list     Allergic rhinitis due to allergen      Anemia      Breathing difficulty      Cervical high risk HPV (human papillomavirus) test positive 02/13/2018, 2019    type 16; see problem list     Cervical radiculopathy 08/17/2021     Chest pain      Generalised anxiety disorder 09/06/2012     Headache      Heart trouble      High blood triglycerides 02/26/2016     History of blood transfusion     unsure     Hoarseness      Hypertension      Hypoglycemia 03/10/2013     Impaired fasting glucose 08/16/2014     Insomnia 09/06/2012     Irritable bowel syndrome      Itchy eyes      MEDICAL HISTORY OF -     hx of right wrist dislocation     Migraine      Mild intermittent asthma     allergy or URI triggered      Moderate recurrent major depression (H) 09/06/2012     Nasal congestion      Numbness      Obesity, unspecified (OBESE) 08/21/2014     Paroxysmal supraventricular tachycardia (H)     4/00     PONV (postoperative nausea and vomiting)      Ringing in ears      Sleep difficulties      Sneezing      Sore throat      Vertigo      Weakness      Weight gain      Family History   Problem Relation Age of Onset     Alzheimer Disease Maternal Grandfather      Arthritis Maternal Grandmother      Heart Disease Maternal Grandmother      Heart Failure Maternal Grandmother      Thyroid  Disease Mother      Allergy (Severe) Father      Current Outpatient Medications   Medication Sig Dispense Refill     amoxicillin-clavulanate (AUGMENTIN) 875-125 MG tablet Take 1 tablet by mouth 2 times daily for 7 days 14 tablet 0     atorvastatin (LIPITOR) 40 MG tablet Take 1 tablet (40 mg) by mouth daily 90 tablet 2     cholecalciferol (VITAMIN D3) 125 mcg (5000 units) capsule Take 125 mcg by mouth daily       fenofibrate (TRICOR) 48 MG tablet Take 1 tablet (48 mg) by mouth daily 90 tablet 2     fluticasone (FLONASE) 50 MCG/ACT nasal spray Spray 2 sprays into both nostrils daily 16 g 9     hydrOXYzine (ATARAX) 25 MG tablet Take 1 tablet (25 mg) by mouth every 6 hours as needed for itching (and nausea) 30 tablet 0     lisinopril (ZESTRIL) 10 MG tablet Take 1 tablet (10 mg) by mouth daily 90 tablet 3     methocarbamol (ROBAXIN) 750 MG tablet Take 1 tablet (750 mg) by mouth every 6 hours as needed for muscle spasms 30 tablet 0     metroNIDAZOLE (METROCREAM) 0.75 % external cream APPLY TOPICALLY TWO TIMES A DAY 45 g 0     MIRENA 20 MCG/24HR IU IUD USE FOR UP TO 5 YEARS THEN REMOVE       montelukast (SINGULAIR) 10 MG tablet TAKE 1 TABLET(10 MG) BY MOUTH AT BEDTIME 90 tablet 3     PROAIR  (90 Base) MCG/ACT inhaler INHALE 2 PUFFS INTO THE LUNGS EVERY 6 HOURS AS NEEDED FOR SHORTNESS OF BREATH OR DIFFICULT BREATHING OR WHEEZING 8.5 g 0     RESTASIS 0.05 % ophthalmic emulsion Place 1 drop into both eyes daily as needed        senna-docusate (SENEXON-S) 8.6-50 MG tablet Take 1 tablet by mouth daily as needed for constipation 30 tablet 0     tiZANidine (ZANAFLEX) 4 MG tablet Take 1-2 tablets (4-8 mg) by mouth nightly as needed for muscle spasms 45 tablet 1     venlafaxine (EFFEXOR XR) 150 MG 24 hr capsule TAKE 1 CAPSULE BY MOUTH EVERY DAY 90 capsule 1     Social History     Tobacco Use     Smoking status: Never Smoker     Smokeless tobacco: Never Used   Substance Use Topics     Alcohol use: Not Currently  "      OBJECTIVE  /67   Pulse 102   Temp 99.2  F (37.3  C) (Temporal)   Ht 1.651 m (5' 5\")   Wt 73 kg (161 lb)   SpO2 100%   BMI 26.79 kg/m      Physical Exam  Vitals and nursing note reviewed.   Constitutional:       Appearance: Normal appearance. She is normal weight.   Eyes:      Extraocular Movements: Extraocular movements intact.      Conjunctiva/sclera: Conjunctivae normal.   Cardiovascular:      Rate and Rhythm: Tachycardia present.   Skin:     Comments: Right upper leg, posterior, with erythema and warmth.  It surrounds a central scab about 3 cm in diameter.  No streaking   Neurological:      Mental Status: She is alert.         Labs:  No results found. However, due to the size of the patient record, not all encounters were searched. Please check Results Review for a complete set of results.    X-Ray was not done.    ASSESSMENT:      ICD-10-CM    1. Cellulitis of right lower extremity  L03.115 amoxicillin-clavulanate (AUGMENTIN) 875-125 MG tablet        Medical Decision Making:    Differential Diagnosis:  cellulitis    Serious Comorbid Conditions:  Adult:  reviewed    PLAN:    rx for augmentin.  Discussed reasons to seek immediate medical attention.        Followup:    If not improving or if condition worsens, follow up with your Primary Care Provider, If not improving or if conditions worsens over the next 12-24 hours, go to the Emergency Department    There are no Patient Instructions on file for this visit.      "

## 2022-07-26 ENCOUNTER — PATIENT OUTREACH (OUTPATIENT)
Dept: NURSING | Facility: CLINIC | Age: 52
End: 2022-07-26

## 2022-07-26 NOTE — PROGRESS NOTES
Clinic Care Coordination Contact  Care Team Conversations    [facility] reached out to patient's , Renee for an update on goals. Renee shares that patient is doing really well. She shares that patient has had all her important medical appointments. She shares she continues to work with disability specialists to obtain social security for patient. She continues to schedule appointments and transportation for patient. Patient continues to work on her mental health and attends therapy every Tuesday. The remainder of the time was used to discuss patient's daughter.     GAVIN Celaya   [facility] Primary Care - Clinic Care Coordination  [phone]

## 2022-07-28 DIAGNOSIS — I10 ESSENTIAL HYPERTENSION WITH GOAL BLOOD PRESSURE LESS THAN 140/90: ICD-10-CM

## 2022-08-01 RX ORDER — LISINOPRIL 10 MG/1
TABLET ORAL
Qty: 90 TABLET | Refills: 1 | Status: SHIPPED | OUTPATIENT
Start: 2022-08-01 | End: 2023-03-06

## 2022-08-01 NOTE — TELEPHONE ENCOUNTER
ACE Inhibitors (Including Combos) Protocol Passed 07/28/2022 12:28 PM   Protocol Details  Blood pressure under 140/90 in past 12 months    Recent (12 mo) or future (30 days) visit within the authorizing provider's specialty    Medication is active on med list    Patient is age 18 or older    No active pregnancy on record    Normal serum creatinine on file in past 12 months    Normal serum potassium on file in past 12 months    No positive pregnancy test within past 12 months

## 2022-08-11 ENCOUNTER — TELEPHONE (OUTPATIENT)
Dept: FAMILY MEDICINE | Facility: CLINIC | Age: 52
End: 2022-08-11

## 2022-08-11 NOTE — TELEPHONE ENCOUNTER
Forms/Letter Request    Type of form/letter: Home Health Certification    Have you been seen for this request: N/A    Do we have the form/letter: Yes: Form folder POD C    When is form/letter needed by: N/A    How would you like the form/letter returned: Fax

## 2022-08-18 ENCOUNTER — PATIENT OUTREACH (OUTPATIENT)
Dept: CARE COORDINATION | Facility: CLINIC | Age: 52
End: 2022-08-18

## 2022-08-18 DIAGNOSIS — E78.5 HYPERLIPIDEMIA LDL GOAL <160: ICD-10-CM

## 2022-08-18 DIAGNOSIS — E78.1 HIGH BLOOD TRIGLYCERIDES: ICD-10-CM

## 2022-08-18 RX ORDER — FENOFIBRATE 48 MG/1
TABLET, COATED ORAL
Qty: 90 TABLET | Refills: 2 | Status: CANCELLED | OUTPATIENT
Start: 2022-08-18

## 2022-08-18 RX ORDER — ATORVASTATIN CALCIUM 40 MG/1
TABLET, FILM COATED ORAL
Qty: 90 TABLET | Refills: 2 | Status: CANCELLED | OUTPATIENT
Start: 2022-08-18

## 2022-08-18 NOTE — PROGRESS NOTES
Clinic Care Coordination - Chart Review Only    Situation/Background: Patient chart reviewed by care coordinator related to [facility] [employer] conversion.    Assessment: Patient continues to be followed by Clinic Care Coordination.    Plan: Patient's chart updated to align with [employer] program for ongoing patient management.    GAVIN Celaya   [facility] Primary Care - Clinic Care Coordination  [phone]

## 2022-08-23 RX ORDER — ATORVASTATIN CALCIUM 40 MG/1
TABLET, FILM COATED ORAL
Qty: 90 TABLET | Refills: 2 | Status: SHIPPED | OUTPATIENT
Start: 2022-08-23 | End: 2023-05-11

## 2022-08-23 RX ORDER — FENOFIBRATE 48 MG/1
TABLET, COATED ORAL
Qty: 90 TABLET | Refills: 2 | Status: SHIPPED | OUTPATIENT
Start: 2022-08-23 | End: 2023-07-03

## 2022-08-23 NOTE — TELEPHONE ENCOUNTER
---Prescription approved per [facility] Refill Protocol.       Carolyn Alaniz RN BSN     [facility]        --Last visit:  4/15/2022     --Future Visit:   Next 5 appointments (look out 90 days)    Sep 08, 2022  1:00 PM  Pharmacist Visit with Quentin Figueroa RPH  [facility] ([facility] [address] [place] [address]  [phone]

## 2022-08-26 ENCOUNTER — PATIENT OUTREACH (OUTPATIENT)
Dept: CARE COORDINATION | Facility: CLINIC | Age: 52
End: 2022-08-26

## 2022-08-26 NOTE — PROGRESS NOTES
Clinic Care Coordination Contact    Community Health Worker Follow Up    Care Gaps:     Health Maintenance Due   Topic Date Due     COVID-19 Vaccine (3 - Booster for Moderna series) 11/05/2021     INFLUENZA VACCINE (1) 09/01/2022     MICROALBUMIN  09/03/2022       Postponed to next CHW outreach     Care Plan:   Care Plan: Financial Wellbeing     Problem: Patient expresses financial resource strain     Onset Date: 10/29/2020    Note:     Goal Statement: I would like help applying and getting approved for Social Security Disability in the next 6 months.   Date Goal set: 10/29/2020  Barriers: Getting denied   Strengths: Asking for help, supportive [facility]   Date to Achieve By: 4/29/2021 // extended 10/20/2021  Patient expressed understanding of goal: yes     Action steps to achieve this goal:  1. [employer] [facility] and I will meet with the Disability Specialists on March 26th to discuss next steps and options with my denial. (Completed, ongoing)  5/13  Pt stated moved up to Federal level pending. [facility] provides support/updates on this.   2. I will attend my first appointment at [facility] on March 30th with Johnson Novak, counselor. (Completed)    5/13 Pt states she connects with monthly telephone visits.  3. I will work with [facility] staff to schedule neuropsychological testing. (Completed)  4. I will give the CHW updates during outreach calls.    Updated: 5/13/2021      Goal: Create an action plan to increase financial stability                       Intervention and Education during outreach:     CHW spoke with patient's [facility] worker, Renee. Renee states that the patient has been going to therapy at [facility] every Tuesday. Renee has been scheduling appointments for the patient and the patient's daughter. Renee states that they are still waiting to hear back from social security, but she plans on reaching out to them within the next week or so. The rest of the phone call was spent talking about the patient's daughter.    CHW  Plan: CHW will do next patient out reach in one month.    HOWARD Aponte, B.A. [facility] Care Coordination  [facility]:   [facility]  [phone]

## 2022-09-08 ENCOUNTER — OFFICE VISIT (OUTPATIENT)
Dept: PHARMACY | Facility: CLINIC | Age: 52
End: 2022-09-08
Payer: COMMERCIAL

## 2022-09-08 VITALS
OXYGEN SATURATION: 98 % | SYSTOLIC BLOOD PRESSURE: 116 MMHG | BODY MASS INDEX: 26.46 KG/M2 | HEART RATE: 82 BPM | DIASTOLIC BLOOD PRESSURE: 62 MMHG | WEIGHT: 159 LBS

## 2022-09-08 DIAGNOSIS — E78.5 HYPERLIPIDEMIA LDL GOAL <160: Primary | ICD-10-CM

## 2022-09-08 DIAGNOSIS — E78.1 HIGH BLOOD TRIGLYCERIDES: ICD-10-CM

## 2022-09-08 DIAGNOSIS — R73.01 IMPAIRED FASTING GLUCOSE: ICD-10-CM

## 2022-09-08 DIAGNOSIS — E55.9 VITAMIN D DEFICIENCY DISEASE: ICD-10-CM

## 2022-09-08 DIAGNOSIS — Z79.899 POLYPHARMACY: ICD-10-CM

## 2022-09-08 DIAGNOSIS — F32.9 MDD (MAJOR DEPRESSIVE DISORDER): ICD-10-CM

## 2022-09-08 DIAGNOSIS — I10 ESSENTIAL HYPERTENSION WITH GOAL BLOOD PRESSURE LESS THAN 140/90: ICD-10-CM

## 2022-09-08 PROCEDURE — 99607 MTMS BY PHARM ADDL 15 MIN: CPT | Performed by: PHARMACIST

## 2022-09-08 PROCEDURE — 99606 MTMS BY PHARM EST 15 MIN: CPT | Performed by: PHARMACIST

## 2022-09-08 NOTE — PATIENT INSTRUCTIONS
"Recommendations from today's MTM visit:                                                         1. Cut atorvastatin dose in half and begin taking only 20mg a day in attempt to reduce muscle side effects. Reminder to tell home health nurse to assist in splitting the pills in half using pill cutter. Consider taking OTC CoQ10 200-400mg daily if muscle problems persist.    2. Continue taking all other meds as directed and keep up the diet and exercise!     Follow-up: Return in about 35 weeks (around 5/11/2023), or @1pm, for Medication Therapy Management Visit, BP Recheck.    It was great speaking with you today.  I value your experience and would be very thankful for your time in providing feedback in our clinic survey. In the next few days, you may receive an email or text message from [email or web address] with a link to a survey related to your  clinical pharmacist.\"     To schedule another MTM appointment, please call the clinic directly or you may call the MTM scheduling line at [phone] or toll-free at [phone].     My Clinical Pharmacist's contact information:                                                      Please feel free to contact me with any questions or concerns you have.      Quentin Figueroa Rph.  Medication Therapy Management Provider  [phone]  Flor Hampton   Pharm-D-4 Student     "
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
## 2022-09-09 ENCOUNTER — PATIENT OUTREACH (OUTPATIENT)
Dept: CARE COORDINATION | Facility: CLINIC | Age: 52
End: 2022-09-09
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
## 2022-09-09 NOTE — PROGRESS NOTES
Care Coordination Clinician Chart Review     Situation: Patient chart reviewed by care coordinator.?     Background: Initial assessment and enrollment to Care Coordination was 10/29/2021.?? Care plan(s) with patient-centered goal(s) were developed with participation from patient.? Lead CC handed patient off to CHW for continued outreach every 30 days.??     Assessment: Per chart review, patient outreach completed by CC CHW on 8/26/22.? Patient is actively working to accomplish goal(s).? Patient's goal(s) remain(s) appropriate at this time.? Patient is due for updated Plan of Care.? Annual assessment will be due 10/2022.     Care Plan: Financial Wellbeing     Problem: Patient expresses financial resource strain     Onset Date: 10/29/2020    Note:     Goal Statement: I would like help applying and getting approved for Social Security Disability in the next 6 months.   Date Goal set: 10/29/2020  Barriers: Getting denied   Strengths: Asking for help, supportive [facility]   Date to Achieve By: 4/29/2021 // extended 10/20/2021  Patient expressed understanding of goal: yes     Action steps to achieve this goal:  1. [employer] [facility] and I will meet with the Disability Specialists on March 26th to discuss next steps and options with my denial. (Completed, ongoing)  5/13  Pt stated moved up to Federal level pending. [facility] provides support/updates on this.   2. I will attend my first appointment at [facility] [place] March 30th with Johnson Novak, counselor. (Completed)    5/13 Pt states she connects with monthly telephone visits.  3. I will work with [facility] staff to schedule neuropsychological testing. (Completed)  4. I will give the CHW updates during outreach calls.    Updated: 5/13/2021      Goal: Create an action plan to increase financial stability                       Plan/Recommendations: The patient will continue working with Care Coordination to achieve above goal(s).? CHW will involve Lead CC as needed or if patient is  ready to move to maintenance.? Lead CC will continue to monitor CHW s monthly outreaches and progress to goal(s) every 6 weeks.    Plan of Care updated and sent to patient: Yes, via [employer].    GAVIN Celaya   [facility] Primary Care - Clinic Care Coordination  [phone]

## 2022-09-27 ENCOUNTER — PATIENT OUTREACH (OUTPATIENT)
Dept: CARE COORDINATION | Facility: CLINIC | Age: 52
End: 2022-09-27
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
## 2022-10-15 ENCOUNTER — HEALTH MAINTENANCE LETTER (OUTPATIENT)
Age: 52
End: 2022-10-15

## 2022-10-18 ENCOUNTER — TRANSFERRED RECORDS (OUTPATIENT)
Dept: HEALTH INFORMATION MANAGEMENT | Facility: CLINIC | Age: 52
End: 2022-10-18

## 2022-10-21 ENCOUNTER — PATIENT OUTREACH (OUTPATIENT)
Dept: CARE COORDINATION | Facility: CLINIC | Age: 52
End: 2022-10-21

## 2022-10-21 NOTE — PROGRESS NOTES
Care Coordination Clinician Chart Review     Situation: Patient chart reviewed by care coordinator.?     Background: Initial assessment and enrollment to Care Coordination was 10/29/21.?? Care plan(s) with patient-centered goal(s) were developed with participation from patient.? Lead CC handed patient off to CHW for continued outreach every 30 days.??     Assessment: Per chart review, patient outreach completed by CC CHW on 9/27/22.? Patient is actively working to accomplish goal(s).? Patient's goal(s) remain(s) appropriate at this time.? Patient is not due for updated Plan of Care.? Annual assessment will be due 10/2022.     Care Plan: Financial Wellbeing     Problem: Patient expresses financial resource strain     Onset Date: 10/29/2020    Note:     Goal Statement: I would like help applying and getting approved for Social Security Disability in the next 6 months.   Date Goal set: 10/29/2020  Barriers: Getting denied   Strengths: Asking for help, supportive [facility]   Date to Achieve By: 4/29/2021 // extended 10/20/2021  Patient expressed understanding of goal: yes     Action steps to achieve this goal:  1. [employer] [facility] and I will meet with the Disability Specialists on March 26th to discuss next steps and options with my denial. (Completed, ongoing)  5/13  Pt stated moved up to Federal level pending. [facility] provides support/updates on this.   2. I will attend my first appointment at [facility] on March 30th with Johnson Novak, counselor. (Completed)    5/13 Pt states she connects with monthly telephone visits.  3. I will work with [facility] staff to schedule neuropsychological testing. (Completed)  4. I will give the CHW updates during outreach calls.    Updated: 5/13/2021      Goal: Create an action plan to increase financial stability                       Plan/Recommendations: The patient will continue working with Care Coordination to achieve above goal(s).? CHW will involve Lead CC as needed or if patient is  ready to move to maintenance.? Lead CC will continue to monitor CHW s monthly outreaches and progress to goal(s) every 6 weeks.    Plan of Care updated and sent to patient: No    GAVIN Celaya   [facility] - Clinic Care Coordination  [phone]

## 2022-10-25 ENCOUNTER — TELEPHONE (OUTPATIENT)
Dept: FAMILY MEDICINE | Facility: CLINIC | Age: 52
End: 2022-10-25

## 2022-10-25 NOTE — TELEPHONE ENCOUNTER
Forms/Letter Request    Type of form/letter: Home Care Certification     Have you been seen for this request: N/A    Do we have the form/letter: Yes: Form folder POD C    When is form/letter needed by: No deadline given    How would you like the form/letter returned: Fax

## 2022-10-26 ENCOUNTER — PATIENT OUTREACH (OUTPATIENT)
Dept: CARE COORDINATION | Facility: CLINIC | Age: 52
End: 2022-10-26

## 2022-10-26 NOTE — PROGRESS NOTES
Clinic Care Coordination Contact    Follow Up Progress Note      Assessment: SWCC called out to patient to complete reassessment. Patient was very confused and asked [facility] to call out to . SWCC called out to Renee and left a message for her to return the call.     Care Gaps:    Health Maintenance Due   Topic Date Due     HEPATITIS B IMMUNIZATION (1 of 3 - 3-dose series) Never done     COVID-19 Vaccine (3 - Booster for Moderna series) 07/31/2021     INFLUENZA VACCINE (1) 09/01/2022     MICROALBUMIN  09/03/2022     PHQ-9  10/15/2022     ASTHMA CONTROL TEST  10/21/2022       Care Plans  Care Plan: Financial Wellbeing     Problem: Patient expresses financial resource strain     Onset Date: 10/29/2020    Note:     Goal Statement: I would like help applying and getting approved for Social Security Disability in the next 6 months.   Date Goal set: 10/29/2020  Barriers: Getting denied   Strengths: Asking for help, supportive [facility] JASON   Date to Achieve By: 4/29/2021 // extended 10/20/2021  Patient expressed understanding of goal: yes     Action steps to achieve this goal:  1. [employer] [facility] and I will meet with the Disability Specialists on March 26th to discuss next steps and options with my denial. (Completed, ongoing)  5/13  Pt stated moved up to Federal level pending. [facility] provides support/updates on this.   2. I will attend my first appointment at [facility] on March 30th with Johnson Novak, counselor. (Completed)    5/13 Pt states she connects with monthly telephone visits.  3. I will work with [facility] staff to schedule neuropsychological testing. (Completed)  4. I will give the CHW updates during outreach calls.    Updated: 5/13/2021      Goal: Create an action plan to increase financial stability                       Intervention/Education provided during outreach: [facility] provided active listening during this outreach.     Outreach Frequency: monthly    Plan:   Care Coordinator will follow up in one  month.    GAVIN Celaya   [facility] Primary [facility] - Clinic Care Coordination  [phone]

## 2022-10-27 DIAGNOSIS — Z12.11 SCREEN FOR COLON CANCER: Primary | ICD-10-CM

## 2022-11-01 ENCOUNTER — LAB (OUTPATIENT)
Dept: LAB | Facility: CLINIC | Age: 52
End: 2022-11-01
Payer: COMMERCIAL

## 2022-11-01 DIAGNOSIS — Z12.11 SCREEN FOR COLON CANCER: ICD-10-CM

## 2022-11-02 DIAGNOSIS — F33.1 MODERATE RECURRENT MAJOR DEPRESSION (H): ICD-10-CM

## 2022-11-02 DIAGNOSIS — I10 ESSENTIAL HYPERTENSION WITH GOAL BLOOD PRESSURE LESS THAN 140/90: Primary | ICD-10-CM

## 2022-11-02 DIAGNOSIS — F41.1 GENERALIZED ANXIETY DISORDER: ICD-10-CM

## 2022-11-09 ENCOUNTER — PATIENT OUTREACH (OUTPATIENT)
Dept: CARE COORDINATION | Facility: CLINIC | Age: 52
End: 2022-11-09

## 2022-11-09 NOTE — PROGRESS NOTES
Clinic Care Coordination Contact  [facility]/Voicemail       Clinical Data: Care Coordinator Outreach  Outreach attempted x 2.  Left message on Renee, patient's 's voicemail with call back information and requested return call.  Plan:  Care Coordinator will try to reach patient again in 1 month.    GAVIN Celaya   [facility] - Clinic Care Coordination  [phone]

## 2022-11-11 RX ORDER — VENLAFAXINE HYDROCHLORIDE 150 MG/1
CAPSULE, EXTENDED RELEASE ORAL
Qty: 90 CAPSULE | Refills: 1 | Status: SHIPPED | OUTPATIENT
Start: 2022-11-11 | End: 2023-06-20

## 2022-11-11 NOTE — TELEPHONE ENCOUNTER
"PHQ 4/16/2021 5/25/2021 4/15/2022   PHQ-9 Total Score 19 6 5   Q9: Thoughts of better off dead/self-harm past 2 weeks Several days Not at all Not at all   F/U: Thoughts of suicide or self-harm - - -   F/U: Self harm-plan - - -   F/U: Self-harm action - - -   F/U: Safety concerns - - -     Wt Readings from Last 4 Encounters:   09/08/22 72.1 kg (159 lb)   07/18/22 73 kg (161 lb)   06/27/22 80.3 kg (177 lb)   04/15/22 80.3 kg (177 lb)     Estimated body mass index is 26.46 kg/m  as calculated from the following:    Height as of 7/18/22: 1.651 m (5' 5\").    Weight as of 9/8/22: 72.1 kg (159 lb).   "

## 2022-12-05 ENCOUNTER — PATIENT OUTREACH (OUTPATIENT)
Dept: CARE COORDINATION | Facility: CLINIC | Age: 52
End: 2022-12-05

## 2022-12-05 NOTE — PROGRESS NOTES
Care Coordination Clinician Chart Review     Situation: Patient chart reviewed by care coordinator.?     Background: Initial assessment and enrollment to Care Coordination was 10/29/21.?? Care plan(s) with patient-centered goal(s) were developed with participation from patient.? Lead CC handed patient off to CHW for continued outreach every 30 days.??     Assessment: Per chart review, patient outreach completed by CC JASON on 11/9/22.? Patient is actively working to accomplish goal(s).? Patient's goal(s) remain(s) appropriate at this time.? Patient is due for updated Plan of Care.? Annual assessment will be due 10/2023.     Care Plan: Financial Wellbeing     Problem: Patient expresses financial resource strain     Onset Date: 10/29/2020    Note:     Goal Statement: I would like help applying and getting approved for Social Security Disability in the next 6 months.   Date Goal set: 10/29/2020  Barriers: Getting denied   Strengths: Asking for help, supportive [facility]   Date to Achieve By: 4/29/2021 // extended 10/20/2021  Patient expressed understanding of goal: yes     Action steps to achieve this goal:  1. [employer] [facility] and I will meet with the Disability Specialists on March 26th to discuss next steps and options with my denial. (Completed, ongoing)  5/13  Pt stated moved up to Federal level pending. [facility] provides support/updates on this.   2. I will attend my first appointment at [facility] on March 30th with Johnson Novak, counselor. (Completed)    5/13 Pt states she connects with monthly telephone visits.  3. I will work with [facility] staff to schedule neuropsychological testing. (Completed)  4. I will give the CHW updates during outreach calls.    Updated: 5/13/2021      Goal: Create an action plan to increase financial stability                       Plan/Recommendations: The patient will continue working with Care Coordination to achieve above goal(s).? CHW will involve Lead CC as needed or if patient is ready  to move to maintenance.? Lead CC will continue to monitor CHW s monthly outreaches and progress to goal(s) every 6 weeks.    Plan of Care updated and sent to patient: Yes, via [email or web address].    GAVIN Celaya   [facility] Primary Care - Clinic Care Coordination  [phone]

## 2022-12-05 NOTE — LETTER
[facility]  Patient Centered Plan of Care  About Me:        Patient Name:  Isabela Panchal    YOB: 1970  Age:         52 year old   Jayshree MRN:    [id number] Telephone Information:  Home Phone [phone]   Mobile [phone]       Address:  [address]  [facility] [address] Email address:  [email or web address]      Emergency Contact(s)    Name Relationship Lgl Grd Work Phone Home Phone Mobile Phone   1. CHARO SHELDON Mother   [phone]   2. WHITNEY SHELDON Father   [phone]            Primary language:  English     needed? No   [facility]:  [phone] op. 1  Other communication barriers:None    Preferred Method of Communication:  Mail  Current living arrangement: No data recorded  Mobility Status/ Medical Equipment: No data recorded      Health Maintenance  Health Maintenance Reviewed: No data recorded    My Access Plan  Medical Emergency 911   Primary Clinic Line [facility] - [phone]   24 Hour Appointment Line [phone] or  [phone]) (toll-free)   24 Hour Nurse Line [phone] (toll-free)   Preferred Urgent Care No data recorded   Preferred Hospital No data recorded   Preferred Pharmacy [facility] [place], [place] - [address]     Behavioral Health Crisis Line The National Suicide Prevention Lifeline at 1-635.277.3610 or Text/Call 988             My Care Team Members  Patient Care Team       Relationship Specialty Notifications Start End    Felisha Briggs MD PCP - General Family Medicine  5/3/22     Phone: [phone] Fax: [phone] [address] [place] [address]    ObstephentaAlexandria mallory MD MD Family Medicine - Sports Medicine  4/1/15     Phone: [phone] Fax: [phone]         [address] [facility] [address]    Man Gilbert MD MD Family Medicine - Sports Medicine  11/16/19     Phone:  [phone] Fax: [phone]         [address] [facility] [address]    Man [facility] Therapist  - Clinical Admissions 2/26/20     [facility]    Phone: [phone] Fax: [phone]         [address] [place] [address]    [facility] Dentistry Dentist Dentist  3/6/20     Isabela mccallum who ever is available    Phone: [phone] Fax: [phone] [address] [facility] CADI Case Management Case Management Specialist   7/1/20     Monie Briggs    E-Mail: [email or web address]    Renee Lal Other (see comments)   10/21/20     [facility]     Phone: [phone]         Nohelia Mccoy Lead Care Coordinator  Admissions 10/29/20     Marta Lovelace    2/8/21     [facility] mental health     Phone: [phone]         Shaw Lal MD Assigned Musculoskeletal Provider   8/29/21     Phone: [phone] Fax: [phone]         [address] [facility] [address]    Quentin Figueroa RPH Pharmacist Pharmacist  9/21/21      [address] [facility] [address]    Shaw Lal MD MD Orthopaedic Surgery  12/7/21     Phone: [phone] Fax: [phone]         [address] [facility] [address]    Reta Hagan, RN Registered Nurse Wound Care  12/7/21     Phone: [phone] Pager: [phone]         [address] [facility] [address]    Vannesa Valera MA Medical Assistant Primary Care - CC  7/26/22     Quentin Figueroa RPH Assigned MTM Pharmacist   9/28/22      [address] [facility] [address]    Vannesa Valera MA Community Health Worker Primary Care - CC Admissions 10/21/22     Chip Sadler APRN CNP Assigned PCP   10/22/22     Phone: [phone] Fax: [phone] [address] [place] [address]            My Care Plans  Self Management and Treatment Plan  Care Plan  Care Plan: Financial Wellbeing     Problem: Patient expresses financial  resource strain     Onset Date: 10/29/2020    Note:     Goal Statement: I would like help applying and getting approved for Social Security Disability in the next 6 months.   Date Goal set: 10/29/2020  Barriers: Getting denied   Strengths: Asking for help, supportive [facility] JASON   Date to Achieve By: 4/29/2021 // extended 10/20/2021  Patient expressed understanding of goal: yes     Action steps to achieve this goal:  1. [employer] [facility] and I will meet with the Disability Specialists on March 26th to discuss next steps and options with my denial. (Completed, ongoing)  5/13  Pt stated moved up to Federal level pending. [facility] provides support/updates on this.   2. I will attend my first appointment at [facility] on March 30th with Johnson Novak, counselor. (Completed)    5/13 Pt states she connects with monthly telephone visits.  3. I will work with [facility] staff to schedule neuropsychological testing. (Completed)  4. I will give the CHW updates during outreach calls.    Updated: 5/13/2021      Goal: Create an action plan to increase financial stability                        Action Plans on File:   Asthma        Depression          Advance Care Plans/Directives Type:   No data recorded    My Medical and Care Information  Problem List   Patient Active Problem List   Diagnosis     Surveillance of previously prescribed intrauterine contraceptive device     Migraine     Health Care Home     Moderate recurrent major depression (H)     Generalized anxiety disorder     Allergic rhinitis due to allergen     Vitamin D deficiency disease     Impaired fasting glucose     Postconcussion syndrome     Vertigo     Chronic pain of both shoulders     Bilateral carpal tunnel syndrome     Primary insomnia     High blood triglycerides     Lactose intolerance     Essential hypertension with goal blood pressure less than 140/90     DDD (degenerative disc disease), lumbar     Mild intermittent asthma without complication     Cervical high risk  HPV (human papillomavirus) test positive     Hyperlipidemia LDL goal <160     Photosensitivity     History of traumatic brain injury     Chronic patellofemoral pain of right knee     Eyes sensitive to light, bilateral     Chronic midline low back pain with right-sided sciatica     Post-traumatic headache     Trauma and stressor-related disorder      Current Medications and Allergies:  See printed Medication Report.    Care Coordination Start Date: 10/29/2020   Frequency of Care Coordination: monthly     Form Last Updated: 12/05/2022

## 2022-12-12 ENCOUNTER — PATIENT OUTREACH (OUTPATIENT)
Dept: CARE COORDINATION | Facility: CLINIC | Age: 52
End: 2022-12-12

## 2022-12-12 NOTE — PROGRESS NOTES
Clinic Care Coordination Contact  [facility]/Voicemail       Clinical Data: Care Coordinator Outreach  Outreach attempted x 3.  Left message on patient's , Renee's voicemail with call back information and requested return call.  Plan:bCare Coordinator will follow up again in 3-5 business days.    GAVIN Celaya   [facility] Primary Care - Clinic Care Coordination  [phone]

## 2022-12-13 ENCOUNTER — PATIENT OUTREACH (OUTPATIENT)
Dept: CARE COORDINATION | Facility: CLINIC | Age: 52
End: 2022-12-13

## 2022-12-13 NOTE — PROGRESS NOTES
Clinic Care Coordination Contact    Psychiatric received a VM from patient's [facility] coordinatior Renee with update. She has no new updates for CC. Patient was denied social security again and they will be starting work to reapply next week. She shares they are working closely with patient's therapist at the moment.    Plan: Care Coordination to follow up in one month.    GAVIN Celaya   [facility] Primary Care - Clinic Care Coordination  [phone]

## 2023-01-11 ENCOUNTER — PATIENT OUTREACH (OUTPATIENT)
Dept: CARE COORDINATION | Facility: CLINIC | Age: 53
End: 2023-01-11

## 2023-01-11 NOTE — PROGRESS NOTES
Clinic Care Coordination Contact    Community Health Worker Follow Up    Care Gaps:     Health Maintenance Due   Topic Date Due     HEPATITIS B IMMUNIZATION (1 of 3 - 3-dose series) Never done     COVID-19 Vaccine (3 - Booster for Moderna series) 07/31/2021     INFLUENZA VACCINE (1) 09/01/2022     MICROALBUMIN  09/03/2022     PHQ-9  10/15/2022     ASTHMA CONTROL TEST  10/21/2022         Care Plan:   Care Plan: Financial Wellbeing     Problem: Patient expresses financial resource strain     Onset Date: 10/29/2020    Note:     Goal Statement: I would like help applying and getting approved for Social Security Disability in the next 6 months.   Date Goal set: 10/29/2020  Barriers: Getting denied   Strengths: Asking for help, supportive [facility]   Date to Achieve By: 4/29/2021 // extended 10/20/2021  Patient expressed understanding of goal: yes     Action steps to achieve this goal:  1. [employer] [facility] and I will meet with the Disability Specialists on March 26th to discuss next steps and options with my denial. (Completed, ongoing)  5/13  Pt stated moved up to Federal level pending. [facility] provides support/updates on this.   2. I will attend my first appointment at [facility] on March 30th with Johnson Novak, counselor. (Completed)    5/13 Pt states she connects with monthly telephone visits.  3. I will work with [facility] staff to schedule neuropsychological testing. (Completed)  4. I will give the CHW updates during outreach calls.          Goal: Create an action plan to increase financial stability                       Intervention and Education during outreach:     Patient states that she has been doing good and feel supported by Renee, her [facility] worker. Patient states that she also has a  that helps her out with grocerices and by taking both her and her daughter to appointments as well. Patient states that she does not need any additional assistance from CC at this time since she is able to get help from her  [facility] worker and .    CHW Plan: CHW will route patient to  CC to see if dis enrollment is appropriate for patient since patient has no need for CC at this time.    HOWARD Aponte, B.A. [facility] Care Coordination  [facility]:   [facility]  [phone]

## 2023-01-11 NOTE — PROGRESS NOTES
Clinic Care Coordination Contact    Assessment: Care Coordinator contacted patient for 1 month follow up.  Patient shares she feels she has good support from her [facility] worker, Renee and . Patient will be disenrolled from .     Enrollment status: Disenrolled.     Plan: No further outreaches at this time.  Patient will continue to follow the plan of care.  If new needs arise a new Care Coordination referral may be placed.     GAVIN Celaya   [facility] Primary Care - Clinic Care Coordination  [phone]

## 2023-02-01 ENCOUNTER — OFFICE VISIT (OUTPATIENT)
Dept: PEDIATRICS | Facility: CLINIC | Age: 53
End: 2023-02-01
Payer: COMMERCIAL

## 2023-02-01 VITALS
HEIGHT: 65 IN | SYSTOLIC BLOOD PRESSURE: 108 MMHG | TEMPERATURE: 99.1 F | RESPIRATION RATE: 20 BRPM | DIASTOLIC BLOOD PRESSURE: 72 MMHG | WEIGHT: 173.2 LBS | BODY MASS INDEX: 28.86 KG/M2 | OXYGEN SATURATION: 98 % | HEART RATE: 105 BPM

## 2023-02-01 DIAGNOSIS — M51.369 DDD (DEGENERATIVE DISC DISEASE), LUMBAR: Primary | ICD-10-CM

## 2023-02-01 PROCEDURE — 99214 OFFICE O/P EST MOD 30 MIN: CPT | Performed by: PHYSICIAN ASSISTANT

## 2023-02-01 RX ORDER — NAPROXEN 500 MG/1
500 TABLET ORAL 2 TIMES DAILY WITH MEALS
Qty: 60 TABLET | Refills: 0 | Status: SHIPPED | OUTPATIENT
Start: 2023-02-01

## 2023-02-01 RX ORDER — TIZANIDINE 2 MG/1
2 TABLET ORAL 3 TIMES DAILY PRN
Qty: 15 TABLET | Refills: 0 | Status: SHIPPED | OUTPATIENT
Start: 2023-02-01

## 2023-02-01 ASSESSMENT — ENCOUNTER SYMPTOMS: BACK PAIN: 1

## 2023-02-01 ASSESSMENT — ASTHMA QUESTIONNAIRES
QUESTION_1 LAST FOUR WEEKS HOW MUCH OF THE TIME DID YOUR ASTHMA KEEP YOU FROM GETTING AS MUCH DONE AT WORK, SCHOOL OR AT HOME: NONE OF THE TIME
QUESTION_2 LAST FOUR WEEKS HOW OFTEN HAVE YOU HAD SHORTNESS OF BREATH: NOT AT ALL
ACT_TOTALSCORE: 25
QUESTION_4 LAST FOUR WEEKS HOW OFTEN HAVE YOU USED YOUR RESCUE INHALER OR NEBULIZER MEDICATION (SUCH AS ALBUTEROL): NOT AT ALL
QUESTION_5 LAST FOUR WEEKS HOW WOULD YOU RATE YOUR ASTHMA CONTROL: COMPLETELY CONTROLLED
ACT_TOTALSCORE: 25
QUESTION_3 LAST FOUR WEEKS HOW OFTEN DID YOUR ASTHMA SYMPTOMS (WHEEZING, COUGHING, SHORTNESS OF BREATH, CHEST TIGHTNESS OR PAIN) WAKE YOU UP AT NIGHT OR EARLIER THAN USUAL IN THE MORNING: NOT AT ALL

## 2023-02-01 ASSESSMENT — PATIENT HEALTH QUESTIONNAIRE - PHQ9
SUM OF ALL RESPONSES TO PHQ QUESTIONS 1-9: 4
SUM OF ALL RESPONSES TO PHQ QUESTIONS 1-9: 4
10. IF YOU CHECKED OFF ANY PROBLEMS, HOW DIFFICULT HAVE THESE PROBLEMS MADE IT FOR YOU TO DO YOUR WORK, TAKE CARE OF THINGS AT HOME, OR GET ALONG WITH OTHER PEOPLE: NOT DIFFICULT AT ALL

## 2023-02-01 ASSESSMENT — PAIN SCALES - GENERAL: PAINLEVEL: EXTREME PAIN (8)

## 2023-02-01 NOTE — PATIENT INSTRUCTIONS
Referral was made for ortho for possible injection.    I sent a rx in for naproxen take with food. For the first few days take one tab twice daily and then after that take one tab twice daily if needed for pain.  Ice 20 min three times days.  Rx for Tizanidine was sent. This can help with muscle spasms, but can also make you sleepy. Use it with caution.    Follow up with pcp if worsening.   Numbness, tingling or weakness to emergency department.    Schedule you annual exam with your pcp after 4/15/23    Suzi Perez PA-C on 2/1/2023 at 4:36 PM

## 2023-02-01 NOTE — PROGRESS NOTES
"  Assessment & Plan     DDD (degenerative disc disease), lumbar      - Spine  Referral  - naproxen (NAPROSYN) 500 MG tablet  Dispense: 60 tablet; Refill: 0  - tiZANidine (ZANAFLEX) 2 MG tablet  Dispense: 15 tablet; Refill: 0      Reviewed previous MRI for pathology. Had SI joint injection 6/2022. She would like another injection. No red flags. Suggested naproxen, discussed side effect profile. Take with food.   Tizanidine rx given, warned about sleepiness with use. Not to use with other muscle relaxers.   Follow up with pcp. If numbness, tingling or weakness. To emergency department.     The patient indicates understanding of these issues and agrees with the plan.    Patient Instructions   Referral was made for ortho for possible injection.    I sent a rx in for naproxen take with food. For the first few days take one tab twice daily and then after that take one tab twice daily if needed for pain.  Ice 20 min three times days.  Rx for Tizanidine was sent. This can help with muscle spasms, but can also make you sleepy. Use it with caution.    Follow up with pcp if worsening.   Numbness, tingling or weakness to emergency department.    Schedule you annual exam with your pcp after 4/15/23      Review of prior external note(s) from - ortho note rom 6/27/22  Review of the result(s) of each unique test - MRI 2021lumbar          BMI:   Estimated body mass index is 28.82 kg/m  as calculated from the following:    Height as of this encounter: 1.651 m (5' 5\").    Weight as of this encounter: 78.6 kg (173 lb 3.2 oz).   Weight management plan: Discussed healthy diet and exercise guidelines    Regular exercise    No follow-ups on file.    LINA [facility] STALIN Mar is a 52 year old presenting for the following health issues:  Back Pain    Chronic lumbar pain. No history of surgery in back. Had cervical fusion. Pain flared over clari time. Noted when standing from a " "sitting position. Mild improvement since. Reports sharp jolts across low back. Current pain is otherwise throbbing in nature. Comes and goes. Worse with movement. Uses a walker at times because of this.   Currently using CBD gummies, helps calm her. Using Tylenol. Not using ibuprofen. Using hot and cold packs. Pain level on avg is 7//10.    Has had injections in past. Was helpful. Last injection has been over a year.   No numbness, tingling or weakness.      Lumbar MRI: 5/17/21 reviewed     L      Back Pain          Pain History:  When did you first notice your pain? - Chronic Pain   Have you seen this provider for your pain in the past?   No   Where in your body do your have pain? Low back Low Back    Are you seeing anyone else for your pain? No  What makes your pain better? resting  What makes your pain worse? Trying to sit  How has pain affected your ability to work? Does not work  Who lives in your household? Herself and her 2 daughters    PHQ-9 SCORE 5/25/2021 4/15/2022 2/1/2023   PHQ-9 Total Score - - -   PHQ-9 Total Score MyChart - 5 (Mild depression) 4 (Minimal depression)   PHQ-9 Total Score 6 5 4       JOSEFINA-7 SCORE 4/16/2021 5/25/2021 4/15/2022   Total Score - - -   Total Score 12 (moderate anxiety) - 3 (minimal anxiety)   Total Score 19 2 3       Aracely EE, Aamir KA, Camelia MJ, Duane TA, Juan J, Renee JM, [facility] SM, Kelli K. Development and initial validation of the PEG, a 3-item scale assessing pain         Review of Systems   Musculoskeletal: Positive for back pain.      CONSTITUTIONAL: NEGATIVE for fever, chills, change in weight  : negative for, dysuria, incontinence and urgency  MUSCULOSKELETAL: POSITIVE  for back pain. No redness or warmth.   NEURO: no numbness, tingling or weakness.       Objective    /72 (BP Location: Right arm, Patient Position: Chair, Cuff Size: Adult Regular)   Pulse 105   Temp 99.1  F (37.3  C) (Tympanic)   Resp 20   Ht 1.651 m (5' 5\")   Wt 78.6 kg (173 lb 3.2 " oz)   SpO2 98%   BMI 28.82 kg/m    There is no height or weight on file to calculate BMI.  Physical Exam   GENERAL: healthy, alert and no distress  RESP: lungs clear to auscultation - no rales, rhonchi or wheezes  CV: regular rate and rhythm, normal S1 S2, no S3 or S4, no murmur, click or rub, no peripheral edema and peripheral pulses strong  MS: spine exam shows ttp Lumbar paraspinals, L4-5 area. Active range of motion is decreased due to pain in lumbar area.   Lower extremities: no edema.  Neuro: sensation intact, strength 5/5. Reflexes 2+ symmetrical. Neg SLR. Slow gait noted.  Skin: no rash noted               Answers for HPI/ROS submitted by the patient on 2/1/2023  If you checked off any problems, how difficult have these problems made it for you to do your work, take care of things at home, or get along with other people?: Not difficult at all  PHQ9 TOTAL SCORE: 4

## 2023-02-20 ENCOUNTER — TELEPHONE (OUTPATIENT)
Dept: FAMILY MEDICINE | Facility: CLINIC | Age: 53
End: 2023-02-20
Payer: COMMERCIAL

## 2023-02-20 NOTE — TELEPHONE ENCOUNTER
Forms/Letter Request    Type of form/letter: Letter of Opinion    Have you been seen for this request: N/A    Do we have the form/letter: Yes: Form folder POD C    When is form/letter needed by: As soon as able, per cover sheet it is second request. Do not see first request documented-    How would you like the form/letter returned: Fax
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
## 2023-03-03 DIAGNOSIS — I10 ESSENTIAL HYPERTENSION WITH GOAL BLOOD PRESSURE LESS THAN 140/90: ICD-10-CM

## 2023-03-06 RX ORDER — LISINOPRIL 10 MG/1
TABLET ORAL
Qty: 90 TABLET | Refills: 0 | Status: SHIPPED | OUTPATIENT
Start: 2023-03-06 | End: 2023-05-11 | Stop reason: DRUGHIGH

## 2023-03-06 NOTE — TELEPHONE ENCOUNTER
Prescription approved per [facility] Refill Protocol.    Mary Altamirano, BSN RN  [facility]

## 2023-03-31 ENCOUNTER — PATIENT OUTREACH (OUTPATIENT)
Dept: FAMILY MEDICINE | Facility: CLINIC | Age: 53
End: 2023-03-31
Payer: COMMERCIAL

## 2023-03-31 DIAGNOSIS — R87.810 CERVICAL HIGH RISK HPV (HUMAN PAPILLOMAVIRUS) TEST POSITIVE: ICD-10-CM

## 2023-03-31 NOTE — LETTER
March 31, 2023      Isabela BOWENS [place]  [address] [place] [address]        Dear MsNicoleAnsley,    This letter is to remind you that you are due for your follow-up Pap smear and Human Papillomavirus (HPV) test.    Please call [phone] to schedule your appointment at your earliest convenience.    If you have completed the appointment outside of the [facility] system, please have the records forwarded to our office. We will update your chart for your provider to review before your next annual wellness visit.     Thank you for choosing [facility]!      Sincerely,    Your [facility] Care Team

## 2023-04-05 ENCOUNTER — TELEPHONE (OUTPATIENT)
Dept: FAMILY MEDICINE | Facility: CLINIC | Age: 53
End: 2023-04-05
Payer: COMMERCIAL

## 2023-04-05 NOTE — TELEPHONE ENCOUNTER
Forms/Letter Request    Type of form/letter: Home Care Certification     Have you been seen for this request: N/A    Do we have the form/letter: Yes: Form folder CARE TEAM 2    Who is the form from? Home care    Where did/will the form come from? form was faxed in    When is form/letter needed by: N/A    How would you like the form/letter returned: Fax : [phone]

## 2023-04-13 DIAGNOSIS — Z53.9 DIAGNOSIS NOT YET DEFINED: Primary | ICD-10-CM

## 2023-04-13 PROCEDURE — 99207 PR MD RECERTIFICATION HHA PT: CPT | Performed by: FAMILY MEDICINE

## 2023-04-13 NOTE — TELEPHONE ENCOUNTER
Completed, signed forms placed in MA basket today.  Sincerely,  Dr. Felisha Briggs MD  4/13/2023    8:07 AM
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
## 2023-05-11 ENCOUNTER — LAB (OUTPATIENT)
Dept: LAB | Facility: CLINIC | Age: 53
End: 2023-05-11
Payer: COMMERCIAL

## 2023-05-11 ENCOUNTER — OFFICE VISIT (OUTPATIENT)
Dept: PHARMACY | Facility: CLINIC | Age: 53
End: 2023-05-11
Payer: COMMERCIAL

## 2023-05-11 VITALS
BODY MASS INDEX: 28.31 KG/M2 | HEART RATE: 87 BPM | SYSTOLIC BLOOD PRESSURE: 96 MMHG | DIASTOLIC BLOOD PRESSURE: 74 MMHG | WEIGHT: 170.1 LBS | OXYGEN SATURATION: 98 %

## 2023-05-11 DIAGNOSIS — I10 ESSENTIAL HYPERTENSION WITH GOAL BLOOD PRESSURE LESS THAN 140/90: ICD-10-CM

## 2023-05-11 DIAGNOSIS — E78.1 HIGH BLOOD TRIGLYCERIDES: ICD-10-CM

## 2023-05-11 DIAGNOSIS — F32.9 MDD (MAJOR DEPRESSIVE DISORDER): ICD-10-CM

## 2023-05-11 DIAGNOSIS — E78.5 HYPERLIPIDEMIA LDL GOAL <160: ICD-10-CM

## 2023-05-11 DIAGNOSIS — R73.01 IMPAIRED FASTING GLUCOSE: ICD-10-CM

## 2023-05-11 DIAGNOSIS — Z79.899 POLYPHARMACY: ICD-10-CM

## 2023-05-11 DIAGNOSIS — I10 ESSENTIAL HYPERTENSION WITH GOAL BLOOD PRESSURE LESS THAN 140/90: Primary | ICD-10-CM

## 2023-05-11 DIAGNOSIS — E55.9 VITAMIN D DEFICIENCY DISEASE: ICD-10-CM

## 2023-05-11 LAB
ANION GAP SERPL CALCULATED.3IONS-SCNC: 11 MMOL/L (ref 7–15)
BUN SERPL-MCNC: 10.9 MG/DL (ref 6–20)
CALCIUM SERPL-MCNC: 9.6 MG/DL (ref 8.6–10)
CHLORIDE SERPL-SCNC: 111 MMOL/L (ref 98–107)
CHOLEST SERPL-MCNC: 138 MG/DL
CREAT SERPL-MCNC: 0.78 MG/DL (ref 0.51–0.95)
DEPRECATED HCO3 PLAS-SCNC: 24 MMOL/L (ref 22–29)
GFR SERPL CREATININE-BSD FRML MDRD: 90 ML/MIN/1.73M2
GLUCOSE SERPL-MCNC: 106 MG/DL (ref 70–99)
HBA1C MFR BLD: 5.2 % (ref 0–5.6)
HDLC SERPL-MCNC: 38 MG/DL
LDLC SERPL CALC-MCNC: 79 MG/DL
NONHDLC SERPL-MCNC: 100 MG/DL
POTASSIUM SERPL-SCNC: 4.4 MMOL/L (ref 3.4–5.3)
SODIUM SERPL-SCNC: 146 MMOL/L (ref 136–145)
TRIGL SERPL-MCNC: 104 MG/DL

## 2023-05-11 PROCEDURE — 80061 LIPID PANEL: CPT

## 2023-05-11 PROCEDURE — 82306 VITAMIN D 25 HYDROXY: CPT

## 2023-05-11 PROCEDURE — 99606 MTMS BY PHARM EST 15 MIN: CPT | Performed by: PHARMACIST

## 2023-05-11 PROCEDURE — 36415 COLL VENOUS BLD VENIPUNCTURE: CPT

## 2023-05-11 PROCEDURE — 83036 HEMOGLOBIN GLYCOSYLATED A1C: CPT

## 2023-05-11 PROCEDURE — 80048 BASIC METABOLIC PNL TOTAL CA: CPT

## 2023-05-11 PROCEDURE — 99607 MTMS BY PHARM ADDL 15 MIN: CPT | Performed by: PHARMACIST

## 2023-05-11 RX ORDER — ATORVASTATIN CALCIUM 40 MG/1
20 TABLET, FILM COATED ORAL DAILY
Qty: 90 TABLET | Refills: 2 | COMMUNITY
Start: 2023-05-11 | End: 2023-09-14

## 2023-05-11 RX ORDER — LISINOPRIL 5 MG/1
5 TABLET ORAL DAILY
Qty: 90 TABLET | Refills: 1 | Status: SHIPPED | OUTPATIENT
Start: 2023-05-11 | End: 2023-07-21

## 2023-05-11 NOTE — LETTER
May 12, 2023      Isabela Panchal  [address]  [facility] [address]        Dear ,    We are writing to inform you of your test results.    Hello!  It was a pleasure to see you in clinic!  Thank you for getting labs done. Everything looks normal, which is good news.     If you have any questions, please contact the clinic or schedule an appointment with me, thank you!      Sincerely,  Dr. Felisha Briggs MD  5/12/2023      Resulted Orders   HEMOGLOBIN A1C   Result Value Ref Range    Hemoglobin A1C 5.2 0.0 - 5.6 %      Comment:      Normal <5.7%   Prediabetes 5.7-6.4%    Diabetes 6.5% or higher     Note: Adopted from ADA consensus guidelines.   BASIC METABOLIC PANEL   Result Value Ref Range    Sodium 146 (H) 136 - 145 mmol/L    Potassium 4.4 3.4 - 5.3 mmol/L    Chloride 111 (H) 98 - 107 mmol/L    Carbon Dioxide (CO2) 24 22 - 29 mmol/L    Anion Gap 11 7 - 15 mmol/L    Urea Nitrogen 10.9 6.0 - 20.0 mg/dL    Creatinine 0.78 0.51 - 0.95 mg/dL    Calcium 9.6 8.6 - 10.0 mg/dL    Glucose 106 (H) 70 - 99 mg/dL    GFR Estimate 90 >60 mL/min/1.73m2      Comment:      eGFR calculated using 2021 CKD-EPI equation.   Lipid panel reflex to direct LDL Non-fasting   Result Value Ref Range    Cholesterol 138 <200 mg/dL    Triglycerides 104 <150 mg/dL    Direct Measure HDL 38 (L) >=50 mg/dL    LDL Cholesterol Calculated 79 <=100 mg/dL    Non HDL Cholesterol 100 <130 mg/dL    Narrative    Cholesterol  Desirable:  <200 mg/dL    Triglycerides  Normal:  Less than 150 mg/dL  Borderline High:  150-199 mg/dL  High:  200-499 mg/dL  Very High:  Greater than or equal to 500 mg/dL    Direct Measure HDL  Female:  Greater than or equal to 50 mg/dL   Male:  Greater than or equal to 40 mg/dL    LDL Cholesterol  Desirable:  <100mg/dL  Above Desirable:  100-129 mg/dL   Borderline High:  130-159 mg/dL   High:  160-189 mg/dL   Very High:  >= 190 mg/dL    Non HDL Cholesterol  Desirable:  130 mg/dL  Above Desirable:  130-159  mg/dL  Borderline High:  160-189 mg/dL  High:  190-219 mg/dL  Very High:  Greater than or equal to 220 mg/dL   Vitamin D Deficiency   Result Value Ref Range    Vitamin D, Total (25-Hydroxy) 54 20 - 75 ug/L    Narrative    Season, race, dietary intake, and treatment affect the concentration of 25-hydroxy-Vitamin D. Values may decrease during winter months and increase during summer months. Values 20-29 ug/L may indicate Vitamin D insufficiency and values <20 ug/L may indicate Vitamin D deficiency.    Vitamin D determination is routinely performed by an immunoassay specific for 25 hydroxyvitamin D3.  If an individual is on vitamin D2(ergocalciferol) supplementation, please specify 25 OH vitamin D2 and D3 level determination by LCMSMS test VITD23.         If you have any questions or concerns, please call the clinic at the number listed above.       Sincerely,      Felisha Briggs MD

## 2023-05-11 NOTE — PATIENT INSTRUCTIONS
"Recommendations from today's MTM visit:                                                         1. Blood Pressure 96/74mmhg today --lets Decrease your Lisinopril dose to 5mg./day --I sent new rx to our pharmacy for you today .    2. A1c is 5.2% -  non-diabetic.  Keep eating healthy and slowly losing a few lbs.    3. We are checking your urine protein today as well as your cholesterol --will mail you the results/plan if anything changes. If cholesterol results are good --I can change your Atorvastatin pill to 20mg. tablet once daily.         Follow-up: Return in about 26 weeks (around 11/9/2023), or @ 1 PM, for Medication Therapy Management Visit, BP Recheck, Weight loss.    It was great speaking with you today.  I value your experience and would be very thankful for your time in providing feedback in our clinic survey. In the next few days, you may receive an email or text message from [employer] with a link to a survey related to your  clinical pharmacist.\"     To schedule another MTM appointment, please call the clinic directly or you may call the MTM scheduling line at [phone] or toll-free at [phone].     My Clinical Pharmacist's contact information:                                                      Please feel free to contact me with any questions or concerns you have.      Quentin Figueroa Rph.  Medication Therapy Management Provider  [phone]    "

## 2023-05-11 NOTE — Clinical Note
Felisha -- saw hannah- today --Blood Pressure a little low again --I reduced her lisinopril to 5mg. --await umalcr lab from [facility] . a1c today 5.2% , lipids pending.  Other than cognitive issues she is doing well.  Obdulia

## 2023-05-12 LAB — DEPRECATED CALCIDIOL+CALCIFEROL SERPL-MC: 54 UG/L (ref 20–75)

## 2023-05-12 NOTE — RESULT ENCOUNTER NOTE
Thank you very much for getting labs done! Everything looks normal/stable.    Sincerely,  5/12/2023

## 2023-05-25 NOTE — TELEPHONE ENCOUNTER
FYI to provider - Patient is lost to pap tracking follow-up. Attempts to contact pt have been made per reminder process and there has been no reply and/or no appt scheduled. Contact hx listed below.     12/16/10 ASCUS pap, neg HR HPV.  3/5/14 NIL pap, neg HR HPV.  2/13/18 NIL pap, + HR HPV type 16. Plan colp due by 5/13/18 04/05/18 Certified result letter: [id number]  05/09/18 Certified letter returned as unclaimed. Sent to [facility] for scanning into pt's chart.   12/05/18: [place] Bx A small amount of squamous epithelium with atypia of undetermined significance, favor reactive change,and chronic inflammation. Plan cotest in 1 year.   7/5/19 ASCUS Pap, + HR HPV 16 (Neg 18 & other). Plan colp  08/14/19 [place] appt--after discussion, we will defer today's colposcopy, instead planning a repeat Pap and HPV test in December 2019.  If colposcopy is indicated at that time we will proceed.   01/15/20: [place] Bx benign, chronic inflammation present. ECC benign,chronic inflammation present, small amount of atypical squamous epithelium. NIL Pap, Neg HR HPV result. Plan cotest in 1 year.   04/2/21 NIL, +HPV 16. Plan [place] bef 7/2/21.  11/10/21 Lost to follow-up for pap tracking, fyi routed to provider  4/15/22 NIL, Neg HPV. Plan 1 yr co-test  3/31/23 Reminder Miranda and letter  4/28/23 Reminder call - spoke with patient  5/25/23 Lost to follow-up for pap tracking

## 2023-06-15 DIAGNOSIS — J45.20 MILD INTERMITTENT ASTHMA WITHOUT COMPLICATION: ICD-10-CM

## 2023-06-15 DIAGNOSIS — F33.1 MODERATE RECURRENT MAJOR DEPRESSION (H): ICD-10-CM

## 2023-06-15 DIAGNOSIS — F41.1 GENERALIZED ANXIETY DISORDER: ICD-10-CM

## 2023-06-18 NOTE — TELEPHONE ENCOUNTER
"Routing refill request to provider for review/approval because:  Patient needs to be seen because:  q6m    Last Written Prescription Date:  11/11/22  Last Fill Quantity: 90,  # refills: 1   Last office visit provider:  4/15/22     Requested Prescriptions   Pending Prescriptions Disp Refills     venlafaxine (EFFEXOR XR) 150 MG 24 hr capsule [Pharmacy Med Name: VENLAFAXINE ER 150MG CAPSULES] 90 capsule 1     Sig: TAKE 1 CAPSULE BY MOUTH EVERY DAY       Serotonin-Norepinephrine Reuptake Inhibitors  Failed - 6/15/2023  2:25 PM        Failed - Recent (6 mo) or future (30 days) visit within the authorizing provider's specialty     Patient had office visit in the last 6 months or has a visit in the next 30 days with authorizing provider or within the authorizing provider's specialty.  See \"Patient Info\" tab in inbasket, or \"Choose Columns\" in Meds & Orders section of the refill encounter.            Passed - Blood pressure under 140/90 in past 12 months     BP Readings from Last 3 Encounters:   05/11/23 96/74   02/01/23 108/72   09/08/22 116/62                 Passed - PHQ-9 score of less than 5 in past 6 months     Please review last PHQ-9 score.           Passed - Medication is active on med list        Passed - Patient is age 18 or older        Passed - No active pregnancy on record        Passed - Normal serum creatinine on file in past 12 months     Recent Labs   Lab Test 05/11/23  1310   CR 0.78       Ok to refill medication if creatinine is low          Passed - No positive pregnancy test in past 12 months           montelukast (SINGULAIR) 10 MG tablet [Pharmacy Med Name: MONTELUKAST 10MG TABLETS] 90 tablet 3     Sig: TAKE 1 TABLET(10 MG) BY MOUTH AT BEDTIME       Leukotriene Inhibitors Protocol Failed - 6/15/2023  2:25 PM        Failed - Recent (6 mo) or future (30 days) visit within the authorizing provider's specialty     Patient had office visit in the last 6 months or has a visit in the next 30 days with " "authorizing provider or within the authorizing provider's specialty.  See \"Patient Info\" tab in inbasket, or \"Choose Columns\" in Meds & Orders section of the refill encounter.            Passed - Patient is age 12 or older     If patient is under 16, ok to refill using age based dosing.           Passed - Asthma control assessment score within normal limits in last 6 months     Please review ACT score.           Passed - Medication is active on med list             Mary De La Cruz RN 06/18/23 3:55 PM  "

## 2023-06-20 RX ORDER — MONTELUKAST SODIUM 10 MG/1
1 TABLET ORAL AT BEDTIME
Qty: 30 TABLET | Refills: 0 | Status: SHIPPED | OUTPATIENT
Start: 2023-06-20 | End: 2023-07-21

## 2023-06-20 RX ORDER — VENLAFAXINE HYDROCHLORIDE 150 MG/1
CAPSULE, EXTENDED RELEASE ORAL
Qty: 90 CAPSULE | Refills: 0 | Status: SHIPPED | OUTPATIENT
Start: 2023-06-20 | End: 2023-07-21

## 2023-06-20 NOTE — TELEPHONE ENCOUNTER
Called patient to schedule appointments but patient is currently at an appointment with their therapist. Patient will call back to schedule appointments.

## 2023-06-21 ENCOUNTER — TELEPHONE (OUTPATIENT)
Dept: FAMILY MEDICINE | Facility: CLINIC | Age: 53
End: 2023-06-21
Payer: COMMERCIAL

## 2023-06-21 NOTE — TELEPHONE ENCOUNTER
Forms/Letter Request    Type of form/letter:  cert/plan of care 4/2/2023-5/31/2023    Have you been seen for this request: N/A    Do we have the form/letter: Yes: placed in care team 2 providers sign folder    Who is the form from? [facility] (if other please explain)    Where did/will the form come from? form was faxed in    When is form/letter needed by: non given    How would you like the form/letter returned: Fax : [phone]

## 2023-06-22 DIAGNOSIS — Z53.9 DIAGNOSIS NOT YET DEFINED: Primary | ICD-10-CM

## 2023-06-22 PROCEDURE — 99207 PR MD RECERTIFICATION HHA PT: CPT | Performed by: FAMILY MEDICINE

## 2023-06-26 ENCOUNTER — TELEPHONE (OUTPATIENT)
Dept: FAMILY MEDICINE | Facility: CLINIC | Age: 53
End: 2023-06-26
Payer: COMMERCIAL

## 2023-06-26 NOTE — TELEPHONE ENCOUNTER
Forms/Letter Request    Type of form/letter: Home Care Certification    Have you been seen for this request: N/A    Do we have the form/letter: Yes: Form folder CARE TEAM 2    Who is the form from? Home care    Where did/will the form come from? form was faxed in    When is form/letter needed by: N/A    How would you like the form/letter returned: Fax : Listed in contacts

## 2023-06-29 NOTE — TELEPHONE ENCOUNTER
Completed, signed forms placed in MA basket today.  Sincerely,  Dr. Felisha Briggs MD  6/29/2023    7:45 AM

## 2023-07-01 DIAGNOSIS — E78.1 HIGH BLOOD TRIGLYCERIDES: ICD-10-CM

## 2023-07-03 RX ORDER — FENOFIBRATE 48 MG/1
TABLET, COATED ORAL
Qty: 90 TABLET | Refills: 0 | Status: SHIPPED | OUTPATIENT
Start: 2023-07-03 | End: 2023-10-02

## 2023-07-03 NOTE — TELEPHONE ENCOUNTER
,  --Please contact patient and ask to schedule an annual preventive/physical.    --Does NOT appear to read MyChart.  --If you do not reach live, please send letter.      --Last visit:  4/15/2022 [place].    --Future Visit: 7/21/23 [facility] for acute care.        ---Prescription approved per [facility] Refill Protocol.       Carolyn Alaniz RN BSN     [facility]

## 2023-07-03 NOTE — TELEPHONE ENCOUNTER
Called patient. They state they have memory issues so is having their care coordinator schedule for them.

## 2023-07-21 ENCOUNTER — OFFICE VISIT (OUTPATIENT)
Dept: PEDIATRICS | Facility: CLINIC | Age: 53
End: 2023-07-21
Payer: COMMERCIAL

## 2023-07-21 ENCOUNTER — TELEPHONE (OUTPATIENT)
Dept: FAMILY MEDICINE | Facility: CLINIC | Age: 53
End: 2023-07-21

## 2023-07-21 VITALS
WEIGHT: 174 LBS | HEART RATE: 92 BPM | SYSTOLIC BLOOD PRESSURE: 131 MMHG | TEMPERATURE: 98.6 F | DIASTOLIC BLOOD PRESSURE: 84 MMHG | OXYGEN SATURATION: 100 % | BODY MASS INDEX: 28.96 KG/M2

## 2023-07-21 DIAGNOSIS — F41.1 GENERALIZED ANXIETY DISORDER: ICD-10-CM

## 2023-07-21 DIAGNOSIS — F33.1 MODERATE RECURRENT MAJOR DEPRESSION (H): ICD-10-CM

## 2023-07-21 DIAGNOSIS — I10 ESSENTIAL HYPERTENSION WITH GOAL BLOOD PRESSURE LESS THAN 140/90: ICD-10-CM

## 2023-07-21 DIAGNOSIS — J45.20 MILD INTERMITTENT ASTHMA WITHOUT COMPLICATION: ICD-10-CM

## 2023-07-21 PROCEDURE — 99214 OFFICE O/P EST MOD 30 MIN: CPT | Performed by: PHYSICIAN ASSISTANT

## 2023-07-21 RX ORDER — MONTELUKAST SODIUM 10 MG/1
1 TABLET ORAL AT BEDTIME
Qty: 90 TABLET | Refills: 3 | Status: SHIPPED | OUTPATIENT
Start: 2023-07-21 | End: 2024-08-26

## 2023-07-21 RX ORDER — VENLAFAXINE HYDROCHLORIDE 150 MG/1
CAPSULE, EXTENDED RELEASE ORAL
Qty: 90 CAPSULE | Refills: 0 | Status: SHIPPED | OUTPATIENT
Start: 2023-07-21 | End: 2024-02-29

## 2023-07-21 RX ORDER — LISINOPRIL 5 MG/1
5 TABLET ORAL DAILY
Qty: 90 TABLET | Refills: 3 | Status: SHIPPED | OUTPATIENT
Start: 2023-07-21 | End: 2024-08-27

## 2023-07-21 NOTE — TELEPHONE ENCOUNTER
Pt came in for appointment thinking that she was going to get a back injection in clinic. Her worker is the one who takes care of all of her appointments, and scheduled this one. She stated this is the second time that she has come in, so she proceeded to walk out. She came back with her worker and her worker explained that she just needed a referral   And med refills.

## 2023-07-21 NOTE — PROGRESS NOTES
Assessment & Plan     Essential hypertension with goal blood pressure less than 140/90  Well controlled   - lisinopril (ZESTRIL) 5 MG tablet  Dispense: 90 tablet; Refill: 3    Mild intermittent asthma without complication  Stable   - montelukast (SINGULAIR) 10 MG tablet  Dispense: 90 tablet; Refill: 3    Generalized anxiety disorder  Refilled for 90 days, needs follow up with pcp   - venlafaxine (EFFEXOR XR) 150 MG 24 hr capsule  Dispense: 90 capsule; Refill: 0    Moderate recurrent major depression (H)    - venlafaxine (EFFEXOR XR) 150 MG 24 hr capsule  Dispense: 90 capsule; Refill: 0    Patient Instructions   I refilled Lisinopril, Singular, Effexor today.  Please schedule an annual exam with your pcp   I reprinted the AVS from Feb of this year that had your referral for ortho on it.                   Suzi Perez PA-C  [facility] STALIN Mar is a 52 year old, presenting for the following health issues:  Refill Request        7/21/2023     3:34 PM   Additional Questions   Roomed by Kristina Calderon     HPI     1. Pt is here today for medication refills. She is not clear what medications exactly but thinks it is her effexor and lisinopril.   Takes on regular basis, tolerates them.  Review of chart, pt was supposed to make follow up apt with pcp for further refills   Has seen pharm D for medication adjustment. Lisinopril was decreased to 5mg from 10mg. Has had normal bp since.   No chest pain, palpitations, dyspnea, marc, pnd, orthopnea, headaches, visual changes, numbness, tingling, or weakness    2. She is frustrated as she thought she was going to get a back injection today. She became irate, raising he voice, yelling at rooming staff. Patient [facility] worker was able to join the visit.  Review of patient chart, it is noted that I saw her for a visit for her back in 2/2023- referred her to ortho for possible injection and she has never made an appointment.  Back isssue is chronic not  new, no new sxs          4/16/2021    11:58 AM 4/28/2022     3:00 PM 2/1/2023     4:01 PM   ACT Total Scores   ACT TOTAL SCORE (Goal Greater than or Equal to 20) 23 20 25   In the past 12 months, how many times did you visit the emergency room for your asthma without being admitted to the hospital? 0 0 0   In the past 12 months, how many times were you hospitalized overnight because of your asthma? 0 0 0        PHQ-9 score:      2/1/2023     3:54 PM   PHQ   PHQ-9 Total Score 4   Q9: Thoughts of better off dead/self-harm past 2 weeks Not at all               Review of Systems         Objective    /84   Pulse 92   Temp 98.6  F (37  C) (Tympanic)   Wt 78.9 kg (174 lb)   SpO2 100%   BMI 28.96 kg/m    Body mass index is 28.96 kg/m .  Physical Exam   GENERAL: healthy, alert and no distress  RESP: lungs clear to auscultation - no rales, rhonchi or wheezes  CV: regular rate and rhythm, normal S1 S2, no S3 or S4, no murmur, click or rub, no peripheral edema and peripheral pulses strong

## 2023-07-21 NOTE — PATIENT INSTRUCTIONS
I refilled Lisinopril, Singular, Effexor today.  Please schedule an annual exam with your pcp   I reprinted the AVS from Feb of this year that had your referral for ortho on it.

## 2023-07-28 DIAGNOSIS — G89.29 CHRONIC SI JOINT PAIN: Primary | ICD-10-CM

## 2023-07-28 DIAGNOSIS — M53.3 CHRONIC SI JOINT PAIN: Primary | ICD-10-CM

## 2023-08-01 ENCOUNTER — ANCILLARY PROCEDURE (OUTPATIENT)
Dept: GENERAL RADIOLOGY | Facility: CLINIC | Age: 53
End: 2023-08-01
Attending: ORTHOPAEDIC SURGERY
Payer: COMMERCIAL

## 2023-08-01 ENCOUNTER — OFFICE VISIT (OUTPATIENT)
Dept: ORTHOPEDICS | Facility: CLINIC | Age: 53
End: 2023-08-01
Attending: PHYSICIAN ASSISTANT
Payer: COMMERCIAL

## 2023-08-01 DIAGNOSIS — M53.3 CHRONIC SI JOINT PAIN: ICD-10-CM

## 2023-08-01 DIAGNOSIS — G89.29 CHRONIC SI JOINT PAIN: ICD-10-CM

## 2023-08-01 DIAGNOSIS — M54.41 CHRONIC MIDLINE LOW BACK PAIN WITH RIGHT-SIDED SCIATICA: Primary | ICD-10-CM

## 2023-08-01 DIAGNOSIS — M51.369 DDD (DEGENERATIVE DISC DISEASE), LUMBAR: ICD-10-CM

## 2023-08-01 DIAGNOSIS — G89.29 CHRONIC MIDLINE LOW BACK PAIN WITH RIGHT-SIDED SCIATICA: Primary | ICD-10-CM

## 2023-08-01 DIAGNOSIS — G89.29 CHRONIC LOW BACK PAIN WITHOUT SCIATICA, UNSPECIFIED BACK PAIN LATERALITY: ICD-10-CM

## 2023-08-01 DIAGNOSIS — M54.50 CHRONIC LOW BACK PAIN WITHOUT SCIATICA, UNSPECIFIED BACK PAIN LATERALITY: ICD-10-CM

## 2023-08-01 PROCEDURE — 72110 X-RAY EXAM L-2 SPINE 4/>VWS: CPT | Performed by: STUDENT IN AN ORGANIZED HEALTH CARE EDUCATION/TRAINING PROGRAM

## 2023-08-01 PROCEDURE — 99214 OFFICE O/P EST MOD 30 MIN: CPT | Mod: GC | Performed by: ORTHOPAEDIC SURGERY
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
## 2023-08-01 NOTE — NURSING NOTE
Reason For Visit:   Chief Complaint   Patient presents with    RECHECK     Patient is here for recheck lumbar spine.  She is interested in repeat injection.  PMH of C5-7 ACDF 10/13/21.  Last seen in clinic 6/27/22 for chronic SI pain.       Primary MD: Felisha Briggs  Ref. MD: treva    Date of surgery: 10/13/21  Type of surgery: C5-7 ACDF.      There were no vitals taken for this visit.         Oswestry (GRAEME) Questionnaire        3/1/2019     1:59 PM   OSWESTRY DISABILITY INDEX   Count 10   Sum 29   Oswestry Score (%) 58 %            Neck Disability Index (NDI) Questionnaire        8/6/2021     2:09 PM   Neck Disability Index (NDI)   Neck Disability Index: Count 10   NDI: Total Score = SUM (points for all 10 findings) 36   Neck Disability in Percent = (Total Score) / 50 * 100 72 (%)                   Promis 10 Assessment        3/1/2019     2:36 PM   PROMIS 10   In general, would you say your health is: Fair   In general, would you say your quality of life is: Fair   In general, how would you rate your physical health? Fair   In general, how would you rate your mental health, including your mood and your ability to think? Poor   In general, how would you rate your satisfaction with your social activities and relationships? Fair   In general, please rate how well you carry out your usual social activities and roles Fair   To what extent are you able to carry out your everyday physical activities such as walking, climbing stairs, carrying groceries, or moving a chair? A little   In the past 7 days, how often have you been bothered by emotional problems such as feeling anxious, depressed, or irritable? Always   In the past 7 days, how would you rate your fatigue on average? Severe   In the past 7 days, how would you rate your pain on average, where 0 means no pain, and 10 means worst imaginable pain? 9   In general, would you say your health is: 2   In general, would you say your quality of life is: 2   In  general, how would you rate your physical health? 2   In general, how would you rate your mental health, including your mood and your ability to think? 1   In general, how would you rate your satisfaction with your social activities and relationships? 2   In general, please rate how well you carry out your usual social activities and roles. (This includes activities at home, at work and in your community, and responsibilities as a parent, child, spouse, employee, friend, etc.) 2   To what extent are you able to carry out your everyday physical activities such as walking, climbing stairs, carrying groceries, or moving a chair? 2   In the past 7 days, how often have you been bothered by emotional problems such as feeling anxious, depressed, or irritable? 5   In the past 7 days, how would you rate your fatigue on average? 4   In the past 7 days, how would you rate your pain on average, where 0 means no pain, and 10 means worst imaginable pain? 9   Global Mental Health Score 6   Global Physical Health Score 8   PROMIS TOTAL - SUBSCORES 14                Teddy Wise, ATC

## 2023-08-02 ENCOUNTER — TELEPHONE (OUTPATIENT)
Dept: ORTHOPEDICS | Facility: CLINIC | Age: 53
End: 2023-08-02
Payer: COMMERCIAL

## 2023-08-02 ENCOUNTER — TELEPHONE (OUTPATIENT)
Dept: NEUROSURGERY | Facility: CLINIC | Age: 53
End: 2023-08-02
Payer: COMMERCIAL

## 2023-08-02 DIAGNOSIS — M51.379 DDD (DEGENERATIVE DISC DISEASE), LUMBOSACRAL: Primary | ICD-10-CM

## 2023-08-02 NOTE — PROGRESS NOTES
Per Shaw Lal MD (orthopedic surgery), ordered an L4-L5 interlaminar epidural corticosteroid injection for Ms. Isabela BOWENS Ansley for lumbosacral degenerative disc disease.    Teddy Phillips MD

## 2023-08-02 NOTE — TELEPHONE ENCOUNTER
Patient is scheduled for surgery with Dr. Phillips    Spoke with: Patient    Date of Surgery: 08/09/23    Location: [facility]    Informed patient they will need an adult  yes    Additional comments: Patient is aware of date and time of the procedure.        Angela Ahumada MA on 8/2/2023 at 8:49 AM

## 2023-08-02 NOTE — TELEPHONE ENCOUNTER
Patient Returning Call    Reason for call:  [facility] calling requesting a full medication list including current medication provider sent for patient   Fax      Information relayed to patient:  te sent to clinic     Patient has additional questions:  No      Okay to leave a detailed message?: Yes at Cell number on file:    Telephone Information:   Mobile [phone]

## 2023-08-03 ENCOUNTER — THERAPY VISIT (OUTPATIENT)
Dept: PHYSICAL THERAPY | Facility: CLINIC | Age: 53
End: 2023-08-03
Attending: ORTHOPAEDIC SURGERY
Payer: COMMERCIAL

## 2023-08-03 DIAGNOSIS — M54.50 CHRONIC LOW BACK PAIN WITHOUT SCIATICA, UNSPECIFIED BACK PAIN LATERALITY: Primary | ICD-10-CM

## 2023-08-03 DIAGNOSIS — G89.29 CHRONIC MIDLINE LOW BACK PAIN WITH RIGHT-SIDED SCIATICA: ICD-10-CM

## 2023-08-03 DIAGNOSIS — M54.41 CHRONIC MIDLINE LOW BACK PAIN WITH RIGHT-SIDED SCIATICA: ICD-10-CM

## 2023-08-03 DIAGNOSIS — M51.369 DDD (DEGENERATIVE DISC DISEASE), LUMBAR: ICD-10-CM

## 2023-08-03 DIAGNOSIS — G89.29 CHRONIC LOW BACK PAIN WITHOUT SCIATICA, UNSPECIFIED BACK PAIN LATERALITY: Primary | ICD-10-CM

## 2023-08-03 PROCEDURE — 97162 PT EVAL MOD COMPLEX 30 MIN: CPT | Mod: GP

## 2023-08-03 PROCEDURE — 97110 THERAPEUTIC EXERCISES: CPT | Mod: GP

## 2023-08-03 NOTE — TELEPHONE ENCOUNTER
RN attempted to call patient again for med reconciliation. Please assist patient in getting in contact with nurse and correct number for patient to be reached at.     Carley Yuan RN
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
## 2023-08-03 NOTE — TELEPHONE ENCOUNTER
RN attempted to call patient to go over medication list to provide updated list to home care staff. Left VM for patient, will attempt to reach at a later time.     Carley Yuan RN

## 2023-08-04 ENCOUNTER — TELEPHONE (OUTPATIENT)
Dept: FAMILY MEDICINE | Facility: CLINIC | Age: 53
End: 2023-08-04
Payer: COMMERCIAL

## 2023-08-04 PROBLEM — M54.50 CHRONIC LOW BACK PAIN WITHOUT SCIATICA, UNSPECIFIED BACK PAIN LATERALITY: Status: ACTIVE | Noted: 2020-02-25

## 2023-08-04 NOTE — TELEPHONE ENCOUNTER
Forms/Letter Request    Type of form/letter:  Home Health Certification 07/30/2023 to 09/28/2023    Have you been seen for this request: N/A    Do we have the form/letter: Yes: Placed in care team 2    Who is the form from? Home care    Where did/will the form come from? form was faxed in    When is form/letter needed by: no date given    How would you like the form/letter returned: Fax : [phone]

## 2023-08-04 NOTE — TELEPHONE ENCOUNTER
Completed, signed forms placed in MA basket today.  Sincerely,  Dr. Felisha Briggs MD  8/4/2023    2:19 PM

## 2023-08-09 ENCOUNTER — TELEPHONE (OUTPATIENT)
Dept: ORTHOPEDICS | Facility: CLINIC | Age: 53
End: 2023-08-09
Payer: COMMERCIAL

## 2023-08-09 NOTE — TELEPHONE ENCOUNTER
Patient is scheduled for surgery with Dr. Phillips    Spoke with: Renee (Care coordinator [phone]     Date of Surgery: 08/23/23    [place] [facility] [place]    Informed patient they will need an adult  yes    Additional comments: Patient is aware of date and time of the procedure.        Angela Ahumada MA on 8/9/2023 at 1:13 PM

## 2023-08-15 ENCOUNTER — ANCILLARY PROCEDURE (OUTPATIENT)
Dept: MRI IMAGING | Facility: CLINIC | Age: 53
End: 2023-08-15
Attending: ORTHOPAEDIC SURGERY
Payer: COMMERCIAL

## 2023-08-15 DIAGNOSIS — M54.41 CHRONIC MIDLINE LOW BACK PAIN WITH RIGHT-SIDED SCIATICA: ICD-10-CM

## 2023-08-15 DIAGNOSIS — G89.29 CHRONIC MIDLINE LOW BACK PAIN WITH RIGHT-SIDED SCIATICA: ICD-10-CM

## 2023-08-15 PROCEDURE — 72148 MRI LUMBAR SPINE W/O DYE: CPT | Mod: GC | Performed by: STUDENT IN AN ORGANIZED HEALTH CARE EDUCATION/TRAINING PROGRAM

## 2023-08-23 ENCOUNTER — ANCILLARY PROCEDURE (OUTPATIENT)
Dept: RADIOLOGY | Facility: AMBULATORY SURGERY CENTER | Age: 53
End: 2023-08-23
Attending: PHYSICAL MEDICINE & REHABILITATION
Payer: COMMERCIAL

## 2023-08-23 ENCOUNTER — HOSPITAL ENCOUNTER (OUTPATIENT)
Facility: AMBULATORY SURGERY CENTER | Age: 53
Discharge: HOME | End: 2023-08-23
Attending: PHYSICAL MEDICINE & REHABILITATION | Admitting: PHYSICAL MEDICINE & REHABILITATION
Payer: COMMERCIAL

## 2023-08-23 VITALS
RESPIRATION RATE: 15 BRPM | WEIGHT: 171 LBS | HEIGHT: 65 IN | OXYGEN SATURATION: 98 % | BODY MASS INDEX: 28.49 KG/M2 | HEART RATE: 86 BPM | TEMPERATURE: 97.8 F | SYSTOLIC BLOOD PRESSURE: 125 MMHG | DIASTOLIC BLOOD PRESSURE: 67 MMHG

## 2023-08-23 DIAGNOSIS — M54.50 CHRONIC LOW BACK PAIN WITHOUT SCIATICA, UNSPECIFIED BACK PAIN LATERALITY: ICD-10-CM

## 2023-08-23 DIAGNOSIS — M51.379 DDD (DEGENERATIVE DISC DISEASE), LUMBOSACRAL: Primary | ICD-10-CM

## 2023-08-23 DIAGNOSIS — G89.29 CHRONIC LOW BACK PAIN WITHOUT SCIATICA, UNSPECIFIED BACK PAIN LATERALITY: ICD-10-CM

## 2023-08-23 DIAGNOSIS — M51.369 DDD (DEGENERATIVE DISC DISEASE), LUMBAR: ICD-10-CM

## 2023-08-23 PROCEDURE — 62323 NJX INTERLAMINAR LMBR/SAC: CPT

## 2023-08-23 RX ORDER — IOPAMIDOL 408 MG/ML
INJECTION, SOLUTION INTRAVASCULAR DAILY PRN
Status: DISCONTINUED | OUTPATIENT
Start: 2023-08-23 | End: 2023-08-23 | Stop reason: HOSPADM

## 2023-08-23 RX ORDER — DEXAMETHASONE SODIUM PHOSPHATE 10 MG/ML
INJECTION INTRAMUSCULAR; INTRAVENOUS DAILY PRN
Status: DISCONTINUED | OUTPATIENT
Start: 2023-08-23 | End: 2023-08-23 | Stop reason: HOSPADM

## 2023-08-23 RX ORDER — LIDOCAINE HYDROCHLORIDE 10 MG/ML
INJECTION, SOLUTION EPIDURAL; INFILTRATION; INTRACAUDAL; PERINEURAL DAILY PRN
Status: DISCONTINUED | OUTPATIENT
Start: 2023-08-23 | End: 2023-08-23 | Stop reason: HOSPADM

## 2023-08-23 NOTE — OP NOTE
"PHYSICAL MEDICINE & REHABILITATION / MEDICAL SPINE      PROCEDURE DATE:  Aug 23, 2023      PATIENT NAME:  Isabela Panchal  MRN:  [id number]  YOB: 1970      PRE-PROCEDURE DIAGNOSIS:  1. DDD (degenerative disc disease), lumbosacral    2. Chronic low back pain without sciatica, unspecified back pain laterality        POST-PROCEDURE DIAGNOSIS:  1. DDD (degenerative disc disease), lumbosacral    2. Chronic low back pain without sciatica, unspecified back pain laterality        PROCEDURE:  Fluoroscopic-guided left paramedian L4-L5 interlaminar epidural steroid injection.  (CPT code:  91077)      PROCEDURE IN DETAIL:  Prior to the procedure, educational material was provided for the patient to review and take home.  After a discussion of the risks, benefits, and alternatives to the procedure, the patient expressed understanding and wished to proceed.  Consent form was signed.  The patient was brought to the fluoroscopy suite and placed in the prone position.  Procedural pause was conducted to verify:  patient identity, procedure to be performed, laterality, site, and patient position.    The skin was sterilely prepped using chlorhexidine and allowed to dry.  The skin was draped in the usual sterile fashion.  After identifying the L4-L5 interspace with fluoroscopy, the skin and subcutaneous tissue were infiltrated with 1 ml 1% preservative-free lidocaine.  Utilizing a loss of resistance technique and intermittent fluoroscopic guidance, 20g 3.5\" Tuohy needle was advanced into the epidural space using a left paramedian approach.  Proper needle positioning was confirmed using multiple fluoroscopic views.  The epidural space was accessed at a depth of 6.5 cm.  After negative aspiration, 1.2 ml Isovue-M 200 (iopamidol) was injected confirming epidural spread without evidence of intravascular or intrathecal spread.  Next, 10 mg dexamethasone and 4 ml preservative-free normal saline were injected.  Following the " injection, the needle was withdrawn slightly and flushed with 0.5 ml preservative-free 1% lidocaine as it was fully extracted.    The patient tolerated the procedure well, and there were no apparent complications.  The patient was escorted back to the postprocedure room.  The patient was monitored for side effects.  No reactions were noted.  After appropriate observation, the patient was dismissed from the clinic in good condition.    Preprocedure pain level: 9/10.  Postprocedure pain level: 0/10.      Teddy Phillips MD

## 2023-08-23 NOTE — DISCHARGE INSTRUCTIONS
Home Care Instructions after an Epidural Steroid Pain Injection    A lumbar epidural steroid injection delivers steroid medication directly into the area that may be causing your lower back pain and/or leg pain. A cervical or thoracic epidural steroid injection delivers steroids into the epidural space surrounding spinal nerve roots to help relieve pain in the upper spine/neck.    Activity  -Rest today  -Do not work today  -Resume normal activity tomorrow  -DO NOT shower for 24 hours  -DO NOT remove bandaid for 24 hours    Pain  -You may experience soreness at the injection site for one or two days  -You may use an ice pack for 20 minutes every 2 hours for the first 24 hours  -You may use a heating pad after the first 24 hours  -You may use Tylenol (acetaminophen) every 4 hours or other pain medicines as     directed by your physician    You may experience numbness radiating into your legs or arms (depending on the procedure location). This numbness may last several hours. Until sensation returns to normal; please use caution in walking, climbing stairs, and stepping out of your vehicle, etc.    Common side effects of steroids:  Not everyone will experience corticosteroid side effects. If side effects are experienced, they will gradually subside in the 7-10 day period following an injection. Most common side effects include:  -Flushed face and/or chest  -Feeling of warmth, particularly in the face but could be an overall feeling of warmth  -Increased blood sugar in diabetic patients  -Menstrual irregularities my occur. If taking hormone-based birth control an alternate method of birth control is recommended  -Sleep disturbances and/or mood swings are possible  -Leg cramps    Please contact us if you have:  -Severe pain  -Fever more than 101.5 degrees Fahrenheit  -Signs of infection at the injection site (redness, swelling, or drainage)    FOR PAIN CENTER PATIENTS:  If you have questions, please contact the Pain  Clinic at [phone] Option #1 between the hours of 7:00 am and 3:00 pm Monday through Friday. After office hours you can contact the on call provider by dialing [phone]. If you need immediate attention, we recommend that you go to a hospital emergency room or dial [phone].      FOR PM&R PATIENTS:  For patients seen by the PM and R service, please call [phone]. (Monday through Friday 8a-5p.  After business hours and weekends call [phone] and ask for the PM and R doctor on call). If you need to fax a pain diary to PM&R the fax number is [phone]. If you are unable to fax, uploading to [employer] is encouraged, then send to provider. If you have procedure scheduling questions please call [phone] Option #2.

## 2023-09-14 DIAGNOSIS — E78.1 HIGH BLOOD TRIGLYCERIDES: ICD-10-CM

## 2023-09-14 DIAGNOSIS — E78.5 HYPERLIPIDEMIA LDL GOAL <160: ICD-10-CM

## 2023-09-14 RX ORDER — ATORVASTATIN CALCIUM 40 MG/1
20 TABLET, FILM COATED ORAL DAILY
Qty: 30 TABLET | Refills: 0 | Status: SHIPPED | OUTPATIENT
Start: 2023-09-14 | End: 2023-11-28

## 2023-09-14 NOTE — TELEPHONE ENCOUNTER
Dr. Briggs --  This is a historical medication.   Patient has appointment on 10/10/2023 with JOANIE Amor.     Patient was seeing a different provider at time of original prescription.     BRETT ChaudharyN RN  [facility]

## 2023-09-21 ENCOUNTER — TELEPHONE (OUTPATIENT)
Dept: FAMILY MEDICINE | Facility: CLINIC | Age: 53
End: 2023-09-21

## 2023-09-21 NOTE — TELEPHONE ENCOUNTER
See refill note from 9/14/2023 below ; I refilled enough to get her to appointment.     Dr. Briggs --  This is a historical medication.   Patient has appointment on 10/10/2023 with JOANIE Amor.      Patient was seeing a different provider at time of original prescription.      BENJAMIN Chaudhary RN  [facility]        Change  Adjust Sig  Reorder  Discontinue  Order Details: Take 0.5 tablets (20 mg) by mouth daily Dispense: 30 tablet, Refills: 0 ordered  Authorized By: Felisha Briggs MD  Prior Authorization:   Request PA  Dose History      Home care states patient went to  her Atorvastatin at the [facility] and was told it was voided out by provider.  Is patient to be taking this medication?  Please resend to pharmacy if patient is to be on med.  Lucina Greenberg RN  [facility]

## 2023-09-21 NOTE — TELEPHONE ENCOUNTER
Writer called home care Tamara LOWERY:  No answer and voicemail not identified so writer did not leave a message    Writer called patient and reviewed plan per Dr. Briggs regarding Atorvastatin.    Patient verbalized understanding and in agreement with plan.    Confirmed 10/10/23 appt with JOANIE Amor PA-C- left this information in detailed voicemail per patient's request, along with:    Our clinic is now located at [address] [place] [address], [address], [place] [address].  There is a small surface lot outside the building and two large levels of ramp parking directly beneath the building. Parking is free. Please take the elevator to floor 2 and exit left to get to the lobby door.      BRETT CastanedaN, RN-BC  [facility]

## 2023-09-26 ENCOUNTER — THERAPY VISIT (OUTPATIENT)
Dept: PHYSICAL THERAPY | Facility: CLINIC | Age: 53
End: 2023-09-26
Attending: ORTHOPAEDIC SURGERY
Payer: COMMERCIAL

## 2023-09-26 DIAGNOSIS — G89.29 CHRONIC PAIN OF BOTH SHOULDERS: ICD-10-CM

## 2023-09-26 DIAGNOSIS — M25.511 CHRONIC PAIN OF BOTH SHOULDERS: ICD-10-CM

## 2023-09-26 DIAGNOSIS — M54.41 CHRONIC MIDLINE LOW BACK PAIN WITH RIGHT-SIDED SCIATICA: ICD-10-CM

## 2023-09-26 DIAGNOSIS — M51.369 DDD (DEGENERATIVE DISC DISEASE), LUMBAR: Primary | ICD-10-CM

## 2023-09-26 DIAGNOSIS — G89.29 CHRONIC LOW BACK PAIN WITHOUT SCIATICA, UNSPECIFIED BACK PAIN LATERALITY: ICD-10-CM

## 2023-09-26 DIAGNOSIS — G89.29 CHRONIC MIDLINE LOW BACK PAIN WITH RIGHT-SIDED SCIATICA: ICD-10-CM

## 2023-09-26 DIAGNOSIS — M25.512 CHRONIC PAIN OF BOTH SHOULDERS: ICD-10-CM

## 2023-09-26 DIAGNOSIS — M54.50 CHRONIC LOW BACK PAIN WITHOUT SCIATICA, UNSPECIFIED BACK PAIN LATERALITY: ICD-10-CM

## 2023-09-26 PROCEDURE — 97110 THERAPEUTIC EXERCISES: CPT | Mod: GP

## 2023-10-01 DIAGNOSIS — E78.1 HIGH BLOOD TRIGLYCERIDES: ICD-10-CM

## 2023-10-02 RX ORDER — FENOFIBRATE 48 MG/1
TABLET, COATED ORAL
Qty: 90 TABLET | Refills: 3 | Status: SHIPPED | OUTPATIENT
Start: 2023-10-02 | End: 2024-06-06

## 2023-10-10 ENCOUNTER — TELEPHONE (OUTPATIENT)
Dept: FAMILY MEDICINE | Facility: CLINIC | Age: 53
End: 2023-10-10

## 2023-10-10 NOTE — TELEPHONE ENCOUNTER
Forms/Letter Request    Type of form/letter:  Home Health Certification    Have you been seen for this request: N/A    Do we have the form/letter: Yes: Placed in care team 2    Who is the form from? Home care    Where did/will the form come from? form was faxed in    When is form/letter needed by: as soon as able    How would you like the form/letter returned: Fax : [phone]

## 2023-10-12 DIAGNOSIS — Z53.9 DIAGNOSIS NOT YET DEFINED: Primary | ICD-10-CM

## 2023-10-12 PROCEDURE — G0179 MD RECERTIFICATION HHA PT: HCPCS | Performed by: FAMILY MEDICINE

## 2023-10-12 NOTE — TELEPHONE ENCOUNTER
Completed, signed forms placed in MA basket today.  Sincerely,  Dr. Felisha Briggs MD  10/12/2023    8:19 AM

## 2023-10-27 ENCOUNTER — TELEPHONE (OUTPATIENT)
Dept: FAMILY MEDICINE | Facility: CLINIC | Age: 53
End: 2023-10-27

## 2023-10-27 NOTE — TELEPHONE ENCOUNTER
Deanna with [facility] calling    Therapist requested patient gets MRI ordered due to some concerns with some brain activity.     Patient does not have a neurologist at this time so requesting that PCP orders MRI    I did inform her that patient has not been seen with PCP in well over a year, hasn't even been seen in our clinic since April 2022 with Chip Sadler.     I did inform her they could request this from patient's neuropsych provider that she saw with [facility] in July.     Otherwise, would need a visit with PCP.     Dinora Matson, BRETTN RN  [facility]

## 2023-10-27 NOTE — TELEPHONE ENCOUNTER
Authorizing use of same day/STEPAN slots? Or should patient schedule next month when next in person availability w/ first avail provider? Please advise-thanks!

## 2023-10-27 NOTE — TELEPHONE ENCOUNTER
Please have patient schedule follow up to get MI orders.  Lucina Greenberg RN  [facility]

## 2023-10-27 NOTE — TELEPHONE ENCOUNTER
I agree with assessment and plan below, thanks! Needs visit or to make imaging request of specialist.  Felisha Briggs MD  10/27/2023

## 2023-10-31 NOTE — TELEPHONE ENCOUNTER
HI, please check with her to see what she prefers for scheduling.    If she wants to see me specifically, then use next available, NOT an approval only or overbook, so probably wouldn't be available until January.     If she wants to be seen sooner, than any available provider. Visit does need to be in person. Thank you!    Sincerely,  Dr. Felisha Briggs MD  10/31/2023        Connected with patient, advised of above message from PCP. Patient stated she wanted to stay with Dr. Briggs to complete visit. Was scheduled for next avail 01/12/24.      Letter sent to patient regarding appt information

## 2023-11-23 ENCOUNTER — HOSPITAL ENCOUNTER (EMERGENCY)
Facility: CLINIC | Age: 53
Discharge: HOME | End: 2023-11-23
Attending: EMERGENCY MEDICINE | Admitting: EMERGENCY MEDICINE
Payer: COMMERCIAL

## 2023-11-23 VITALS
HEART RATE: 79 BPM | RESPIRATION RATE: 16 BRPM | OXYGEN SATURATION: 95 % | BODY MASS INDEX: 28.32 KG/M2 | DIASTOLIC BLOOD PRESSURE: 75 MMHG | TEMPERATURE: 97.5 F | HEIGHT: 65 IN | SYSTOLIC BLOOD PRESSURE: 107 MMHG | WEIGHT: 170 LBS

## 2023-11-23 DIAGNOSIS — Z76.0 ENCOUNTER FOR MEDICATION REFILL: ICD-10-CM

## 2023-11-23 PROCEDURE — 250N000013 HC RX MED GY IP 250 OP 250 PS 637: Performed by: EMERGENCY MEDICINE

## 2023-11-23 PROCEDURE — 99283 EMERGENCY DEPT VISIT LOW MDM: CPT | Performed by: EMERGENCY MEDICINE

## 2023-11-23 RX ORDER — VENLAFAXINE HYDROCHLORIDE 150 MG/1
150 CAPSULE, EXTENDED RELEASE ORAL DAILY
Qty: 7 CAPSULE | Refills: 0 | Status: SHIPPED | OUTPATIENT
Start: 2023-11-23

## 2023-11-23 RX ORDER — VENLAFAXINE HYDROCHLORIDE 150 MG/1
150 CAPSULE, EXTENDED RELEASE ORAL ONCE
Status: COMPLETED | OUTPATIENT
Start: 2023-11-23 | End: 2023-11-23

## 2023-11-23 RX ADMIN — VENLAFAXINE HYDROCHLORIDE 150 MG: 150 CAPSULE, EXTENDED RELEASE ORAL at 13:53

## 2023-11-23 ASSESSMENT — ACTIVITIES OF DAILY LIVING (ADL)
ADLS_ACUITY_SCORE: 33
ADLS_ACUITY_SCORE: 33
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
## 2023-11-23 NOTE — ED TRIAGE NOTES
Patient woke up this morning and felt woozy and thinks is she in withdrawal since she hasn't had her venlafaxine.      Triage Assessment (Adult)       Row Name 11/23/23 1240          Triage Assessment    Airway WDL WDL        Respiratory WDL    Respiratory WDL WDL        Skin Circulation/Temperature WDL    Skin Circulation/Temperature WDL WDL        Cardiac WDL    Cardiac WDL WDL        Peripheral/Neurovascular WDL    Peripheral Neurovascular WDL WDL        Cognitive/Neuro/Behavioral WDL    Cognitive/Neuro/Behavioral WDL WDL

## 2023-11-24 DIAGNOSIS — E78.5 HYPERLIPIDEMIA LDL GOAL <160: ICD-10-CM

## 2023-11-24 DIAGNOSIS — E78.1 HIGH BLOOD TRIGLYCERIDES: ICD-10-CM

## 2023-11-28 RX ORDER — ATORVASTATIN CALCIUM 40 MG/1
20 TABLET, FILM COATED ORAL DAILY
Qty: 30 TABLET | Refills: 0 | Status: SHIPPED | OUTPATIENT
Start: 2023-11-28 | End: 2024-01-25

## 2023-12-11 ENCOUNTER — TELEPHONE (OUTPATIENT)
Dept: FAMILY MEDICINE | Facility: CLINIC | Age: 53
End: 2023-12-11

## 2023-12-11 ENCOUNTER — OFFICE VISIT (OUTPATIENT)
Dept: FAMILY MEDICINE | Facility: CLINIC | Age: 53
End: 2023-12-11
Payer: COMMERCIAL

## 2023-12-11 VITALS
DIASTOLIC BLOOD PRESSURE: 78 MMHG | HEIGHT: 65 IN | WEIGHT: 171.6 LBS | BODY MASS INDEX: 28.59 KG/M2 | OXYGEN SATURATION: 99 % | RESPIRATION RATE: 16 BRPM | HEART RATE: 106 BPM | SYSTOLIC BLOOD PRESSURE: 110 MMHG | TEMPERATURE: 97.8 F

## 2023-12-11 DIAGNOSIS — R41.0 CONFUSION: ICD-10-CM

## 2023-12-11 DIAGNOSIS — R41.89 SUBJECTIVE MEMORY COMPLAINTS: Primary | ICD-10-CM

## 2023-12-11 DIAGNOSIS — Z12.31 ENCOUNTER FOR SCREENING MAMMOGRAM FOR BREAST CANCER: ICD-10-CM

## 2023-12-11 PROCEDURE — 99213 OFFICE O/P EST LOW 20 MIN: CPT | Mod: 25 | Performed by: FAMILY MEDICINE

## 2023-12-11 PROCEDURE — 90471 IMMUNIZATION ADMIN: CPT | Performed by: FAMILY MEDICINE

## 2023-12-11 PROCEDURE — 90686 IIV4 VACC NO PRSV 0.5 ML IM: CPT | Performed by: FAMILY MEDICINE

## 2023-12-11 ASSESSMENT — PATIENT HEALTH QUESTIONNAIRE - PHQ9
10. IF YOU CHECKED OFF ANY PROBLEMS, HOW DIFFICULT HAVE THESE PROBLEMS MADE IT FOR YOU TO DO YOUR WORK, TAKE CARE OF THINGS AT HOME, OR GET ALONG WITH OTHER PEOPLE: SOMEWHAT DIFFICULT
SUM OF ALL RESPONSES TO PHQ QUESTIONS 1-9: 14
SUM OF ALL RESPONSES TO PHQ QUESTIONS 1-9: 14

## 2023-12-11 ASSESSMENT — ASTHMA QUESTIONNAIRES: ACT_TOTALSCORE: 25

## 2023-12-11 NOTE — TELEPHONE ENCOUNTER
Forms/Letter Request    Type of form/letter:  plan of care 11/28/2023 - 1/26/2024    Have you been seen for this request: N/A    Do we have the form/letter: Yes: placed in care team 2 providers sign folder    Who is the form from? Home care    Where did/will the form come from? form was faxed in    When is form/letter needed by: non given    How would you like the form/letter returned: Fax : [phone]

## 2023-12-11 NOTE — COMMUNITY RESOURCES LIST (ENGLISH)
12/11/2023   [facility]  N/A  For questions about this resource list or additional care needs, please contact your primary care clinic or care manager.  Phone: [phone]   Email: N/A   Address: [address] [place] [id number]   Hours: N/A        Food and Nutrition       Food pantry  1  [facility] Distance: 1.74 miles      In-Person   [address] [place] [address]  Language: English  Hours: Mon - Thu 1:00 PM - 4:30 PM , Fri 1:30 PM - 3:00 PM  Fees: Free   Phone: (804) 542-6891 Email: Heath@[employer]. Website: https://www.[employer]/     2  [facility] Distance: 1.82 miles      [facility]   [address] [place] [address]  Language: English  Hours: Tue 5:00 PM - 6:30 PM  Fees: Free   Phone: (627) 989-5458 Email: office@[facility] Website: http://www.[employer]/     SNAP application assistance  3  [facility] - Office of Multicultural Services Distance: 1.33 miles      Phone/Virtual   [address] [place] [address]  Language: American Sign Language, German, Polish, English, Bulgarian, Thai, Oromo, Jordanian, Botswanan, Kittitian, Swahili, Estonian, Khmer  Hours: Mon - Tue 9:00 AM - 4:00 PM , Wed 10:00 AM - 5:00 PM , Thu - Fri 9:00 AM - 4:00 PM  Fees: Free   Phone: (702) 922-2340 Email: oms@[place]. Website: http://www.[facility]/residents/human-services/multi-cultural-services     4  [employer] Distance: 2.4 miles      In-Person   [address] [place] [address]  Language: English, Kittitian  Hours: Mon - Fri 8:30 AM - 5:00 PM  Fees: Free   Phone: (561) 355-5093 Email: info@clues.org Website: http://www.clues.org/     Soup kitchen or free meals  5  [facility] [place] [facility] [place] [place] Distance: 1.84 miles      In-Person   [address] [place] [address]  Language: English  Hours: Mon - Wed 12:00 PM - 1:00 PM  Fees: Free    Phone: (156) 582-5554 Email: victorina@[facility].org Website: https://centralusa.[facility]lands[facility] Card.org/[facility]/Barbara/     6  [facility] [place] [facility] Distance: 2.11 miles      [facility]   [address] [place] [address]  Language: English, Nepali  Hours: Mon - Thu 5:15 PM - 6:15 PM  Fees: Free   Phone: (508) 462-1266 Ext.214 Email: rzvoyusygi7602@[employer] Website: http://www.[employer].org/          Important Numbers & Websites       Emergency Services   911  [facility]   311  Poison Control   (755) 441-1663  Suicide Prevention Lifeline   (323) 211-2256 (TALK)  Child Abuse Hotline   (869) 174-8970 (4-A-Child)  Sexual Assault Hotline   (390) 207-3669 (HOPE)  National Runaway Safeline   (902) 127-2106 (RUNAWAY)  All-Options Talkline   (910) 738-9762  Substance Abuse Referral   (120) 631-5919 (HELP)

## 2023-12-11 NOTE — PROGRESS NOTES
"  Assessment & Plan     Subjective memory complaints  She has had a recent neuropsych evaluation as per her care everywhere records.  Results showed disengagement during the test and it limits conclusion.  As per neuropsych report test results are not consistent with mild TBI or neurodegenerative condition.  As per neuropsych mood, stress, sleep disturbances may be contributing to her cognitive difficulty.  Her current therapist is concerned about the confusion and they want neurology input.  Will do neurology referral.  Discussed it may take months to get in.  They understand.  - Adult Neurology  Referral; Future    Confusion  See above  - Adult Neurology  Referral; Future    Encounter for screening mammogram for breast cancer     - MA SCREENING DIGITAL BILAT - Future  (s+30); Future             BMI:   Estimated body mass index is 28.56 kg/m  as calculated from the following:    Height as of this encounter: 1.651 m (5' 5\").    Weight as of this encounter: 77.8 kg (171 lb 9.6 oz).           Ephraim Alonso MD, MD  [facility]    Pietro Mar is a 53 year old, presenting for the following health issues:  Referral (MRI for brain tissue)      12/11/2023     3:33 PM   Additional Questions   Roomed by Nash   Accompanied by casw worker       History of Present Illness       Reason for visit:  MRI referral    She eats 4 or more servings of fruits and vegetables daily.She consumes 2 sweetened beverage(s) daily.She exercises with enough effort to increase her heart rate 9 or less minutes per day.  She exercises with enough effort to increase her heart rate 3 or less days per week.   She is taking medications regularly.     Here with her S worker.     Need to have MRI done.     [facility] wants MRI. [facility] therapist.     Was advised to draw pumpkin and she isauro something else on several times. They are concerned and wants neurology input. " "    Review of Systems         Objective    /78 (BP Location: Right arm, Patient Position: Sitting, Cuff Size: Adult Regular)   Pulse 106   Temp 97.8  F (36.6  C) (Temporal)   Resp 16   Ht 1.651 m (5' 5\")   Wt 77.8 kg (171 lb 9.6 oz)   LMP  (LMP Unknown)   SpO2 99%   BMI 28.56 kg/m    Body mass index is 28.56 kg/m .  Physical Exam   Clear speech, alert, minimally confused during conversation.  Most of the history obtained through [facility] worker.                      "

## 2023-12-14 DIAGNOSIS — Z53.9 DIAGNOSIS NOT YET DEFINED: Primary | ICD-10-CM

## 2023-12-14 PROCEDURE — G0179 MD RECERTIFICATION HHA PT: HCPCS | Performed by: FAMILY MEDICINE

## 2023-12-15 NOTE — TELEPHONE ENCOUNTER
Completed, signed forms placed in MA basket today.  Sincerely,  Dr. Felisha Briggs MD  12/14/2023    6:55 PM

## 2024-01-25 DIAGNOSIS — E78.5 HYPERLIPIDEMIA LDL GOAL <160: ICD-10-CM

## 2024-01-25 DIAGNOSIS — E78.1 HIGH BLOOD TRIGLYCERIDES: ICD-10-CM

## 2024-01-25 RX ORDER — ATORVASTATIN CALCIUM 40 MG/1
40 TABLET, FILM COATED ORAL DAILY
Qty: 90 TABLET | Refills: 0 | Status: SHIPPED | OUTPATIENT
Start: 2024-01-25 | End: 2024-05-23

## 2024-01-25 NOTE — TELEPHONE ENCOUNTER
Declined to schedule at time of call due to her memory issues, but was more than willing to be seen. States she has a helper who coordinates for her, Char. There is no CTC on file for Char so advised patient to call back when she is with Char so we can get her scheduled. At time of visit can have CTC completed. Patient in agreement with plan.

## 2024-01-29 ENCOUNTER — TELEPHONE (OUTPATIENT)
Dept: PEDIATRICS | Facility: CLINIC | Age: 54
End: 2024-01-29
Payer: MEDICARE

## 2024-01-29 DIAGNOSIS — I10 ESSENTIAL HYPERTENSION WITH GOAL BLOOD PRESSURE LESS THAN 140/90: ICD-10-CM

## 2024-01-29 RX ORDER — LISINOPRIL 5 MG/1
5 TABLET ORAL DAILY
Qty: 90 TABLET | Refills: 3 | OUTPATIENT
Start: 2024-01-29

## 2024-02-01 ENCOUNTER — TELEPHONE (OUTPATIENT)
Dept: FAMILY MEDICINE | Facility: CLINIC | Age: 54
End: 2024-02-01
Payer: MEDICARE

## 2024-02-01 DIAGNOSIS — Z53.9 DIAGNOSIS NOT YET DEFINED: Primary | ICD-10-CM

## 2024-02-01 PROCEDURE — G0179 MD RECERTIFICATION HHA PT: HCPCS | Performed by: FAMILY MEDICINE

## 2024-02-01 NOTE — TELEPHONE ENCOUNTER
Forms/Letter Request    Type of form/letter: Home Health Certification      Do we have the form/letter: Yes:  Found completed in outgoing [facility] CARE TEAM 2  Faxed to [phone]  Copy sent stat to abstract  Copy kept in clinic

## 2024-02-22 DIAGNOSIS — F33.1 MODERATE RECURRENT MAJOR DEPRESSION (H): ICD-10-CM

## 2024-02-22 DIAGNOSIS — F41.1 GENERALIZED ANXIETY DISORDER: ICD-10-CM

## 2024-02-27 RX ORDER — VENLAFAXINE HYDROCHLORIDE 150 MG/1
150 CAPSULE, EXTENDED RELEASE ORAL DAILY
Qty: 90 CAPSULE | OUTPATIENT
Start: 2024-02-27

## 2024-02-27 NOTE — TELEPHONE ENCOUNTER
Hi, I haven't seen her since 5/2021, so if she wants me to refill her medications she needs to make an appointment with me. She has seen Dr. Alonso 12/2023, so I've routed this request to him to see if he's comfortable refilling this medication. I'll route to reception; please call her and help her make a preventive physical appointment with me or provider of her choice. Thank you!

## 2024-02-29 RX ORDER — VENLAFAXINE HYDROCHLORIDE 150 MG/1
CAPSULE, EXTENDED RELEASE ORAL
Qty: 30 CAPSULE | Refills: 0 | Status: SHIPPED | OUTPATIENT
Start: 2024-02-29 | End: 2024-03-21

## 2024-02-29 NOTE — TELEPHONE ENCOUNTER
Patient called and stated she only has 6 pills left. Pt was last seen in 12/23 with . Routing to  for refill request.

## 2024-03-01 NOTE — TELEPHONE ENCOUNTER
We did not discuss effexor.  I filled for 30 days but she needs to follow up with her pcp before she runs out for further refills.   Please help her to schedule an appt with pcp or first available provider.

## 2024-03-01 NOTE — TELEPHONE ENCOUNTER
LVM informing pt that medication was sent and to schedule appt to discuss additional refills with PCP or next available provider.

## 2024-03-04 NOTE — TELEPHONE ENCOUNTER
RECORDS RECEIVED FROM: Care Everywhere   REASON FOR VISIT: Subjective memory complaints ,Confusion   PROVIDER: Emanuel Stanton MD   DATE OF APPT: 4/26/24 @ 3:15 pm    NOTES (FOR ALL VISITS) STATUS DETAILS   OFFICE NOTE from referring provider Internal 12/11/23 Ephraim Alonso MD @[facility]     OFFICE NOTE from other specialist Care Everywhere 7/24/23, 7/5/23 Lana Vegas, PhD, LP  @[facility] [place] [facility]     MEDICATION LIST Internal    IMAGING  (FOR ALL VISITS)     CT (HEAD, NECK, SPINE) Internal [facility]  8/19/21 CT Head  8/12/21 CT Cervical Spine

## 2024-03-21 DIAGNOSIS — F33.1 MODERATE RECURRENT MAJOR DEPRESSION (H): ICD-10-CM

## 2024-03-21 DIAGNOSIS — F41.1 GENERALIZED ANXIETY DISORDER: ICD-10-CM

## 2024-03-21 RX ORDER — VENLAFAXINE HYDROCHLORIDE 150 MG/1
CAPSULE, EXTENDED RELEASE ORAL
Qty: 30 CAPSULE | Refills: 0 | Status: SHIPPED | OUTPATIENT
Start: 2024-03-21 | End: 2024-04-29

## 2024-03-27 ENCOUNTER — TELEPHONE (OUTPATIENT)
Dept: FAMILY MEDICINE | Facility: CLINIC | Age: 54
End: 2024-03-27
Payer: MEDICARE

## 2024-03-27 NOTE — TELEPHONE ENCOUNTER
Forms/Letter Request    Type of form/letter: Home Health Certification      Do we have the form/letter: Yes: Placed in care team 2    Who is the form from? Home care    Where did/will the form come from? form was faxed in    When is form/letter needed by: as soon as able    How would you like the form/letter returned: Fax : [phone]

## 2024-03-28 NOTE — TELEPHONE ENCOUNTER
Placed on Dr. Alonso's desk, I haven't seen her since 4/16/2021.  Sincerely,  Dr. Felisha Briggs MD  3/28/2024

## 2024-04-01 DIAGNOSIS — Z53.9 DIAGNOSIS NOT YET DEFINED: Primary | ICD-10-CM

## 2024-04-01 PROCEDURE — G0179 MD RECERTIFICATION HHA PT: HCPCS | Performed by: FAMILY MEDICINE

## 2024-04-26 ENCOUNTER — OFFICE VISIT (OUTPATIENT)
Dept: NEUROLOGY | Facility: CLINIC | Age: 54
End: 2024-04-26
Attending: FAMILY MEDICINE
Payer: MEDICARE

## 2024-04-26 ENCOUNTER — PRE VISIT (OUTPATIENT)
Dept: NEUROLOGY | Facility: CLINIC | Age: 54
End: 2024-04-26

## 2024-04-26 VITALS
BODY MASS INDEX: 27.99 KG/M2 | SYSTOLIC BLOOD PRESSURE: 101 MMHG | DIASTOLIC BLOOD PRESSURE: 70 MMHG | HEIGHT: 65 IN | OXYGEN SATURATION: 98 % | HEART RATE: 92 BPM | WEIGHT: 168 LBS

## 2024-04-26 DIAGNOSIS — R41.3 MEMORY CHANGE: Primary | ICD-10-CM

## 2024-04-26 PROCEDURE — 99204 OFFICE O/P NEW MOD 45 MIN: CPT | Performed by: INTERNAL MEDICINE

## 2024-04-26 ASSESSMENT — PAIN SCALES - GENERAL: PAINLEVEL: NO PAIN (0)
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
## 2024-04-29 DIAGNOSIS — F33.1 MODERATE RECURRENT MAJOR DEPRESSION (H): ICD-10-CM

## 2024-04-29 DIAGNOSIS — F41.1 GENERALIZED ANXIETY DISORDER: ICD-10-CM

## 2024-04-29 RX ORDER — VENLAFAXINE HYDROCHLORIDE 150 MG/1
CAPSULE, EXTENDED RELEASE ORAL
Qty: 30 CAPSULE | Refills: 0 | Status: SHIPPED | OUTPATIENT
Start: 2024-04-29 | End: 2024-05-24

## 2024-04-29 RX ORDER — VENLAFAXINE HYDROCHLORIDE 150 MG/1
150 CAPSULE, EXTENDED RELEASE ORAL DAILY
Qty: 7 CAPSULE | Refills: 0 | Status: CANCELLED | OUTPATIENT
Start: 2024-04-29

## 2024-05-02 ENCOUNTER — LAB (OUTPATIENT)
Dept: LAB | Facility: CLINIC | Age: 54
End: 2024-05-02
Payer: COMMERCIAL

## 2024-05-02 DIAGNOSIS — R41.3 MEMORY CHANGE: ICD-10-CM

## 2024-05-02 PROCEDURE — 82607 VITAMIN B-12: CPT | Performed by: INTERNAL MEDICINE

## 2024-05-02 PROCEDURE — 0346U LABORATORY MISCELLANEOUS ORDER: CPT | Performed by: INTERNAL MEDICINE

## 2024-05-02 PROCEDURE — 99000 SPECIMEN HANDLING OFFICE-LAB: CPT | Performed by: PATHOLOGY

## 2024-05-02 PROCEDURE — 36415 COLL VENOUS BLD VENIPUNCTURE: CPT | Performed by: PATHOLOGY

## 2024-05-03 LAB — VIT B12 SERPL-MCNC: 610 PG/ML (ref 232–1245)

## 2024-05-08 ENCOUNTER — TELEPHONE (OUTPATIENT)
Dept: FAMILY MEDICINE | Facility: CLINIC | Age: 54
End: 2024-05-08

## 2024-05-08 ENCOUNTER — HOSPITAL ENCOUNTER (EMERGENCY)
Facility: CLINIC | Age: 54
Discharge: HOME | End: 2024-05-08
Admitting: EMERGENCY MEDICINE
Payer: MEDICARE

## 2024-05-08 VITALS
SYSTOLIC BLOOD PRESSURE: 146 MMHG | RESPIRATION RATE: 18 BRPM | TEMPERATURE: 98.6 F | HEART RATE: 93 BPM | DIASTOLIC BLOOD PRESSURE: 89 MMHG | OXYGEN SATURATION: 99 %

## 2024-05-08 PROCEDURE — 99281 EMR DPT VST MAYX REQ PHY/QHP: CPT

## 2024-05-08 ASSESSMENT — COLUMBIA-SUICIDE SEVERITY RATING SCALE - C-SSRS
6. HAVE YOU EVER DONE ANYTHING, STARTED TO DO ANYTHING, OR PREPARED TO DO ANYTHING TO END YOUR LIFE?: NO
1. IN THE PAST MONTH, HAVE YOU WISHED YOU WERE DEAD OR WISHED YOU COULD GO TO SLEEP AND NOT WAKE UP?: NO
2. HAVE YOU ACTUALLY HAD ANY THOUGHTS OF KILLING YOURSELF IN THE PAST MONTH?: NO

## 2024-05-08 ASSESSMENT — ACTIVITIES OF DAILY LIVING (ADL): ADLS_ACUITY_SCORE: 36

## 2024-05-08 NOTE — TELEPHONE ENCOUNTER
Forms/Letter Request    Type of form/letter: OTHER: /requesting ICD-10 codes       Do we have the form/letter: Yes: placed in care team 2 providers form folder    Who is the form from? [employer] (if other please explain)    Where did/will the form come from? form was faxed in    When is form/letter needed by: n/a    How would you like the form/letter returned: Fax : [phone]

## 2024-05-09 LAB — SCANNED LAB RESULT: NORMAL

## 2024-05-14 NOTE — TELEPHONE ENCOUNTER
ASHLEY Pardo asking to call back to speak with  to schedule with  in May for Prevnetative visit. Pt needs to be seen before these forms can be filled out. Placed forms in Care team 2 until pt is scheduled.

## 2024-05-16 ENCOUNTER — TELEPHONE (OUTPATIENT)
Dept: NEUROLOGY | Facility: CLINIC | Age: 54
End: 2024-05-16
Payer: MEDICARE

## 2024-05-16 DIAGNOSIS — R41.3 MEMORY CHANGE: Primary | ICD-10-CM

## 2024-05-16 NOTE — TELEPHONE ENCOUNTER
Received following message from Dr. Stanton-     The blood test showed that she was in the moderate risk range for Alzheimer's disease. It is reassuring that this test was not in the high risk range.     I would recommend that patient repeat neuropsychology testing as a next step. She should get a call to schedule that.     If patient is confused about the message, it would be helpful to also call  Char to relay the message.    Spoke to Isabela - she requested that I reach out to Char  to assist in follow-up instructions.

## 2024-05-21 NOTE — TELEPHONE ENCOUNTER
Spoke with Char - provided neuropsychology # to call to schedule. Relayed findings and recommendations from Dr. Stanton.     Char is wondering if follow-up on 6/7 is still needed or if it should be rescheduled. I will reach out to him to verify.

## 2024-05-23 DIAGNOSIS — F41.1 GENERALIZED ANXIETY DISORDER: ICD-10-CM

## 2024-05-23 DIAGNOSIS — E78.1 HIGH BLOOD TRIGLYCERIDES: ICD-10-CM

## 2024-05-23 DIAGNOSIS — F33.1 MODERATE RECURRENT MAJOR DEPRESSION (H): ICD-10-CM

## 2024-05-23 DIAGNOSIS — E78.5 HYPERLIPIDEMIA LDL GOAL <160: ICD-10-CM

## 2024-05-24 ENCOUNTER — ANCILLARY PROCEDURE (OUTPATIENT)
Dept: MRI IMAGING | Facility: CLINIC | Age: 54
End: 2024-05-24
Attending: INTERNAL MEDICINE
Payer: MEDICARE

## 2024-05-24 DIAGNOSIS — R41.3 MEMORY CHANGE: ICD-10-CM

## 2024-05-24 PROCEDURE — G1010 CDSM STANSON: HCPCS | Mod: GC | Performed by: RADIOLOGY

## 2024-05-24 PROCEDURE — 70551 MRI BRAIN STEM W/O DYE: CPT | Mod: MG | Performed by: RADIOLOGY

## 2024-05-24 RX ORDER — ATORVASTATIN CALCIUM 40 MG/1
40 TABLET, FILM COATED ORAL DAILY
Qty: 30 TABLET | Refills: 0 | Status: SHIPPED | OUTPATIENT
Start: 2024-05-24 | End: 2024-06-13

## 2024-05-24 RX ORDER — VENLAFAXINE HYDROCHLORIDE 150 MG/1
CAPSULE, EXTENDED RELEASE ORAL
Qty: 30 CAPSULE | Refills: 0 | Status: SHIPPED | OUTPATIENT
Start: 2024-05-24 | End: 2024-06-20

## 2024-05-30 ENCOUNTER — TELEPHONE (OUTPATIENT)
Dept: FAMILY MEDICINE | Facility: CLINIC | Age: 54
End: 2024-05-30
Payer: MEDICARE

## 2024-05-30 DIAGNOSIS — R94.02 ABNORMAL BRAIN SCAN: ICD-10-CM

## 2024-05-30 DIAGNOSIS — F03.90: ICD-10-CM

## 2024-05-30 DIAGNOSIS — R41.3 MEMORY CHANGE: Primary | ICD-10-CM

## 2024-05-30 NOTE — TELEPHONE ENCOUNTER
Spoke to Char- relayed MRI results & recommendations per Dr. Stanton.    Neuropsych referral being declined - NP referral declined- ref prov notified. MOCA <10. Consulted with Dr. Rhonda Courtney.     At this time, Dr. Stanton wants to proceed with PET scan rather than Neuropsych or further cognitive testing. Imaging scheduling number provided to Char. Will have clinic coordinators reach out to schedule as well.

## 2024-05-30 NOTE — TELEPHONE ENCOUNTER
Forms/Letter Request    Type of form/letter: Home Health Certification      Do we have the form/letter: Yes: Form folder CARE TEAM 2    Who is the form from? Home care    Where did/will the form come from? form was faxed in    When is form/letter needed by: N/A    How would you like the form/letter returned: Fax : Number listed in contact

## 2024-05-30 NOTE — TELEPHONE ENCOUNTER
Completed, signed forms placed in MA basket today.  Sincerely,  Dr. Felisha Briggs MD  5/30/2024    6:41 PM

## 2024-05-30 NOTE — TELEPHONE ENCOUNTER
Completed, signed forms placed in MA basket today.  Sincerely,  Dr. Felisha Briggs MD  5/30/2024    6:36 PM

## 2024-05-30 NOTE — TELEPHONE ENCOUNTER
Forms/Letter Request    Type of form/letter: OTHER: ICD 10 Codes        Do we have the form/letter: Yes: Placed in providers folder     Who is the form from? [facility] (if other please explain)    Where did/will the form come from? form was faxed in    When is form/letter needed by: ASAP     How would you like the form/letter returned: Fax : [phone]    Patient scheduled with PCP on 06/20/24.

## 2024-06-06 DIAGNOSIS — E78.1 HIGH BLOOD TRIGLYCERIDES: ICD-10-CM

## 2024-06-07 ENCOUNTER — ANCILLARY PROCEDURE (OUTPATIENT)
Dept: PET IMAGING | Facility: CLINIC | Age: 54
End: 2024-06-07
Attending: INTERNAL MEDICINE
Payer: MEDICARE

## 2024-06-07 DIAGNOSIS — Z08 ENCOUNTER FOR FOLLOW-UP EXAMINATION AFTER COMPLETED TREATMENT FOR MALIGNANT NEOPLASM: ICD-10-CM

## 2024-06-07 DIAGNOSIS — F03.90: ICD-10-CM

## 2024-06-07 DIAGNOSIS — R41.3 MEMORY CHANGE: ICD-10-CM

## 2024-06-07 DIAGNOSIS — R94.02 ABNORMAL BRAIN SCAN: ICD-10-CM

## 2024-06-07 LAB — GLUCOSE SERPL-MCNC: 91 MG/DL (ref 70–99)

## 2024-06-07 RX ORDER — FLUDEOXYGLUCOSE F 18 200 MCI/ML
8-18 INJECTION, SOLUTION INTRAVENOUS ONCE
Status: COMPLETED | OUTPATIENT
Start: 2024-06-07 | End: 2024-06-07

## 2024-06-07 RX ORDER — FENOFIBRATE 48 MG/1
48 TABLET, COATED ORAL DAILY
Qty: 30 TABLET | Refills: 0 | Status: SHIPPED | OUTPATIENT
Start: 2024-06-07 | End: 2024-06-13

## 2024-06-07 RX ADMIN — FLUDEOXYGLUCOSE F 18 11.35 MILLICURIE: 200 INJECTION, SOLUTION INTRAVENOUS at 08:57

## 2024-06-12 ENCOUNTER — TELEPHONE (OUTPATIENT)
Dept: NEUROLOGY | Facility: CLINIC | Age: 54
End: 2024-06-12

## 2024-06-13 DIAGNOSIS — E78.5 HYPERLIPIDEMIA LDL GOAL <160: ICD-10-CM

## 2024-06-13 DIAGNOSIS — E78.1 HIGH BLOOD TRIGLYCERIDES: ICD-10-CM

## 2024-06-13 RX ORDER — FENOFIBRATE 48 MG/1
48 TABLET, COATED ORAL DAILY
Qty: 30 TABLET | Refills: 0 | Status: SHIPPED | OUTPATIENT
Start: 2024-06-13 | End: 2024-06-20

## 2024-06-13 RX ORDER — ATORVASTATIN CALCIUM 40 MG/1
40 TABLET, FILM COATED ORAL DAILY
Qty: 30 TABLET | Refills: 0 | Status: SHIPPED | OUTPATIENT
Start: 2024-06-13 | End: 2024-06-20

## 2024-06-13 NOTE — TELEPHONE ENCOUNTER
Rubi, RN, [facility]:  Saw patient today and set up patient's medications  Atorvastatin needs to be refilled-patietn is completely out  Fenofibrate 48 mg also needs to be refilled    Writer nicholas Venegas, Fenofibrate was refilled on 6/7/24 to [facility] and [place].    Rubi peterson writer pharmacy did not have this order as patient was just at pharmacy and not dispensed Fenofibrate.    Writer informed Rubi welsh will work on processing these refill requests.    Thank you!  BRETT CastanedaN, RN-[facility]
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
## 2024-06-20 ENCOUNTER — OFFICE VISIT (OUTPATIENT)
Dept: FAMILY MEDICINE | Facility: CLINIC | Age: 54
End: 2024-06-20
Payer: MEDICARE

## 2024-06-20 VITALS
HEIGHT: 65 IN | WEIGHT: 159.6 LBS | TEMPERATURE: 97.7 F | RESPIRATION RATE: 18 BRPM | BODY MASS INDEX: 26.59 KG/M2 | SYSTOLIC BLOOD PRESSURE: 128 MMHG | HEART RATE: 75 BPM | OXYGEN SATURATION: 100 % | DIASTOLIC BLOOD PRESSURE: 85 MMHG

## 2024-06-20 DIAGNOSIS — Z12.31 ENCOUNTER FOR SCREENING MAMMOGRAM FOR BREAST CANCER: ICD-10-CM

## 2024-06-20 DIAGNOSIS — Z00.00 ENCOUNTER FOR MEDICARE ANNUAL WELLNESS EXAM: Primary | ICD-10-CM

## 2024-06-20 DIAGNOSIS — R87.810 CERVICAL HIGH RISK HPV (HUMAN PAPILLOMAVIRUS) TEST POSITIVE: ICD-10-CM

## 2024-06-20 DIAGNOSIS — Z30.431 SURVEILLANCE OF PREVIOUSLY PRESCRIBED INTRAUTERINE CONTRACEPTIVE DEVICE: ICD-10-CM

## 2024-06-20 DIAGNOSIS — F41.1 GENERALIZED ANXIETY DISORDER: ICD-10-CM

## 2024-06-20 DIAGNOSIS — Z01.419 ENCOUNTER FOR GYNECOLOGICAL EXAMINATION (GENERAL) (ROUTINE) WITHOUT ABNORMAL FINDINGS: ICD-10-CM

## 2024-06-20 DIAGNOSIS — F33.1 MODERATE RECURRENT MAJOR DEPRESSION (H): ICD-10-CM

## 2024-06-20 DIAGNOSIS — R73.01 IMPAIRED FASTING GLUCOSE: ICD-10-CM

## 2024-06-20 DIAGNOSIS — E55.9 VITAMIN D DEFICIENCY: ICD-10-CM

## 2024-06-20 DIAGNOSIS — I10 ESSENTIAL HYPERTENSION WITH GOAL BLOOD PRESSURE LESS THAN 140/90: ICD-10-CM

## 2024-06-20 DIAGNOSIS — E78.5 HYPERLIPIDEMIA LDL GOAL <160: ICD-10-CM

## 2024-06-20 DIAGNOSIS — Z12.11 SCREEN FOR COLON CANCER: ICD-10-CM

## 2024-06-20 DIAGNOSIS — E78.1 HIGH BLOOD TRIGLYCERIDES: ICD-10-CM

## 2024-06-20 DIAGNOSIS — J45.20 MILD INTERMITTENT ASTHMA WITHOUT COMPLICATION: ICD-10-CM

## 2024-06-20 PROBLEM — G44.309 POST-TRAUMATIC HEADACHE: Status: RESOLVED | Noted: 2017-12-05 | Resolved: 2024-06-20

## 2024-06-20 PROBLEM — L56.8 PHOTOSENSITIVITY: Status: RESOLVED | Noted: 2019-07-05 | Resolved: 2024-06-20

## 2024-06-20 PROBLEM — H53.143 EYES SENSITIVE TO LIGHT, BILATERAL: Status: RESOLVED | Noted: 2019-10-31 | Resolved: 2024-06-20

## 2024-06-20 LAB — HBA1C MFR BLD: 5.1 % (ref 0–5.6)

## 2024-06-20 PROCEDURE — 90480 ADMN SARSCOV2 VAC 1/ONLY CMP: CPT | Performed by: FAMILY MEDICINE

## 2024-06-20 PROCEDURE — 80061 LIPID PANEL: CPT | Performed by: FAMILY MEDICINE

## 2024-06-20 PROCEDURE — 83036 HEMOGLOBIN GLYCOSYLATED A1C: CPT | Performed by: FAMILY MEDICINE

## 2024-06-20 PROCEDURE — 36415 COLL VENOUS BLD VENIPUNCTURE: CPT | Performed by: FAMILY MEDICINE

## 2024-06-20 PROCEDURE — 87624 HPV HI-RISK TYP POOLED RSLT: CPT | Performed by: FAMILY MEDICINE

## 2024-06-20 PROCEDURE — 80048 BASIC METABOLIC PNL TOTAL CA: CPT | Performed by: FAMILY MEDICINE

## 2024-06-20 PROCEDURE — 82570 ASSAY OF URINE CREATININE: CPT | Performed by: FAMILY MEDICINE

## 2024-06-20 PROCEDURE — 91320 SARSCV2 VAC 30MCG TRS-SUC IM: CPT | Performed by: FAMILY MEDICINE

## 2024-06-20 PROCEDURE — 82043 UR ALBUMIN QUANTITATIVE: CPT | Performed by: FAMILY MEDICINE

## 2024-06-20 PROCEDURE — G0145 SCR C/V CYTO,THINLAYER,RESCR: HCPCS | Performed by: FAMILY MEDICINE

## 2024-06-20 PROCEDURE — 99214 OFFICE O/P EST MOD 30 MIN: CPT | Mod: 25 | Performed by: FAMILY MEDICINE

## 2024-06-20 PROCEDURE — 82306 VITAMIN D 25 HYDROXY: CPT | Performed by: FAMILY MEDICINE

## 2024-06-20 PROCEDURE — G0402 INITIAL PREVENTIVE EXAM: HCPCS | Performed by: FAMILY MEDICINE

## 2024-06-20 RX ORDER — VENLAFAXINE HYDROCHLORIDE 150 MG/1
CAPSULE, EXTENDED RELEASE ORAL
Qty: 90 CAPSULE | Refills: 3 | Status: SHIPPED | OUTPATIENT
Start: 2024-06-20

## 2024-06-20 RX ORDER — FENOFIBRATE 48 MG/1
48 TABLET, COATED ORAL DAILY
Qty: 90 TABLET | Refills: 3 | Status: SHIPPED | OUTPATIENT
Start: 2024-06-20 | End: 2024-07-09

## 2024-06-20 RX ORDER — ATORVASTATIN CALCIUM 40 MG/1
40 TABLET, FILM COATED ORAL DAILY
Qty: 90 TABLET | Refills: 3 | Status: SHIPPED | OUTPATIENT
Start: 2024-06-20

## 2024-06-20 SDOH — HEALTH STABILITY: PHYSICAL HEALTH: ON AVERAGE, HOW MANY DAYS PER WEEK DO YOU ENGAGE IN MODERATE TO STRENUOUS EXERCISE (LIKE A BRISK WALK)?: 3 DAYS

## 2024-06-20 SDOH — HEALTH STABILITY: PHYSICAL HEALTH: ON AVERAGE, HOW MANY MINUTES DO YOU ENGAGE IN EXERCISE AT THIS LEVEL?: 20 MIN

## 2024-06-20 ASSESSMENT — ASTHMA QUESTIONNAIRES
QUESTION_4 LAST FOUR WEEKS HOW OFTEN HAVE YOU USED YOUR RESCUE INHALER OR NEBULIZER MEDICATION (SUCH AS ALBUTEROL): NOT AT ALL
QUESTION_2 LAST FOUR WEEKS HOW OFTEN HAVE YOU HAD SHORTNESS OF BREATH: NOT AT ALL
ACT_TOTALSCORE: 24
QUESTION_1 LAST FOUR WEEKS HOW MUCH OF THE TIME DID YOUR ASTHMA KEEP YOU FROM GETTING AS MUCH DONE AT WORK, SCHOOL OR AT HOME: NONE OF THE TIME
QUESTION_5 LAST FOUR WEEKS HOW WOULD YOU RATE YOUR ASTHMA CONTROL: WELL CONTROLLED
ACT_TOTALSCORE: 24
QUESTION_3 LAST FOUR WEEKS HOW OFTEN DID YOUR ASTHMA SYMPTOMS (WHEEZING, COUGHING, SHORTNESS OF BREATH, CHEST TIGHTNESS OR PAIN) WAKE YOU UP AT NIGHT OR EARLIER THAN USUAL IN THE MORNING: NOT AT ALL
EMERGENCY_ROOM_LAST_YEAR_TOTAL: ONE

## 2024-06-20 ASSESSMENT — PATIENT HEALTH QUESTIONNAIRE - PHQ9
SUM OF ALL RESPONSES TO PHQ QUESTIONS 1-9: 3
SUM OF ALL RESPONSES TO PHQ QUESTIONS 1-9: 3
10. IF YOU CHECKED OFF ANY PROBLEMS, HOW DIFFICULT HAVE THESE PROBLEMS MADE IT FOR YOU TO DO YOUR WORK, TAKE CARE OF THINGS AT HOME, OR GET ALONG WITH OTHER PEOPLE: NOT DIFFICULT AT ALL

## 2024-06-20 ASSESSMENT — SOCIAL DETERMINANTS OF HEALTH (SDOH): HOW OFTEN DO YOU GET TOGETHER WITH FRIENDS OR RELATIVES?: PATIENT DECLINED

## 2024-06-20 ASSESSMENT — PAIN SCALES - GENERAL: PAINLEVEL: NO PAIN (0)

## 2024-06-20 NOTE — LETTER
July 3, 2024      Isabela Panchal  [address]  [place] [address]        Dear ,    We are writing to inform you of your test results.    Thank you very much for getting labs done! Everything looks normal/stable.    Sincerely,  Dr. Felisha Briggs MD  7/3/2024      Resulted Orders   Albumin Random Urine Quantitative with Creat Ratio   Result Value Ref Range    Creatinine Urine mg/dL 175.0 mg/dL      Comment:      The reference ranges have not been established in urine creatinine. The results should be integrated into the clinical context for interpretation.    Albumin Urine mg/L <12.0 mg/L      Comment:      The reference ranges have not been established in urine albumin. The results should be integrated into the clinical context for interpretation.    Albumin Urine mg/g Cr        Comment:      Unable to calculate, urine albumin and/or urine creatinine is outside detectable limits.  Microalbuminuria is defined as an albumin:creatinine ratio of 17 to 299 for males and 25 to 299 for females. A ratio of albumin:creatinine of 300 or higher is indicative of overt proteinuria.  Due to biologic variability, positive results should be confirmed by a second, first-morning random or 24-hour timed urine specimen. If there is discrepancy, a third specimen is recommended. When 2 out of 3 results are in the microalbuminuria range, this is evidence for incipient nephropathy and warrants increased efforts at glucose control, blood pressure control, and institution of therapy with an angiotensin-converting-enzyme (ACE) inhibitor (if the patient can tolerate it).     HEMOGLOBIN A1C   Result Value Ref Range    Hemoglobin A1C 5.1 0.0 - 5.6 %      Comment:      Normal <5.7%   Prediabetes 5.7-6.4%    Diabetes 6.5% or higher     Note: Adopted from ADA consensus guidelines.   BASIC METABOLIC PANEL   Result Value Ref Range    Sodium 140 135 - 145 mmol/L      Comment:      Reference intervals for this test were updated  on 09/26/2023 to more accurately reflect our healthy population. There may be differences in the flagging of prior results with similar values performed with this method. Interpretation of those prior results can be made in the context of the updated reference intervals.     Potassium 4.0 3.4 - 5.3 mmol/L    Chloride 107 98 - 107 mmol/L    Carbon Dioxide (CO2) 24 22 - 29 mmol/L    Anion Gap 9 7 - 15 mmol/L    Urea Nitrogen 11.8 6.0 - 20.0 mg/dL    Creatinine 0.73 0.51 - 0.95 mg/dL    GFR Estimate >90 >60 mL/min/1.73m2      Comment:      eGFR calculated using 2021 CKD-EPI equation.    Calcium 9.4 8.6 - 10.0 mg/dL    Glucose 82 70 - 99 mg/dL    Patient Fasting > 8hrs? No    Lipid panel reflex to direct LDL Non-fasting   Result Value Ref Range    Cholesterol 118 <200 mg/dL    Triglycerides 119 <150 mg/dL    Direct Measure HDL 36 (L) >=50 mg/dL    LDL Cholesterol Calculated 58 <=100 mg/dL    Non HDL Cholesterol 82 <130 mg/dL    Patient Fasting > 8hrs? No     Narrative    Cholesterol  Desirable:  <200 mg/dL    Triglycerides  Normal:  Less than 150 mg/dL  Borderline High:  150-199 mg/dL  High:  200-499 mg/dL  Very High:  Greater than or equal to 500 mg/dL    Direct Measure HDL  Female:  Greater than or equal to 50 mg/dL   Male:  Greater than or equal to 40 mg/dL    LDL Cholesterol  Desirable:  <100mg/dL  Above Desirable:  100-129 mg/dL   Borderline High:  130-159 mg/dL   High:  160-189 mg/dL   Very High:  >= 190 mg/dL    Non HDL Cholesterol  Desirable:  130 mg/dL  Above Desirable:  130-159 mg/dL  Borderline High:  160-189 mg/dL  High:  190-219 mg/dL  Very High:  Greater than or equal to 220 mg/dL   Vitamin D Deficiency   Result Value Ref Range    Vitamin D, Total (25-Hydroxy) 39 20 - 50 ng/mL      Comment:      optimum levels    Narrative    Season, race, dietary intake, and treatment affect the concentration of 25-hydroxy-Vitamin D. Values may decrease during winter months and increase during summer months.    Vitamin D  determination is routinely performed by an immunoassay specific for 25 hydroxyvitamin D3.  If an individual is on vitamin D2(ergocalciferol) supplementation, please specify 25 OH vitamin D2 and D3 level determination by LCMSMS test VITD23.         If you have any questions or concerns, please call the clinic at the number listed above.       Sincerely,      Felisha Briggs MD
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
## 2024-06-20 NOTE — LETTER
My Asthma Action Plan    Name: Isabela Panchal   YOB: 1970  Date: 6/20/2024   My doctor: Felisha Briggs MD   My clinic: [facility]        My Control Medicine: None  My Rescue Medicine: Albuterol (Proair/Ventolin/Proventil HFA) 2-4 puffs EVERY 4 HOURS as needed. Use a spacer if recommended by your provider.   My Asthma Severity:   Mild Persistent  Know your asthma triggers:   None            GREEN ZONE   Good Control  I feel good  No cough or wheeze  Can work, sleep and play without asthma symptoms       Take your asthma control medicine every day.     If exercise triggers your asthma, take your rescue medication  15 minutes before exercise or sports, and  During exercise if you have asthma symptoms  Spacer to use with inhaler: If you have a spacer, make sure to use it with your inhaler             YELLOW ZONE Getting Worse  I have ANY of these:  I do not feel good  Cough or wheeze  Chest feels tight  Wake up at night   Keep taking your Green Zone medications  Start taking your rescue medicine:  every 20 minutes for up to 1 hour. Then every 4 hours for 24-48 hours.  If you stay in the Yellow Zone for more than 12-24 hours, contact your doctor.  If you do not return to the Green Zone in 12-24 hours or you get worse, start taking your oral steroid medicine if prescribed by your provider.           RED ZONE Medical Alert - Get Help  I have ANY of these:  I feel awful  Medicine is not helping  Breathing getting harder  Trouble walking or talking  Nose opens wide to breathe       Take your rescue medicine NOW  If your provider has prescribed an oral steroid medicine, start taking it NOW  Call your doctor NOW  If you are still in the Red Zone after 20 minutes and you have not reached your doctor:  Take your rescue medicine again and  Call 911 or go to the emergency room right away    See your regular doctor within 2 weeks of an Emergency Room or Urgent Care visit for  follow-up treatment.          Annual Reminders:  Meet with Asthma Educator,  Flu Shot in the Fall, consider Pneumonia Vaccination for patients with asthma (aged 19 and older).    Pharmacy:    [facility] [place], [place] - [address]  [facility] [place] - [place], [place] - [address] [id number] - [place], [place] - [address] AT [address]    Electronically signed by Felisha Briggs MD   Date: 06/20/24                      Asthma Triggers  How To Control Things That Make Your Asthma Worse    Triggers are things that make your asthma worse.  Look at the list below to help you find your triggers and what you can do about them.  You can help prevent asthma flare-ups by staying away from your triggers.      Trigger                                                          What you can do   Cigarette Smoke  Tobacco smoke can make asthma worse. Do not allow smoking in your home, car or around you.  Be sure no one smokes at a child s day care or school.  If you smoke, ask your health care provider for ways to help you quit.  Ask family members to quit too.  Ask your health care provider for a referral to Quit Plan to help you quit smoking, or call 9-740-266-PLAN.     Colds, Flu, Bronchitis  These are common triggers of asthma. Wash your hands often.  Don t touch your eyes, nose or mouth.  Get a flu shot every year.     Dust Mites  These are tiny bugs that live in cloth or carpet. They are too small to see. Wash sheets and blankets in hot water every week.   Encase pillows and mattress in dust mite proof covers.  Avoid having carpet if you can. If you have carpet, vacuum weekly.   Use a dust mask and HEPA vacuum.   Pollen and Outdoor Mold  Some people are allergic to trees, grass, or weed pollen, or molds. Try to keep your windows closed.  Limit time out doors when pollen count is high.   Ask you health  care provider about taking medicine during allergy season.     Animal Dander  Some people are allergic to skin flakes, urine or saliva from pets with fur or feathers. Keep pets with fur or feathers out of your home.    If you can t keep the pet outdoors, then keep the pet out of your bedroom.  Keep the bedroom door closed.  Keep pets off cloth furniture and away from stuffed toys.     Mice, Rats, and Cockroaches   Some people are allergic to the waste from these pests.   Cover food and garbage.  Clean up spills and food crumbs.  Store grease in the refrigerator.   Keep food out of the bedroom.   Indoor Mold  This can be a trigger if your home has high moisture. Fix leaking faucets, pipes, or other sources of water.   Clean moldy surfaces.  Dehumidify basement if it is damp and smelly.   Smoke, Strong Odors, and Sprays  These can reduce air quality. Stay away from strong odors and sprays, such as perfume, powder, hair spray, paints, smoke incense, paint, cleaning products, candles and new carpet.   Exercise or Sports  Some people with asthma have this trigger. Be active!  Ask your doctor about taking medicine before sports or exercise to prevent symptoms.    Warm up for 5-10 minutes before and after sports or exercise.     Other Triggers of Asthma  Cold air:  Cover your nose and mouth with a scarf.  Sometimes laughing or crying can be a trigger.  Some medicines and food can trigger asthma.

## 2024-06-20 NOTE — PATIENT INSTRUCTIONS
"Patient Education   Preventive Care Advice   This is general advice we often give to help people stay healthy. Your care team may have specific advice just for you. Please talk to your care team about your own preventive care needs.  Lifestyle  Exercise at least 150 minutes each week (30 minutes a day, 5 days a week).  Do muscle strengthening activities 2 days a week. These help control your weight and prevent disease.  No smoking.  Wear sunscreen to prevent skin cancer.  Have your home tested for radon every 2 to 5 years. Radon is a colorless, odorless gas that can harm your lungs. To learn more, go to www.health.[facility].[place].us and search for \"Radon in Homes.\"  Keep guns unloaded and locked up in a safe place like a safe or gun vault, or, use a gun lock and hide the keys. Always lock away bullets separately. To learn more, visit [employer].[place].gov and search for \"safe gun storage.\"  Nutrition  Eat 5 or more servings of fruits and vegetables each day.  Try wheat bread, brown rice and whole grain pasta (instead of white bread, rice, and pasta).  Get enough calcium and vitamin D. Check the label on foods and aim for 100% of the RDA (recommended daily allowance).  Regular exams  Have a dental exam and cleaning every 6 months.  See your health care team every year to talk about:  Any changes in your health.  Any medicines your care team has prescribed.  Preventive care, family planning, and ways to prevent chronic diseases.  Shots (vaccines)   HPV shots (up to age 26), if you've never had them before.  Hepatitis B shots (up to age 59), if you've never had them before.  COVID-19 shot: Get this shot when it's due.  Flu shot: Get a flu shot every year.  Tetanus shot: Get a tetanus shot every 10 years.  Pneumococcal, hepatitis A, and RSV shots: Ask your care team if you need these based on your risk.  Shingles shot (for age 50 and up).  General health tests  Diabetes screening:  Starting at age 35, Get screened for diabetes at least " every 3 years.  If you are younger than age 35, ask your care team if you should be screened for diabetes.  Cholesterol test: At age 39, start having a cholesterol test every 5 years, or more often if advised.  Bone density scan (DEXA): At age 50, ask your care team if you should have this scan for osteoporosis (brittle bones).  Hepatitis C: Get tested at least once in your life.  Abdominal aortic aneurysm screening: Talk to your doctor about having this screening if you:  Have ever smoked; and  Are biologically male; and  Are between the ages of 65 and 75.  STIs (sexually transmitted infections)  Before age 24: Ask your care team if you should be screened for STIs.  After age 24: Get screened for STIs if you're at risk. You are at risk for STIs (including HIV) if:  You are sexually active with more than one person.  You don't use condoms every time.  You or a partner was diagnosed with a sexually transmitted infection.  If you are at risk for HIV, ask about PrEP medicine to prevent HIV.  Get tested for HIV at least once in your life, whether you are at risk for HIV or not.  Cancer screening tests  Cervical cancer screening: If you have a cervix, begin getting regular cervical cancer screening tests at age 21. Most people who have regular screenings with normal results can stop after age 65. Talk about this with your provider.  Breast cancer scan (mammogram): If you've ever had breasts, begin having regular mammograms starting at age 40. This is a scan to check for breast cancer.  Colon cancer screening: It is important to start screening for colon cancer at age 45.  Have a colonoscopy test every 10 years (or more often if you're at risk) Or, ask your provider about stool tests like a FIT test every year or Cologuard test every 3 years.  To learn more about your testing options, visit: www.[employer]/689561.pdf.  For help making a decision, visit: hortencia/cj34931.  Prostate cancer screening test: If you have a  prostate and are age 55 to 69, ask your provider if you would benefit from a yearly prostate cancer screening test.  Lung cancer screening: If you are a current or former smoker age 50 to 80, ask your care team if ongoing lung cancer screenings are right for you.  For informational purposes only. Not to replace the advice of your health care provider. Copyright   2023 [facility]. All rights reserved. Clinically reviewed by the [facility]. "[employer] 018501 - REV 04/24.

## 2024-06-21 LAB
ANION GAP SERPL CALCULATED.3IONS-SCNC: 9 MMOL/L (ref 7–15)
BUN SERPL-MCNC: 11.8 MG/DL (ref 6–20)
CALCIUM SERPL-MCNC: 9.4 MG/DL (ref 8.6–10)
CHLORIDE SERPL-SCNC: 107 MMOL/L (ref 98–107)
CHOLEST SERPL-MCNC: 118 MG/DL
CREAT SERPL-MCNC: 0.73 MG/DL (ref 0.51–0.95)
CREAT UR-MCNC: 175 MG/DL
DEPRECATED HCO3 PLAS-SCNC: 24 MMOL/L (ref 22–29)
EGFRCR SERPLBLD CKD-EPI 2021: >90 ML/MIN/1.73M2
FASTING STATUS PATIENT QL REPORTED: NO
FASTING STATUS PATIENT QL REPORTED: NO
GLUCOSE SERPL-MCNC: 82 MG/DL (ref 70–99)
HDLC SERPL-MCNC: 36 MG/DL
HPV HR 12 DNA CVX QL NAA+PROBE: POSITIVE
HPV16 DNA CVX QL NAA+PROBE: NEGATIVE
HPV18 DNA CVX QL NAA+PROBE: NEGATIVE
HUMAN PAPILLOMA VIRUS FINAL DIAGNOSIS: ABNORMAL
LDLC SERPL CALC-MCNC: 58 MG/DL
MICROALBUMIN UR-MCNC: <12 MG/L
MICROALBUMIN/CREAT UR: NORMAL MG/G{CREAT}
NONHDLC SERPL-MCNC: 82 MG/DL
POTASSIUM SERPL-SCNC: 4 MMOL/L (ref 3.4–5.3)
SODIUM SERPL-SCNC: 140 MMOL/L (ref 135–145)
TRIGL SERPL-MCNC: 119 MG/DL
VIT D+METAB SERPL-MCNC: 39 NG/ML (ref 20–50)

## 2024-06-21 NOTE — RESULT ENCOUNTER NOTE
Hello! Thank you for getting labs done. Your lab test to check for diabetes, HgA1C, also called glycosylated hemoglobin, which measures the level of sugar in your blood over the past few months, is normal which is great!     Congratulations, you don't have diabetes!  However, since your fasting blood sugars have been high in the past, I will continue to screen your blood sugar every year.     If you have any questions, please contact the clinic or schedule an appointment with me, thank you!    Sincerely,    BONILLA RHOADES MD   6/20/2024

## 2024-06-25 ENCOUNTER — OFFICE VISIT (OUTPATIENT)
Dept: NEUROLOGY | Facility: CLINIC | Age: 54
End: 2024-06-25
Payer: MEDICARE

## 2024-06-25 ENCOUNTER — TELEPHONE (OUTPATIENT)
Dept: NEUROLOGY | Facility: CLINIC | Age: 54
End: 2024-06-25

## 2024-06-25 VITALS
WEIGHT: 159 LBS | BODY MASS INDEX: 26.65 KG/M2 | HEART RATE: 75 BPM | DIASTOLIC BLOOD PRESSURE: 82 MMHG | SYSTOLIC BLOOD PRESSURE: 126 MMHG

## 2024-06-25 DIAGNOSIS — G30.9 ALZHEIMER'S DISEASE (H): Primary | ICD-10-CM

## 2024-06-25 DIAGNOSIS — F02.80 ALZHEIMER'S DISEASE (H): Primary | ICD-10-CM

## 2024-06-25 PROCEDURE — 99214 OFFICE O/P EST MOD 30 MIN: CPT | Performed by: INTERNAL MEDICINE

## 2024-06-25 RX ORDER — DONEPEZIL HYDROCHLORIDE 5 MG/1
5 TABLET, FILM COATED ORAL AT BEDTIME
Qty: 30 TABLET | Refills: 6 | Status: SHIPPED | OUTPATIENT
Start: 2024-06-25
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
## 2024-06-25 NOTE — TELEPHONE ENCOUNTER
Writer called patients pharmacy and gave a verbal order to change her donepezil 5 mg prescription to 90 day refills per patient's request.      RBETT FarahN RN Care Coordinator  Neurology/Neurosurgery/PM&R/Pain Management

## 2024-06-26 LAB
BKR LAB AP GYN ADEQUACY: NORMAL
BKR LAB AP GYN INTERPRETATION: NORMAL
BKR LAB AP PREVIOUS ABNORMAL: NORMAL
PATH REPORT.COMMENTS IMP SPEC: NORMAL
PATH REPORT.COMMENTS IMP SPEC: NORMAL
PATH REPORT.RELEVANT HX SPEC: NORMAL

## 2024-06-27 ENCOUNTER — PATIENT OUTREACH (OUTPATIENT)
Dept: FAMILY MEDICINE | Facility: CLINIC | Age: 54
End: 2024-06-27
Payer: MEDICARE

## 2024-06-27 DIAGNOSIS — R87.810 CERVICAL HIGH RISK HPV (HUMAN PAPILLOMAVIRUS) TEST POSITIVE: Primary | ICD-10-CM

## 2024-07-03 ENCOUNTER — TELEPHONE (OUTPATIENT)
Dept: FAMILY MEDICINE | Facility: CLINIC | Age: 54
End: 2024-07-03
Payer: MEDICARE

## 2024-07-03 DIAGNOSIS — E78.5 HYPERLIPIDEMIA LDL GOAL <160: Primary | ICD-10-CM

## 2024-07-03 NOTE — TELEPHONE ENCOUNTER
Recent Labs   Lab Test 06/20/24  1646 05/11/23  1310   CHOL 118 138   HDL 36* 38*   LDL 58 79   TRIG 119 104     Cholesterol at goal on statin alone, so will just discontinue fenofibrate and continue statin alone.  Sincerely,  Dr. Felisha Briggs MD  7/9/2024  11:23 AM     Dr. Briggs - fenofibrate not on formulary. Gemfibrozil is the suggested alternative. Review and alternate rx appreciated.    --------------------    Home care RN Simi calling regarding patient's fenofibrate rx  States patient has been unable to fill this medication and pharmacy informed the pt this was no long covered by insurance  Writer calling pharmacy to clarify, as there is no PA initiated or documentation of pharmacy calling to clinic    Fenofibrate not on formulary  Suggesting gemfibrozil, as this is on formulary for patient    Routing to clinician for review.    BRETT AlexanderN, RN (she/her)  [facility] RN

## 2024-07-03 NOTE — RESULT ENCOUNTER NOTE
Thank you very much for getting labs done! Everything looks normal/stable.    Sincerely,  Dr. Felisha Briggs MD  7/3/2024

## 2024-07-09 NOTE — TELEPHONE ENCOUNTER
Spoke with [facility] home care, RN and relayed update. Also called patient directly to relay this medication update.    BRETT AlexanderN, RN (she/her)  [facility] RN

## 2024-07-25 ENCOUNTER — TRANSFERRED RECORDS (OUTPATIENT)
Dept: HEALTH INFORMATION MANAGEMENT | Facility: CLINIC | Age: 54
End: 2024-07-25
Payer: MEDICARE

## 2024-07-25 ENCOUNTER — TELEPHONE (OUTPATIENT)
Dept: FAMILY MEDICINE | Facility: CLINIC | Age: 54
End: 2024-07-25
Payer: MEDICARE

## 2024-07-25 NOTE — TELEPHONE ENCOUNTER
Forms/Letter Request    Type of form/letter: Home Health Certification cert/ plan of care 7/25/2024 - 9/22/2024      Do we have the form/letter: Yes: placed in care team 2 providers sig folder    Who is the form from? Home care    Where did/will the form come from? form was faxed in    When is form/letter needed by: n/a    How would you like the form/letter returned: Fax : [phone]

## 2024-07-25 NOTE — TELEPHONE ENCOUNTER
Completed, signed forms placed in MA basket today.  Sincerely,  Dr. Felisha Briggs MD  7/25/2024    1:20 PM

## 2024-08-16 ENCOUNTER — TELEPHONE (OUTPATIENT)
Dept: NEUROLOGY | Facility: CLINIC | Age: 54
End: 2024-08-16
Payer: MEDICARE

## 2024-08-16 NOTE — TELEPHONE ENCOUNTER
Left Voicemail (1st Attempt) for the patient to call back and schedule the following:    Appointment type: Resched Dec Appt  Provider: Dr. Stanton  Return date: Dec 2024  Specialty phone number: [phone]  Additional appointment(s) needed:   Additonal Notes:

## 2024-08-20 ENCOUNTER — TELEPHONE (OUTPATIENT)
Dept: NEUROLOGY | Facility: CLINIC | Age: 54
End: 2024-08-20
Payer: MEDICARE

## 2024-08-20 NOTE — TELEPHONE ENCOUNTER
Left Voicemail (2nd Attempt) for the patient to call back and schedule the following:    Appointment type: Resched Dec Appt (return)  Provider: Dr. Stanton  Return date: Dec 2024  Specialty phone number: [phone]  Additional appointment(s) needed:   Additonal Notes:

## 2024-08-20 NOTE — TELEPHONE ENCOUNTER
Left Voicemail (2nd Attempt) for the patient to call back and schedule the following:    Appointment type: Resched Dec Appt (return)  Provider: Dr. Stanton  Return date: Dec 2024  Specialty phone number: [phone]  Additional appointment(s) needed:   Additonal Notes:

## 2024-08-23 DIAGNOSIS — J45.20 MILD INTERMITTENT ASTHMA WITHOUT COMPLICATION: ICD-10-CM

## 2024-08-24 DIAGNOSIS — I10 ESSENTIAL HYPERTENSION WITH GOAL BLOOD PRESSURE LESS THAN 140/90: ICD-10-CM

## 2024-08-26 RX ORDER — MONTELUKAST SODIUM 10 MG/1
1 TABLET ORAL DAILY
Qty: 90 TABLET | Refills: 0 | Status: SHIPPED | OUTPATIENT
Start: 2024-08-26

## 2024-08-27 RX ORDER — LISINOPRIL 5 MG/1
5 TABLET ORAL DAILY
Qty: 90 TABLET | Refills: 3 | Status: SHIPPED | OUTPATIENT
Start: 2024-08-27

## 2024-09-17 ENCOUNTER — TELEPHONE (OUTPATIENT)
Dept: GASTROENTEROLOGY | Facility: CLINIC | Age: 54
End: 2024-09-17
Payer: MEDICARE

## 2024-09-17 NOTE — TELEPHONE ENCOUNTER
"Endoscopy Scheduling Screen    Have you had any respiratory illness or flu-like symptoms in the last 10 days?  No    What is your communication preference for Instructions and/or Bowel Prep?   MyChart    What insurance is in the chart?  Other:  MEDICARE/[employer]    Ordering/Referring Provider:   BONILLA RHOADES        (If ordering provider performs procedure, schedule with ordering provider unless otherwise instructed. )    BMI: Estimated body mass index is 26.65 kg/m  as calculated from the following:    Height as of 6/20/24: 1.645 m (5' 4.76\").    Weight as of 6/25/24: 72.1 kg (159 lb).     Sedation Ordered  moderate sedation.   If patient BMI > 50 do not schedule in [facility].    If patient BMI > 45 do not schedule at [facility].    Are you taking methadone or Suboxone?  NO, No RN review required.    Have you been diagnosed and are being treated for severe PTSD or severe anxiety?  NO, No RN review required.    Are you taking any prescription medications for pain 3 or more times per week?   NO, No RN review required.    Do you have a history of malignant hyperthermia?  No    (Females) Are you currently pregnant?   No     Have you been diagnosed or told you have pulmonary hypertension?   No    Do you have an LVAD?  No    Have you been told you have moderate to severe sleep apnea?  No.    Have you been told you have COPD, asthma, or any other lung disease?  No    Do you have any heart conditions?  No     Have you ever had or are you waiting for an organ transplant?  No. Continue scheduling, no site restrictions.    Have you had a stroke or transient ischemic attack (TIA aka \"mini stroke\" in the last 6 months?   No    Have you been diagnosed with or been told you have cirrhosis of the liver?   No.    Are you currently on dialysis?   No    Do you need assistance transferring?   No    BMI: Estimated body mass index is 26.65 kg/m  as calculated from the following:    Height as of 6/20/24: 1.645 m (5' 4.76\").    Weight as of " 6/25/24: 72.1 kg (159 lb).     Is patients BMI > 40 and scheduling location [facility]?  No    Do you take an injectable or oral medication for weight loss or diabetes (excluding insulin)?  No    Do you take the medication Naltrexone?  No    Do you take blood thinners?  No       Prep   Are you currently on dialysis or do you have chronic kidney disease?  No    Do you have a diagnosis of diabetes?  No    Do you have a diagnosis of cystic fibrosis (CF)?  No    On a regular basis do you go 3 -5 days between bowel movements?  No    BMI > 40?  No    Preferred Pharmacy:    [id number] - [place], [place] - [address] [place], [place] [address] AT [facility] & [address] [place] [id number]  Phone: [phone] Fax: [phone]      Final Scheduling Details     Procedure scheduled  Colonoscopy    Surgeon:  gabi     Date of procedure:  01/06/2025     Pre-OP / PAC:   No - Not required for this site.    Location  [facility] - [facility] - Per order.    Sedation   Moderate Sedation - Per order.      Patient Reminders:   You will receive a call from a Nurse to review instructions and health history.  This assessment must be completed prior to your procedure.  Failure to complete the Nurse assessment may result in the procedure being cancelled.      On the day of your procedure, please designate an adult(s) who can drive you home stay with you for the next 24 hours. The medicines used in the exam will make you sleepy. You will not be able to drive.      You cannot take public transportation, ride share services, or non-medical taxi service without a responsible caregiver.  Medical transport services are allowed with the requirement that a responsible caregiver will receive you at your destination.  We require that drivers and caregivers are confirmed prior to your procedure.

## 2024-09-24 ENCOUNTER — TELEPHONE (OUTPATIENT)
Dept: FAMILY MEDICINE | Facility: CLINIC | Age: 54
End: 2024-09-24
Payer: MEDICARE

## 2024-09-24 NOTE — TELEPHONE ENCOUNTER
Forms/Letter Request    Type of form/letter: Home Health Certification      Do we have the form/letter: Yes: Placed in care team 2    Who is the form from? Home care    Where did/will the form come from? form was faxed in    When is form/letter needed by: as soon as able    How would you like the form/letter returned: Fax : [phone]

## 2024-09-25 DIAGNOSIS — Z53.9 DIAGNOSIS NOT YET DEFINED: Primary | ICD-10-CM

## 2024-09-25 PROCEDURE — G0179 MD RECERTIFICATION HHA PT: HCPCS | Performed by: FAMILY MEDICINE

## 2024-09-25 NOTE — TELEPHONE ENCOUNTER
Completed, signed forms placed in MA basket today.  Sincerely,  Dr. Felisha Briggs MD  9/25/2024    1:12 PM

## 2024-11-03 ASSESSMENT — ANXIETY QUESTIONNAIRES: GAD7 TOTAL SCORE: 12

## 2024-11-03 ASSESSMENT — PATIENT HEALTH QUESTIONNAIRE - PHQ9: SUM OF ALL RESPONSES TO PHQ QUESTIONS 1-9: 19

## 2024-11-22 DIAGNOSIS — Z53.9 DIAGNOSIS NOT YET DEFINED: Primary | ICD-10-CM

## 2024-11-22 PROCEDURE — G0179 MD RECERTIFICATION HHA PT: HCPCS | Performed by: FAMILY MEDICINE

## 2024-12-19 ENCOUNTER — TELEPHONE (OUTPATIENT)
Dept: GASTROENTEROLOGY | Facility: CLINIC | Age: 54
End: 2024-12-19
Payer: MEDICARE

## 2024-12-19 ENCOUNTER — PATIENT OUTREACH (OUTPATIENT)
Dept: CARE COORDINATION | Facility: CLINIC | Age: 54
End: 2024-12-19
Payer: MEDICARE

## 2024-12-19 NOTE — LETTER
December 19, 2024      Isabela GOGO Panchal  [address]  [facility] [address]              Dear Isabela,      Colonoscopy     Procedure date: 1/6/25    Anticipated arrival time: 1130 AM   (Please note that arrival times may change)    Fac[facility]; [address] [facility], 5th Floor, [place] [address] - Check in location: 5th Floor. Parking information: Self pay parking is available in West Lot located directly across from the main entrance of the [facility]. Entry and exit to this lot is on [facility]. In addition, self pay parking is available in [place] [address] which is located west of [facility]. Follow the center emy designated ' [employer] [place]' to ground-level spaces that are reserved for patients. Please disregard any signage indicating the ramp is full or available by reservation only as this does not pertain to the spaces dedicated for patients coming to the clinic.  services are available for patients with limited mobility. Services available Monday-Friday, 7:00a.m.-  5:00p.m.    Important Procedure Reminders:     Prep Instructions:   Instructions on how to prepare for your upcoming procedure are found below. Please read instructions carefully. Deviation from instructions may result in less than desired outcomes and procedure may need to be rescheduled.   If you have additional questions regarding how to prepare for your upcoming procedure, contact our endoscopy pre assessment nurses at [phone] option 2 Monday through Friday 7:00am-5:00pm.      Policy:   The medications used during the procedure will make you sleepy, so you won't be able to drive. On the day of your procedure, please have an adult ready to drive you home and stay with you for the next 6 hours.   You can't use public transportation, ride-share services, or non-medical taxi services without a responsible caregiver. Medical transport services are okay, but a caregiver must be there to  receive you at your destination.   Make sure your  and caregiver are confirmed before your procedure.    Day of procedure:  Please keep in mind that arrival times may change. Let your  know there might be a one-hour window for changes.  We ask that you please check in at the  with your . Your  should remain on campus.  Expect to be at the procedure center for about 1.5-2.5 hours.    Please do not wear jewelry (i.e. earrings, rings, necklaces, watches, etc). Leave your purse, billfold, credit cards, and other valuables at home.   Bring insurance card and ID.     To cancel or reschedule your procedure:   If you need to cancel or reschedule, our endoscopy scheduling team can be reached at [phone], option 2. Monday through Friday, 7:00am-5:00pm.    Medication Reminders:    Please note the following medication holding recommendations:   N/A    ---------------------------------------------------------------------------------------------------------------------------------------------------------------------------------------------------------------      Standard Miralax Bowel Prep   Prep instructions for your colonoscopy     [facility]; [address] [place] [address], [place] [address] - Check in location: 5th Floor.  For prep questions, please call: [facility] - [phone] option 2    Please read these instructions carefully at least 7 days prior to your colonoscopy procedure. Be sure to follow all directions completely. The inside of your colon must be clean to allow for a complete examination for the presence of any growths, polyps, and/or abnormalities, as well as their biopsy or removal. A number of tips are included in order to make this part of the procedure as comfortable as possible.    Getting ready   Purchase the following items over-the-counter/off the shelf at the drug store:    Four (4) - Dulcolax laxative (Bisacodyl) 5mg tablets (Do  not use Dulcolax stool softener)   8.3 ounce bottle of Miralax powder (ClearLAX, SmoothLAX, PowderLAX)  64 ounces of Gatorade or similar sports drink. Not red or purple. (Pedialyte, Propel, Gatorade G2/Zero, Powerade, Powerade Zero)   10 ounce bottle of clear Magnesium Citrate    A nurse will call you to go over your appointment details and prep instructions. Not completing the nurse call could result with your appointment being cancelled.    You must arrange for an adult to drive you home after your exam. Your colonoscopy cannot be done unless you have a ride. If you need to use public transportation, someone must ride with you and stay with you for up to 24 hours.       7 days before procedure   Medications that may need to be held before procedure:     GLP-1 agonist medication for diabetes or weight loss: such as Mounjaro (Tirzepatide).  Ozempic (Semaglutide). Rybelsus (Semaglutide), Tirzepatide-Weight Management (Zepbound), Wegovy (Semaglutide) or others, holding times may vary based on how you take this medication. This may be up to a 7 day hold. Our pre assessment nurses will call and discuss holding recommendations 1-2 weeks before scheduled procedure.     Blood thinning and/or anti platelet medications: such as Coumadin, Plavix, Xarelto, Eliquis, Lovenox or others, ask your your prescribing provider about holding recommendations.     If you take insulin for diabetes, ask your prescribing provider for instructions on how to manage this medication while preparing for a colonoscopy.     Stop taking iron (ferrous sulfate), multivitamins that contain iron, and/or fiber supplements (Metamucil, Benefiber, Psyllium husk powder, Fibercon, Bran, etc.) 7 days before procedure.     Stop eating whole kernel corn, popcorn, nuts, and foods that contain seeds. These can stay in the colon for many days and they can clog up the colonoscope.       3 days before procedure     Begin a low-fiber diet (see examples below). No  Olestra (a fat substitute).    Consume no more than 10-15 grams of fiber each day.     It is important to stay hydrated. Drink at least eight 8-ounce glasses of water a day.      LOW FIBER DIET   You can have:   Do not have:    Starches: White bread, rolls, biscuits, croissants, Veronica toast, white flour tortillas, waffles, pancakes, Tunisian toast; white rice, noodles, pasta, macaroni; cooked and peeled potatoes; plain crackers, saltines; cooked farina or cream of rice; puffed rice, corn flakes, Rice Krispies, Special K      Vegetables: tender cooked and canned, vegetable broths     Fruits and fruit juices: Strained fruit juice, canned fruit without seeds or skin (not pineapple), applesauce, pear sauce, ripe bananas, melons (not watermelon)     Milk products: Milk (plain or flavored), cheese, cottage cheese, yogurt (no berries), custard, ice cream       Proteins: Tender, well-cooked ground beef, lamb, veal, ham, pork, chicken, turkey, fish or organ meat, Tofu, eggs, creamy peanut butter      Fats and condiments:  Margarine, butter, oils, mayonnaise, sour cream, salad dressing, plain gravy; spices, cooked herbs; sugar, clear jelly, honey, syrup      Snacks, sweets and drinks: Pretzels, hard candy; plain cakes and cookies (no nuts or seeds); gelatin, plain pudding, sherbet, Popsicles; coffee, tea, carbonated ( fizzy ) drinks  Starches: Breads or rolls that contain nuts, seeds or fruit; whole wheat or whole grain breads that contain more than 2 grams of fiber per serving; cornbread; corn or whole wheat tortillas; potatoes with skin; brown rice, wild rice, quinoa, kasha (buckwheat), and oatmeal      Vegetables: Any raw or steamed vegetables; vegetables with seeds; corn in any form      Fruits and fruit juices: Prunes, prune juice, raisins and other dried fruits, berries and other fruits with seeds, canned pineapple juices with pulp such as orange, grapefruit, pineapple or tomato juice     Milk products: Any yogurt with  nuts, seeds or berries      Proteins: Tough, fibrous meats with gristle; cooked dried beans, peas or lentils; crunchy peanut butter     Fats and condiments: Pickles, olives, relish, horseradish; jam, marmalade, preserves      Snacks, sweets and drinks: Popcorn, nuts, seeds, granola, coconut, candies made with nuts or seeds; all desserts that contain nuts, seeds, raisins and other dried fruits, coconut, whole grains or bran.     1 day before procedure     Start a clear liquid diet (see examples below). Do not eat any solid food.      Drink at least eight to ten 8-ounce glasses of water throughout the day. ? ? ? ? ? ? ? ?    CLEAR LIQUID DIET:  You can have: Do not have:    Water, tea, coffee (no milk or cream)   Soda pop, Gatorade (not red or purple)   Coconut water   Jell-O, Popsicles (no milk or fruit pieces - not red or purple)   Fat-free soup broth or bouillon   Plain hard candy, such as clear life savers (not red or purple)   Clear juices and fruit-flavored drinks, such as apple juice, white grape juice, Hi-C, and Meir-Aid (not red or purple)  Milk or milk products such as ice cream, malts or shakes, or coffee creamer   Red or purple drinks of any kind such as cranberry juice, grape juice, or Meir-Aid. Avoid red or purple Jell-O, Popsicles, sorbet, sherbet, and candy.   Juices with pulp such as orange, grapefruit, pineapple, or tomato juice   Cream soups of any kind   Alcohol and beer   Protein drinks or protein powder     Step 1     At 4 PM, take 2 Dulcolax (Bisacodyl) tablets.   At 5 PM, mix the entire bottle of Miralax with 64 ounces of Gatorade in a pitcher and stir to dissolve the powder. Start drinking one 8-ounce glass of the Miralax and Gatorade mixture every 15 minutes until the pitcher is HALF empty (about 4 glasses). Drink each glass quickly. Store the rest in the refrigerator.   Continue to drink clear liquids.    Step 2     At 10 PM, take 2 Dulcolax (Bisacodyl) tablets.  At 10 PM start drinking one  8-ounce glass of Miralax and Gatorade mixture every 15 minutes until the pitcher is empty (about 4 glasses). Drink each glass quickly.    Step 3     If you arrive for your procedure BEFORE 11 AM:  6 hours prior to your scheduled arrival to the endoscopy unit drink 10 ounces of clear Magnesium Citrate    If you arrive for your procedure AFTER 11 AM:  At 6 AM on the day of the exam drink 10 ounces of clear Magnesium Citrate       Reminders While Drinking Laxatives:     After you start drinking the solution, stay near a toilet. You may have watery stools (diarrhea), mild cramping, bloating, and nausea. You may want to use Vaseline on the skin around your anus after each bowel movement or use wet wipes to prevent irritation. Bowel movements will be liquid and dark in color at first and then should turn clear yellow in color.      Some find it easier to drink the Miralax and Gatorade mixture when it is chilled. Do not add ice as this will dilute the laxative. Drinking from a straw can be helpful to drink the liquid faster.     If you have nausea or vomiting during drinking the solution, rinse your mouth with water and take a 15-30 minute break and then continue drinking solution.       Day of procedure     2 hours before your arrival time stop drinking all liquids, including water.   Do not smoke or swallow anything, including water or gum for at least 2 hours before your arrival time. This is a safety issue. Your procedure could be cancelled if you do not follow directions.  No chewing tobacco 6 hours prior to procedure arrival time.     You may take your necessary morning medications with sips of water (4 ounces).   Do not take diabetes medicine by mouth until after your exam.  If you have asthma, bring your inhalers.  Please perform your nebulizer treatments and airway clearance therapy in the morning prior to the procedure (if applicable).    Arrive with a responsible adult who can drive you home and stay with you  for up to 24 hours. The medications used during the procedure will make you sleepy, so you won't be able to drive yourself home.   You cannot use public transportation, ride-share services, or non-medical taxi services without a responsible caregiver. Medical transport services are okay, but a caregiver must be there to receive you at your destination.  Please check in with your  when you arrive. Drivers should stay on campus.    Expect to be at the procedure center for about 1.5-2.5 hours.    Do not wear jewelry (i.e. earrings, rings, necklaces, watches, etc.). Leave your purse, billfold, credit cards, and other valuables at home.      Bring insurance card and ID.       Answers to Commonly Asked Questions     How soon can I eat after the procedure?  You may resume your normal diet when you feel ready, unless advised otherwise by the doctor performing your procedure. We recommend starting with a light meal.   Do not drink alcohol for 24 hours after your procedure.  You may resume normal activities (work, exercise, etc.) after 24 hours.    How might I feel after the procedure?  It is normal to feel bloated and gassy after your procedure. Walking will help move the air through your colon. You can take non-aspirin pain relievers that contain acetaminophen (Tylenol).  If you are having sedation, we require a responsible adult to take you home for your safety. The sedation medicines used to relax you during the procedure can impair your judgement and reaction time, and make you forgetful and possibly a little unsteady.  Do not drive, make any important decisions, or sign any legal documents for 24 hours after your procedure.    When will I get my test results?  You should have your procedure results and any lab results (if applicable) by letter, MyChart message, or phone call within 2 weeks. If you have any questions, please call the doctor that referred you for the procedure.    How do I know if my colon is  cleaned out?   After completing the bowel prep, your bowel movements should be all liquid and yellow. Your bowel movements will look similar to urine in the toilet. If there are pieces of stool (poop) in the toilet, or if you can't see to the bottom of the toilet, please call our office for advice. Call [phone] and ask to speak with a nurse.    Why is the Miralax bowel prep taken in several steps?   The stool is flushed out by a large wave of fluid going through the colon. Just sipping a large volume of the solution will not achieve the desired result. Studies have shown that two smaller waves (or more in some cases) are better than one large one.      Why do I need to drink the magnesium citrate so close to the procedure arrival time?   The intestine continues to produce mucus and waste. Longer intervals between the prep and the exam can lead to less than desired results. However, the stomach must be empty at the time of the exam in order to allow safe sedation. Therefore, there should be nothing by mouth 2 hours before the exam is started.    What if I need to cancel or reschedule my procedure?  Contact our endoscopy scheduling team at [phone], option 2. Monday through Friday, 7:00am-5:00pm.

## 2024-12-19 NOTE — TELEPHONE ENCOUNTER
Pre visit planning completed.      Procedure details:    Patient scheduled for Colonoscopy on 1/6/25.     Arrival time: 1130. Procedure time 1230    Facility location: [facility]; [address] [place] [address], 5th Floor, [place] [address]. Check in location: 5th Floor.    Sedation type: Conscious sedation     Pre op exam needed? No.    Indication for procedure: Screening      Chart review:     Electronic implanted devices? No    Recent diagnosis of diverticulitis within the last 6 weeks? No      Medication review:    Diabetic? No    Anticoagulants? No    Weight loss medication/injectable? No GLP-1 medication per patient's medication list. Nursing to verify with pre-assessment call.    Other medication HOLDING recommendations:  N/A      Prep for procedure:     Bowel prep recommendation: Standard Miralax.   Due to: standard bowel prep    Procedure information and instructions sent via letter         Alexandria Montelongo RN  Endoscopy Procedure Pre Assessment   [phone] option 2

## 2024-12-19 NOTE — TELEPHONE ENCOUNTER
Attempted to contact patient. Patient did answer phone however she was just waking up from a nap and was unable to verify date of birth. Has noted history of a TBI and is on a medication for memory issues. Patient would like to call back. May have someone with her to help with instructions.    Patient was given the number to call back to go over PA.

## 2024-12-24 NOTE — TELEPHONE ENCOUNTER
Second call attempt to complete pre assessment.     Patient scheduled for Colonoscopy on 1/6/25.     Patient did answer but said she is just waking up. She will return call to [phone] #2 by next business day prior to 4PM or procedure will be sent to cancel.     Callback required communication sent via voicemail due to viblast inactive.      Alexandria Montelongo, RN  Endoscopy Procedure Pre Assessment

## 2024-12-29 NOTE — TELEPHONE ENCOUNTER
Prescription approved per [facility] Refill Protocol.  JOANIE Whitehead RN  [facility]     Name band;

## 2025-01-01 DIAGNOSIS — J45.20 MILD INTERMITTENT ASTHMA WITHOUT COMPLICATION: ICD-10-CM

## 2025-01-02 RX ORDER — MONTELUKAST SODIUM 10 MG/1
1 TABLET ORAL DAILY
Qty: 60 TABLET | Refills: 0 | Status: SHIPPED | OUTPATIENT
Start: 2025-01-02

## 2025-01-24 DIAGNOSIS — Z53.9 DIAGNOSIS NOT YET DEFINED: Primary | ICD-10-CM

## 2025-01-24 PROCEDURE — G0179 MD RECERTIFICATION HHA PT: HCPCS | Mod: 4MD | Performed by: FAMILY MEDICINE

## 2025-03-17 NOTE — NURSING NOTE
"Chief Complaint   Patient presents with     Back Pain       Initial /85  Pulse 86  Temp 98.2  F (36.8  C) (Tympanic)  Wt 199 lb (90.3 kg)  SpO2 96%  BMI 32.86 kg/m2 Estimated body mass index is 32.86 kg/(m^2) as calculated from the following:    Height as of 1/24/17: 5' 5.25\" (1.657 m).    Weight as of this encounter: 199 lb (90.3 kg).  Medication Reconciliation: complete     Ina Toussaint MA    " What Type Of Note Output Would You Prefer (Optional)?: Bullet Format How Severe Is Your Rash?: mild Is This A New Presentation, Or A Follow-Up?: Rash Additional History: Pt states I have this rash that came up about two weeks ago and has spread to other areas of my body. I have tried using the Cortisone cream on it but it did not really seem to be doing anything.

## 2025-03-27 ENCOUNTER — TELEPHONE (OUTPATIENT)
Dept: FAMILY MEDICINE | Facility: CLINIC | Age: 55
End: 2025-03-27
Payer: MEDICARE

## 2025-03-27 NOTE — TELEPHONE ENCOUNTER
Completed, signed forms placed in MA basket today.  Sincerely,  Dr. Felisha Briggs MD  3/27/2025    3:13 PM

## 2025-03-27 NOTE — TELEPHONE ENCOUNTER
Forms/Letter Request    Type of form/letter: Home Health Certification  Do we have the form/letter: Yes: care team 2 form folder     Who is the form from? Home care    Where did/will the form come from? form was faxed in    When is form/letter needed by: n/a    How would you like the form/letter returned: Fax : number listed in contact

## 2025-04-05 DIAGNOSIS — J45.20 MILD INTERMITTENT ASTHMA WITHOUT COMPLICATION: ICD-10-CM

## 2025-04-06 RX ORDER — MONTELUKAST SODIUM 10 MG/1
1 TABLET ORAL DAILY
Qty: 90 TABLET | OUTPATIENT
Start: 2025-04-06

## 2025-04-15 ENCOUNTER — TELEPHONE (OUTPATIENT)
Dept: NEUROLOGY | Facility: CLINIC | Age: 55
End: 2025-04-15
Payer: MEDICARE

## 2025-04-15 NOTE — TELEPHONE ENCOUNTER
LVM x 2, Sent letter, for the patient to call back and schedule the following:    Appointment type: Return Neuro  Provider: Dr. Stanton  Return date: June 2025  Specialty phone number: [phone]  Additional appointment(s) needed: NA  Additonal Notes:

## 2025-04-18 ENCOUNTER — NURSE TRIAGE (OUTPATIENT)
Dept: NURSING | Facility: CLINIC | Age: 55
End: 2025-04-18
Payer: MEDICARE

## 2025-04-18 DIAGNOSIS — E55.9 VITAMIN D DEFICIENCY: Primary | ICD-10-CM

## 2025-04-18 DIAGNOSIS — E55.9 VITAMIN D DEFICIENCY: ICD-10-CM

## 2025-04-18 RX ORDER — FAMOTIDINE 20 MG
25 TABLET ORAL DAILY
Status: SHIPPED
Start: 2025-04-18 | End: 2025-04-21

## 2025-04-18 NOTE — PROGRESS NOTES
Please call or fax updated medication list (see below). I added Vitamin D 1000 units daily and removed naproxyn. Thanks!    Sincerely,  Dr. Felisha Briggs MD  4/18/2025      ASSESSMENT / PLAN:  (E55.9) Vitamin D deficiency  (primary encounter diagnosis)  Vitamin D 1000 UT daily.  Plan: Vitamin D, Cholecalciferol, 25 MCG (1000 UT)         CAPS          I prescribed vitamin D as noted above.  I removed naproxyn from medication list.

## 2025-04-18 NOTE — TELEPHONE ENCOUNTER
Nurse Triage SBAR    Is this a 2nd Level Triage? NO    Situation:   Medication question    Background:   Home care RN, calling to ask further questions about Vit D.    Assessment:   Asking for frequency.  He is also asking if this prescription was sent to pharmacy.    Protocol Recommended Disposition:   Call PCP When Office is Open    Recommendation:   Informed RN that mediation is to be given daily, per order.  Per chart review, it was not sent to pharmacy.  RN states it would be good if order can be sent to [facility] pharmacy to see if insurance will cover it.  Will route to PCP clinic for review.     Routed to provider    Does the patient meet one of the following criteria for ADS visit consideration? 16+ years old, with an [facility] PCP     TIP  Providers, please consider if this condition is appropriate for management at one of our Acute and Diagnostic Services sites.     If patient is a good candidate, please use dotphrase <dot>triageresponse and select Refer to ADS to document.  Reason for Disposition   [1] Caller has NON-URGENT medicine question about med that PCP prescribed AND [2] triager unable to answer question    Additional Information   Negative: [1] Intentional drug overdose AND [2] suicidal thoughts or ideas   Negative: Drug overdose and triager unable to answer question   Negative: Caller requesting a renewal or refill of a medicine patient is currently taking   Negative: Caller requesting information unrelated to medicine   Negative: Caller requesting information about COVID-19 Vaccine   Negative: Caller requesting information about Emergency Contraception   Negative: Caller requesting information about Combined Birth Control Pills   Negative: Caller requesting information about Progestin Birth Control Pills   Negative: Caller requesting information about Post-Op pain or medicines   Negative: Caller requesting a prescription antibiotic (such as Penicillin) for Strep throat and has a positive culture  result   Negative: Caller requesting a prescription anti-viral med (such as Tamiflu) and has influenza (flu) symptoms   Negative: Immunization reaction suspected   Negative: Rash while taking a medicine or within 3 days of stopping it   Negative: [1] Asthma and [2] having symptoms of asthma (cough, wheezing, etc.)   Negative: [1] Symptom of illness (e.g., headache, abdominal pain, earache, vomiting) AND [2] more than mild   Negative: Breastfeeding questions about mother's medicines and diet   Negative: MORE THAN A DOUBLE DOSE of a prescription or over-the-counter (OTC) drug   Negative: [1] DOUBLE DOSE (an extra dose or lesser amount) of prescription drug AND [2] any symptoms (e.g., dizziness, nausea, pain, sleepiness)   Negative: [1] DOUBLE DOSE (an extra dose or lesser amount) of over-the-counter (OTC) drug AND [2] any symptoms (e.g., dizziness, nausea, pain, sleepiness)   Negative: Took another person's prescription drug   Negative: [1] DOUBLE DOSE (an extra dose or lesser amount) of prescription drug AND [2] NO symptoms  (Exception: A double dose of antibiotics.)   Negative: Diabetes drug error or overdose (e.g., took wrong type of insulin or took extra dose)   Negative: [1] Prescription not at pharmacy AND [2] was prescribed by PCP recently (Exception: Triager has access to EMR and prescription is recorded there. Go to Home Care and confirm for pharmacy.)   Negative: [1] Pharmacy calling with prescription question AND [2] triager unable to answer question   Negative: [1] Caller has URGENT medicine question about med that PCP or specialist prescribed AND [2] triager unable to answer question   Negative: Medicine patch causing local rash or itching   Negative: Prescription request for new medicine (not a refill)   Negative: [1] Caller has medicine question about med NOT prescribed by PCP AND [2] triager unable to answer question (e.g., compatibility with other med, storage)    Protocols used: Medication Question  Call-A-AH

## 2025-04-24 RX ORDER — FAMOTIDINE 20 MG
25 TABLET ORAL DAILY
Qty: 90 CAPSULE | Refills: 3 | Status: SHIPPED | OUTPATIENT
Start: 2025-04-24

## 2025-04-24 NOTE — TELEPHONE ENCOUNTER
ASSESSMENT / PLAN:  (E55.9) Vitamin D deficiency  Comment: I agree with assessment and plan below, thanks!  Felisha Briggs MD  4/24/2025    Plan: Vitamin D, Cholecalciferol, 25 MCG (1000 UT)         CAPS        Rx sent.

## 2025-05-21 ENCOUNTER — TELEPHONE (OUTPATIENT)
Dept: FAMILY MEDICINE | Facility: CLINIC | Age: 55
End: 2025-05-21

## 2025-05-21 ENCOUNTER — PATIENT OUTREACH (OUTPATIENT)
Dept: CARE COORDINATION | Facility: CLINIC | Age: 55
End: 2025-05-21
Payer: MEDICARE

## 2025-05-21 NOTE — TELEPHONE ENCOUNTER
Forms/Letter Request    Type of form/letter: Home Health Certification      Do we have the form/letter: Yes: In care team 2, provider's form folder.     Who is the form from? Home care    Where did/will the form come from? form was faxed in    When is form/letter needed by: n/a    How would you like the form/letter returned: Fax : [phone]

## 2025-05-27 ENCOUNTER — TELEPHONE (OUTPATIENT)
Dept: FAMILY MEDICINE | Facility: CLINIC | Age: 55
End: 2025-05-27
Payer: MEDICARE

## 2025-05-27 NOTE — TELEPHONE ENCOUNTER
Forms/Letter Request    Type of form/letter: Home Health Certification      Do we have the form/letter: Yes: In care team 2, provider's form folder.     Who is the form from? Home care    Where did/will the form come from? form was faxed in    When is form/letter needed by: n/a    How would you like the form/letter returned: Fax : [phone]

## 2025-06-03 DIAGNOSIS — Z53.9 DIAGNOSIS NOT YET DEFINED: Primary | ICD-10-CM

## 2025-06-20 ENCOUNTER — TELEPHONE (OUTPATIENT)
Dept: FAMILY MEDICINE | Facility: CLINIC | Age: 55
End: 2025-06-20
Payer: MEDICARE

## 2025-06-20 NOTE — TELEPHONE ENCOUNTER
Eusebio Nurse from [facility] called to see if patient is still taking the Singulair medication. Writer confirmed that an order was sent already on 06/06/25. Caller understands and will be able to get it for the patient. Writer could not get the call back number as the caller hung up already.

## 2025-07-09 ENCOUNTER — TELEPHONE (OUTPATIENT)
Dept: FAMILY MEDICINE | Facility: CLINIC | Age: 55
End: 2025-07-09
Payer: MEDICARE

## 2025-07-09 NOTE — TELEPHONE ENCOUNTER
The new script didn't properly connect to the existing profile, could a new script for the Jantoven 2mg be sent?    Thank you,  GEORGES Fall [facility] Float Department

## 2025-07-10 NOTE — TELEPHONE ENCOUNTER
Nohelia Simeon CNP from the [facility] has never seen this patient.I don't see where the patient has seen any provider from this clinic. Please route to ordering provider.     Bethany WESTN, RN

## 2025-07-17 DIAGNOSIS — F33.1 MODERATE RECURRENT MAJOR DEPRESSION (H): ICD-10-CM

## 2025-07-17 DIAGNOSIS — F41.1 GENERALIZED ANXIETY DISORDER: ICD-10-CM

## 2025-07-17 DIAGNOSIS — E78.1 HIGH BLOOD TRIGLYCERIDES: ICD-10-CM

## 2025-07-17 DIAGNOSIS — E78.5 HYPERLIPIDEMIA LDL GOAL <160: ICD-10-CM

## 2025-07-17 RX ORDER — ATORVASTATIN CALCIUM 40 MG/1
40 TABLET, FILM COATED ORAL DAILY
Qty: 60 TABLET | Refills: 0 | Status: SHIPPED | OUTPATIENT
Start: 2025-07-17

## 2025-07-17 RX ORDER — VENLAFAXINE HYDROCHLORIDE 150 MG/1
150 CAPSULE, EXTENDED RELEASE ORAL DAILY
Qty: 7 CAPSULE | Refills: 0 | Status: CANCELLED | OUTPATIENT
Start: 2025-07-17

## 2025-07-18 NOTE — TELEPHONE ENCOUNTER
Patient was given a years supply at 6/20/24 visit. Looks like the refill for the wrong rx was submitted. Pended long-term rx for refill and sent PHQ9 via [facility].

## 2025-07-18 NOTE — TELEPHONE ENCOUNTER
Clinic RN: Please investigate patient's chart or contact patient if the information cannot be found because last prescribed by ED physician and not mentioned in last OV; is pt still using?    Kaur Whitehead RN on 7/18/2025 at 11:39 AM

## 2025-07-21 ENCOUNTER — TELEPHONE (OUTPATIENT)
Dept: FAMILY MEDICINE | Facility: CLINIC | Age: 55
End: 2025-07-21
Payer: MEDICARE

## 2025-07-21 NOTE — TELEPHONE ENCOUNTER
Forms/Letter Request    Type of form/letter: Home Health Certification      Do we have the form/letter: Yes: Form folder CARE TEAM 4    Who is the form from? Home care    Where did/will the form come from? form was faxed in    When is form/letter needed by: N.A    How would you like the form/letter returned: Fax : Number listed in contact

## 2025-07-22 RX ORDER — VENLAFAXINE HYDROCHLORIDE 150 MG/1
CAPSULE, EXTENDED RELEASE ORAL
Qty: 90 CAPSULE | Refills: 0 | Status: SHIPPED | OUTPATIENT
Start: 2025-07-22

## 2025-08-28 ENCOUNTER — TELEPHONE (OUTPATIENT)
Dept: FAMILY MEDICINE | Facility: CLINIC | Age: 55
End: 2025-08-28
Payer: MEDICARE

## 2025-08-30 DIAGNOSIS — I10 ESSENTIAL HYPERTENSION WITH GOAL BLOOD PRESSURE LESS THAN 140/90: ICD-10-CM

## 2025-09-02 RX ORDER — LISINOPRIL 5 MG/1
5 TABLET ORAL DAILY
Qty: 30 TABLET | Refills: 0 | Status: SHIPPED | OUTPATIENT
Start: 2025-09-02

## (undated) DEVICE — NDL EPIDURAL TUOHY 20GA 3.5" 405028

## (undated) DEVICE — LINEN BACK PACK 5440

## (undated) DEVICE — ADH SKIN CLOSURE PREMIERPRO EXOFIN 1.0ML 3470

## (undated) DEVICE — DRAIN HEMOVAC BAG 00-1500-050-10

## (undated) DEVICE — LINEN TOWEL PACK X5 5464

## (undated) DEVICE — Device

## (undated) DEVICE — SOL NACL 0.9% IRRIG 1000ML BOTTLE 2F7124

## (undated) DEVICE — DRSG PRIMAPORE 03 1/8X6" 66000318

## (undated) DEVICE — DRAPE MICROSCOPE MICRO-KOVER LEICA 48"X120" 09-MK651

## (undated) DEVICE — SUCTION MANIFOLD NEPTUNE 2 SYS 4 PORT 0702-020-000

## (undated) DEVICE — DRSG DRAIN 4X4" 7086

## (undated) DEVICE — SU ETHILON 3-0 PS-1 18" 1663H

## (undated) DEVICE — SU VICRYL 2-0 CT-2 8X18" UND D8144

## (undated) DEVICE — BLADE CLIPPER SGL USE 9680

## (undated) DEVICE — SU MONOCRYL 3-0 PS-2 18" UND Y497G

## (undated) DEVICE — SYR 07ML EPIDURAL LOSS OF RESISTANCE PULSATOR 4905

## (undated) DEVICE — GLOVE PROTEXIS BLUE W/NEU-THERA 8.0  2D73EB80

## (undated) DEVICE — LINEN GOWN X4 5410

## (undated) DEVICE — SOL WATER IRRIG 1000ML BOTTLE 2F7114

## (undated) DEVICE — PREP CHLORAPREP W/ORANGE TINT 10.5ML 260715

## (undated) DEVICE — SPONGE COTTONOID NEURO 1/2"X1/2" 30-054

## (undated) DEVICE — DRAPE C-ARM W/STRAPS 42X72" 07-CA104

## (undated) DEVICE — DRSG GAUZE 4X8" NON21842

## (undated) DEVICE — DRSG TEGADERM 4X10" 1627

## (undated) DEVICE — GLOVE PROTEXIS W/NEU-THERA 7.5  2D73TE75

## (undated) DEVICE — IOM SUPPLY

## (undated) DEVICE — SU VICRYL 1 CT-1 CR 8X18" J741D

## (undated) DEVICE — TUBING EXTENSION SET MICROBORE 6" LL 2N1194

## (undated) DEVICE — RESTRAINT LIMB HOLDER ANKLE/WRIST FOAM W/QUICK RELEASE 2533

## (undated) DEVICE — SYR BULB IRRIG 50ML LATEX FREE 0035280

## (undated) DEVICE — POSITIONER ARMBOARD FOAM 1PAIR LF FP-ARMB1

## (undated) DEVICE — DRSG TEGADERM 4X4 3/4" 1626W

## (undated) DEVICE — TOOL DISSECT MIDAS MR8 14CM MATCH HEAD 3MM MR8-14MH30

## (undated) DEVICE — DRSG KERLIX FLUFFS X5

## (undated) DEVICE — GLOVE PROTEXIS POWDER FREE SMT 7.5  2D72PT75X

## (undated) DEVICE — BRUSH SURGICAL SCRUB W/4% CHLORHEXIDINE GLUCONATE SOL 4458A

## (undated) DEVICE — DRAPE STERI TOWEL LG 1010

## (undated) DEVICE — SYR 05ML LL W/O NDL

## (undated) DEVICE — TAPE DURAPORE 3" SILK 1538-3

## (undated) DEVICE — SYR 50ML LL W/O NDL 309653

## (undated) DEVICE — DRAPE C-ARMOR 5 SIDED 5523

## (undated) DEVICE — POSITIONER HEAD DONUT FOAM 9" LF FP-HEAD9

## (undated) DEVICE — TRAY PAIN INJECTION 97A 640

## (undated) DEVICE — SOL ISOPROPYL RUBBING ALCOHOL USP 70% 4OZ HDX-20 I0020

## (undated) DEVICE — PREP DURAPREP 26ML APL 8630

## (undated) RX ORDER — FENTANYL CITRATE 50 UG/ML
INJECTION, SOLUTION INTRAMUSCULAR; INTRAVENOUS
Status: DISPENSED
Start: 2021-10-13

## (undated) RX ORDER — ROCURONIUM BROMIDE 50 MG/5 ML
SYRINGE (ML) INTRAVENOUS
Status: DISPENSED
Start: 2021-10-13

## (undated) RX ORDER — BUPIVACAINE HYDROCHLORIDE 5 MG/ML
INJECTION, SOLUTION EPIDURAL; INTRACAUDAL
Status: DISPENSED
Start: 2022-05-26

## (undated) RX ORDER — PROPOFOL 10 MG/ML
INJECTION, EMULSION INTRAVENOUS
Status: DISPENSED
Start: 2021-10-13

## (undated) RX ORDER — LIDOCAINE HYDROCHLORIDE 10 MG/ML
INJECTION, SOLUTION EPIDURAL; INFILTRATION; INTRACAUDAL; PERINEURAL
Status: DISPENSED
Start: 2020-01-29

## (undated) RX ORDER — ONDANSETRON 2 MG/ML
INJECTION INTRAMUSCULAR; INTRAVENOUS
Status: DISPENSED
Start: 2021-10-13

## (undated) RX ORDER — METHYLPREDNISOLONE ACETATE 80 MG/ML
INJECTION, SUSPENSION INTRA-ARTICULAR; INTRALESIONAL; INTRAMUSCULAR; SOFT TISSUE
Status: DISPENSED
Start: 2018-09-21

## (undated) RX ORDER — SODIUM CHLORIDE, SODIUM LACTATE, POTASSIUM CHLORIDE, CALCIUM CHLORIDE 600; 310; 30; 20 MG/100ML; MG/100ML; MG/100ML; MG/100ML
INJECTION, SOLUTION INTRAVENOUS
Status: DISPENSED
Start: 2021-10-13

## (undated) RX ORDER — METHYLPREDNISOLONE ACETATE 80 MG/ML
INJECTION, SUSPENSION INTRA-ARTICULAR; INTRALESIONAL; INTRAMUSCULAR; SOFT TISSUE
Status: DISPENSED
Start: 2020-01-29

## (undated) RX ORDER — DEXAMETHASONE SODIUM PHOSPHATE 4 MG/ML
INJECTION, SOLUTION INTRA-ARTICULAR; INTRALESIONAL; INTRAMUSCULAR; INTRAVENOUS; SOFT TISSUE
Status: DISPENSED
Start: 2021-10-13

## (undated) RX ORDER — BUPIVACAINE HYDROCHLORIDE 5 MG/ML
INJECTION, SOLUTION EPIDURAL; INTRACAUDAL
Status: DISPENSED
Start: 2020-01-29

## (undated) RX ORDER — CEFAZOLIN SODIUM 2 G/100ML
INJECTION, SOLUTION INTRAVENOUS
Status: DISPENSED
Start: 2021-10-13

## (undated) RX ORDER — LIDOCAINE HYDROCHLORIDE 5 MG/ML
INJECTION, SOLUTION INFILTRATION; INTRAVENOUS
Status: DISPENSED
Start: 2019-11-25

## (undated) RX ORDER — BUPIVACAINE HYDROCHLORIDE 5 MG/ML
INJECTION, SOLUTION EPIDURAL; INTRACAUDAL
Status: DISPENSED
Start: 2018-09-21

## (undated) RX ORDER — TRIAMCINOLONE ACETONIDE 40 MG/ML
INJECTION, SUSPENSION INTRA-ARTICULAR; INTRAMUSCULAR
Status: DISPENSED
Start: 2022-05-26

## (undated) RX ORDER — HYDROMORPHONE HYDROCHLORIDE 1 MG/ML
INJECTION, SOLUTION INTRAMUSCULAR; INTRAVENOUS; SUBCUTANEOUS
Status: DISPENSED
Start: 2021-10-13

## (undated) RX ORDER — BUPIVACAINE HYDROCHLORIDE 5 MG/ML
INJECTION, SOLUTION EPIDURAL; INTRACAUDAL
Status: DISPENSED
Start: 2018-11-07

## (undated) RX ORDER — OXYCODONE HYDROCHLORIDE 5 MG/1
TABLET ORAL
Status: DISPENSED
Start: 2021-10-13

## (undated) RX ORDER — LIDOCAINE HYDROCHLORIDE 10 MG/ML
INJECTION, SOLUTION EPIDURAL; INFILTRATION; INTRACAUDAL; PERINEURAL
Status: DISPENSED
Start: 2018-11-07

## (undated) RX ORDER — LIDOCAINE HYDROCHLORIDE 20 MG/ML
INJECTION, SOLUTION EPIDURAL; INFILTRATION; INTRACAUDAL; PERINEURAL
Status: DISPENSED
Start: 2021-10-13

## (undated) RX ORDER — ACETAMINOPHEN 325 MG/1
TABLET ORAL
Status: DISPENSED
Start: 2021-10-13

## (undated) RX ORDER — METHYLPREDNISOLONE ACETATE 80 MG/ML
INJECTION, SUSPENSION INTRA-ARTICULAR; INTRALESIONAL; INTRAMUSCULAR; SOFT TISSUE
Status: DISPENSED
Start: 2018-11-07

## (undated) RX ORDER — GABAPENTIN 300 MG/1
CAPSULE ORAL
Status: DISPENSED
Start: 2021-10-13

## (undated) RX ORDER — LIDOCAINE HYDROCHLORIDE 10 MG/ML
INJECTION, SOLUTION EPIDURAL; INFILTRATION; INTRACAUDAL; PERINEURAL
Status: DISPENSED
Start: 2018-09-21

## (undated) RX ORDER — GLYCOPYRROLATE 0.2 MG/ML
INJECTION INTRAMUSCULAR; INTRAVENOUS
Status: DISPENSED
Start: 2021-10-13

## (undated) RX ORDER — VANCOMYCIN HYDROCHLORIDE 1 G/20ML
INJECTION, POWDER, LYOPHILIZED, FOR SOLUTION INTRAVENOUS
Status: DISPENSED
Start: 2021-10-13

## (undated) RX ORDER — SUFENTANIL CITRATE 50 UG/ML
INJECTION EPIDURAL; INTRAVENOUS
Status: DISPENSED
Start: 2021-10-13

## (undated) RX ORDER — TRIAMCINOLONE ACETONIDE 40 MG/ML
INJECTION, SUSPENSION INTRA-ARTICULAR; INTRAMUSCULAR
Status: DISPENSED
Start: 2019-11-25